# Patient Record
Sex: FEMALE | Race: WHITE | NOT HISPANIC OR LATINO | Employment: OTHER | URBAN - METROPOLITAN AREA
[De-identification: names, ages, dates, MRNs, and addresses within clinical notes are randomized per-mention and may not be internally consistent; named-entity substitution may affect disease eponyms.]

---

## 2017-01-10 ENCOUNTER — TRANSCRIBE ORDERS (OUTPATIENT)
Dept: ADMINISTRATIVE | Facility: HOSPITAL | Age: 77
End: 2017-01-10

## 2017-01-10 DIAGNOSIS — Z13.820 SCREENING FOR OSTEOPOROSIS: Primary | ICD-10-CM

## 2017-01-17 ENCOUNTER — HOSPITAL ENCOUNTER (OUTPATIENT)
Dept: BONE DENSITY | Facility: HOSPITAL | Age: 77
Discharge: HOME | End: 2017-01-17
Admitting: INTERNAL MEDICINE

## 2017-01-17 DIAGNOSIS — Z13.820 SCREENING FOR OSTEOPOROSIS: ICD-10-CM

## 2017-01-17 PROCEDURE — 77080 DXA BONE DENSITY AXIAL: CPT | Performed by: RADIOLOGY

## 2017-01-17 PROCEDURE — 77080 DXA BONE DENSITY AXIAL: CPT

## 2017-07-31 ENCOUNTER — OFFICE VISIT (OUTPATIENT)
Dept: SLEEP MEDICINE | Facility: HOSPITAL | Age: 77
End: 2017-07-31

## 2017-07-31 VITALS
WEIGHT: 166 LBS | BODY MASS INDEX: 29.41 KG/M2 | DIASTOLIC BLOOD PRESSURE: 66 MMHG | SYSTOLIC BLOOD PRESSURE: 144 MMHG | HEART RATE: 82 BPM | HEIGHT: 63 IN | OXYGEN SATURATION: 96 %

## 2017-07-31 DIAGNOSIS — G47.33 OBSTRUCTIVE SLEEP APNEA, ADULT: Primary | ICD-10-CM

## 2017-07-31 DIAGNOSIS — G47.33 MILD OBSTRUCTIVE SLEEP APNEA: ICD-10-CM

## 2017-07-31 PROCEDURE — 99213 OFFICE O/P EST LOW 20 MIN: CPT | Performed by: INTERNAL MEDICINE

## 2017-07-31 NOTE — PROGRESS NOTES
Subjective:     Chief Complaint:   Chief Complaint   Patient presents with   • Follow-up       HPI:    Kaylen Garrison is a 77 y.o. female here for follow-up of obstructive sleep apnea.    BERNARD well controlled on current auto CPAP and nasal mask pillow mask.    Further details:    Since starting PAP sleep problem has: remained the same  Currently using PAP: yes Hours of usage during the night: 7    Amount of sleep per night : 7 hours  Average length of time it takes to fall asleep : 10 minutes  Number of awakenings per night : 1     She feels fatigue (tiredness, exhaustion, lethargy) in the daytime even when not sleepy? No  She feels sleepy (or struggles to stay awake) in the daytime? No    [place] Scale scored as 1/24.    Type of mask: nasal pillow    She (since starting PAP or since last visit) has problems with the following:   Pressure from the mask 0 - No Problem  Skin irritation from the mask 0 - No Problem  Mask coming off face 0 - No Problem  Air leaks from the mask 1 - Mild Problems  Dry mouth or throat 1 - Mild Problems  Nasal congestion 0 - No Problem    I reviewed her PAP download:  Average pressure: 9  Average AHI:  3  Average minutes in large leak per night: 5      Current medications are:   Current Outpatient Prescriptions:   •  amLODIPine (NORVASC) 2.5 MG tablet, daily., Disp: , Rfl:   •  aspirin 81 MG tablet, Take 81 mg by mouth daily., Disp: , Rfl:   •  glipiZIDE (GLUCOTROL) 5 MG ER tablet, daily., Disp: , Rfl:   •  metFORMIN XR (GLUCOPHAGE-XR) 500 MG 24 hr tablet, 2 (two) times a day. 2 tabs 2x daily, Disp: , Rfl:   •  simvastatin (ZOCOR) 20 MG tablet, daily., Disp: , Rfl:   •  sitaGLIPtin (JANUVIA) 100 MG tablet, Take  by mouth daily., Disp: , Rfl:   •  triamterene-hydrochlorothiazide (DYAZIDE) 37.5-25 MG per capsule, daily., Disp: , Rfl: .      The patient's relevant past medical, surgical, family and social history were reviewed and updated in Epic as appropriate.     ROS:    Review of  Systems   Constitutional: Negative for fatigue.   HENT: Negative for congestion.    Respiratory: Negative for apnea.    Psychiatric/Behavioral: Negative for sleep disturbance.         Objective:    Physical Exam   Constitutional: She is oriented to person, place, and time. She appears well-developed and well-nourished.   HENT:   Head: Normocephalic and atraumatic.   Mouth/Throat: Oropharynx is clear and moist.   Class II airway   Neck: Neck supple. No thyromegaly present.   Cardiovascular: Normal rate and regular rhythm.  Exam reveals no gallop and no friction rub.    No murmur heard.  Pulmonary/Chest: Effort normal. No respiratory distress. She has no wheezes. She has no rales.   Musculoskeletal: She exhibits no edema.   Neurological: She is alert and oriented to person, place, and time.   Skin: Skin is warm and dry.   Psychiatric: She has a normal mood and affect. Her behavior is normal.   Vitals reviewed.      Data:    Patient's PAP download was personally reviewed including raw data and results.    Assessment:    Problem List Items Addressed This Visit        Pulmonary Problems    Mild obstructive sleep apnea    Overview     APAP 8-18           Other Visit Diagnoses     Obstructive sleep apnea, adult    -  Primary          BERNARD well controlled on current auto CPAP and nasal mask pillow mask.    Plan:     No change in PAP settings.  No change in her mask size or type.  Continued efforts at further weight loss.  I urged 7-8 hours of sleep per night on average.  I renewed supplies for the next year.  Follow-up scheduled.  If problems in the interim she knows how to contact us or her [employer] company.  Discussed the above in detail with the patient.        Signed by  Deuce Tyson MD

## 2017-09-19 ENCOUNTER — OFFICE VISIT (OUTPATIENT)
Dept: CARDIOLOGY | Facility: CLINIC | Age: 77
End: 2017-09-19

## 2017-09-19 VITALS
BODY MASS INDEX: 30.78 KG/M2 | DIASTOLIC BLOOD PRESSURE: 58 MMHG | SYSTOLIC BLOOD PRESSURE: 114 MMHG | HEIGHT: 61 IN | HEART RATE: 82 BPM | WEIGHT: 163 LBS

## 2017-09-19 DIAGNOSIS — R55 SYNCOPE, UNSPECIFIED SYNCOPE TYPE: Primary | ICD-10-CM

## 2017-09-19 DIAGNOSIS — E78.2 MIXED HYPERLIPIDEMIA: ICD-10-CM

## 2017-09-19 DIAGNOSIS — I10 ESSENTIAL HYPERTENSION: ICD-10-CM

## 2017-09-19 PROCEDURE — 99213 OFFICE O/P EST LOW 20 MIN: CPT | Performed by: INTERNAL MEDICINE

## 2017-09-19 NOTE — PROGRESS NOTES
Kaylen Garrison  1940  [phone]  [phone]    09/19/2017    Nika Shelton MD    Chief Complaint   Patient presents with   • Loss of Consciousness     Problem List:  1. Syncope, April 2012.    A. Echocardiogram, April 2012, revealing an LVEF of 55% to 60% with no valvular abnormalities appreciated.    B. Carotid studies performed, April 9, 2012, revealing less than 50% bilateral carotid artery stenosis.    C. No acute findings per CT scan.    D. MRI of the brain, April 9, 2012, revealing cortical atrophy, chronic ischemic changes are noted.    E. Abnormal myocardial perfusion study, May 29, 2012, revealing an LVEF of 79% with apical hypoperfusion suspicious for ischemia, Arnaud Augustine M.D.     F. MPS, 9/12/2016: EF > 70%. No evidence of inducible ischemia. Borderline inferior ST segment changes were seen.  2. Hypertension  3. Dyslipidemia   4. Diabetes mellitus, type 2.   5. Obesity.   6. Meniere's disease.   7. Surgical history.   A. Hysterectomy   B. Benign tumor removal from the left breast.       Allergies   Allergen Reactions   • Adhesive Tape    • Latex    • Penicillins Rash       Current Medications:      Current Outpatient Prescriptions:   •  amLODIPine (NORVASC) 2.5 MG tablet, daily., Disp: , Rfl:   •  aspirin 81 MG tablet, Take 81 mg by mouth daily., Disp: , Rfl:   •  glipiZIDE (GLUCOTROL) 5 MG ER tablet, daily., Disp: , Rfl:   •  metFORMIN XR (GLUCOPHAGE-XR) 500 MG 24 hr tablet, 2 (two) times a day. 2 tabs 2x daily, Disp: , Rfl:   •  simvastatin (ZOCOR) 20 MG tablet, daily., Disp: , Rfl:   •  sitaGLIPtin (JANUVIA) 100 MG tablet, Take  by mouth daily., Disp: , Rfl:   •  triamterene-hydrochlorothiazide (DYAZIDE) 37.5-25 MG per capsule, daily., Disp: , Rfl:     HPI    Kaylen Garrison presents today for 12 month follow up of syncope, hypertension, and hyperlipidemia. Since last visit, patient has been feeling well overall from a cardiovascular standpoint. Patient denies chest pain,  "palpitations, shortness of breath, edema, PND, orthopnea, dizziness, and syncope. She states she has been trying to remain as active as possible by exercising, but has not been able to do much over the past month. She is curious about what she should do about her varicose veins. She notes she does travel often, but tries to get up and walk around on her flights.    The following portions of the patient's history were reviewed and updated as appropriate: allergies, current medications and problem list.    Pertinent positives as listed in the HPI.  All other systems reviewed are negative.    Vitals:    09/19/17 1346   BP: 114/58   BP Location: Right arm   Patient Position: Sitting   Pulse: 82   Weight: 163 lb (73.9 kg)   Height: 61\" (154.9 cm)       Physical Exam:  General: Alert and oriented  Neck: Jugular venous pressure is within normal limits. Carotids have normal upstrokes without bruits.   Cardiovascular: Regular rate and rhythm with 1/6 systolic ejection murmur at right upper sternal border.  Lungs: Clear without rales or wheezes. Equal expansion is noted.   Extremities: Show no edema.  Skin: warm and dry.  Neurologic: nonfocal    Diagnostic Data:    Procedures    Assessment:      ICD-10-CM ICD-9-CM   1. Syncope, unspecified syncope type R55 780.2   2. Essential hypertension I10 401.9   3. Mixed hyperlipidemia E78.2 272.2       Plan:    1. Recommend Jobst light compression stockings for varicose veins, especially during travel.  2. Continue routine exercise regimen; 30 minutes per day, 4-5 days per week.  3. Continue current medications.  4. F/up in 12 months or sooner if needed.    Scribed for Matilde Alvarez MD by Jose Platt. 9/19/2017  2:00 PM    I Matilde Alvarez MD personally performed the services described in this documentation as scribed by the above individual in my presence, and it is both accurate and complete.    Matilde Alvarez MD, FACC    "

## 2018-08-09 ENCOUNTER — OFFICE VISIT (OUTPATIENT)
Dept: SLEEP MEDICINE | Facility: HOSPITAL | Age: 78
End: 2018-08-09

## 2018-08-09 VITALS
SYSTOLIC BLOOD PRESSURE: 127 MMHG | OXYGEN SATURATION: 97 % | HEART RATE: 77 BPM | BODY MASS INDEX: 27.68 KG/M2 | DIASTOLIC BLOOD PRESSURE: 62 MMHG | WEIGHT: 156.2 LBS | HEIGHT: 63 IN

## 2018-08-09 DIAGNOSIS — G47.33 OSA (OBSTRUCTIVE SLEEP APNEA): Primary | ICD-10-CM

## 2018-08-09 PROCEDURE — 99213 OFFICE O/P EST LOW 20 MIN: CPT | Performed by: NURSE PRACTITIONER

## 2018-08-09 NOTE — PROGRESS NOTES
Subjective: Follow-up        Chief Complaint:   Chief Complaint   Patient presents with   • Follow-up       HPI:    Kaylen Garrison is a 78 y.o. female here for follow-up of andrea.  Patient states she is doing very well with her CPAP machine.  She sleeping 7 hours approximately a night.  She feels rested upon awakening.  Her [place] score is 2/24.  She is doing well with her current settings.  She does not need any changes made today.        Current medications are:   Current Outpatient Prescriptions:   •  amLODIPine (NORVASC) 2.5 MG tablet, daily., Disp: , Rfl:   •  aspirin 81 MG tablet, Take 81 mg by mouth daily., Disp: , Rfl:   •  glipiZIDE (GLUCOTROL) 5 MG ER tablet, daily., Disp: , Rfl:   •  metFORMIN XR (GLUCOPHAGE-XR) 500 MG 24 hr tablet, 2 (two) times a day. 2 tabs 2x daily, Disp: , Rfl:   •  simvastatin (ZOCOR) 20 MG tablet, daily., Disp: , Rfl:   •  sitaGLIPtin (JANUVIA) 100 MG tablet, Take  by mouth daily., Disp: , Rfl:   •  triamterene-hydrochlorothiazide (DYAZIDE) 37.5-25 MG per capsule, daily., Disp: , Rfl: .      The patient's relevant past medical, surgical, family and social history were reviewed and updated in Epic as appropriate.       Review of Systems   Constitutional: Negative for chills, fatigue, fever and unexpected weight change.   HENT: Positive for postnasal drip and rhinorrhea.    Respiratory: Positive for apnea and shortness of breath.    Musculoskeletal: Positive for arthralgias and back pain.   Allergic/Immunologic: Positive for environmental allergies.   Neurological: Positive for dizziness.   Psychiatric/Behavioral: Positive for sleep disturbance.   All other systems reviewed and are negative.        Objective:    Physical Exam   Constitutional: She is oriented to person, place, and time. She appears well-developed and well-nourished.   HENT:   Head: Normocephalic and atraumatic.   Mouth/Throat: Oropharynx is clear and moist.   Mallampati 2 anatomy   Eyes: Conjunctivae are normal.    Neck: Neck supple. No thyromegaly present.   Cardiovascular: Normal rate and regular rhythm.    Pulmonary/Chest: Effort normal and breath sounds normal.   Lymphadenopathy:     She has no cervical adenopathy.   Neurological: She is alert and oriented to person, place, and time.   Skin: Skin is warm and dry.   Psychiatric: She has a normal mood and affect. Her behavior is normal. Judgment and thought content normal.   Nursing note and vitals reviewed.    Reviewed compliance download with patient usages at 92.8%.  AHI of 2.2.  90% pressure 9.8.    ASSESSMENT/PLAN    Kaylen was seen today for follow-up.    Diagnoses and all orders for this visit:    BERNARD (obstructive sleep apnea)  -     CPAP Therapy            1. Counseled patient regarding multimodal approach with healthy nutrition, healthy sleep, regular physical activity, social activities, counseling, and medications. Encouraged to practice lateral sleep position. Avoid alcohol and sedatives close to bedtime.  2. Refill supplies ×1 year.  Return to clinic one year or sooner as symptoms warrant.    I have reviewed the results of my evaluation and impression and discussed my recommendations in detail with the patient.      Signed by  VALERIANO Covarrubias    August 9, 2018      CC: Nika Shelton MD          No ref. provider found

## 2018-08-09 NOTE — PATIENT INSTRUCTIONS

## 2019-02-05 NOTE — PROGRESS NOTES
Kaylen Garrison  1940  78 y.o.  [phone]  [phone]      02/06/2019    Nika Shelton MD    Chief Complaint   Patient presents with   • Syncope, unspecified syncope type       Problem List:  1. Syncope, April 2012:  a. Echocardiogram, April 2012: EF 55-60% with no valvular abnormalities.   b. Carotid studies, 04/09/2012: 0-49% bilateral carotid artery stenosis.   c. No acute findings per CT scan.   d. MRI of the brain, 04/09/2012: Cortical atrophy, chronic ischemic changes noted.   e. Abnormal MPS, 05/29/2012: EF 79% with apical hypoperfusion suspicious for ischemia, Dr. Arnaud Augustine.  f. MPS, 09/12/2016: EF > 70%. No evidence of inducible ischemia. Borderline inferior ST segment changes.  2. Hypertension.  3. Dyslipidemia.  4. Diabetes mellitus, type 2.   5. Obesity.   6. Meniere's disease.  7. Surgical history:  a. Hysterectomy.  b. Benign tumor removal from the left breast.     Allergies   Allergen Reactions   • Adhesive Tape    • Latex    • Penicillins Rash       Current Medications:    Current Outpatient Medications:   •  amLODIPine (NORVASC) 2.5 MG tablet, Take 2.5 mg by mouth Daily., Disp: , Rfl:   •  aspirin 81 MG tablet, Take 81 mg by mouth daily., Disp: , Rfl:   •  glipiZIDE (GLUCOTROL) 5 MG ER tablet, Take 5 mg by mouth Daily., Disp: , Rfl:   •  metFORMIN XR (GLUCOPHAGE-XR) 500 MG 24 hr tablet, Take 1,000 mg by mouth 2 (Two) Times a Day. 2 tabs 2x daily , Disp: , Rfl:   •  simvastatin (ZOCOR) 20 MG tablet, Take 20 mg by mouth Daily., Disp: , Rfl:   •  sitaGLIPtin (JANUVIA) 100 MG tablet, Take  by mouth daily., Disp: , Rfl:   •  triamterene-hydrochlorothiazide (DYAZIDE) 37.5-25 MG per capsule, daily., Disp: , Rfl:     HPI    Kaylen Garrison is a 78 y.o. female who presents today for annual follow up of hypertension and hyperlipidemia. Since last visit, she has been doing well overall from a cardiovascular standpoint. Patient's PCP switched her from simvastatin 20 mg to rosuvastatin 20  "mg for better control of her hyperlipidemia. She has not made this change yet as she wanted to make sure that we would agree with this decision. Patient denies chest pain, palpitations, shortness of breath, edema, dizziness, and syncope. Her twin sister is a physical therapist and has been advising her on exercises to remain active and help with her balance.    The following portions of the patient's history were reviewed and updated as appropriate: allergies, current medications and problem list.    Pertinent positives as listed in the HPI.  All other systems reviewed are negative.    Vitals:    02/06/19 0940   BP: 138/56   BP Location: Right arm   Patient Position: Sitting   Pulse: 90   Weight: 71.2 kg (157 lb)   Height: 160 cm (63\")       Physical Exam:    General: Alert and oriented  Neck: Jugular venous pressure is within normal limits. Carotids have normal upstrokes without bruits.   Cardiovascular: Heart has a nondisplaced focal PMI. Regular rate and rhythm with 1/6 systolic ejection murmur at the right upper sternal border. No gallop or rub.  Lungs: Clear without rales or wheezes. Equal expansion is noted.   Extremities: Show no edema.  Skin: warm and dry.  Neurologic: nonfocal    Diagnostic Data:  No recent lab results available for review.     Procedures    Assessment:      ICD-10-CM ICD-9-CM   1. Essential hypertension I10 401.9   2. Mixed hyperlipidemia E78.2 272.2       Plan:    1. Continue aspirin 81 mg.  2. Continue amlodipine and triamterene-hydrochlorothiazide for hypertension.  3. DC simvastatin and start rosuvastatin 20 mg daily.  4. Continue all other current medications.  5. F/up in 12 months or sooner if needed.    Scribed for Matilde Alvarez MD by Genevieve Fernandez. 2/6/2019  10:07 AM     I Matilde Alvarez MD personally performed the services described in this documentation as scribed by the above individual in my presence, and it is both accurate and complete.    Matilde Alvarez, " MD, FACC

## 2019-02-06 ENCOUNTER — OFFICE VISIT (OUTPATIENT)
Dept: CARDIOLOGY | Facility: CLINIC | Age: 79
End: 2019-02-06

## 2019-02-06 VITALS
DIASTOLIC BLOOD PRESSURE: 56 MMHG | WEIGHT: 157 LBS | HEART RATE: 90 BPM | BODY MASS INDEX: 27.82 KG/M2 | SYSTOLIC BLOOD PRESSURE: 138 MMHG | HEIGHT: 63 IN

## 2019-02-06 DIAGNOSIS — E78.2 MIXED HYPERLIPIDEMIA: ICD-10-CM

## 2019-02-06 DIAGNOSIS — I10 ESSENTIAL HYPERTENSION: Primary | ICD-10-CM

## 2019-02-06 PROCEDURE — 99213 OFFICE O/P EST LOW 20 MIN: CPT | Performed by: INTERNAL MEDICINE

## 2019-08-02 ENCOUNTER — OFFICE VISIT (OUTPATIENT)
Dept: SLEEP MEDICINE | Facility: HOSPITAL | Age: 79
End: 2019-08-02

## 2019-08-02 VITALS
HEART RATE: 73 BPM | DIASTOLIC BLOOD PRESSURE: 57 MMHG | OXYGEN SATURATION: 99 % | WEIGHT: 153.2 LBS | HEIGHT: 63 IN | SYSTOLIC BLOOD PRESSURE: 117 MMHG | BODY MASS INDEX: 27.14 KG/M2

## 2019-08-02 DIAGNOSIS — G47.33 OSA (OBSTRUCTIVE SLEEP APNEA): Primary | ICD-10-CM

## 2019-08-02 PROCEDURE — 99212 OFFICE O/P EST SF 10 MIN: CPT | Performed by: NURSE PRACTITIONER

## 2019-08-02 NOTE — PATIENT INSTRUCTIONS

## 2019-08-02 NOTE — PROGRESS NOTES
"    Chief Complaint:   Chief Complaint   Patient presents with   • Follow-up       HPI:    Kaylen Garrison is a 79 y.o. female here for follow-up of sleep apnea.  Patient was last seen 8/9/2018.  Patient states she is doing well with CPAP therapy.  Patient is sleeping 7 hours nightly and feels refreshed upon awakening.  Patient has an [place] score of 1/24.  Patient has been experiencing a little dry mouth.  Patient has not increased the humidity settings on her machine and has not tried any over-the-counter medication.  Patient currently has allergies after cleaning out boxes in her garage, she has watery eyes and nasal congestion.  Other than this patient states she is doing \"wonderful.\"        Current medications are:   Current Outpatient Medications:   •  amLODIPine (NORVASC) 2.5 MG tablet, Take 2.5 mg by mouth Daily., Disp: , Rfl:   •  aspirin 81 MG tablet, Take 81 mg by mouth daily., Disp: , Rfl:   •  glipiZIDE (GLUCOTROL) 5 MG ER tablet, Take 5 mg by mouth Daily., Disp: , Rfl:   •  metFORMIN XR (GLUCOPHAGE-XR) 500 MG 24 hr tablet, Take 1,000 mg by mouth 2 (Two) Times a Day. 2 tabs 2x daily , Disp: , Rfl:   •  sitaGLIPtin (JANUVIA) 100 MG tablet, Take  by mouth daily., Disp: , Rfl:   •  triamterene-hydrochlorothiazide (DYAZIDE) 37.5-25 MG per capsule, daily., Disp: , Rfl: .      The patient's relevant past medical, surgical, family and social history were reviewed and updated in Epic as appropriate.       Review of Systems   HENT: Positive for congestion, postnasal drip and rhinorrhea.    Eyes: Positive for redness, itching and visual disturbance.   Respiratory: Positive for apnea and shortness of breath.    Musculoskeletal: Positive for arthralgias and back pain.   Allergic/Immunologic: Positive for environmental allergies.   Neurological: Positive for dizziness.   Psychiatric/Behavioral: Positive for sleep disturbance.   All other systems reviewed and are negative.        Objective:    Physical Exam "   Constitutional: She is oriented to person, place, and time. She appears well-developed and well-nourished.   HENT:   Head: Normocephalic and atraumatic.   Mouth/Throat: Oropharynx is clear and moist.   Class 2 airway   Eyes: Conjunctivae are normal.   Neck: Neck supple. No thyromegaly present.   Cardiovascular: Normal rate and regular rhythm.   Pulmonary/Chest: Effort normal and breath sounds normal.   Lymphadenopathy:     She has no cervical adenopathy.   Neurological: She is alert and oriented to person, place, and time.   Skin: Skin is warm and dry.   Psychiatric: She has a normal mood and affect. Her behavior is normal. Judgment and thought content normal.   Nursing note and vitals reviewed.  168/1 80 days of use.  Greater than 4-hour use 90%.  AHI 2.7.  Percent pressure 10.5.  Download reviewed with patient.      ASSESSMENT/PLAN    Kaylen was seen today for follow-up.    Diagnoses and all orders for this visit:    BERNARD (obstructive sleep apnea)  -     CPAP Therapy            1. Counseled patient regarding multimodal approach with healthy nutrition, healthy sleep, regular physical activity, social activities, counseling, and medications. Encouraged to practice lateral sleep position. Avoid alcohol and sedatives close to bedtime.  2.   Refill supplies x1 year.  Return to clinic 1 year or sooner if symptoms warrant.  Patient to increase humidity or try Biotene if her mucous membranes continue to be dry.  I have reviewed the results of my evaluation and impression and discussed my recommendations in detail with the patient.      Signed by  VALERIANO Covarrubias    August 2, 2019      CC: Nika Shelton MD          No ref. provider found

## 2020-02-12 ENCOUNTER — OFFICE VISIT (OUTPATIENT)
Dept: CARDIOLOGY | Facility: CLINIC | Age: 80
End: 2020-02-12

## 2020-02-12 VITALS
BODY MASS INDEX: 26.58 KG/M2 | WEIGHT: 150 LBS | HEIGHT: 63 IN | SYSTOLIC BLOOD PRESSURE: 126 MMHG | HEART RATE: 101 BPM | DIASTOLIC BLOOD PRESSURE: 38 MMHG

## 2020-02-12 DIAGNOSIS — E78.2 MIXED HYPERLIPIDEMIA: ICD-10-CM

## 2020-02-12 DIAGNOSIS — E66.3 OVERWEIGHT (BMI 25.0-29.9): ICD-10-CM

## 2020-02-12 DIAGNOSIS — I10 ESSENTIAL HYPERTENSION: Primary | ICD-10-CM

## 2020-02-12 PROCEDURE — 99213 OFFICE O/P EST LOW 20 MIN: CPT | Performed by: INTERNAL MEDICINE

## 2020-02-12 RX ORDER — AMPICILLIN TRIHYDRATE 250 MG
500 CAPSULE ORAL DAILY
COMMUNITY

## 2020-02-12 RX ORDER — ROSUVASTATIN CALCIUM 20 MG/1
20 TABLET, COATED ORAL DAILY
COMMUNITY
Start: 2019-12-09 | End: 2021-12-08 | Stop reason: ALTCHOICE

## 2020-02-12 NOTE — PROGRESS NOTES
[facility]    Patient ID: Kaylen Garrison is a  79 y.o.  female.  : 1940   Contact: [phone]    Encounter date: 2020    PCP: Nika Shelton MD      Chief complaint:   Chief Complaint   Patient presents with   • Essential hypertension       Problem List:  1. Syncope, 2012:  a. Echocardiogram, 2012: EF 55-60% with no valvular abnormalities.   b. Carotid studies, 2012: 0-49% bilateral carotid artery stenosis.   c. No acute findings per CT scan.   d. MRI of the brain, 2012: Cortical atrophy, chronic ischemic changes noted.   e. Abnormal MPS, 2012: EF 79% with apical hypoperfusion suspicious for ischemia, Dr. Arnaud Augustine.  f. MPS, 2016: EF > 70%. No evidence of inducible ischemia. Borderline inferior ST segment changes.  2. Hypertension.  3. Dyslipidemia.  4. Diabetes mellitus, type 2.   5. Obesity.   6. Meniere's disease.  7. Surgical history:  a. Hysterectomy.  b. Benign tumor removal from the left breast.     Allergies   Allergen Reactions   • Adhesive Tape Rash   • Latex Rash   • Penicillins Rash       Current Medications:      Current Outpatient Medications:   •  amLODIPine (NORVASC) 2.5 MG tablet, Take 2.5 mg by mouth Daily., Disp: , Rfl:   •  aspirin 81 MG tablet, Take 81 mg by mouth daily., Disp: , Rfl:   •  Cinnamon 500 MG capsule, Take 500 mg by mouth Daily., Disp: , Rfl:   •  glipiZIDE (GLUCOTROL) 5 MG ER tablet, Take 5 mg by mouth Daily., Disp: , Rfl:   •  metFORMIN (GLUCOPHAGE) 500 MG tablet, Take 1,000 mg by mouth 2 (Two) Times a Day With Meals., Disp: , Rfl:   •  Mirabegron ER (MYRBETRIQ) 25 MG tablet sustained-release 24 hour 24 hr tablet, Take 25 mg by mouth Daily., Disp: , Rfl:   •  Omega-3 300 MG capsule, Take 300 mg by mouth Daily., Disp: , Rfl:   •  rosuvastatin (CRESTOR) 20 MG tablet, Take 20 mg by mouth Daily., Disp: , Rfl:   •  sitaGLIPtin (JANUVIA) 100 MG tablet, Take  by mouth  "daily., Disp: , Rfl:   •  triamterene-hydrochlorothiazide (DYAZIDE) 37.5-25 MG per capsule, Take 1 capsule by mouth Daily., Disp: , Rfl:     HPI    Kaylen Garrison is a 79 y.o. female who presents today for annual follow up of hypertension and hyperlipidemia. Since last visit, she has been feeling well overall from a cardiovascular standpoint. She has been going to physical therapy for leg weakness, and has been pleased with the results. She monitors her blood pressure at home, and reports it has been well-controlled. She denies any orthostatic dizziness. Patient denies chest pain, palpitations, shortness of breath, edema, dizziness, and syncope. She stays busy with [facility]. Pt has questions about what lifestyle changes she should make for weight loss and better overall health.      The following portions of the patient's history were reviewed and updated as appropriate: allergies, current medications and problem list.    Pertinent positives as listed in the HPI.  All other systems reviewed are negative.         Vitals:    02/12/20 1308 02/12/20 1324   BP: 98/50 (!) 126/38   BP Location: Right arm Right arm   Patient Position: Sitting Sitting   Pulse: 101    Weight: 68 kg (150 lb)    Height: 160 cm (63\")        Physical Exam:  General: Alert and oriented.  Neck: Jugular venous pressure is within normal limits. Carotids have normal upstrokes without bruits.   Cardiovascular: Heart has a nondisplaced focal PMI. Regular rate and rhythm without gallop or rub. 1/6 ASHWIN RUSB.  Lungs: Clear without rales or wheezes. Equal expansion is noted.   Extremities: Show no edema.  Skin: Warm and dry.  Neurologic: Nonfocal.       Assessment:    ICD-10-CM ICD-9-CM   1. Essential hypertension I10 401.9   2. Mixed hyperlipidemia E78.2 272.2   3. Overweight (BMI 25.0-29.9) E66.3 278.02         Plan:  1. Continue PT and begin routine aerobic exercise, at least 30 minutes 5 days per week.   2. Carb cycling recommended for " weight loss, as well as exercise.  3. Continue amlodipine and Dyazide for hypertension and fluid rentention.  4. Continue rosuvastatin 20 mg for hyperlipidemia.  5. Continue all other current medications.  6. F/up in 12 months, sooner if needed.      Scribed for Matilde Alvarez MD by Genevieve Fernandez. 2/12/2020  1:41 PM     I Matilde Alvarez MD personally performed the services described in this documentation as scribed by the above individual in my presence, and it is both accurate and complete.    Matilde Alvarez MD, FACC

## 2020-08-03 ENCOUNTER — OFFICE VISIT (OUTPATIENT)
Dept: SLEEP MEDICINE | Facility: HOSPITAL | Age: 80
End: 2020-08-03

## 2020-08-03 VITALS
OXYGEN SATURATION: 98 % | BODY MASS INDEX: 25.94 KG/M2 | SYSTOLIC BLOOD PRESSURE: 152 MMHG | DIASTOLIC BLOOD PRESSURE: 67 MMHG | WEIGHT: 146.4 LBS | HEIGHT: 63 IN | HEART RATE: 86 BPM

## 2020-08-03 DIAGNOSIS — G47.33 OSA (OBSTRUCTIVE SLEEP APNEA): Primary | ICD-10-CM

## 2020-08-03 PROCEDURE — 99212 OFFICE O/P EST SF 10 MIN: CPT | Performed by: NURSE PRACTITIONER

## 2020-08-03 NOTE — PROGRESS NOTES
Chief Complaint:   Chief Complaint   Patient presents with   • Follow-up       HPI:    Kaylen Garrison is a 80 y.o. female here for follow-up of sleep apnea.  Patient was last seen 8/2/2019.  Patient has a history Hyperlipidemia, Hypertension, syncope, and diabetes.  Patient states she is doing very well with CPAP therapy.  Patient is sleeping 6 to 7 hours nightly and does feel refreshed upon awakening.  Patient will fall asleep within minutes and does not get up in the night.  Patient has an [place] score of 2/24.  Patient has no concerns or complaints related to CPAP therapy and wishes to continue.        Current medications are:   Current Outpatient Medications:   •  amLODIPine (NORVASC) 2.5 MG tablet, Take 2.5 mg by mouth Daily., Disp: , Rfl:   •  aspirin 81 MG tablet, Take 81 mg by mouth daily., Disp: , Rfl:   •  Cinnamon 500 MG capsule, Take 500 mg by mouth Daily., Disp: , Rfl:   •  glipiZIDE (GLUCOTROL) 5 MG ER tablet, Take 5 mg by mouth Daily., Disp: , Rfl:   •  metFORMIN (GLUCOPHAGE) 500 MG tablet, Take 1,000 mg by mouth 2 (Two) Times a Day With Meals., Disp: , Rfl:   •  Mirabegron ER (MYRBETRIQ) 25 MG tablet sustained-release 24 hour 24 hr tablet, Take 25 mg by mouth Daily., Disp: , Rfl:   •  Omega-3 300 MG capsule, Take 300 mg by mouth Daily., Disp: , Rfl:   •  rosuvastatin (CRESTOR) 20 MG tablet, Take 20 mg by mouth Daily., Disp: , Rfl:   •  sitaGLIPtin (JANUVIA) 100 MG tablet, Take  by mouth daily., Disp: , Rfl:   •  triamterene-hydrochlorothiazide (DYAZIDE) 37.5-25 MG per capsule, Take 1 capsule by mouth Daily., Disp: , Rfl: .      The patient's relevant past medical, surgical, family and social history were reviewed and updated in Epic as appropriate.       Review of Systems   Respiratory: Positive for apnea and shortness of breath.    Musculoskeletal: Positive for arthralgias and back pain.   Allergic/Immunologic: Positive for environmental allergies.   Neurological: Positive for dizziness.    Psychiatric/Behavioral: Positive for sleep disturbance.   All other systems reviewed and are negative.        Objective:    Physical Exam   Constitutional: She is oriented to person, place, and time. She appears well-developed and well-nourished.   HENT:   Head: Normocephalic and atraumatic.   Class 2 airway   Eyes: Conjunctivae are normal.   Neck: No tracheal deviation present.   Pulmonary/Chest: Effort normal.   Neurological: She is alert and oriented to person, place, and time.   Skin: No erythema. No pallor.   Psychiatric: She has a normal mood and affect. Her behavior is normal. Judgment and thought content normal.     90/90 days of use, > 4 hour use 97.8, 90 % pressure 10.2, ahi 2.5 , settings 8-18, download reviewed with pt     ASSESSMENT/PLAN    Kaylen was seen today for follow-up.    Diagnoses and all orders for this visit:    BERNARD (obstructive sleep apnea)  -     CPAP Therapy            1. Counseled patient regarding multimodal approach with healthy nutrition, healthy sleep, regular physical activity, social activities, counseling, and medications. Encouraged to practice lateral  sleep position. Avoid alcohol and sedatives close to bedtime.  2. Refill supplies x1 year.  Return to clinic 1 year or sooner if symptoms warrant.    I have reviewed the results of my evaluation and impression and discussed my recommendations in detail with the patient.      Signed by  VALERIANO Covarrubias    August 3, 2020      CC: Nika Shelton MD          No ref. provider found

## 2020-11-04 ENCOUNTER — TRANSCRIBE ORDERS (OUTPATIENT)
Dept: ADMINISTRATIVE | Facility: HOSPITAL | Age: 80
End: 2020-11-04

## 2020-11-04 ENCOUNTER — TELEPHONE (OUTPATIENT)
Dept: ENDOCRINOLOGY | Facility: CLINIC | Age: 80
End: 2020-11-04

## 2020-11-04 DIAGNOSIS — Z78.0 MENOPAUSE: Primary | ICD-10-CM

## 2020-11-04 NOTE — TELEPHONE ENCOUNTER
PT SAW HER PCP  YESTERDAY AND HER BLOOD PRESSURE /60  THEY TOLD HER TO CALL US    PLEASE CALL THE PT BACK [phone]

## 2020-11-04 NOTE — TELEPHONE ENCOUNTER
Called pt and left VM.  Since BP was low, she can stop the amlodipine 2.5mg qd and monitor BP.  I will try to give her a call back tomorrow as well.

## 2020-11-05 NOTE — TELEPHONE ENCOUNTER
Spoke with pt. She stopped amlodipine and dyazide. She will monitor BP and call if going above 140/85.

## 2020-12-08 ENCOUNTER — TELEPHONE (OUTPATIENT)
Dept: ENDOCRINOLOGY | Facility: CLINIC | Age: 80
End: 2020-12-08

## 2020-12-08 NOTE — TELEPHONE ENCOUNTER
Spoke with patient.  She is not going to fast for appointment.  It looks like in [place] patient will need CMP, A1c, vitamin d, PTH

## 2020-12-08 NOTE — TELEPHONE ENCOUNTER
PATIENT WOULD LIEK TO KNOW IF SHE NEEDS TO BE FASTING FOR HER UPCOMING APPT.    CALL BACK [phone]

## 2020-12-30 ENCOUNTER — OFFICE VISIT (OUTPATIENT)
Dept: ENDOCRINOLOGY | Facility: CLINIC | Age: 80
End: 2020-12-30

## 2020-12-30 ENCOUNTER — LAB (OUTPATIENT)
Dept: LAB | Facility: HOSPITAL | Age: 80
End: 2020-12-30

## 2020-12-30 ENCOUNTER — TELEPHONE (OUTPATIENT)
Dept: ENDOCRINOLOGY | Facility: CLINIC | Age: 80
End: 2020-12-30

## 2020-12-30 VITALS
DIASTOLIC BLOOD PRESSURE: 64 MMHG | OXYGEN SATURATION: 98 % | HEART RATE: 68 BPM | WEIGHT: 144 LBS | BODY MASS INDEX: 23.99 KG/M2 | SYSTOLIC BLOOD PRESSURE: 124 MMHG | HEIGHT: 65 IN | TEMPERATURE: 97.3 F

## 2020-12-30 DIAGNOSIS — E11.9 TYPE 2 DIABETES MELLITUS WITHOUT COMPLICATION, WITHOUT LONG-TERM CURRENT USE OF INSULIN (HCC): Primary | Chronic | ICD-10-CM

## 2020-12-30 DIAGNOSIS — E78.00 PURE HYPERCHOLESTEROLEMIA: ICD-10-CM

## 2020-12-30 DIAGNOSIS — I10 ESSENTIAL HYPERTENSION: ICD-10-CM

## 2020-12-30 DIAGNOSIS — E21.0 PRIMARY HYPERPARATHYROIDISM (HCC): ICD-10-CM

## 2020-12-30 LAB
25(OH)D3 SERPL-MCNC: 34.7 NG/ML (ref 30–100)
ALBUMIN SERPL-MCNC: 4.8 G/DL (ref 3.5–5.2)
ALBUMIN UR-MCNC: <1.2 MG/DL
ANION GAP SERPL CALCULATED.3IONS-SCNC: 17.2 MMOL/L (ref 5–15)
BUN SERPL-MCNC: 29 MG/DL (ref 8–23)
BUN/CREAT SERPL: 29.9 (ref 7–25)
CALCIUM SPEC-SCNC: 10.6 MG/DL (ref 8.6–10.5)
CHLORIDE SERPL-SCNC: 105 MMOL/L (ref 98–107)
CO2 SERPL-SCNC: 22.8 MMOL/L (ref 22–29)
CREAT SERPL-MCNC: 0.97 MG/DL (ref 0.57–1)
CREAT UR-MCNC: 66 MG/DL
GFR SERPL CREATININE-BSD FRML MDRD: 55 ML/MIN/1.73
GLUCOSE SERPL-MCNC: 98 MG/DL (ref 65–99)
MICROALBUMIN/CREAT UR: NORMAL MG/G{CREAT}
PHOSPHATE SERPL-MCNC: 3.7 MG/DL (ref 2.5–4.5)
POTASSIUM SERPL-SCNC: 4.3 MMOL/L (ref 3.5–5.2)
PTH-INTACT SERPL-MCNC: 67.2 PG/ML (ref 15–65)
SODIUM SERPL-SCNC: 145 MMOL/L (ref 136–145)

## 2020-12-30 PROCEDURE — 82306 VITAMIN D 25 HYDROXY: CPT | Performed by: PHYSICIAN ASSISTANT

## 2020-12-30 PROCEDURE — 83970 ASSAY OF PARATHORMONE: CPT | Performed by: PHYSICIAN ASSISTANT

## 2020-12-30 PROCEDURE — 99214 OFFICE O/P EST MOD 30 MIN: CPT | Performed by: PHYSICIAN ASSISTANT

## 2020-12-30 PROCEDURE — 82570 ASSAY OF URINE CREATININE: CPT | Performed by: PHYSICIAN ASSISTANT

## 2020-12-30 PROCEDURE — 82043 UR ALBUMIN QUANTITATIVE: CPT | Performed by: PHYSICIAN ASSISTANT

## 2020-12-30 PROCEDURE — 80069 RENAL FUNCTION PANEL: CPT | Performed by: PHYSICIAN ASSISTANT

## 2020-12-30 RX ORDER — METFORMIN HYDROCHLORIDE 500 MG/1
1000 TABLET, EXTENDED RELEASE ORAL 2 TIMES DAILY WITH MEALS
Start: 2020-12-30 | End: 2021-03-08 | Stop reason: SDUPTHER

## 2020-12-30 RX ORDER — BLOOD SUGAR DIAGNOSTIC
STRIP MISCELLANEOUS
COMMUNITY
Start: 2020-12-12 | End: 2021-07-16 | Stop reason: SDUPTHER

## 2020-12-30 RX ORDER — METFORMIN HYDROCHLORIDE 500 MG/1
1000 TABLET, EXTENDED RELEASE ORAL 2 TIMES DAILY WITH MEALS
COMMUNITY
Start: 2020-10-27 | End: 2020-12-30

## 2020-12-30 NOTE — TELEPHONE ENCOUNTER
Please call [facility] listed in her chart.  I would like to verify that she is getting the extended release version of metformin 500mg.  Thank you.

## 2020-12-30 NOTE — PROGRESS NOTES
"     Office Note      Date: 2020  Patient Name: Kaylen Garrison  MRN: [id number]  : 1940    Chief Complaint   Patient presents with   • Diabetes       History of Present Illness:   Kaylen Garrison is a 80 y.o. female who presents today for type 2 diabetes.  She remains on metformin, Januvia, and glipizide.  She is testing fasting FSBS daily.  Readings are typically <120.  She denies any hypoglycemia.  Lowest FSBS has been 88.  She reports that she has been working on her diet.  She stopped amlodipine and dyazide after having BP reading of 100/60.  She reports that she has started these back though.  She reports that most home BP readings have been around 125/65.  She denies any sores on her feet.  She reports wearing compression stockings due to varicose veins.  Eye exam up to date.  She remains on statin and aspirin.    Subjective      Review of Systems:   Review of Systems   Constitutional: Negative for appetite change, chills, fatigue, fever and unexpected weight change.   Respiratory: Negative for cough, shortness of breath and wheezing.    Cardiovascular: Negative for chest pain, palpitations and leg swelling.   Gastrointestinal: Negative for abdominal pain, constipation, diarrhea, nausea and vomiting.   Endocrine: Negative for cold intolerance, heat intolerance, polydipsia, polyphagia and polyuria.   Neurological: Negative for tremors, syncope, weakness, numbness and headaches.       The following portions of the patient's history were reviewed and updated as appropriate: allergies, current medications, past family history, past medical history, past social history, past surgical history and problem list.    Objective     Vitals:    20 0755   BP: 124/64   BP Location: Left arm   Patient Position: Sitting   Cuff Size: Adult   Pulse: 68   Temp: 97.3 °F (36.3 °C)   TempSrc: Infrared   SpO2: 98%   Weight: 65.3 kg (144 lb)   Height: 165.1 cm (65\")     Body mass index is 23.96 " kg/m².    Physical Exam  Vitals signs reviewed.   Constitutional:       General: She is not in acute distress.  Cardiovascular:      Rate and Rhythm: Normal rate and regular rhythm.      Pulses:           Dorsalis pedis pulses are 2+ on the right side and 2+ on the left side.        Posterior tibial pulses are 2+ on the right side and 2+ on the left side.      Heart sounds: Murmur (faint ASHWIN) present.   Pulmonary:      Effort: Pulmonary effort is normal.      Breath sounds: Normal breath sounds and air entry.   Musculoskeletal:      Right lower leg: No edema.      Left lower leg: No edema.      Right foot: Deformity (hammertoes, arthritic changes) present.      Left foot: Deformity (hammertoes, arthritic changes) present.   Feet:      Right foot:      Protective Sensation: 10 sites tested. 10 sites sensed.      Skin integrity: Dry skin present.      Toenail Condition: Right toenails are abnormally thick.      Left foot:      Protective Sensation: 10 sites tested. 10 sites sensed.      Skin integrity: Dry skin present.      Toenail Condition: Left toenails are abnormally thick.      Comments: Varicose veins  Neurological:      Mental Status: She is alert and oriented to person, place, and time.   Psychiatric:         Mood and Affect: Mood and affect normal.       Current Outpatient Medications   Medication Instructions   • Accu-Chek Guide test strip USE TO CHECK BLOOD GLUCOSE ONCE DAILY   • amLODIPine (NORVASC) 2.5 mg, Oral, Daily   • aspirin 81 mg, Oral, Daily   • Cinnamon 500 mg, Oral, Daily   • glipizide (GLUCOTROL XL) 5 mg, Oral, Daily   • metFORMIN ER (GLUCOPHAGE-XR) 1,000 mg, Oral, 2 Times Daily With Meals   • Mirabegron ER (MYRBETRIQ) 25 mg, Oral, Daily   • Omega-3 300 mg, Oral, Daily   • rosuvastatin (CRESTOR) 20 mg, Oral, Daily   • SITagliptin (JANUVIA) 100 mg, Oral, Daily   • triamterene-hydrochlorothiazide (DYAZIDE) 37.5-25 MG per capsule 1 capsule, Oral, Daily       Assessment / Plan      Assessment &  Plan:  1. Type 2 diabetes mellitus without complication, without long-term current use of insulin (CMS/[facility])  Recent A1c 7.0%.  Encouraged her to keep up the good work.  No known hypoglycemia.  Continue current treatment.  - Microalbumin / Creatinine Urine Ratio - Urine, Clean Catch  - Renal Function Panel    2. Essential hypertension  BP okay today.  Continue to monitor BP.  Consider stopping amlodipine.    3. Pure hypercholesterolemia  Recent lipids at goal.  Continue rosuvastatin.    4. Primary hyperparathyroidism (CMS/[facility])  Check intact PTH with calcium and 25-OH vitamin D.  - Vitamin D 25 Hydroxy  - PTH, Intact    Will notify her of pending lab results from today.    Return in about 3 months (around 3/30/2021) for Next scheduled follow up.     LISA Stone  12/30/2020

## 2021-01-13 ENCOUNTER — TELEPHONE (OUTPATIENT)
Dept: ENDOCRINOLOGY | Facility: CLINIC | Age: 81
End: 2021-01-13

## 2021-01-13 NOTE — TELEPHONE ENCOUNTER
Spoke with patient about lab results.  Kidney tests stable.  Urine protein level was normal.  Calcium mildly elevated along with intact PTH - stable mild primary hyperparathyroidism.  Vitamin D was okay.  We will continue to monitor.  Mailed her copy of results.  She will keep track of her blood pressure for 2 weeks and then send me readings.  Consider stopping amlodipine or dyazide.

## 2021-02-17 ENCOUNTER — APPOINTMENT (OUTPATIENT)
Dept: BONE DENSITY | Facility: HOSPITAL | Age: 81
End: 2021-02-17

## 2021-03-08 RX ORDER — METFORMIN HYDROCHLORIDE 500 MG/1
1000 TABLET, EXTENDED RELEASE ORAL 2 TIMES DAILY WITH MEALS
Qty: 120 TABLET | Refills: 0
Start: 2021-03-08 | End: 2021-03-31 | Stop reason: SDUPTHER

## 2021-03-11 ENCOUNTER — TELEPHONE (OUTPATIENT)
Dept: ENDOCRINOLOGY | Facility: CLINIC | Age: 81
End: 2021-03-11

## 2021-03-11 NOTE — TELEPHONE ENCOUNTER
This is not our patient.  Have tried to call number listed and there was no answer.  Left message to return call.

## 2021-03-11 NOTE — TELEPHONE ENCOUNTER
PATIENT CALLED TO REQUEST A REFILL ON METFORMIN  MG. PLEASE SEND TO [facility] [address]. SHE STATES THAT SHE HAS AN UPCOMING APPT THIS MONTH BUT WILL RUN OUT OF THIS MEDICATION PRIOR TO THAT APPT.

## 2021-03-30 ENCOUNTER — OFFICE VISIT (OUTPATIENT)
Dept: ENDOCRINOLOGY | Facility: CLINIC | Age: 81
End: 2021-03-30

## 2021-03-30 VITALS
HEIGHT: 65 IN | HEART RATE: 76 BPM | TEMPERATURE: 98 F | WEIGHT: 146 LBS | SYSTOLIC BLOOD PRESSURE: 146 MMHG | DIASTOLIC BLOOD PRESSURE: 74 MMHG | BODY MASS INDEX: 24.32 KG/M2

## 2021-03-30 DIAGNOSIS — E78.00 PURE HYPERCHOLESTEROLEMIA: Chronic | ICD-10-CM

## 2021-03-30 DIAGNOSIS — I10 ESSENTIAL HYPERTENSION: Chronic | ICD-10-CM

## 2021-03-30 DIAGNOSIS — E11.65 TYPE 2 DIABETES MELLITUS WITH HYPERGLYCEMIA, WITHOUT LONG-TERM CURRENT USE OF INSULIN (HCC): Primary | Chronic | ICD-10-CM

## 2021-03-30 LAB
EXPIRATION DATE: NORMAL
HBA1C MFR BLD: 8.1 %
Lab: NORMAL

## 2021-03-30 PROCEDURE — 83036 HEMOGLOBIN GLYCOSYLATED A1C: CPT | Performed by: PHYSICIAN ASSISTANT

## 2021-03-30 PROCEDURE — 99214 OFFICE O/P EST MOD 30 MIN: CPT | Performed by: PHYSICIAN ASSISTANT

## 2021-03-31 ENCOUNTER — TELEPHONE (OUTPATIENT)
Dept: ENDOCRINOLOGY | Facility: CLINIC | Age: 81
End: 2021-03-31

## 2021-03-31 RX ORDER — METFORMIN HYDROCHLORIDE 500 MG/1
1000 TABLET, EXTENDED RELEASE ORAL 2 TIMES DAILY WITH MEALS
Qty: 120 TABLET | Refills: 11 | Status: SHIPPED | OUTPATIENT
Start: 2021-03-31 | End: 2021-11-29 | Stop reason: SDUPTHER

## 2021-03-31 NOTE — TELEPHONE ENCOUNTER
Pt called needs a refill on Metformin  mg also needs a refill Januvia 100 mg. Pt will stop by and pickup prescriptions. Pt is going out of town and would like to leave by 2:00. Please notify pt.

## 2021-04-08 ENCOUNTER — OFFICE VISIT (OUTPATIENT)
Dept: CARDIOLOGY | Facility: CLINIC | Age: 81
End: 2021-04-08

## 2021-04-08 VITALS
HEIGHT: 63 IN | BODY MASS INDEX: 26.22 KG/M2 | SYSTOLIC BLOOD PRESSURE: 118 MMHG | OXYGEN SATURATION: 94 % | WEIGHT: 148 LBS | DIASTOLIC BLOOD PRESSURE: 84 MMHG | HEART RATE: 85 BPM

## 2021-04-08 DIAGNOSIS — E78.00 PURE HYPERCHOLESTEROLEMIA: ICD-10-CM

## 2021-04-08 DIAGNOSIS — E66.3 OVERWEIGHT (BMI 25.0-29.9): ICD-10-CM

## 2021-04-08 DIAGNOSIS — I10 ESSENTIAL HYPERTENSION: Primary | ICD-10-CM

## 2021-04-08 PROCEDURE — 99213 OFFICE O/P EST LOW 20 MIN: CPT | Performed by: INTERNAL MEDICINE

## 2021-04-08 NOTE — PROGRESS NOTES
[facility]    Patient ID: Kaylen Garrison is a 80 y.o. female.  : 1940   Contact: [phone]    Encounter date: 2021    PCP: Nika Shelton MD      Chief complaint:   Chief Complaint   Patient presents with   • Hypertension     f/u       Problem List:  1. Syncope, 2012:  a. Echocardiogram, 2012: EF 55-60% with no valvular abnormalities.   b. Carotid studies, 2012: 0-49% bilateral carotid artery stenosis.   c. No acute findings per CT scan.   d. MRI of the brain, 2012: Cortical atrophy, chronic ischemic changes noted.   e. Abnormal MPS, 2012: EF 79% with apical hypoperfusion suspicious for ischemia, Dr. Arnaud Augustine.  f. MPS, 2016: EF > 70%. No evidence of inducible ischemia. Borderline inferior ST segment changes.  2. Hypertension.  3. Dyslipidemia.  4. Diabetes mellitus, type 2.   5. Obesity.   6. Meniere's disease.  7. Surgical history:  a. Hysterectomy.  b. Benign tumor removal from the left breast.     Allergies   Allergen Reactions   • Adhesive Tape Rash   • Latex Rash   • Penicillins Rash       Current Medications:    Current Outpatient Medications:   •  Accu-Chek Guide test strip, USE TO CHECK BLOOD GLUCOSE ONCE DAILY, Disp: , Rfl:   •  amLODIPine (NORVASC) 2.5 MG tablet, Take 2.5 mg by mouth Daily., Disp: , Rfl:   •  aspirin 81 MG tablet, Take 81 mg by mouth daily., Disp: , Rfl:   •  Cinnamon 500 MG capsule, Take 500 mg by mouth Daily., Disp: , Rfl:   •  glipiZIDE (GLUCOTROL) 5 MG ER tablet, Take 5 mg by mouth Daily., Disp: , Rfl:   •  metFORMIN ER (GLUCOPHAGE-XR) 500 MG 24 hr tablet, Take 2 tablets by mouth 2 (Two) Times a Day With Meals., Disp: 120 tablet, Rfl: 11  •  Mirabegron ER (MYRBETRIQ) 25 MG tablet sustained-release 24 hour 24 hr tablet, Take 25 mg by mouth Daily., Disp: , Rfl:   •  Omega-3 300 MG capsule, Take 300 mg by mouth Daily., Disp: , Rfl:   •  rosuvastatin (CRESTOR) 20 MG tablet, Take 20 mg  "by mouth Daily., Disp: , Rfl:   •  SITagliptin (Januvia) 100 MG tablet, Take 1 tablet by mouth Daily., Disp: 30 tablet, Rfl: 11  •  triamterene-hydrochlorothiazide (DYAZIDE) 37.5-25 MG per capsule, Take 1 capsule by mouth Daily., Disp: , Rfl:     HPI    Kaylen Garrison is a 80 y.o. female who presents today for an annual follow up of cardiac risk factors. Since last visit, she has been doing well from a cardiovascular standpoint. She has been experiencing trouble with balance but is planning on beginning physical therapy soon. She has been wearing support stocking to help control POP. She has what seems to be a ganglion cyst on the palm of her hand. She has been trying to stay healthy and control her weight through diet and remaining active. Patient otherwise denies chest pain, shortness of breath, PND, edema, palpitations, syncope, or presyncope at this time.       The following portions of the patient's history were reviewed and updated as appropriate: allergies, current medications and problem list.    Pertinent positives as listed in the HPI.  All other systems reviewed are negative.         Vitals:    04/08/21 1041   BP: 118/84   BP Location: Left arm   Patient Position: Sitting   Pulse: 85   SpO2: 94%   Weight: 67.1 kg (148 lb)   Height: 160 cm (63\")       Physical Exam:  General: Alert and oriented.  Neck: Jugular venous pressure is within normal limits. Carotids have normal upstrokes without bruits.   Cardiovascular: Heart has a nondisplaced focal PMI. Regular rate and rhythm. No gallop or rub. 1/6 SE murmur  Lungs: Clear, no rales or wheezes. Equal expansion is noted.   Extremities: Show no edema.  Skin: Warm and dry.  Neurologic: Nonfocal.     Diagnostic Data (reviewed with patient):  Lab date: 11/03/2020  • FLP: , TG 99, HDL 61, LDL 58     Lab Results   Component Value Date    GLUCOSE 98 12/30/2020    BUN 29 (H) 12/30/2020    CREATININE 0.97 12/30/2020    EGFRIFNONA 55 (L) 12/30/2020    BCR 29.9 " (H) 12/30/2020     12/30/2020    K 4.3 12/30/2020     12/30/2020    CO2 22.8 12/30/2020    CALCIUM 10.6 (H) 12/30/2020    ALBUMIN 4.80 12/30/2020       Procedures    Advance Care Planning   ACP discussion was held with the patient during this visit. Patient does not have an advance directive, declines further assistance.      Assessment:    ICD-10-CM ICD-9-CM   1. Essential hypertension  I10 401.9   2. Pure hypercholesterolemia  E78.00 272.0   3. Overweight (BMI 25.0-29.9)  E66.3 278.02         Plan:  1. Continue amlodipine 2.5mg and Dyazide 37.5-25mg for hypertension.   2. Continue rosuvastatin 20mg for hyperlipidemia.   3. Continue all other current medications.  4. F/up in 12 months, sooner if needed.    Scribed for Matilde Alvarez MD by Jose Antonio Alegre. 4/8/2021  10:53 EDT     I Matilde Alvarez MD personally performed the services described in this documentation as scribed by the above individual in my presence, and it is both accurate and complete.    Matilde Alvarez MD, [facility]

## 2021-04-09 ENCOUNTER — HOSPITAL ENCOUNTER (OUTPATIENT)
Dept: BONE DENSITY | Facility: HOSPITAL | Age: 81
Discharge: HOME | End: 2021-04-09
Admitting: INTERNAL MEDICINE

## 2021-04-09 DIAGNOSIS — Z78.0 MENOPAUSE: ICD-10-CM

## 2021-04-09 PROCEDURE — 77080 DXA BONE DENSITY AXIAL: CPT

## 2021-05-05 ENCOUNTER — TELEPHONE (OUTPATIENT)
Dept: ENDOCRINOLOGY | Facility: CLINIC | Age: 81
End: 2021-05-05

## 2021-05-05 NOTE — TELEPHONE ENCOUNTER
Pt wanted to come in today for labs can we put an order in her chart and call her when this is done  [phone]

## 2021-05-05 NOTE — TELEPHONE ENCOUNTER
Spoke with patient.  She is unsure of what labs.  States her PCP told her this office needed to order labs due to her DEXA scan.  She is going to contact PCP and have them contact us if labs are needed.

## 2021-06-07 RX ORDER — TRIAMTERENE AND HYDROCHLOROTHIAZIDE 37.5; 25 MG/1; MG/1
1 CAPSULE ORAL DAILY
Qty: 90 CAPSULE | Refills: 1 | Status: SHIPPED | OUTPATIENT
Start: 2021-06-07 | End: 2021-11-17 | Stop reason: HOSPADM

## 2021-06-24 RX ORDER — AMLODIPINE BESYLATE 2.5 MG/1
2.5 TABLET ORAL DAILY
Qty: 90 TABLET | Refills: 1 | Status: SHIPPED | OUTPATIENT
Start: 2021-06-24 | End: 2021-11-17 | Stop reason: HOSPADM

## 2021-06-28 RX ORDER — GLIPIZIDE 5 MG/1
TABLET, FILM COATED, EXTENDED RELEASE ORAL
Qty: 90 TABLET | Refills: 1 | Status: SHIPPED | OUTPATIENT
Start: 2021-06-28 | End: 2021-11-17 | Stop reason: HOSPADM

## 2021-06-30 ENCOUNTER — OFFICE VISIT (OUTPATIENT)
Dept: ENDOCRINOLOGY | Facility: CLINIC | Age: 81
End: 2021-06-30

## 2021-06-30 VITALS — SYSTOLIC BLOOD PRESSURE: 128 MMHG | OXYGEN SATURATION: 95 % | HEART RATE: 76 BPM | DIASTOLIC BLOOD PRESSURE: 60 MMHG

## 2021-06-30 DIAGNOSIS — E21.0 PRIMARY HYPERPARATHYROIDISM (HCC): Chronic | ICD-10-CM

## 2021-06-30 DIAGNOSIS — I10 ESSENTIAL HYPERTENSION: Chronic | ICD-10-CM

## 2021-06-30 DIAGNOSIS — E11.65 TYPE 2 DIABETES MELLITUS WITH HYPERGLYCEMIA, WITHOUT LONG-TERM CURRENT USE OF INSULIN (HCC): Primary | Chronic | ICD-10-CM

## 2021-06-30 DIAGNOSIS — M85.852 OSTEOPENIA OF NECK OF LEFT FEMUR: ICD-10-CM

## 2021-06-30 LAB
EXPIRATION DATE: ABNORMAL
EXPIRATION DATE: NORMAL
GLUCOSE BLDC GLUCOMTR-MCNC: 178 MG/DL (ref 70–130)
HBA1C MFR BLD: 8.7 %
Lab: ABNORMAL
Lab: NORMAL

## 2021-06-30 PROCEDURE — 82947 ASSAY GLUCOSE BLOOD QUANT: CPT | Performed by: PHYSICIAN ASSISTANT

## 2021-06-30 PROCEDURE — 83036 HEMOGLOBIN GLYCOSYLATED A1C: CPT | Performed by: PHYSICIAN ASSISTANT

## 2021-06-30 PROCEDURE — 99214 OFFICE O/P EST MOD 30 MIN: CPT | Performed by: PHYSICIAN ASSISTANT

## 2021-07-13 PROBLEM — M85.80 OSTEOPENIA: Status: ACTIVE | Noted: 2021-07-13

## 2021-07-16 RX ORDER — BLOOD SUGAR DIAGNOSTIC
1 STRIP MISCELLANEOUS DAILY
Qty: 90 EACH | Refills: 0 | Status: SHIPPED | OUTPATIENT
Start: 2021-07-16 | End: 2021-10-14

## 2021-08-03 ENCOUNTER — OFFICE VISIT (OUTPATIENT)
Dept: SLEEP MEDICINE | Facility: HOSPITAL | Age: 81
End: 2021-08-03

## 2021-08-03 VITALS
BODY MASS INDEX: 26.58 KG/M2 | TEMPERATURE: 97.7 F | RESPIRATION RATE: 18 BRPM | HEIGHT: 63 IN | WEIGHT: 150 LBS | HEART RATE: 83 BPM | SYSTOLIC BLOOD PRESSURE: 119 MMHG | DIASTOLIC BLOOD PRESSURE: 49 MMHG | OXYGEN SATURATION: 96 %

## 2021-08-03 DIAGNOSIS — G47.33 OSA (OBSTRUCTIVE SLEEP APNEA): Primary | ICD-10-CM

## 2021-08-03 PROCEDURE — 99213 OFFICE O/P EST LOW 20 MIN: CPT | Performed by: NURSE PRACTITIONER

## 2021-08-03 NOTE — PROGRESS NOTES
Chief Complaint:   Chief Complaint   Patient presents with   • Follow-up       HPI:    Kaylen Garrison is a 81 y.o. female here for follow-up of sleep apnea.  Patient was last seen 8/3/2020.  Patient has a history of CANALES, hyperlipidemia, hypertension, Ménière's disease, type 2 diabetes, primary hyperparathyroidism, osteopenia, and sleep apnea.  Patient states she continues to do well with CPAP therapy.  Patient is sleeping 6 to 7 hours nightly and does feel refreshed upon awakening.  Patient goes to sleep quickly and does not get up during the night.  Patient has an [place] score of 2/24.  Patient states her machine is 5+ years old and does wish to have an order today for a new machine.  We will provide this for her and faxed to DME.        Current medications are:   Current Outpatient Medications:   •  Accu-Chek Guide test strip, 1 each by Other route Daily for 90 days. Use as instructed, Disp: 90 each, Rfl: 0  •  amLODIPine (NORVASC) 2.5 MG tablet, Take 1 tablet by mouth Daily., Disp: 90 tablet, Rfl: 1  •  aspirin 81 MG tablet, Take 81 mg by mouth daily., Disp: , Rfl:   •  Cinnamon 500 MG capsule, Take 500 mg by mouth Daily., Disp: , Rfl:   •  glipizide (GLUCOTROL XL) 5 MG ER tablet, Take 1 tablet daily, Disp: 90 tablet, Rfl: 1  •  metFORMIN ER (GLUCOPHAGE-XR) 500 MG 24 hr tablet, Take 2 tablets by mouth 2 (Two) Times a Day With Meals., Disp: 120 tablet, Rfl: 11  •  Mirabegron ER (MYRBETRIQ) 25 MG tablet sustained-release 24 hour 24 hr tablet, Take 25 mg by mouth Daily., Disp: , Rfl:   •  Omega-3 300 MG capsule, Take 300 mg by mouth Daily., Disp: , Rfl:   •  rosuvastatin (CRESTOR) 20 MG tablet, Take 20 mg by mouth Daily., Disp: , Rfl:   •  SITagliptin (Januvia) 100 MG tablet, Take 1 tablet by mouth Daily., Disp: 30 tablet, Rfl: 11  •  triamterene-hydrochlorothiazide (DYAZIDE) 37.5-25 MG per capsule, Take 1 capsule by mouth Daily., Disp: 90 capsule, Rfl: 1.      The patient's relevant past medical, surgical,  family and social history were reviewed and updated in Epic as appropriate.       Review of Systems   Eyes: Positive for visual disturbance.   Respiratory: Positive for apnea and shortness of breath.    Musculoskeletal: Positive for arthralgias and back pain.   Allergic/Immunologic: Positive for environmental allergies.   Neurological: Positive for dizziness.   Psychiatric/Behavioral: Positive for sleep disturbance.   All other systems reviewed and are negative.        Objective:    Physical Exam  Constitutional:       Appearance: Normal appearance.   HENT:      Head: Normocephalic and atraumatic.      Mouth/Throat:      Mouth: Mucous membranes are moist.      Pharynx: Oropharynx is clear.      Comments: Class 2 airway  Eyes:      Conjunctiva/sclera: Conjunctivae normal.   Cardiovascular:      Rate and Rhythm: Normal rate and regular rhythm.   Pulmonary:      Effort: Pulmonary effort is normal.      Breath sounds: Normal breath sounds.   Skin:     General: Skin is warm and dry.   Neurological:      Mental Status: She is alert and oriented to person, place, and time.   Psychiatric:         Mood and Affect: Mood normal.         Behavior: Behavior normal.         Thought Content: Thought content normal.         Judgment: Judgment normal.       81/90 days of use  Greater than 4-hour use 88.9  90% pressure 15.7  AHI of 3.2  Settings 8-18    ASSESSMENT/PLAN    Diagnoses and all orders for this visit:    1. BERNARD (obstructive sleep apnea) (Primary)  -     CPAP Therapy            1. Counseled patient regarding multimodal approach with healthy nutrition, healthy sleep, regular physical activity, social activities, counseling, and medications. Encouraged to practice lateral sleep position. Avoid alcohol and sedatives close to bedtime.  2. Refill supplies x1 year.  Order for new machine with no changes in settings and I will see patient back in 31 to 90 days.    I have reviewed the results of my evaluation and impression and  discussed my recommendations in detail with the patient.      Signed by  VALEIRANO Covarrubias    August 3, 2021      CC: Nika Shelton MD          No ref. provider found

## 2021-08-20 ENCOUNTER — TELEPHONE (OUTPATIENT)
Dept: ENDOCRINOLOGY | Facility: CLINIC | Age: 81
End: 2021-08-20

## 2021-08-20 NOTE — TELEPHONE ENCOUNTER
Please call pt she is having blood sugar problems its been running  150-160    pts number  [phone]

## 2021-08-20 NOTE — TELEPHONE ENCOUNTER
Spoke with patient.  Since blood sugars have improved, will not make any medication adjustments.  Call back if anything changes.

## 2021-08-20 NOTE — TELEPHONE ENCOUNTER
Spoke with patient.  States you had wanted her to call back with blood sugar readings after her last appointment.  States she has been out of town.  States they have been running 150-160.  States now that she is back home, it was 120.  She thinks she can keep it down now that she is home.  She just wanted to let you know.

## 2021-08-26 ENCOUNTER — TELEPHONE (OUTPATIENT)
Dept: ENDOCRINOLOGY | Facility: CLINIC | Age: 81
End: 2021-08-26

## 2021-08-26 RX ORDER — BLOOD-GLUCOSE METER
1 EACH MISCELLANEOUS SEE ADMIN INSTRUCTIONS
Qty: 1 KIT | Refills: 0 | Status: SHIPPED | OUTPATIENT
Start: 2021-08-26

## 2021-08-26 NOTE — TELEPHONE ENCOUNTER
PT NEEDS A NEW METER HERS STOPPED WORKING    ACCU CHECK GUIDE (DO WE HAVE A SAMPLE SHE CAN HAVE)    IF NOT SHE NEEDS ONE CALLED TO [facility]     PTS NUMBER CALL BEFORE 10:30 OR AFTER 3 [phone]

## 2021-09-10 RX ORDER — SITAGLIPTIN 100 MG/1
TABLET, FILM COATED ORAL
Qty: 90 TABLET | Refills: 0 | Status: SHIPPED | OUTPATIENT
Start: 2021-09-10

## 2021-09-21 ENCOUNTER — OFFICE VISIT (OUTPATIENT)
Dept: ENDOCRINOLOGY | Facility: CLINIC | Age: 81
End: 2021-09-21

## 2021-09-21 ENCOUNTER — MEDICATION THERAPY MANAGEMENT (OUTPATIENT)
Dept: ENDOCRINOLOGY | Facility: CLINIC | Age: 81
End: 2021-09-21

## 2021-09-21 VITALS
BODY MASS INDEX: 26.22 KG/M2 | WEIGHT: 148 LBS | OXYGEN SATURATION: 98 % | HEIGHT: 63 IN | HEART RATE: 85 BPM | DIASTOLIC BLOOD PRESSURE: 58 MMHG | SYSTOLIC BLOOD PRESSURE: 148 MMHG

## 2021-09-21 DIAGNOSIS — E21.0 PRIMARY HYPERPARATHYROIDISM (HCC): ICD-10-CM

## 2021-09-21 DIAGNOSIS — I10 ESSENTIAL HYPERTENSION: Chronic | ICD-10-CM

## 2021-09-21 DIAGNOSIS — E78.00 PURE HYPERCHOLESTEROLEMIA: Chronic | ICD-10-CM

## 2021-09-21 DIAGNOSIS — E11.9 TYPE 2 DIABETES MELLITUS WITHOUT COMPLICATION, WITHOUT LONG-TERM CURRENT USE OF INSULIN (HCC): Primary | Chronic | ICD-10-CM

## 2021-09-21 PROCEDURE — 99214 OFFICE O/P EST MOD 30 MIN: CPT | Performed by: INTERNAL MEDICINE

## 2021-09-21 NOTE — PROGRESS NOTES
"     Office Note      Date: 2021  Patient Name: Kaylen Garrison  MRN: [id number]  : 1940    Chief Complaint   Patient presents with   • Thyroid Problem     hyperparathyroidism       History of Present Illness:   Kaylen Garrison is a 81 y.o. female who presents for Diabetes type 2. Diagnosed in: . Treated in past with oral agents. Current treatments: metformin, januvia and glipizide. Number of insulin shots per day: none. Checks blood sugar 1 times a day. Has low blood sugar: no. Aspirin use: Yes. Statin use: Yes. ACE-I/ARB use: No -  . Changes in health since last visit: none. Last eye exam 2021.    She saw a podiatrist due to pulled ligament in her foot.  No diabetic complications.      She has h/o primary hyperparathyroidism which has been mild and asymptomatic.  Her last DEXA showed osteopenia.  We have discussed surgery but elected to observe due to her age.  She denies any kidney stones.  She denies any bone fractures.      She had labs done with her PCP recently.  We are calling for results to be sent.    Subjective      Diabetic Complications:  Eyes: No  Kidneys: No  Feet: No  Heart: No    Diet and Exercise:  Meals per day: 3  Minutes of exercise per week: 0 mins.    Review of Systems:   Review of Systems   Constitutional: Negative.    Cardiovascular: Negative.    Gastrointestinal: Negative.    Endocrine: Negative.        The following portions of the patient's history were reviewed and updated as appropriate: allergies, current medications, past family history, past medical history, past social history, past surgical history and problem list.    Objective       Visit Vitals  /58 (BP Location: Right arm, Patient Position: Sitting, Cuff Size: Adult)   Pulse 85   Ht 160 cm (63\")   Wt 67.1 kg (148 lb)   SpO2 98%   BMI 26.22 kg/m²       Physical Exam:  Physical Exam  Constitutional:       Appearance: Normal appearance.   Neurological:      Mental Status: She is alert. "         Labs:    HbA1c  Lab Results   Component Value Date    HGBA1C 8.7 06/30/2021       CMP  Lab Results   Component Value Date    GLUCOSE 98 12/30/2020    BUN 29 (H) 12/30/2020    CREATININE 0.97 12/30/2020    EGFRIFNONA 55 (L) 12/30/2020    BCR 29.9 (H) 12/30/2020    K 4.3 12/30/2020    CO2 22.8 12/30/2020    CALCIUM 10.6 (H) 12/30/2020        Lipid Panel        TSH  No results found for: TSH, FREET4     Hemoglobin A1C  Lab Results   Component Value Date    HGBA1C 8.7 06/30/2021        Microalbumin/Creatinine  Lab Results   Component Value Date    MALBCRERATIO  12/30/2020      Comment:      Unable to calculate    MICROALBUR <1.2 12/30/2020           Assessment / Plan      Assessment & Plan:  Diagnoses and all orders for this visit:    1. Type 2 diabetes mellitus without complication, without long-term current use of insulin (CMS/[facility]) (Primary)  Assessment & Plan:  Last A1c was elevated.  Too soon to recheck today.  Continue current meds.  Watch for hypoglycemia.  She has made some dietary changes and recent FSBS have been <150.      2. Essential hypertension  Assessment & Plan:  SBP a bit elevated but DBP a bit low.  Continue observation.      3. Pure hypercholesterolemia  Assessment & Plan:  Continue statin.      4. Primary hyperparathyroidism (CMS/[facility])  Assessment & Plan:  Trying to track down recent labs.         Return in about 3 months (around 12/21/2021) for Recheck with A1c, CMP, lipids, TSH, microalbumin, foot exam.    Harley Rose MD   09/21/2021

## 2021-09-21 NOTE — PROGRESS NOTES
[facility] Pharmacy Visit [facility] Endocrinology    Kaylen Garrison is a 81 y.o. female with T2DM seen by Endocrinology and assessed by the [facility] Pharmacist at [facility]. This was an Initial MTM visit for Kaylen Garrison.    Kaylen Garrison was introduced to the [facility] and allergies, PMH, and medications were reviewed.    Insurance Coverage/Eligibility:  • [employer] Medicare D  • Patient is Eligible for [facility]      Past Medical History:   Diagnosis Date   • Benign hypertension    • History of echocardiogram 04/2012   • Hypercalcemia    • Hypercholesterolemia    • Hypertension    • Meniere's disease    • Obesity    • Overweight (BMI 25.0-29.9)    • Primary hyperparathyroidism (CMS/[facility])    • Sleep apnea with use of continuous positive airway pressure (CPAP)    • Syncope 04/2012   • Type 2 diabetes mellitus (CMS/[facility])    • Vitamin D deficiency      Social History     Socioeconomic History   • Marital status:      Spouse name: Not on file   • Number of children: Not on file   • Years of education: Not on file   • Highest education level: Not on file   Tobacco Use   • Smoking status: Never Smoker   • Smokeless tobacco: Never Used   Vaping Use   • Vaping Use: Never used   Substance and Sexual Activity   • Alcohol use: No   • Drug use: No   • Sexual activity: Defer       Medication Allergies:  Adhesive tape, Latex, and Penicillins    Current Medication List:    Current Outpatient Medications:   •  Accu-Chek Guide test strip, 1 each by Other route Daily for 90 days. Use as instructed, Disp: 90 each, Rfl: 0  •  amLODIPine (NORVASC) 2.5 MG tablet, Take 1 tablet by mouth Daily., Disp: 90 tablet, Rfl: 1  •  aspirin 81 MG tablet, Take 81 mg by mouth daily., Disp: , Rfl:   •  Blood Glucose Monitoring Suppl (Accu-Chek Guide) w/Device kit, 1 Device See Admin Instructions. Use to check blood sugar once daily.  Dx E11.9,  Disp: 1 kit, Rfl: 0  •  Cinnamon 500 MG capsule, Take 500 mg by mouth Daily., Disp: , Rfl:   •  glipizide (GLUCOTROL XL) 5 MG ER tablet, Take 1 tablet daily, Disp: 90 tablet, Rfl: 1  •  Januvia 100 MG tablet, Take 1 tablet by mouth once daily, Disp: 90 tablet, Rfl: 0  •  metFORMIN ER (GLUCOPHAGE-XR) 500 MG 24 hr tablet, Take 2 tablets by mouth 2 (Two) Times a Day With Meals., Disp: 120 tablet, Rfl: 11  •  Mirabegron ER (MYRBETRIQ) 25 MG tablet sustained-release 24 hour 24 hr tablet, Take 25 mg by mouth Daily., Disp: , Rfl:   •  Omega-3 300 MG capsule, Take 300 mg by mouth Daily., Disp: , Rfl:   •  rosuvastatin (CRESTOR) 20 MG tablet, Take 20 mg by mouth Daily., Disp: , Rfl:   •  triamterene-hydrochlorothiazide (DYAZIDE) 37.5-25 MG per capsule, Take 1 capsule by mouth Daily., Disp: 90 capsule, Rfl: 1    Vitals/Labs:  BP: (148)/(58) 148/58   Heart Rate:  [85] 85      There were no vitals filed for this visit.  Lab Results   Component Value Date    HGBA1C 8.7 06/30/2021     Lab Results   Component Value Date    GLUCOSE 98 12/30/2020    CALCIUM 10.6 (H) 12/30/2020     12/30/2020    K 4.3 12/30/2020    CO2 22.8 12/30/2020     12/30/2020    BUN 29 (H) 12/30/2020    CREATININE 0.97 12/30/2020    EGFRIFNONA 55 (L) 12/30/2020    BCR 29.9 (H) 12/30/2020    ANIONGAP 17.2 (H) 12/30/2020     No results found for: CHOL, CHLPL, TRIG, HDL, LDL, LDLDIRECT  Microalbumin    Microalbumin 12/30/20   Microalbumin, Urine <1.2              Drug-Drug Interactions:   • Glipizide + Januvia + metformin: Concurrent use increases risk for hypoglycemia. Patient currently tolerating combination well, denies any s/sx of hypoglycemia. No further action warranted at this time.    Medication Assessment:   1. Aspirin - Currently Taking  2. Statin - Currently Taking  3. ACEi/ARB - Not Indicated    Medication Education on Specialty Medication:  The patient was provided medication education via verbal and written information on new and/or  current target medications. Patient expressed understanding and had no further questions at this time.    Januvia (sitagliptin)   • Take once daily with or without food   • Side effects may include headache, arthralgia,nasopharyngitis, or upper respiratory infection   • May also cause severe joint pain       Assessment & Plan:  1. Provider Changes This Visit: None  2. Therapy Modifications Recommended: None at This Time   3. Provided contact information for the pharmacy team and discussed [facility] services available to patient, including: monitoring of refills, prior authorizations, and copay coupon cards, as applicable, as well as curb-side pick-up or mail-order as needed.   4. Ms. Garrison currently fills her prescriptions at [facility], which is conveniently located near her home. Reviewed anticipated costs of medications through  Retail Pharmacy, copays appear similar. Denies any barriers in obtaining medications at this time. Ms. Garrison states she prefers to  her prescriptions vs. Receiving via mail and is not interested in converting at this time. However, states she will call writer if she changes her mind this winter.   5. PharmD Follow Up: No further f/u warranted at this time.        Linda Moreno, SakshiD  9/21/2021  16:22 EDT

## 2021-09-21 NOTE — ASSESSMENT & PLAN NOTE
Last A1c was elevated.  Too soon to recheck today.  Continue current meds.  Watch for hypoglycemia.  She has made some dietary changes and recent FSBS have been <150.

## 2021-09-30 ENCOUNTER — TELEPHONE (OUTPATIENT)
Dept: ENDOCRINOLOGY | Facility: CLINIC | Age: 81
End: 2021-09-30

## 2021-10-05 ENCOUNTER — OFFICE VISIT (OUTPATIENT)
Dept: SLEEP MEDICINE | Facility: HOSPITAL | Age: 81
End: 2021-10-05

## 2021-10-05 VITALS
HEIGHT: 63 IN | DIASTOLIC BLOOD PRESSURE: 54 MMHG | OXYGEN SATURATION: 96 % | WEIGHT: 150.4 LBS | HEART RATE: 87 BPM | BODY MASS INDEX: 26.65 KG/M2 | SYSTOLIC BLOOD PRESSURE: 122 MMHG

## 2021-10-05 DIAGNOSIS — G47.33 OSA (OBSTRUCTIVE SLEEP APNEA): Primary | ICD-10-CM

## 2021-10-05 PROCEDURE — 99213 OFFICE O/P EST LOW 20 MIN: CPT | Performed by: NURSE PRACTITIONER

## 2021-10-05 NOTE — PROGRESS NOTES
Chief Complaint:   Chief Complaint   Patient presents with   • Follow-up       HPI:    Kaylen Garrison is a 81 y.o. female here for follow-up of sleep apnea.  Patient has a history of CANALES, hyperlipidemia, hypertension, Ménière's disease, type 2 diabetes, hyperparathyroidism, osteopenia, syncope and sleep apnea.  Patient was last seen 8/3/2021.  Patient had a machine that was 5+ years old and needed an order for a new one.  Patient states she continues to do well and does like her new machine.  She is sleeping 6 to 7 hours nightly and feels refreshed upon awakening.  Patient will go to sleep within 5 minutes and does not get up during the night.  Patient has no concerns or complaints today and wishes to continue with CPAP.        Current medications are:   Current Outpatient Medications:   •  Accu-Chek Guide test strip, 1 each by Other route Daily for 90 days. Use as instructed, Disp: 90 each, Rfl: 0  •  amLODIPine (NORVASC) 2.5 MG tablet, Take 1 tablet by mouth Daily., Disp: 90 tablet, Rfl: 1  •  aspirin 81 MG tablet, Take 81 mg by mouth daily., Disp: , Rfl:   •  Blood Glucose Monitoring Suppl (Accu-Chek Guide) w/Device kit, 1 Device See Admin Instructions. Use to check blood sugar once daily.  Dx E11.9, Disp: 1 kit, Rfl: 0  •  Cinnamon 500 MG capsule, Take 500 mg by mouth Daily., Disp: , Rfl:   •  glipizide (GLUCOTROL XL) 5 MG ER tablet, Take 1 tablet daily, Disp: 90 tablet, Rfl: 1  •  Januvia 100 MG tablet, Take 1 tablet by mouth once daily, Disp: 90 tablet, Rfl: 0  •  metFORMIN ER (GLUCOPHAGE-XR) 500 MG 24 hr tablet, Take 2 tablets by mouth 2 (Two) Times a Day With Meals., Disp: 120 tablet, Rfl: 11  •  Mirabegron ER (MYRBETRIQ) 25 MG tablet sustained-release 24 hour 24 hr tablet, Take 25 mg by mouth Daily., Disp: , Rfl:   •  Omega-3 300 MG capsule, Take 300 mg by mouth Daily., Disp: , Rfl:   •  rosuvastatin (CRESTOR) 20 MG tablet, Take 20 mg by mouth Daily., Disp: , Rfl:   •  triamterene-hydrochlorothiazide  (DYAZIDE) 37.5-25 MG per capsule, Take 1 capsule by mouth Daily., Disp: 90 capsule, Rfl: 1.      The patient's relevant past medical, surgical, family and social history were reviewed and updated in Epic as appropriate.       Review of Systems   Eyes: Positive for visual disturbance.   Respiratory: Positive for apnea and shortness of breath.    Musculoskeletal: Positive for arthralgias and back pain.   Allergic/Immunologic: Positive for environmental allergies.   Neurological: Positive for dizziness.   Psychiatric/Behavioral: Positive for sleep disturbance.   All other systems reviewed and are negative.        Objective:    Physical Exam  Constitutional:       Appearance: Normal appearance.   HENT:      Head: Normocephalic and atraumatic.      Mouth/Throat:      Mouth: Mucous membranes are moist.      Pharynx: Oropharynx is clear.      Comments: Class 2 airway  Cardiovascular:      Rate and Rhythm: Normal rate and regular rhythm.   Pulmonary:      Effort: Pulmonary effort is normal.      Breath sounds: Normal breath sounds.   Skin:     General: Skin is warm and dry.   Neurological:      Mental Status: She is alert.   Psychiatric:         Mood and Affect: Mood normal.         Behavior: Behavior normal.         Thought Content: Thought content normal.         Judgment: Judgment normal.     48/60 days of use  Greater than 4-hour use 77%  95th percentile pressure 9.6  AHI of 0.7  Settings 8-18    ASSESSMENT/PLAN    Diagnoses and all orders for this visit:    1. BERNARD (obstructive sleep apnea) (Primary)  -     CPAP Therapy            1. Counseled patient regarding multimodal approach with healthy nutrition, healthy sleep, regular physical activity, social activities, counseling, and medications. Encouraged to practice lateral  sleep position. Avoid alcohol and sedatives close to bedtime.  2. Refill supplies x1 year.  Return to clinic 1 year or sooner symptoms warrant.    I have reviewed the results of my evaluation and  impression and discussed my recommendations in detail with the patient.      Signed by  Donita Cat, VALERIANO    October 5, 2021      CC: Nika Shelton MD          No ref. provider found

## 2021-11-08 ENCOUNTER — TELEPHONE (OUTPATIENT)
Dept: ENDOCRINOLOGY | Facility: CLINIC | Age: 81
End: 2021-11-08

## 2021-11-08 NOTE — TELEPHONE ENCOUNTER
PATIENT IS REQUESTING A RETURN CALL TO DISCUSS HER INCREASE IN GLUCOSE READINGS. SHE STATES THAT HER READINGS HAVE GONE HIGH AND WOULD LIKE TO BE ADVISED ON ANY POSSIBLE RX CHANGE. SHE WOULD LIKE TO COME TO SEE DR SCHMID TODAY, OF POSSIBLE.     CALL BACK [phone]

## 2021-11-08 NOTE — TELEPHONE ENCOUNTER
Spoke with patient.  No appointments available with Dr. Rose.  Patient did not want a virtual appointment.  Appointment scheduled with EDUARD Sousa for 11/10/2021.

## 2021-11-08 NOTE — TELEPHONE ENCOUNTER
Spoke with patient.  States her blood sugars have been running 200-240 fasting.  States she is currently taking Januvia, glipizide and Metformin.  Feels like this may need to change.

## 2021-11-10 ENCOUNTER — OFFICE VISIT (OUTPATIENT)
Dept: ENDOCRINOLOGY | Facility: CLINIC | Age: 81
End: 2021-11-10

## 2021-11-10 ENCOUNTER — HOSPITAL ENCOUNTER (INPATIENT)
Facility: HOSPITAL | Age: 81
LOS: 7 days | Discharge: NURSING, REHAB OR LONG-TERM CARE | End: 2021-11-17
Attending: EMERGENCY MEDICINE | Admitting: INTERNAL MEDICINE

## 2021-11-10 ENCOUNTER — APPOINTMENT (OUTPATIENT)
Dept: GENERAL RADIOLOGY | Facility: HOSPITAL | Age: 81
End: 2021-11-10

## 2021-11-10 VITALS
BODY MASS INDEX: 27.75 KG/M2 | HEIGHT: 61 IN | WEIGHT: 147 LBS | OXYGEN SATURATION: 93 % | SYSTOLIC BLOOD PRESSURE: 104 MMHG | DIASTOLIC BLOOD PRESSURE: 62 MMHG | HEART RATE: 154 BPM | TEMPERATURE: 97.7 F

## 2021-11-10 DIAGNOSIS — R73.9 HYPERGLYCEMIA: ICD-10-CM

## 2021-11-10 DIAGNOSIS — R06.89 DECREASED BREATH SOUNDS: ICD-10-CM

## 2021-11-10 DIAGNOSIS — I48.91 ATRIAL FIBRILLATION, UNSPECIFIED TYPE (HCC): ICD-10-CM

## 2021-11-10 DIAGNOSIS — R00.0 TACHYCARDIA: ICD-10-CM

## 2021-11-10 DIAGNOSIS — I48.92 ATRIAL FLUTTER WITH RAPID VENTRICULAR RESPONSE (HCC): Primary | ICD-10-CM

## 2021-11-10 DIAGNOSIS — I48.11 LONGSTANDING PERSISTENT ATRIAL FIBRILLATION (HCC): ICD-10-CM

## 2021-11-10 DIAGNOSIS — E11.65 TYPE 2 DIABETES MELLITUS WITH HYPERGLYCEMIA, WITHOUT LONG-TERM CURRENT USE OF INSULIN (HCC): Primary | Chronic | ICD-10-CM

## 2021-11-10 LAB
ALBUMIN SERPL-MCNC: 4 G/DL (ref 3.5–5.2)
ALBUMIN/GLOB SERPL: 1.3 G/DL
ALP SERPL-CCNC: 67 U/L (ref 39–117)
ALT SERPL W P-5'-P-CCNC: 11 U/L (ref 1–33)
ANION GAP SERPL CALCULATED.3IONS-SCNC: 16 MMOL/L (ref 5–15)
AST SERPL-CCNC: 13 U/L (ref 1–32)
B-OH-BUTYR SERPL-SCNC: 1.12 MMOL/L (ref 0.02–0.27)
BACTERIA UR QL AUTO: NORMAL /HPF
BASOPHILS # BLD AUTO: 0.06 10*3/MM3 (ref 0–0.2)
BASOPHILS NFR BLD AUTO: 0.5 % (ref 0–1.5)
BILIRUB SERPL-MCNC: 0.5 MG/DL (ref 0–1.2)
BILIRUB UR QL STRIP: NEGATIVE
BUN SERPL-MCNC: 21 MG/DL (ref 8–23)
BUN/CREAT SERPL: 21.9 (ref 7–25)
CALCIUM SPEC-SCNC: 9.6 MG/DL (ref 8.6–10.5)
CHLORIDE SERPL-SCNC: 94 MMOL/L (ref 98–107)
CLARITY UR: CLEAR
CO2 SERPL-SCNC: 23 MMOL/L (ref 22–29)
COLOR UR: YELLOW
CREAT SERPL-MCNC: 0.96 MG/DL (ref 0.57–1)
DEPRECATED RDW RBC AUTO: 46.4 FL (ref 37–54)
EOSINOPHIL # BLD AUTO: 0.06 10*3/MM3 (ref 0–0.4)
EOSINOPHIL NFR BLD AUTO: 0.5 % (ref 0.3–6.2)
ERYTHROCYTE [DISTWIDTH] IN BLOOD BY AUTOMATED COUNT: 14.6 % (ref 12.3–15.4)
EXPIRATION DATE: ABNORMAL
EXPIRATION DATE: NORMAL
FLUAV RNA RESP QL NAA+PROBE: NOT DETECTED
FLUBV RNA RESP QL NAA+PROBE: NOT DETECTED
GFR SERPL CREATININE-BSD FRML MDRD: 56 ML/MIN/1.73
GLOBULIN UR ELPH-MCNC: 3.2 GM/DL
GLUCOSE BLDC GLUCOMTR-MCNC: 195 MG/DL (ref 70–130)
GLUCOSE BLDC GLUCOMTR-MCNC: 265 MG/DL (ref 70–130)
GLUCOSE BLDC GLUCOMTR-MCNC: 342 MG/DL (ref 70–130)
GLUCOSE BLDC GLUCOMTR-MCNC: 346 MG/DL (ref 70–130)
GLUCOSE BLDC GLUCOMTR-MCNC: 351 MG/DL (ref 70–130)
GLUCOSE SERPL-MCNC: 366 MG/DL (ref 65–99)
GLUCOSE UR STRIP-MCNC: ABNORMAL MG/DL
HBA1C MFR BLD: 9.8 %
HCT VFR BLD AUTO: 40.2 % (ref 34–46.6)
HGB BLD-MCNC: 13.2 G/DL (ref 12–15.9)
HGB UR QL STRIP.AUTO: NEGATIVE
HOLD SPECIMEN: NORMAL
HYALINE CASTS UR QL AUTO: NORMAL /LPF
IMM GRANULOCYTES # BLD AUTO: 0.07 10*3/MM3 (ref 0–0.05)
IMM GRANULOCYTES NFR BLD AUTO: 0.5 % (ref 0–0.5)
KETONES UR QL STRIP: ABNORMAL
LEUKOCYTE ESTERASE UR QL STRIP.AUTO: ABNORMAL
LYMPHOCYTES # BLD AUTO: 2.01 10*3/MM3 (ref 0.7–3.1)
LYMPHOCYTES NFR BLD AUTO: 15.5 % (ref 19.6–45.3)
Lab: ABNORMAL
Lab: NORMAL
MAGNESIUM SERPL-MCNC: 1.9 MG/DL (ref 1.6–2.4)
MCH RBC QN AUTO: 28.4 PG (ref 26.6–33)
MCHC RBC AUTO-ENTMCNC: 32.8 G/DL (ref 31.5–35.7)
MCV RBC AUTO: 86.5 FL (ref 79–97)
MONOCYTES # BLD AUTO: 1.01 10*3/MM3 (ref 0.1–0.9)
MONOCYTES NFR BLD AUTO: 7.8 % (ref 5–12)
NEUTROPHILS NFR BLD AUTO: 75.2 % (ref 42.7–76)
NEUTROPHILS NFR BLD AUTO: 9.77 10*3/MM3 (ref 1.7–7)
NITRITE UR QL STRIP: NEGATIVE
NRBC BLD AUTO-RTO: 0 /100 WBC (ref 0–0.2)
NT-PROBNP SERPL-MCNC: 1072 PG/ML (ref 0–1800)
PH UR STRIP.AUTO: 7 [PH] (ref 5–8)
PLATELET # BLD AUTO: 395 10*3/MM3 (ref 140–450)
PMV BLD AUTO: 10.2 FL (ref 6–12)
POTASSIUM SERPL-SCNC: 3.9 MMOL/L (ref 3.5–5.2)
PROCALCITONIN SERPL-MCNC: 0.07 NG/ML (ref 0–0.25)
PROT SERPL-MCNC: 7.2 G/DL (ref 6–8.5)
PROT UR QL STRIP: NEGATIVE
RBC # BLD AUTO: 4.65 10*6/MM3 (ref 3.77–5.28)
RBC # UR: NORMAL /HPF
REF LAB TEST METHOD: NORMAL
SARS-COV-2 RNA RESP QL NAA+PROBE: NOT DETECTED
SODIUM SERPL-SCNC: 133 MMOL/L (ref 136–145)
SP GR UR STRIP: 1.01 (ref 1–1.03)
SQUAMOUS #/AREA URNS HPF: NORMAL /HPF
TROPONIN T SERPL-MCNC: <0.01 NG/ML (ref 0–0.03)
TSH SERPL DL<=0.05 MIU/L-ACNC: 2.45 UIU/ML (ref 0.27–4.2)
UROBILINOGEN UR QL STRIP: ABNORMAL
WBC # BLD AUTO: 12.98 10*3/MM3 (ref 3.4–10.8)
WBC UR QL AUTO: NORMAL /HPF
WHOLE BLOOD HOLD SPECIMEN: NORMAL
WHOLE BLOOD HOLD SPECIMEN: NORMAL

## 2021-11-10 PROCEDURE — 82947 ASSAY GLUCOSE BLOOD QUANT: CPT | Performed by: PHYSICIAN ASSISTANT

## 2021-11-10 PROCEDURE — 80053 COMPREHEN METABOLIC PANEL: CPT | Performed by: EMERGENCY MEDICINE

## 2021-11-10 PROCEDURE — 82010 KETONE BODYS QUAN: CPT | Performed by: EMERGENCY MEDICINE

## 2021-11-10 PROCEDURE — 83880 ASSAY OF NATRIURETIC PEPTIDE: CPT | Performed by: EMERGENCY MEDICINE

## 2021-11-10 PROCEDURE — 25010000002 MAGNESIUM SULFATE 2 GM/50ML SOLUTION: Performed by: NURSE PRACTITIONER

## 2021-11-10 PROCEDURE — 83036 HEMOGLOBIN GLYCOSYLATED A1C: CPT | Performed by: PHYSICIAN ASSISTANT

## 2021-11-10 PROCEDURE — 63710000001 INSULIN DETEMIR PER 5 UNITS: Performed by: HOSPITALIST

## 2021-11-10 PROCEDURE — 99284 EMERGENCY DEPT VISIT MOD MDM: CPT

## 2021-11-10 PROCEDURE — 99214 OFFICE O/P EST MOD 30 MIN: CPT | Performed by: PHYSICIAN ASSISTANT

## 2021-11-10 PROCEDURE — 63710000001 INSULIN LISPRO (HUMAN) PER 5 UNITS: Performed by: HOSPITALIST

## 2021-11-10 PROCEDURE — 85025 COMPLETE CBC W/AUTO DIFF WBC: CPT | Performed by: EMERGENCY MEDICINE

## 2021-11-10 PROCEDURE — 84145 PROCALCITONIN (PCT): CPT | Performed by: EMERGENCY MEDICINE

## 2021-11-10 PROCEDURE — 63710000001 INSULIN REGULAR HUMAN PER 5 UNITS: Performed by: EMERGENCY MEDICINE

## 2021-11-10 PROCEDURE — 3046F HEMOGLOBIN A1C LEVEL >9.0%: CPT | Performed by: PHYSICIAN ASSISTANT

## 2021-11-10 PROCEDURE — 25010000002 ENOXAPARIN PER 10 MG: Performed by: HOSPITALIST

## 2021-11-10 PROCEDURE — 93005 ELECTROCARDIOGRAM TRACING: CPT | Performed by: EMERGENCY MEDICINE

## 2021-11-10 PROCEDURE — 84443 ASSAY THYROID STIM HORMONE: CPT | Performed by: EMERGENCY MEDICINE

## 2021-11-10 PROCEDURE — 99223 1ST HOSP IP/OBS HIGH 75: CPT | Performed by: HOSPITALIST

## 2021-11-10 PROCEDURE — 84484 ASSAY OF TROPONIN QUANT: CPT | Performed by: EMERGENCY MEDICINE

## 2021-11-10 PROCEDURE — 81001 URINALYSIS AUTO W/SCOPE: CPT | Performed by: EMERGENCY MEDICINE

## 2021-11-10 PROCEDURE — 83735 ASSAY OF MAGNESIUM: CPT | Performed by: EMERGENCY MEDICINE

## 2021-11-10 PROCEDURE — 93010 ELECTROCARDIOGRAM REPORT: CPT | Performed by: INTERNAL MEDICINE

## 2021-11-10 PROCEDURE — 82962 GLUCOSE BLOOD TEST: CPT

## 2021-11-10 PROCEDURE — 99222 1ST HOSP IP/OBS MODERATE 55: CPT | Performed by: INTERNAL MEDICINE

## 2021-11-10 PROCEDURE — 87636 SARSCOV2 & INF A&B AMP PRB: CPT | Performed by: HOSPITALIST

## 2021-11-10 PROCEDURE — 93005 ELECTROCARDIOGRAM TRACING: CPT | Performed by: NURSE PRACTITIONER

## 2021-11-10 PROCEDURE — 71045 X-RAY EXAM CHEST 1 VIEW: CPT

## 2021-11-10 PROCEDURE — 25010000002 AMIODARONE IN DEXTROSE 5% 360-4.14 MG/200ML-% SOLUTION: Performed by: NURSE PRACTITIONER

## 2021-11-10 RX ORDER — OXYBUTYNIN CHLORIDE 5 MG/1
5 TABLET, EXTENDED RELEASE ORAL DAILY
Status: DISCONTINUED | OUTPATIENT
Start: 2021-11-11 | End: 2021-11-17 | Stop reason: HOSPADM

## 2021-11-10 RX ORDER — ASPIRIN 81 MG/1
81 TABLET ORAL DAILY
Status: DISCONTINUED | OUTPATIENT
Start: 2021-11-10 | End: 2021-11-17 | Stop reason: HOSPADM

## 2021-11-10 RX ORDER — SODIUM CHLORIDE 0.9 % (FLUSH) 0.9 %
10 SYRINGE (ML) INJECTION EVERY 12 HOURS SCHEDULED
Status: DISCONTINUED | OUTPATIENT
Start: 2021-11-10 | End: 2021-11-17 | Stop reason: HOSPADM

## 2021-11-10 RX ORDER — SODIUM CHLORIDE 0.9 % (FLUSH) 0.9 %
10 SYRINGE (ML) INJECTION AS NEEDED
Status: DISCONTINUED | OUTPATIENT
Start: 2021-11-10 | End: 2021-11-17 | Stop reason: HOSPADM

## 2021-11-10 RX ORDER — ACETAMINOPHEN 325 MG/1
650 TABLET ORAL EVERY 4 HOURS PRN
Status: DISCONTINUED | OUTPATIENT
Start: 2021-11-10 | End: 2021-11-17 | Stop reason: HOSPADM

## 2021-11-10 RX ORDER — ROSUVASTATIN CALCIUM 20 MG/1
20 TABLET, COATED ORAL NIGHTLY
Status: DISCONTINUED | OUTPATIENT
Start: 2021-11-10 | End: 2021-11-17 | Stop reason: HOSPADM

## 2021-11-10 RX ORDER — DILTIAZEM HCL IN NACL,ISO-OSM 125 MG/125
5-15 PLASTIC BAG, INJECTION (ML) INTRAVENOUS
Status: DISCONTINUED | OUTPATIENT
Start: 2021-11-10 | End: 2021-11-10

## 2021-11-10 RX ORDER — DILTIAZEM HYDROCHLORIDE 5 MG/ML
5 INJECTION INTRAVENOUS ONCE
Status: COMPLETED | OUTPATIENT
Start: 2021-11-10 | End: 2021-11-10

## 2021-11-10 RX ORDER — ONDANSETRON 2 MG/ML
4 INJECTION INTRAMUSCULAR; INTRAVENOUS EVERY 6 HOURS PRN
Status: DISCONTINUED | OUTPATIENT
Start: 2021-11-10 | End: 2021-11-17 | Stop reason: HOSPADM

## 2021-11-10 RX ORDER — DEXTROSE MONOHYDRATE 25 G/50ML
25 INJECTION, SOLUTION INTRAVENOUS
Status: DISCONTINUED | OUTPATIENT
Start: 2021-11-10 | End: 2021-11-17 | Stop reason: HOSPADM

## 2021-11-10 RX ORDER — ONDANSETRON 4 MG/1
4 TABLET, FILM COATED ORAL EVERY 6 HOURS PRN
Status: DISCONTINUED | OUTPATIENT
Start: 2021-11-10 | End: 2021-11-17 | Stop reason: HOSPADM

## 2021-11-10 RX ORDER — NICOTINE POLACRILEX 4 MG
15 LOZENGE BUCCAL
Status: DISCONTINUED | OUTPATIENT
Start: 2021-11-10 | End: 2021-11-13 | Stop reason: SDUPTHER

## 2021-11-10 RX ORDER — TRIAMTERENE AND HYDROCHLOROTHIAZIDE 37.5; 25 MG/1; MG/1
1 TABLET ORAL DAILY
Status: DISCONTINUED | OUTPATIENT
Start: 2021-11-11 | End: 2021-11-17

## 2021-11-10 RX ORDER — MAGNESIUM SULFATE HEPTAHYDRATE 40 MG/ML
2 INJECTION, SOLUTION INTRAVENOUS ONCE
Status: COMPLETED | OUTPATIENT
Start: 2021-11-10 | End: 2021-11-10

## 2021-11-10 RX ORDER — MAGNESIUM SULFATE HEPTAHYDRATE 40 MG/ML
2 INJECTION, SOLUTION INTRAVENOUS ONCE
Status: DISCONTINUED | OUTPATIENT
Start: 2021-11-10 | End: 2021-11-10

## 2021-11-10 RX ORDER — AMLODIPINE BESYLATE 2.5 MG/1
2.5 TABLET ORAL DAILY
Status: DISCONTINUED | OUTPATIENT
Start: 2021-11-10 | End: 2021-11-17

## 2021-11-10 RX ADMIN — MAGNESIUM SULFATE HEPTAHYDRATE 2 G: 2 INJECTION, SOLUTION INTRAVENOUS at 20:49

## 2021-11-10 RX ADMIN — Medication 5 MG/HR: at 18:42

## 2021-11-10 RX ADMIN — ROSUVASTATIN CALCIUM 20 MG: 20 TABLET, COATED ORAL at 20:49

## 2021-11-10 RX ADMIN — DILTIAZEM HYDROCHLORIDE 5 MG: 5 INJECTION INTRAVENOUS at 12:39

## 2021-11-10 RX ADMIN — SODIUM CHLORIDE 500 ML: 9 INJECTION, SOLUTION INTRAVENOUS at 20:50

## 2021-11-10 RX ADMIN — AMLODIPINE BESYLATE 2.5 MG: 2.5 TABLET ORAL at 16:18

## 2021-11-10 RX ADMIN — INSULIN DETEMIR 10 UNITS: 100 INJECTION, SOLUTION SUBCUTANEOUS at 20:54

## 2021-11-10 RX ADMIN — ASPIRIN 81 MG: 81 TABLET, COATED ORAL at 16:18

## 2021-11-10 RX ADMIN — SODIUM CHLORIDE 500 ML: 9 INJECTION, SOLUTION INTRAVENOUS at 12:05

## 2021-11-10 RX ADMIN — INSULIN HUMAN 6 UNITS: 100 INJECTION, SOLUTION PARENTERAL at 12:12

## 2021-11-10 RX ADMIN — SODIUM CHLORIDE, PRESERVATIVE FREE 10 ML: 5 INJECTION INTRAVENOUS at 20:50

## 2021-11-10 RX ADMIN — ENOXAPARIN SODIUM 70 MG: 80 INJECTION SUBCUTANEOUS at 15:31

## 2021-11-10 RX ADMIN — AMIODARONE HYDROCHLORIDE 1 MG/MIN: 1.8 INJECTION, SOLUTION INTRAVENOUS at 22:53

## 2021-11-10 RX ADMIN — INSULIN LISPRO 4 UNITS: 100 INJECTION, SOLUTION INTRAVENOUS; SUBCUTANEOUS at 16:38

## 2021-11-10 RX ADMIN — Medication 5 MG/HR: at 12:40

## 2021-11-10 NOTE — PROGRESS NOTES
"     Office Note      Date: 11/10/2021  Patient Name: Kaylen Garrison  MRN: [id number]  : 1940    Chief Complaint   Patient presents with   • Diabetes       History of Present Illness:   Kaylen Garrison is a 81 y.o. female who presents today for follow up on type 2 diabetes.  She remains on Januvia, metformin, glipizide.  She is testing FSBS 1-2 times per day.  She reports that blood sugars have been higher, particularly in the past week.  Readings have been consistently over 200, some over 300.  Average  for the past week, 244 for past 2 weeks, 223 for past month.  Feet: No sores.  Foot exam up to date.  Eye exam current (within one year): yes, 2021. No retinopathy.  Weight trend: stable  ACE inhibitor/ARB: No.  Statin: Yes, rosuvastatin.  She reports pain in left upper arm/shoulder.  She reports that she is being scheduled for imaging by .  She reports increased stress.  She says that this has improved since yesterday.  She reports that she feels well overall.  No denies shortness of breath, chest pain, chest tightness, or edema.  No cough or URI symptoms.  She denies dysuria.  She denies fever.      Subjective      Review of Systems:   Review of Systems   Constitutional: Negative.    HENT: Negative.    Cardiovascular: Negative.    Gastrointestinal: Negative.    Endocrine: Negative.    Genitourinary: Negative.    Musculoskeletal: Positive for arthralgias (left arm/shoulder).       The following portions of the patient's history were reviewed and updated as appropriate: allergies, current medications, past family history, past medical history, past social history, past surgical history and problem list.    Objective     Vitals:    11/10/21 0812 11/10/21 0902   BP: 104/62    Pulse: (!) 154 (!) 154   Temp:  97.7 °F (36.5 °C)   SpO2: 98% 93%   Weight: 66.7 kg (147 lb)    Height: 154.9 cm (61\")    PainSc: 0-No pain      Body mass index is 27.78 kg/m².    Physical Exam  Vitals reviewed. "   Constitutional:       General: She is not in acute distress.  Neck:      Thyroid: No thyroid mass, thyromegaly or thyroid tenderness.   Cardiovascular:      Rate and Rhythm: Regular rhythm. Tachycardia present.      Heart sounds: No murmur heard.      Pulmonary:      Effort: Pulmonary effort is normal.      Breath sounds: Examination of the right-middle field reveals decreased breath sounds. Examination of the right-lower field reveals decreased breath sounds. Decreased breath sounds present.   Musculoskeletal:      Right lower leg: No edema.      Left lower leg: No edema.   Lymphadenopathy:      Cervical: No cervical adenopathy.   Neurological:      Mental Status: She is alert and oriented to person, place, and time.   Psychiatric:         Mood and Affect: Mood and affect normal.         HEMOGLOBIN A1C  Lab Results   Component Value Date    HGBA1C 9.8 11/10/2021    HGBA1C 8.7 06/30/2021    HGBA1C 8.1 03/30/2021     GLUCOSE  Lab Results   Component Value Date    POCGLU 342 (A) 11/10/2021       Current Outpatient Medications   Medication Instructions   • amLODIPine (NORVASC) 2.5 mg, Oral, Daily   • aspirin 81 mg, Oral, Daily   • Blood Glucose Monitoring Suppl (Accu-Chek Guide) w/Device kit 1 Device, Does not apply, See Admin Instructions, Use to check blood sugar once daily.  Dx E11.9   • Cinnamon 500 mg, Oral, Daily   • glipizide (GLUCOTROL XL) 5 MG ER tablet Take 1 tablet daily   • Januvia 100 MG tablet Take 1 tablet by mouth once daily   • metFORMIN ER (GLUCOPHAGE-XR) 1,000 mg, Oral, 2 Times Daily With Meals   • Mirabegron ER (MYRBETRIQ) 25 mg, Oral, Daily   • Omega-3 300 mg, Oral, Daily   • rosuvastatin (CRESTOR) 20 mg, Oral, Daily   • triamterene-hydrochlorothiazide (DYAZIDE) 37.5-25 MG per capsule 1 capsule, Oral, Daily       Assessment / Plan      Assessment & Plan:  1. Type 2 diabetes mellitus with hyperglycemia, without long-term current use of insulin (HCC)  2. Tachycardia  3. Decreased breath sounds  A1c  significantly increased.  She is severely hyperglycemic today.  Blood sugars even higher in the past week.  Plan was to stop glipizide and add basal insulin today.  However, due to tachycardia of 154 and abnormal pulmonary exam, recommend that she go to ER.  Her twin sister accompanies her today and will drive her to [facility] ER.  I notified the ER that she is on her way.  Will address diabetes treatment once she returns home.  - POC Glycosylated Hemoglobin (Hb A1C)  - POC Glucose, Blood      No follow-ups on file.    LISA Stone  Endocrinology  11/10/2021

## 2021-11-11 ENCOUNTER — APPOINTMENT (OUTPATIENT)
Dept: CARDIOLOGY | Facility: HOSPITAL | Age: 81
End: 2021-11-11

## 2021-11-11 ENCOUNTER — APPOINTMENT (OUTPATIENT)
Dept: MRI IMAGING | Facility: HOSPITAL | Age: 81
End: 2021-11-11

## 2021-11-11 LAB
ANION GAP SERPL CALCULATED.3IONS-SCNC: 12 MMOL/L (ref 5–15)
BUN SERPL-MCNC: 19 MG/DL (ref 8–23)
BUN/CREAT SERPL: 22.1 (ref 7–25)
CALCIUM SPEC-SCNC: 9.2 MG/DL (ref 8.6–10.5)
CHLORIDE SERPL-SCNC: 99 MMOL/L (ref 98–107)
CHOLEST SERPL-MCNC: 97 MG/DL (ref 0–200)
CO2 SERPL-SCNC: 25 MMOL/L (ref 22–29)
CREAT SERPL-MCNC: 0.86 MG/DL (ref 0.57–1)
DEPRECATED RDW RBC AUTO: 47.2 FL (ref 37–54)
ERYTHROCYTE [DISTWIDTH] IN BLOOD BY AUTOMATED COUNT: 14.7 % (ref 12.3–15.4)
GFR SERPL CREATININE-BSD FRML MDRD: 63 ML/MIN/1.73
GLUCOSE BLDC GLUCOMTR-MCNC: 290 MG/DL (ref 70–130)
GLUCOSE BLDC GLUCOMTR-MCNC: 299 MG/DL (ref 70–130)
GLUCOSE BLDC GLUCOMTR-MCNC: 322 MG/DL (ref 70–130)
GLUCOSE BLDC GLUCOMTR-MCNC: 332 MG/DL (ref 70–130)
GLUCOSE BLDC GLUCOMTR-MCNC: 440 MG/DL (ref 70–130)
GLUCOSE BLDC GLUCOMTR-MCNC: 457 MG/DL (ref 70–130)
GLUCOSE SERPL-MCNC: 326 MG/DL (ref 65–99)
HCT VFR BLD AUTO: 35.5 % (ref 34–46.6)
HDLC SERPL-MCNC: 42 MG/DL (ref 40–60)
HGB BLD-MCNC: 11.8 G/DL (ref 12–15.9)
LDLC SERPL CALC-MCNC: 40 MG/DL (ref 0–100)
LDLC/HDLC SERPL: 1 {RATIO}
MAGNESIUM SERPL-MCNC: 2.6 MG/DL (ref 1.6–2.4)
MCH RBC QN AUTO: 28.9 PG (ref 26.6–33)
MCHC RBC AUTO-ENTMCNC: 33.2 G/DL (ref 31.5–35.7)
MCV RBC AUTO: 86.8 FL (ref 79–97)
PLATELET # BLD AUTO: 359 10*3/MM3 (ref 140–450)
PMV BLD AUTO: 10.2 FL (ref 6–12)
POTASSIUM SERPL-SCNC: 3.3 MMOL/L (ref 3.5–5.2)
QT INTERVAL: 256 MS
QT INTERVAL: 290 MS
QT INTERVAL: 328 MS
QTC INTERVAL: 411 MS
QTC INTERVAL: 442 MS
QTC INTERVAL: 528 MS
RBC # BLD AUTO: 4.09 10*6/MM3 (ref 3.77–5.28)
SODIUM SERPL-SCNC: 136 MMOL/L (ref 136–145)
TRIGL SERPL-MCNC: 66 MG/DL (ref 0–150)
VLDLC SERPL-MCNC: 15 MG/DL (ref 5–40)
WBC # BLD AUTO: 9.55 10*3/MM3 (ref 3.4–10.8)

## 2021-11-11 PROCEDURE — 93325 DOPPLER ECHO COLOR FLOW MAPG: CPT | Performed by: INTERNAL MEDICINE

## 2021-11-11 PROCEDURE — 99232 SBSQ HOSP IP/OBS MODERATE 35: CPT | Performed by: HOSPITALIST

## 2021-11-11 PROCEDURE — 92960 CARDIOVERSION ELECTRIC EXT: CPT

## 2021-11-11 PROCEDURE — 82962 GLUCOSE BLOOD TEST: CPT

## 2021-11-11 PROCEDURE — 94660 CPAP INITIATION&MGMT: CPT

## 2021-11-11 PROCEDURE — 5A2204Z RESTORATION OF CARDIAC RHYTHM, SINGLE: ICD-10-PCS | Performed by: INTERNAL MEDICINE

## 2021-11-11 PROCEDURE — 73221 MRI JOINT UPR EXTREM W/O DYE: CPT

## 2021-11-11 PROCEDURE — 94799 UNLISTED PULMONARY SVC/PX: CPT

## 2021-11-11 PROCEDURE — 93010 ELECTROCARDIOGRAM REPORT: CPT | Performed by: INTERNAL MEDICINE

## 2021-11-11 PROCEDURE — 93005 ELECTROCARDIOGRAM TRACING: CPT | Performed by: NURSE PRACTITIONER

## 2021-11-11 PROCEDURE — 85027 COMPLETE CBC AUTOMATED: CPT | Performed by: HOSPITALIST

## 2021-11-11 PROCEDURE — 83735 ASSAY OF MAGNESIUM: CPT | Performed by: HOSPITALIST

## 2021-11-11 PROCEDURE — 80061 LIPID PANEL: CPT | Performed by: HOSPITALIST

## 2021-11-11 PROCEDURE — 63710000001 INSULIN LISPRO (HUMAN) PER 5 UNITS: Performed by: PHYSICIAN ASSISTANT

## 2021-11-11 PROCEDURE — 93312 ECHO TRANSESOPHAGEAL: CPT

## 2021-11-11 PROCEDURE — 80048 BASIC METABOLIC PNL TOTAL CA: CPT | Performed by: HOSPITALIST

## 2021-11-11 PROCEDURE — 63710000001 INSULIN LISPRO (HUMAN) PER 5 UNITS: Performed by: HOSPITALIST

## 2021-11-11 PROCEDURE — 99232 SBSQ HOSP IP/OBS MODERATE 35: CPT | Performed by: INTERNAL MEDICINE

## 2021-11-11 PROCEDURE — 25010000002 ENOXAPARIN PER 10 MG

## 2021-11-11 PROCEDURE — 93321 DOPPLER ECHO F-UP/LMTD STD: CPT

## 2021-11-11 PROCEDURE — 92960 CARDIOVERSION ELECTRIC EXT: CPT | Performed by: INTERNAL MEDICINE

## 2021-11-11 PROCEDURE — 93321 DOPPLER ECHO F-UP/LMTD STD: CPT | Performed by: INTERNAL MEDICINE

## 2021-11-11 PROCEDURE — 25010000002 MIDAZOLAM PER 1 MG: Performed by: INTERNAL MEDICINE

## 2021-11-11 PROCEDURE — 63710000001 INSULIN DETEMIR PER 5 UNITS: Performed by: HOSPITALIST

## 2021-11-11 PROCEDURE — 25010000002 FUROSEMIDE PER 20 MG: Performed by: INTERNAL MEDICINE

## 2021-11-11 PROCEDURE — 25010000002 AMIODARONE IN DEXTROSE 5% 360-4.14 MG/200ML-% SOLUTION: Performed by: NURSE PRACTITIONER

## 2021-11-11 PROCEDURE — 93312 ECHO TRANSESOPHAGEAL: CPT | Performed by: INTERNAL MEDICINE

## 2021-11-11 PROCEDURE — 93325 DOPPLER ECHO COLOR FLOW MAPG: CPT

## 2021-11-11 RX ORDER — FENTANYL CITRATE 50 UG/ML
INJECTION, SOLUTION INTRAMUSCULAR; INTRAVENOUS
Status: DISCONTINUED
Start: 2021-11-11 | End: 2021-11-11 | Stop reason: WASHOUT

## 2021-11-11 RX ORDER — MAGNESIUM SULFATE HEPTAHYDRATE 40 MG/ML
4 INJECTION, SOLUTION INTRAVENOUS AS NEEDED
Status: DISCONTINUED | OUTPATIENT
Start: 2021-11-11 | End: 2021-11-17 | Stop reason: HOSPADM

## 2021-11-11 RX ORDER — FLUMAZENIL 0.1 MG/ML
INJECTION INTRAVENOUS
Status: DISCONTINUED
Start: 2021-11-11 | End: 2021-11-11 | Stop reason: WASHOUT

## 2021-11-11 RX ORDER — POTASSIUM CHLORIDE 1.5 G/1.77G
40 POWDER, FOR SOLUTION ORAL AS NEEDED
Status: DISCONTINUED | OUTPATIENT
Start: 2021-11-11 | End: 2021-11-17 | Stop reason: HOSPADM

## 2021-11-11 RX ORDER — FUROSEMIDE 10 MG/ML
20 INJECTION INTRAMUSCULAR; INTRAVENOUS ONCE
Status: COMPLETED | OUTPATIENT
Start: 2021-11-11 | End: 2021-11-11

## 2021-11-11 RX ORDER — MIDAZOLAM HYDROCHLORIDE 1 MG/ML
INJECTION INTRAMUSCULAR; INTRAVENOUS
Status: DISCONTINUED
Start: 2021-11-11 | End: 2021-11-17 | Stop reason: HOSPADM

## 2021-11-11 RX ORDER — MAGNESIUM SULFATE HEPTAHYDRATE 40 MG/ML
2 INJECTION, SOLUTION INTRAVENOUS AS NEEDED
Status: DISCONTINUED | OUTPATIENT
Start: 2021-11-11 | End: 2021-11-17 | Stop reason: HOSPADM

## 2021-11-11 RX ORDER — MIDAZOLAM HYDROCHLORIDE 1 MG/ML
INJECTION INTRAMUSCULAR; INTRAVENOUS
Status: DISCONTINUED | OUTPATIENT
Start: 2021-11-11 | End: 2021-11-17 | Stop reason: HOSPADM

## 2021-11-11 RX ORDER — NALOXONE HYDROCHLORIDE 0.4 MG/ML
INJECTION, SOLUTION INTRAMUSCULAR; INTRAVENOUS; SUBCUTANEOUS
Status: DISCONTINUED
Start: 2021-11-11 | End: 2021-11-11 | Stop reason: WASHOUT

## 2021-11-11 RX ORDER — POTASSIUM CHLORIDE 750 MG/1
40 CAPSULE, EXTENDED RELEASE ORAL AS NEEDED
Status: DISCONTINUED | OUTPATIENT
Start: 2021-11-11 | End: 2021-11-17 | Stop reason: HOSPADM

## 2021-11-11 RX ADMIN — OXYBUTYNIN CHLORIDE 5 MG: 5 TABLET, EXTENDED RELEASE ORAL at 08:42

## 2021-11-11 RX ADMIN — ROSUVASTATIN CALCIUM 20 MG: 20 TABLET, COATED ORAL at 20:00

## 2021-11-11 RX ADMIN — ASPIRIN 81 MG: 81 TABLET, COATED ORAL at 08:42

## 2021-11-11 RX ADMIN — AMLODIPINE BESYLATE 2.5 MG: 2.5 TABLET ORAL at 08:42

## 2021-11-11 RX ADMIN — ACETAMINOPHEN 650 MG: 325 TABLET, FILM COATED ORAL at 01:21

## 2021-11-11 RX ADMIN — POTASSIUM CHLORIDE 40 MEQ: 750 CAPSULE, EXTENDED RELEASE ORAL at 20:00

## 2021-11-11 RX ADMIN — INSULIN LISPRO 5 UNITS: 100 INJECTION, SOLUTION INTRAVENOUS; SUBCUTANEOUS at 17:58

## 2021-11-11 RX ADMIN — ACETAMINOPHEN 650 MG: 325 TABLET, FILM COATED ORAL at 23:33

## 2021-11-11 RX ADMIN — TRIAMTERENE AND HYDROCHLOROTHIAZIDE 1 TABLET: 37.5; 25 TABLET ORAL at 08:42

## 2021-11-11 RX ADMIN — POTASSIUM CHLORIDE 40 MEQ: 750 CAPSULE, EXTENDED RELEASE ORAL at 08:42

## 2021-11-11 RX ADMIN — SODIUM CHLORIDE, PRESERVATIVE FREE 10 ML: 5 INJECTION INTRAVENOUS at 08:43

## 2021-11-11 RX ADMIN — INSULIN LISPRO 7 UNITS: 100 INJECTION, SOLUTION INTRAVENOUS; SUBCUTANEOUS at 21:48

## 2021-11-11 RX ADMIN — MIDAZOLAM 2 MG: 1 INJECTION INTRAMUSCULAR; INTRAVENOUS at 16:10

## 2021-11-11 RX ADMIN — SODIUM CHLORIDE, PRESERVATIVE FREE 10 ML: 5 INJECTION INTRAVENOUS at 20:11

## 2021-11-11 RX ADMIN — ENOXAPARIN SODIUM 70 MG: 80 INJECTION SUBCUTANEOUS at 04:54

## 2021-11-11 RX ADMIN — FUROSEMIDE 20 MG: 10 INJECTION INTRAMUSCULAR; INTRAVENOUS at 08:42

## 2021-11-11 RX ADMIN — AMIODARONE HYDROCHLORIDE 0.5 MG/MIN: 1.8 INJECTION, SOLUTION INTRAVENOUS at 04:53

## 2021-11-11 RX ADMIN — INSULIN DETEMIR 14 UNITS: 100 INJECTION, SOLUTION SUBCUTANEOUS at 20:10

## 2021-11-11 RX ADMIN — RIVAROXABAN 15 MG: 15 TABLET, FILM COATED ORAL at 17:58

## 2021-11-12 LAB
ANION GAP SERPL CALCULATED.3IONS-SCNC: 10 MMOL/L (ref 5–15)
BUN SERPL-MCNC: 23 MG/DL (ref 8–23)
BUN/CREAT SERPL: 23.2 (ref 7–25)
CALCIUM SPEC-SCNC: 9.2 MG/DL (ref 8.6–10.5)
CHLORIDE SERPL-SCNC: 97 MMOL/L (ref 98–107)
CO2 SERPL-SCNC: 29 MMOL/L (ref 22–29)
CREAT SERPL-MCNC: 0.99 MG/DL (ref 0.57–1)
D-LACTATE SERPL-SCNC: 1.3 MMOL/L (ref 0.5–2)
DEPRECATED RDW RBC AUTO: 46.7 FL (ref 37–54)
ERYTHROCYTE [DISTWIDTH] IN BLOOD BY AUTOMATED COUNT: 14.5 % (ref 12.3–15.4)
GFR SERPL CREATININE-BSD FRML MDRD: 54 ML/MIN/1.73
GLUCOSE BLDC GLUCOMTR-MCNC: 204 MG/DL (ref 70–130)
GLUCOSE BLDC GLUCOMTR-MCNC: 270 MG/DL (ref 70–130)
GLUCOSE BLDC GLUCOMTR-MCNC: 338 MG/DL (ref 70–130)
GLUCOSE BLDC GLUCOMTR-MCNC: 424 MG/DL (ref 70–130)
GLUCOSE BLDC GLUCOMTR-MCNC: 461 MG/DL (ref 70–130)
GLUCOSE SERPL-MCNC: 209 MG/DL (ref 65–99)
HBA1C MFR BLD: 10.4 % (ref 4.8–5.6)
HCT VFR BLD AUTO: 38.7 % (ref 34–46.6)
HGB BLD-MCNC: 12.6 G/DL (ref 12–15.9)
MCH RBC QN AUTO: 28.6 PG (ref 26.6–33)
MCHC RBC AUTO-ENTMCNC: 32.6 G/DL (ref 31.5–35.7)
MCV RBC AUTO: 87.8 FL (ref 79–97)
PHOSPHATE SERPL-MCNC: 3.2 MG/DL (ref 2.5–4.5)
PLATELET # BLD AUTO: 379 10*3/MM3 (ref 140–450)
PMV BLD AUTO: 9.8 FL (ref 6–12)
POTASSIUM SERPL-SCNC: 4 MMOL/L (ref 3.5–5.2)
QT INTERVAL: 500 MS
QTC INTERVAL: 565 MS
RBC # BLD AUTO: 4.41 10*6/MM3 (ref 3.77–5.28)
SODIUM SERPL-SCNC: 136 MMOL/L (ref 136–145)
WBC # BLD AUTO: 9.93 10*3/MM3 (ref 3.4–10.8)

## 2021-11-12 PROCEDURE — 83605 ASSAY OF LACTIC ACID: CPT | Performed by: HOSPITALIST

## 2021-11-12 PROCEDURE — 63710000001 INSULIN DETEMIR PER 5 UNITS: Performed by: HOSPITALIST

## 2021-11-12 PROCEDURE — 63710000001 INSULIN LISPRO (HUMAN) PER 5 UNITS: Performed by: PHYSICIAN ASSISTANT

## 2021-11-12 PROCEDURE — 93005 ELECTROCARDIOGRAM TRACING: CPT | Performed by: NURSE PRACTITIONER

## 2021-11-12 PROCEDURE — 82962 GLUCOSE BLOOD TEST: CPT

## 2021-11-12 PROCEDURE — 93010 ELECTROCARDIOGRAM REPORT: CPT | Performed by: INTERNAL MEDICINE

## 2021-11-12 PROCEDURE — 25010000002 AMIODARONE IN DEXTROSE 5% 360-4.14 MG/200ML-% SOLUTION: Performed by: NURSE PRACTITIONER

## 2021-11-12 PROCEDURE — 99232 SBSQ HOSP IP/OBS MODERATE 35: CPT | Performed by: NURSE PRACTITIONER

## 2021-11-12 PROCEDURE — 63710000001 INSULIN DETEMIR PER 5 UNITS: Performed by: NURSE PRACTITIONER

## 2021-11-12 PROCEDURE — 80048 BASIC METABOLIC PNL TOTAL CA: CPT | Performed by: INTERNAL MEDICINE

## 2021-11-12 PROCEDURE — 85027 COMPLETE CBC AUTOMATED: CPT | Performed by: HOSPITALIST

## 2021-11-12 PROCEDURE — 93005 ELECTROCARDIOGRAM TRACING: CPT | Performed by: INTERNAL MEDICINE

## 2021-11-12 PROCEDURE — 83036 HEMOGLOBIN GLYCOSYLATED A1C: CPT | Performed by: NURSE PRACTITIONER

## 2021-11-12 PROCEDURE — 84100 ASSAY OF PHOSPHORUS: CPT | Performed by: HOSPITALIST

## 2021-11-12 PROCEDURE — 99232 SBSQ HOSP IP/OBS MODERATE 35: CPT | Performed by: INTERNAL MEDICINE

## 2021-11-12 RX ORDER — SOTALOL HYDROCHLORIDE 80 MG/1
80 TABLET ORAL EVERY 12 HOURS SCHEDULED
Status: DISCONTINUED | OUTPATIENT
Start: 2021-11-12 | End: 2021-11-13

## 2021-11-12 RX ADMIN — TRIAMTERENE AND HYDROCHLOROTHIAZIDE 1 TABLET: 37.5; 25 TABLET ORAL at 08:25

## 2021-11-12 RX ADMIN — SODIUM CHLORIDE, PRESERVATIVE FREE 10 ML: 5 INJECTION INTRAVENOUS at 21:14

## 2021-11-12 RX ADMIN — INSULIN LISPRO 3 UNITS: 100 INJECTION, SOLUTION INTRAVENOUS; SUBCUTANEOUS at 08:25

## 2021-11-12 RX ADMIN — INSULIN DETEMIR 10 UNITS: 100 INJECTION, SOLUTION SUBCUTANEOUS at 13:00

## 2021-11-12 RX ADMIN — ASPIRIN 81 MG: 81 TABLET, COATED ORAL at 08:25

## 2021-11-12 RX ADMIN — SODIUM CHLORIDE, PRESERVATIVE FREE 10 ML: 5 INJECTION INTRAVENOUS at 08:26

## 2021-11-12 RX ADMIN — INSULIN LISPRO 4 UNITS: 100 INJECTION, SOLUTION INTRAVENOUS; SUBCUTANEOUS at 21:13

## 2021-11-12 RX ADMIN — INSULIN DETEMIR 14 UNITS: 100 INJECTION, SOLUTION SUBCUTANEOUS at 21:14

## 2021-11-12 RX ADMIN — INSULIN LISPRO 5 UNITS: 100 INJECTION, SOLUTION INTRAVENOUS; SUBCUTANEOUS at 17:19

## 2021-11-12 RX ADMIN — AMIODARONE HYDROCHLORIDE 0.5 MG/MIN: 1.8 INJECTION, SOLUTION INTRAVENOUS at 04:29

## 2021-11-12 RX ADMIN — ROSUVASTATIN CALCIUM 20 MG: 20 TABLET, COATED ORAL at 21:13

## 2021-11-12 RX ADMIN — INSULIN LISPRO 7 UNITS: 100 INJECTION, SOLUTION INTRAVENOUS; SUBCUTANEOUS at 12:34

## 2021-11-12 RX ADMIN — AMLODIPINE BESYLATE 2.5 MG: 2.5 TABLET ORAL at 08:25

## 2021-11-12 RX ADMIN — SOTALOL HYDROCHLORIDE 80 MG: 80 TABLET ORAL at 21:13

## 2021-11-12 RX ADMIN — SOTALOL HYDROCHLORIDE 80 MG: 80 TABLET ORAL at 13:32

## 2021-11-12 RX ADMIN — OXYBUTYNIN CHLORIDE 5 MG: 5 TABLET, EXTENDED RELEASE ORAL at 08:25

## 2021-11-12 RX ADMIN — RIVAROXABAN 15 MG: 15 TABLET, FILM COATED ORAL at 17:19

## 2021-11-13 LAB
GLUCOSE BLDC GLUCOMTR-MCNC: 107 MG/DL (ref 70–130)
GLUCOSE BLDC GLUCOMTR-MCNC: 194 MG/DL (ref 70–130)
GLUCOSE BLDC GLUCOMTR-MCNC: 217 MG/DL (ref 70–130)
GLUCOSE BLDC GLUCOMTR-MCNC: 311 MG/DL (ref 70–130)

## 2021-11-13 PROCEDURE — 63710000001 INSULIN DETEMIR PER 5 UNITS: Performed by: NURSE PRACTITIONER

## 2021-11-13 PROCEDURE — 93005 ELECTROCARDIOGRAM TRACING: CPT | Performed by: INTERNAL MEDICINE

## 2021-11-13 PROCEDURE — 93010 ELECTROCARDIOGRAM REPORT: CPT | Performed by: INTERNAL MEDICINE

## 2021-11-13 PROCEDURE — 63710000001 INSULIN LISPRO (HUMAN) PER 5 UNITS: Performed by: NURSE PRACTITIONER

## 2021-11-13 PROCEDURE — 82962 GLUCOSE BLOOD TEST: CPT

## 2021-11-13 PROCEDURE — 99232 SBSQ HOSP IP/OBS MODERATE 35: CPT | Performed by: NURSE PRACTITIONER

## 2021-11-13 PROCEDURE — 63710000001 INSULIN DETEMIR PER 5 UNITS: Performed by: HOSPITALIST

## 2021-11-13 RX ORDER — NICOTINE POLACRILEX 4 MG
15 LOZENGE BUCCAL
Status: DISCONTINUED | OUTPATIENT
Start: 2021-11-13 | End: 2021-11-17 | Stop reason: HOSPADM

## 2021-11-13 RX ORDER — DEXTROSE MONOHYDRATE 25 G/50ML
25 INJECTION, SOLUTION INTRAVENOUS
Status: DISCONTINUED | OUTPATIENT
Start: 2021-11-13 | End: 2021-11-13 | Stop reason: SDUPTHER

## 2021-11-13 RX ORDER — SOTALOL HYDROCHLORIDE 80 MG/1
40 TABLET ORAL EVERY 12 HOURS SCHEDULED
Status: DISCONTINUED | OUTPATIENT
Start: 2021-11-13 | End: 2021-11-16

## 2021-11-13 RX ADMIN — SODIUM CHLORIDE, PRESERVATIVE FREE 10 ML: 5 INJECTION INTRAVENOUS at 22:09

## 2021-11-13 RX ADMIN — SOTALOL HYDROCHLORIDE 40 MG: 80 TABLET ORAL at 23:01

## 2021-11-13 RX ADMIN — INSULIN DETEMIR 10 UNITS: 100 INJECTION, SOLUTION SUBCUTANEOUS at 13:59

## 2021-11-13 RX ADMIN — ROSUVASTATIN CALCIUM 20 MG: 20 TABLET, COATED ORAL at 22:09

## 2021-11-13 RX ADMIN — RIVAROXABAN 15 MG: 15 TABLET, FILM COATED ORAL at 17:35

## 2021-11-13 RX ADMIN — INSULIN LISPRO 2 UNITS: 100 INJECTION, SOLUTION INTRAVENOUS; SUBCUTANEOUS at 12:38

## 2021-11-13 RX ADMIN — INSULIN LISPRO 4 UNITS: 100 INJECTION, SOLUTION INTRAVENOUS; SUBCUTANEOUS at 17:35

## 2021-11-13 RX ADMIN — ASPIRIN 81 MG: 81 TABLET, COATED ORAL at 08:52

## 2021-11-13 RX ADMIN — TRIAMTERENE AND HYDROCHLOROTHIAZIDE 1 TABLET: 37.5; 25 TABLET ORAL at 08:53

## 2021-11-13 RX ADMIN — AMLODIPINE BESYLATE 2.5 MG: 2.5 TABLET ORAL at 08:53

## 2021-11-13 RX ADMIN — LINAGLIPTIN 5 MG: 5 TABLET, FILM COATED ORAL at 16:17

## 2021-11-13 RX ADMIN — OXYBUTYNIN CHLORIDE 5 MG: 5 TABLET, EXTENDED RELEASE ORAL at 08:52

## 2021-11-13 RX ADMIN — INSULIN DETEMIR 14 UNITS: 100 INJECTION, SOLUTION SUBCUTANEOUS at 22:09

## 2021-11-14 LAB
GLUCOSE BLDC GLUCOMTR-MCNC: 154 MG/DL (ref 70–130)
GLUCOSE BLDC GLUCOMTR-MCNC: 256 MG/DL (ref 70–130)
GLUCOSE BLDC GLUCOMTR-MCNC: 277 MG/DL (ref 70–130)
GLUCOSE BLDC GLUCOMTR-MCNC: 330 MG/DL (ref 70–130)

## 2021-11-14 PROCEDURE — 93010 ELECTROCARDIOGRAM REPORT: CPT | Performed by: INTERNAL MEDICINE

## 2021-11-14 PROCEDURE — 93005 ELECTROCARDIOGRAM TRACING: CPT | Performed by: PHYSICIAN ASSISTANT

## 2021-11-14 PROCEDURE — 63710000001 INSULIN DETEMIR PER 5 UNITS: Performed by: HOSPITALIST

## 2021-11-14 PROCEDURE — 99232 SBSQ HOSP IP/OBS MODERATE 35: CPT | Performed by: NURSE PRACTITIONER

## 2021-11-14 PROCEDURE — 82962 GLUCOSE BLOOD TEST: CPT

## 2021-11-14 PROCEDURE — 63710000001 INSULIN LISPRO (HUMAN) PER 5 UNITS: Performed by: NURSE PRACTITIONER

## 2021-11-14 PROCEDURE — 63710000001 INSULIN DETEMIR PER 5 UNITS: Performed by: NURSE PRACTITIONER

## 2021-11-14 PROCEDURE — 93005 ELECTROCARDIOGRAM TRACING: CPT | Performed by: NURSE PRACTITIONER

## 2021-11-14 RX ADMIN — ASPIRIN 81 MG: 81 TABLET, COATED ORAL at 09:17

## 2021-11-14 RX ADMIN — INSULIN LISPRO 7 UNITS: 100 INJECTION, SOLUTION INTRAVENOUS; SUBCUTANEOUS at 12:18

## 2021-11-14 RX ADMIN — AMLODIPINE BESYLATE 2.5 MG: 2.5 TABLET ORAL at 09:16

## 2021-11-14 RX ADMIN — OXYBUTYNIN CHLORIDE 5 MG: 5 TABLET, EXTENDED RELEASE ORAL at 09:17

## 2021-11-14 RX ADMIN — INSULIN DETEMIR 10 UNITS: 100 INJECTION, SOLUTION SUBCUTANEOUS at 09:20

## 2021-11-14 RX ADMIN — RIVAROXABAN 15 MG: 15 TABLET, FILM COATED ORAL at 17:54

## 2021-11-14 RX ADMIN — SODIUM CHLORIDE, PRESERVATIVE FREE 10 ML: 5 INJECTION INTRAVENOUS at 21:30

## 2021-11-14 RX ADMIN — INSULIN LISPRO 6 UNITS: 100 INJECTION, SOLUTION INTRAVENOUS; SUBCUTANEOUS at 17:54

## 2021-11-14 RX ADMIN — INSULIN DETEMIR 14 UNITS: 100 INJECTION, SOLUTION SUBCUTANEOUS at 21:30

## 2021-11-14 RX ADMIN — SOTALOL HYDROCHLORIDE 40 MG: 80 TABLET ORAL at 22:53

## 2021-11-14 RX ADMIN — ROSUVASTATIN CALCIUM 20 MG: 20 TABLET, COATED ORAL at 21:30

## 2021-11-14 RX ADMIN — TRIAMTERENE AND HYDROCHLOROTHIAZIDE 1 TABLET: 37.5; 25 TABLET ORAL at 11:03

## 2021-11-14 RX ADMIN — SODIUM CHLORIDE, PRESERVATIVE FREE 10 ML: 5 INJECTION INTRAVENOUS at 09:17

## 2021-11-14 RX ADMIN — SOTALOL HYDROCHLORIDE 40 MG: 80 TABLET ORAL at 09:16

## 2021-11-14 RX ADMIN — LINAGLIPTIN 5 MG: 5 TABLET, FILM COATED ORAL at 09:19

## 2021-11-15 LAB
GLUCOSE BLDC GLUCOMTR-MCNC: 143 MG/DL (ref 70–130)
GLUCOSE BLDC GLUCOMTR-MCNC: 171 MG/DL (ref 70–130)
GLUCOSE BLDC GLUCOMTR-MCNC: 194 MG/DL (ref 70–130)
GLUCOSE BLDC GLUCOMTR-MCNC: 269 MG/DL (ref 70–130)
QT INTERVAL: 422 MS
QT INTERVAL: 428 MS
QT INTERVAL: 440 MS
QT INTERVAL: 484 MS
QT INTERVAL: 538 MS
QTC INTERVAL: 448 MS
QTC INTERVAL: 455 MS
QTC INTERVAL: 471 MS
QTC INTERVAL: 514 MS
QTC INTERVAL: 519 MS

## 2021-11-15 PROCEDURE — 97165 OT EVAL LOW COMPLEX 30 MIN: CPT

## 2021-11-15 PROCEDURE — 63710000001 INSULIN LISPRO (HUMAN) PER 5 UNITS: Performed by: NURSE PRACTITIONER

## 2021-11-15 PROCEDURE — 93010 ELECTROCARDIOGRAM REPORT: CPT | Performed by: INTERNAL MEDICINE

## 2021-11-15 PROCEDURE — 97530 THERAPEUTIC ACTIVITIES: CPT

## 2021-11-15 PROCEDURE — 99232 SBSQ HOSP IP/OBS MODERATE 35: CPT | Performed by: NURSE PRACTITIONER

## 2021-11-15 PROCEDURE — 97110 THERAPEUTIC EXERCISES: CPT

## 2021-11-15 PROCEDURE — 63710000001 INSULIN DETEMIR PER 5 UNITS: Performed by: HOSPITALIST

## 2021-11-15 PROCEDURE — 93005 ELECTROCARDIOGRAM TRACING: CPT | Performed by: INTERNAL MEDICINE

## 2021-11-15 PROCEDURE — 99232 SBSQ HOSP IP/OBS MODERATE 35: CPT | Performed by: INTERNAL MEDICINE

## 2021-11-15 PROCEDURE — 82962 GLUCOSE BLOOD TEST: CPT

## 2021-11-15 PROCEDURE — 97162 PT EVAL MOD COMPLEX 30 MIN: CPT

## 2021-11-15 PROCEDURE — 63710000001 INSULIN DETEMIR PER 5 UNITS: Performed by: NURSE PRACTITIONER

## 2021-11-15 RX ADMIN — LINAGLIPTIN 5 MG: 5 TABLET, FILM COATED ORAL at 09:03

## 2021-11-15 RX ADMIN — OXYBUTYNIN CHLORIDE 5 MG: 5 TABLET, EXTENDED RELEASE ORAL at 09:03

## 2021-11-15 RX ADMIN — INSULIN LISPRO 2 UNITS: 100 INJECTION, SOLUTION INTRAVENOUS; SUBCUTANEOUS at 17:27

## 2021-11-15 RX ADMIN — ROSUVASTATIN CALCIUM 20 MG: 20 TABLET, COATED ORAL at 21:43

## 2021-11-15 RX ADMIN — SOTALOL HYDROCHLORIDE 40 MG: 80 TABLET ORAL at 09:02

## 2021-11-15 RX ADMIN — INSULIN LISPRO 2 UNITS: 100 INJECTION, SOLUTION INTRAVENOUS; SUBCUTANEOUS at 12:03

## 2021-11-15 RX ADMIN — INSULIN DETEMIR 14 UNITS: 100 INJECTION, SOLUTION SUBCUTANEOUS at 21:42

## 2021-11-15 RX ADMIN — AMLODIPINE BESYLATE 2.5 MG: 2.5 TABLET ORAL at 09:02

## 2021-11-15 RX ADMIN — INSULIN DETEMIR 10 UNITS: 100 INJECTION, SOLUTION SUBCUTANEOUS at 09:04

## 2021-11-15 RX ADMIN — TRIAMTERENE AND HYDROCHLOROTHIAZIDE 1 TABLET: 37.5; 25 TABLET ORAL at 09:03

## 2021-11-15 RX ADMIN — SOTALOL HYDROCHLORIDE 40 MG: 80 TABLET ORAL at 21:43

## 2021-11-15 RX ADMIN — RIVAROXABAN 15 MG: 15 TABLET, FILM COATED ORAL at 17:27

## 2021-11-15 RX ADMIN — ACETAMINOPHEN 650 MG: 325 TABLET, FILM COATED ORAL at 21:55

## 2021-11-15 RX ADMIN — SODIUM CHLORIDE, PRESERVATIVE FREE 10 ML: 5 INJECTION INTRAVENOUS at 21:43

## 2021-11-15 RX ADMIN — ASPIRIN 81 MG: 81 TABLET, COATED ORAL at 09:03

## 2021-11-16 LAB
ANION GAP SERPL CALCULATED.3IONS-SCNC: 12 MMOL/L (ref 5–15)
BASOPHILS # BLD AUTO: 0.05 10*3/MM3 (ref 0–0.2)
BASOPHILS NFR BLD AUTO: 0.6 % (ref 0–1.5)
BUN SERPL-MCNC: 32 MG/DL (ref 8–23)
BUN/CREAT SERPL: 37.2 (ref 7–25)
CALCIUM SPEC-SCNC: 9.2 MG/DL (ref 8.6–10.5)
CHLORIDE SERPL-SCNC: 99 MMOL/L (ref 98–107)
CO2 SERPL-SCNC: 27 MMOL/L (ref 22–29)
CREAT SERPL-MCNC: 0.86 MG/DL (ref 0.57–1)
DEPRECATED RDW RBC AUTO: 45.3 FL (ref 37–54)
EOSINOPHIL # BLD AUTO: 0.24 10*3/MM3 (ref 0–0.4)
EOSINOPHIL NFR BLD AUTO: 3 % (ref 0.3–6.2)
ERYTHROCYTE [DISTWIDTH] IN BLOOD BY AUTOMATED COUNT: 14.7 % (ref 12.3–15.4)
GFR SERPL CREATININE-BSD FRML MDRD: 63 ML/MIN/1.73
GLUCOSE BLDC GLUCOMTR-MCNC: 181 MG/DL (ref 70–130)
GLUCOSE BLDC GLUCOMTR-MCNC: 207 MG/DL (ref 70–130)
GLUCOSE BLDC GLUCOMTR-MCNC: 268 MG/DL (ref 70–130)
GLUCOSE BLDC GLUCOMTR-MCNC: 328 MG/DL (ref 70–130)
GLUCOSE SERPL-MCNC: 201 MG/DL (ref 65–99)
HCT VFR BLD AUTO: 36.3 % (ref 34–46.6)
HGB BLD-MCNC: 12.1 G/DL (ref 12–15.9)
IMM GRANULOCYTES # BLD AUTO: 0.05 10*3/MM3 (ref 0–0.05)
IMM GRANULOCYTES NFR BLD AUTO: 0.6 % (ref 0–0.5)
LYMPHOCYTES # BLD AUTO: 2.73 10*3/MM3 (ref 0.7–3.1)
LYMPHOCYTES NFR BLD AUTO: 33.8 % (ref 19.6–45.3)
MCH RBC QN AUTO: 28.1 PG (ref 26.6–33)
MCHC RBC AUTO-ENTMCNC: 33.3 G/DL (ref 31.5–35.7)
MCV RBC AUTO: 84.4 FL (ref 79–97)
MONOCYTES # BLD AUTO: 0.92 10*3/MM3 (ref 0.1–0.9)
MONOCYTES NFR BLD AUTO: 11.4 % (ref 5–12)
NEUTROPHILS NFR BLD AUTO: 4.08 10*3/MM3 (ref 1.7–7)
NEUTROPHILS NFR BLD AUTO: 50.6 % (ref 42.7–76)
NRBC BLD AUTO-RTO: 0 /100 WBC (ref 0–0.2)
PLATELET # BLD AUTO: 411 10*3/MM3 (ref 140–450)
PMV BLD AUTO: 9.4 FL (ref 6–12)
POTASSIUM SERPL-SCNC: 3.4 MMOL/L (ref 3.5–5.2)
QT INTERVAL: 480 MS
QTC INTERVAL: 491 MS
RBC # BLD AUTO: 4.3 10*6/MM3 (ref 3.77–5.28)
SODIUM SERPL-SCNC: 138 MMOL/L (ref 136–145)
WBC # BLD AUTO: 8.07 10*3/MM3 (ref 3.4–10.8)

## 2021-11-16 PROCEDURE — 99233 SBSQ HOSP IP/OBS HIGH 50: CPT | Performed by: INTERNAL MEDICINE

## 2021-11-16 PROCEDURE — 63710000001 INSULIN LISPRO (HUMAN) PER 5 UNITS: Performed by: INTERNAL MEDICINE

## 2021-11-16 PROCEDURE — 85025 COMPLETE CBC W/AUTO DIFF WBC: CPT | Performed by: NURSE PRACTITIONER

## 2021-11-16 PROCEDURE — 63710000001 INSULIN DETEMIR PER 5 UNITS: Performed by: INTERNAL MEDICINE

## 2021-11-16 PROCEDURE — 93010 ELECTROCARDIOGRAM REPORT: CPT | Performed by: INTERNAL MEDICINE

## 2021-11-16 PROCEDURE — 80048 BASIC METABOLIC PNL TOTAL CA: CPT | Performed by: NURSE PRACTITIONER

## 2021-11-16 PROCEDURE — 82962 GLUCOSE BLOOD TEST: CPT

## 2021-11-16 PROCEDURE — 63710000001 INSULIN LISPRO (HUMAN) PER 5 UNITS: Performed by: NURSE PRACTITIONER

## 2021-11-16 PROCEDURE — 63710000001 INSULIN DETEMIR PER 5 UNITS: Performed by: NURSE PRACTITIONER

## 2021-11-16 PROCEDURE — 93005 ELECTROCARDIOGRAM TRACING: CPT | Performed by: INTERNAL MEDICINE

## 2021-11-16 RX ORDER — AMIODARONE HYDROCHLORIDE 200 MG/1
200 TABLET ORAL EVERY 12 HOURS SCHEDULED
Status: DISCONTINUED | OUTPATIENT
Start: 2021-11-16 | End: 2021-11-17 | Stop reason: HOSPADM

## 2021-11-16 RX ADMIN — SOTALOL HYDROCHLORIDE 40 MG: 80 TABLET ORAL at 09:25

## 2021-11-16 RX ADMIN — INSULIN LISPRO 3 UNITS: 100 INJECTION, SOLUTION INTRAVENOUS; SUBCUTANEOUS at 12:10

## 2021-11-16 RX ADMIN — INSULIN LISPRO 4 UNITS: 100 INJECTION, SOLUTION INTRAVENOUS; SUBCUTANEOUS at 17:41

## 2021-11-16 RX ADMIN — ASPIRIN 81 MG: 81 TABLET, COATED ORAL at 09:25

## 2021-11-16 RX ADMIN — INSULIN LISPRO 2 UNITS: 100 INJECTION, SOLUTION INTRAVENOUS; SUBCUTANEOUS at 09:28

## 2021-11-16 RX ADMIN — OXYBUTYNIN CHLORIDE 5 MG: 5 TABLET, EXTENDED RELEASE ORAL at 09:25

## 2021-11-16 RX ADMIN — INSULIN DETEMIR 10 UNITS: 100 INJECTION, SOLUTION SUBCUTANEOUS at 09:00

## 2021-11-16 RX ADMIN — INSULIN LISPRO 7 UNITS: 100 INJECTION, SOLUTION INTRAVENOUS; SUBCUTANEOUS at 12:10

## 2021-11-16 RX ADMIN — ROSUVASTATIN CALCIUM 20 MG: 20 TABLET, COATED ORAL at 20:12

## 2021-11-16 RX ADMIN — AMIODARONE HYDROCHLORIDE 200 MG: 200 TABLET ORAL at 12:11

## 2021-11-16 RX ADMIN — POTASSIUM CHLORIDE 40 MEQ: 750 CAPSULE, EXTENDED RELEASE ORAL at 20:12

## 2021-11-16 RX ADMIN — POTASSIUM CHLORIDE 40 MEQ: 750 CAPSULE, EXTENDED RELEASE ORAL at 15:53

## 2021-11-16 RX ADMIN — INSULIN DETEMIR 16 UNITS: 100 INJECTION, SOLUTION SUBCUTANEOUS at 20:12

## 2021-11-16 RX ADMIN — AMLODIPINE BESYLATE 2.5 MG: 2.5 TABLET ORAL at 09:25

## 2021-11-16 RX ADMIN — LINAGLIPTIN 5 MG: 5 TABLET, FILM COATED ORAL at 09:25

## 2021-11-16 RX ADMIN — RIVAROXABAN 15 MG: 15 TABLET, FILM COATED ORAL at 17:41

## 2021-11-16 RX ADMIN — AMIODARONE HYDROCHLORIDE 200 MG: 200 TABLET ORAL at 20:12

## 2021-11-16 RX ADMIN — INSULIN LISPRO 3 UNITS: 100 INJECTION, SOLUTION INTRAVENOUS; SUBCUTANEOUS at 17:41

## 2021-11-16 RX ADMIN — TRIAMTERENE AND HYDROCHLOROTHIAZIDE 1 TABLET: 37.5; 25 TABLET ORAL at 09:25

## 2021-11-16 RX ADMIN — SODIUM CHLORIDE, PRESERVATIVE FREE 10 ML: 5 INJECTION INTRAVENOUS at 20:12

## 2021-11-17 VITALS
RESPIRATION RATE: 18 BRPM | SYSTOLIC BLOOD PRESSURE: 134 MMHG | OXYGEN SATURATION: 96 % | HEIGHT: 61 IN | DIASTOLIC BLOOD PRESSURE: 76 MMHG | BODY MASS INDEX: 28.32 KG/M2 | WEIGHT: 150 LBS | HEART RATE: 125 BPM | TEMPERATURE: 98 F

## 2021-11-17 LAB
GLUCOSE BLDC GLUCOMTR-MCNC: 140 MG/DL (ref 70–130)
GLUCOSE BLDC GLUCOMTR-MCNC: 257 MG/DL (ref 70–130)
QT INTERVAL: 352 MS
QT INTERVAL: 374 MS
QTC INTERVAL: 474 MS
QTC INTERVAL: 505 MS

## 2021-11-17 PROCEDURE — 82962 GLUCOSE BLOOD TEST: CPT

## 2021-11-17 PROCEDURE — 63710000001 INSULIN DETEMIR PER 5 UNITS: Performed by: NURSE PRACTITIONER

## 2021-11-17 PROCEDURE — 63710000001 INSULIN LISPRO (HUMAN) PER 5 UNITS: Performed by: NURSE PRACTITIONER

## 2021-11-17 PROCEDURE — 99239 HOSP IP/OBS DSCHRG MGMT >30: CPT | Performed by: INTERNAL MEDICINE

## 2021-11-17 PROCEDURE — 63710000001 INSULIN LISPRO (HUMAN) PER 5 UNITS: Performed by: INTERNAL MEDICINE

## 2021-11-17 RX ORDER — AMIODARONE HYDROCHLORIDE 200 MG/1
200 TABLET ORAL EVERY 12 HOURS SCHEDULED
Start: 2021-11-17 | End: 2021-12-08

## 2021-11-17 RX ADMIN — LINAGLIPTIN 5 MG: 5 TABLET, FILM COATED ORAL at 08:43

## 2021-11-17 RX ADMIN — INSULIN LISPRO 3 UNITS: 100 INJECTION, SOLUTION INTRAVENOUS; SUBCUTANEOUS at 11:24

## 2021-11-17 RX ADMIN — OXYBUTYNIN CHLORIDE 5 MG: 5 TABLET, EXTENDED RELEASE ORAL at 08:44

## 2021-11-17 RX ADMIN — INSULIN DETEMIR 10 UNITS: 100 INJECTION, SOLUTION SUBCUTANEOUS at 09:00

## 2021-11-17 RX ADMIN — AMIODARONE HYDROCHLORIDE 200 MG: 200 TABLET ORAL at 08:44

## 2021-11-17 RX ADMIN — INSULIN LISPRO 3 UNITS: 100 INJECTION, SOLUTION INTRAVENOUS; SUBCUTANEOUS at 08:00

## 2021-11-17 RX ADMIN — INSULIN LISPRO 6 UNITS: 100 INJECTION, SOLUTION INTRAVENOUS; SUBCUTANEOUS at 11:24

## 2021-11-17 RX ADMIN — SODIUM CHLORIDE, PRESERVATIVE FREE 10 ML: 5 INJECTION INTRAVENOUS at 08:44

## 2021-11-17 RX ADMIN — ASPIRIN 81 MG: 81 TABLET, COATED ORAL at 08:43

## 2021-11-19 ENCOUNTER — TELEPHONE (OUTPATIENT)
Dept: SLEEP MEDICINE | Facility: HOSPITAL | Age: 81
End: 2021-11-19

## 2021-11-19 NOTE — TELEPHONE ENCOUNTER
SON CALLED AND WANTED TO DISCUSS POSSIBLE SLEEP STUDY. STATED THAT PATIENT WAS RECENTLY HOSPITALIZED AND O2 KEPT DROPPING WHILE ON PAP        PLEASE RETURN CALL ARNOLD SPARROW [phone]

## 2021-11-19 NOTE — TELEPHONE ENCOUNTER
I did call and speak with her son Dr. German Bladimir.  States that even while she was in the hospital with atrial flutter she was shown to have low oxygen while on CPAP therapy.  She was then transferred to [facility] where she was placed on oxygen per nasal cannula with her CPAP.  She has no low oxygen at this time but son was worried about central apneas.  I did review with him the AHI of 3.9 that we did on her download in October that showed no central apneas.  He did mention retesting with CPAP I have suggested he continue follow-up with cardiology before moving forward as we would add oxygen which he is already on.  Patient is going to move forward gathering information from other doctors does not feel she needs PSG at this time but will call back should he change his mind.

## 2021-11-29 RX ORDER — METFORMIN HYDROCHLORIDE 500 MG/1
1000 TABLET, EXTENDED RELEASE ORAL 2 TIMES DAILY WITH MEALS
Qty: 120 TABLET | Refills: 5 | Status: SHIPPED | OUTPATIENT
Start: 2021-11-29 | End: 2021-11-30 | Stop reason: SDUPTHER

## 2021-11-30 RX ORDER — METFORMIN HYDROCHLORIDE 500 MG/1
1000 TABLET, EXTENDED RELEASE ORAL 2 TIMES DAILY WITH MEALS
Qty: 120 TABLET | Refills: 5 | Status: SHIPPED | OUTPATIENT
Start: 2021-11-30 | End: 2022-03-15 | Stop reason: SDUPTHER

## 2021-12-07 NOTE — PROGRESS NOTES
[facility]  [facility] Valve [facility]    Chief Complaint  Atrial Fibrillation and Establish Care    Subjective    History of Present Illness {CC  Problem List  Visit  Diagnosis   Encounters  Notes  Medications  Labs  Result Review Imaging  Media :23}     Kaylen Garrison is a 81 y.o. female with Hypertension, hyperlipidemia, diabetes, BERNARD who presents today as a hospital referral for atrial flutter.  Patient was admitted 11/10 with new onset atrial flutter with RVR.  She underwent SATISH cardioversion on 11/11 that was initially successful and was placed on sotalol.  She is doing well up until day of discharge and she went back into A. fib.  Sotalol was changed to amiodarone.  [facility] but rates improved.  Due to borderline blood pressures her amlodipine and triamterene/hydrochlorothiazide was stopped.  She was discharged to [facility] for rehab.  She reports that she was discharged home on the 24th.  She reports that she has been doing very well.  According to her , when she was discharged from [facility] she was in A. fib and she went to go see her primary care provider, Dr. Multani, and was prescribed atenolol.  She converted shortly thereafter.  She is asymptomatic and denies any palpitations, dizziness, lightheadedness, chest pain or shortness of breath.  She brings in her pill bottles today and she does not have the amiodarone.  She does not think she is taking this, however, we have confirmed with her pharmacy that she picked it up on the 24th. according to records from [facility] her heart rates were stable on amiodarone 200 mg twice daily.  Reviewed blood pressures from home and most of her systolic blood pressures are running in the 140s            Objective     Vital Signs:   Vitals:    12/08/21 1416 12/08/21 1418 12/08/21 1419   BP: 172/74 156/72 153/70   BP Location: Right arm Left arm Left arm   Patient Position: Sitting Standing Sitting   Cuff  "Size: Adult Adult Adult   Pulse: 57 58 56   Resp:   15   Temp:   97.6 °F (36.4 °C)   TempSrc:   Temporal   SpO2: 98% 99% 98%   Weight:   68.7 kg (151 lb 8 oz)   Height:   154.9 cm (61\")     Body mass index is 28.63 kg/m².  Physical Exam  Vitals reviewed.   Constitutional:       Appearance: Normal appearance.   HENT:      Head: Normocephalic.   Eyes:      Extraocular Movements: Extraocular movements intact.      Pupils: Pupils are equal, round, and reactive to light.   Neck:      Vascular: No carotid bruit.   Cardiovascular:      Rate and Rhythm: Normal rate and regular rhythm.      Pulses: Normal pulses.      Heart sounds: Normal heart sounds, S1 normal and S2 normal. No murmur heard.      Pulmonary:      Effort: Pulmonary effort is normal. No respiratory distress.      Breath sounds: Normal breath sounds.   Chest:      Chest wall: No tenderness.   Abdominal:      General: Abdomen is flat. Bowel sounds are normal.      Palpations: Abdomen is soft.   Musculoskeletal:      Cervical back: Neck supple.      Right lower leg: No edema.      Left lower leg: No edema.   Skin:     General: Skin is warm and dry.   Neurological:      General: No focal deficit present.      Mental Status: She is alert and oriented to person, place, and time. Mental status is at baseline.   Psychiatric:         Mood and Affect: Mood normal.         Behavior: Behavior normal.         Thought Content: Thought content normal.              Result Review  Data Reviewed:{ Labs  Result Review  Imaging  Med Tab  Media :23}   POC Glucose Once (11/17/2021 11:09)  Basic Metabolic Panel (11/16/2021 04:48)  CBC & Differential (11/16/2021 04:48)  ECG 12 Lead (11/16/2021 20:45)  Cardioversion External in Cardiology Department (11/12/2021 09:38)  Adult Transesophageal Echo (SATISH) W/ Cont if Necessary Per Protocol (11/12/2021 09:38)    Consultant notes Cardiology and Recent hospitalization notes             Assessment and Plan {CC Problem List  Visit " Diagnosis  ROS  Review (Popup)  [facility] Maintenance  Quality  BestPractice  Medications  SmartSets  SnapShot Encounters  Media :23}   1. Paroxysmal atrial flutter  Maintaining normal sinus rhythm.  It is unclear whether or not she is taking amiodarone.  Atenolol recently added by her PCP.  Patient to go home and confirm if she has the amiodarone and taking it. She says she will call.  According to her pharmacy she picked it up on 11/24  Continue Xarelto.  Refills provided  Atrial fibrillation education provided today including: causes of afib, s/s, risks for stroke and use of anticoagulation for stroke prevention and treatment of atrial fibrillation.  Continue CPAP and O2 at night  - ECG 12 Lead; Future    2. Essential hypertension  Home log reviewed. BPs 130s-150s  Cardinal Vickers prescribed her HCTZ 3 times a week.  She does not feel that she needs this and does note some dry mouth.  I told her she could go ahead and stop this.  We will go ahead and increase her lisinopril from 2.5 mg to 5 mg daily to help keep her systolic blood pressures in the 130s to 140s.  Advised to continue to monitor.      Keep follow up with Dr. Alvarez    Follow Up {Instructions Charge Capture  Follow-up Communications :23}   Return if symptoms worsen or fail to improve.    Patient was given instructions and counseling regarding her condition or for health maintenance advice. Please see specific information pulled into the AVS if appropriate.  Advised to call the Heart and Valve Center with any questions, concerns, or worsening symptoms.    Dictated Utilizing Dragon Dictation

## 2021-12-08 ENCOUNTER — OFFICE VISIT (OUTPATIENT)
Dept: CARDIOLOGY | Facility: HOSPITAL | Age: 81
End: 2021-12-08

## 2021-12-08 ENCOUNTER — PATIENT ROUNDING (BHMG ONLY) (OUTPATIENT)
Dept: CARDIOLOGY | Facility: HOSPITAL | Age: 81
End: 2021-12-08

## 2021-12-08 ENCOUNTER — TELEPHONE (OUTPATIENT)
Dept: CARDIOLOGY | Facility: HOSPITAL | Age: 81
End: 2021-12-08

## 2021-12-08 ENCOUNTER — HOSPITAL ENCOUNTER (OUTPATIENT)
Dept: CARDIOLOGY | Facility: HOSPITAL | Age: 81
Discharge: HOME | End: 2021-12-08

## 2021-12-08 VITALS
SYSTOLIC BLOOD PRESSURE: 153 MMHG | HEIGHT: 61 IN | RESPIRATION RATE: 15 BRPM | WEIGHT: 151.5 LBS | DIASTOLIC BLOOD PRESSURE: 70 MMHG | TEMPERATURE: 97.6 F | BODY MASS INDEX: 28.6 KG/M2 | OXYGEN SATURATION: 98 % | HEART RATE: 56 BPM

## 2021-12-08 DIAGNOSIS — I48.0 PAF (PAROXYSMAL ATRIAL FIBRILLATION) (HCC): ICD-10-CM

## 2021-12-08 DIAGNOSIS — I10 ESSENTIAL HYPERTENSION: ICD-10-CM

## 2021-12-08 DIAGNOSIS — I48.92 ATRIAL FLUTTER, PAROXYSMAL (HCC): Primary | ICD-10-CM

## 2021-12-08 PROCEDURE — 93010 ELECTROCARDIOGRAM REPORT: CPT | Performed by: INTERNAL MEDICINE

## 2021-12-08 PROCEDURE — 93005 ELECTROCARDIOGRAM TRACING: CPT | Performed by: NURSE PRACTITIONER

## 2021-12-08 PROCEDURE — 99214 OFFICE O/P EST MOD 30 MIN: CPT | Performed by: NURSE PRACTITIONER

## 2021-12-08 RX ORDER — LISINOPRIL 5 MG/1
5 TABLET ORAL DAILY
Qty: 30 TABLET | Refills: 3 | Status: SHIPPED | OUTPATIENT
Start: 2021-12-08 | End: 2022-01-19 | Stop reason: SDDI

## 2021-12-08 RX ORDER — AMIODARONE HYDROCHLORIDE 200 MG/1
200 TABLET ORAL 2 TIMES DAILY
Qty: 60 TABLET | Refills: 1 | Status: SHIPPED | OUTPATIENT
Start: 2021-12-08 | End: 2022-01-19

## 2021-12-08 RX ORDER — ATENOLOL 25 MG/1
12.5 TABLET ORAL DAILY
COMMUNITY

## 2021-12-08 RX ORDER — HYDROCHLOROTHIAZIDE 25 MG/1
12.5 TABLET ORAL 3 TIMES WEEKLY
COMMUNITY
End: 2021-12-08

## 2021-12-08 RX ORDER — OXYBUTYNIN CHLORIDE 5 MG/1
5 TABLET, EXTENDED RELEASE ORAL DAILY
COMMUNITY

## 2021-12-08 RX ORDER — GLIPIZIDE 2.5 MG/1
5 TABLET, EXTENDED RELEASE ORAL DAILY
COMMUNITY

## 2021-12-08 RX ORDER — LISINOPRIL 2.5 MG/1
2.5 TABLET ORAL DAILY
COMMUNITY
End: 2021-12-08 | Stop reason: SDUPTHER

## 2021-12-08 RX ORDER — ATORVASTATIN CALCIUM 40 MG/1
40 TABLET, FILM COATED ORAL DAILY
COMMUNITY

## 2021-12-08 NOTE — PROGRESS NOTES
December 8, 2021    Hello, may I speak with Kaylen Garrison?    My name is Luba    I am  with [facility]ARDI[facility]  [address]  [facility] [address] [phone].    Before we get started may I verify your date of birth? 1940    I am calling to officially welcome you to our practice and ask about your recent visit. Is this a good time to talk?Yes    Tell me about your visit with us. What things went well?  the provider was very detailed       We're always looking for ways to make our patients' experiences even better. Do you have recommendations on ways we may improve?  no    Overall were you satisfied with your first visit to our practice? yes       I appreciate you taking the time to speak with me today. Is there anything else I can do for you? no      Thank you, and have a great day.

## 2021-12-09 LAB
QT INTERVAL: 526 MS
QTC INTERVAL: 503 MS

## 2022-01-05 LAB
BH CV ECHO MEAS - BSA(HAYCOCK): 1.7 M^2
BH CV ECHO MEAS - BSA: 1.6 M^2
BH CV ECHO MEAS - BZI_BMI: 27.4 KILOGRAMS/M^2
BH CV ECHO MEAS - BZI_METRIC_HEIGHT: 154.9 CM
BH CV ECHO MEAS - BZI_METRIC_WEIGHT: 65.8 KG
BH CV ECHO MEAS - MV MAX PG: 6.9 MMHG
BH CV ECHO MEAS - MV MEAN PG: 3.9 MMHG
BH CV ECHO MEAS - MV V2 MAX: 131.3 CM/SEC
BH CV ECHO MEAS - MV V2 MEAN: 90.8 CM/SEC
BH CV ECHO MEAS - MV V2 VTI: 11 CM
LV EF 2D ECHO EST: 50 %
MAXIMAL PREDICTED HEART RATE: 137 BPM
STRESS TARGET HR: 116 BPM

## 2022-01-14 ENCOUNTER — TRANSCRIBE ORDERS (OUTPATIENT)
Dept: CARDIAC REHAB | Facility: HOSPITAL | Age: 82
End: 2022-01-14

## 2022-01-14 ENCOUNTER — DOCUMENTATION (OUTPATIENT)
Dept: CARDIAC REHAB | Facility: HOSPITAL | Age: 82
End: 2022-01-14

## 2022-01-14 DIAGNOSIS — Z00.00 PREVENTATIVE HEALTH CARE: Primary | ICD-10-CM

## 2022-01-14 NOTE — PROGRESS NOTES
Pt. Referred for Phase III Cardiac Rehab. Staff discussed benefits of exercise, program protocol, and educational material provided. Teach back verified. Patient schedule for Phase III CR orientation on 1/27/2022.

## 2022-01-17 NOTE — PROGRESS NOTES
[facility] Cardiology    Patient ID: Kaylen Garrison is a 81 y.o. female.  : 1940   Contact: [phone]    Encounter date: 2022    PCP: Lizzette Multani MD      Chief complaint:   Chief Complaint   Patient presents with   • Hypertension   • Hyperlipidemia   • Atrial Flutter     Problem List:  1. Atrial flutter with RVR  a. New onset 11/10/2021 in setting of nocturnal hypoxia  b. [id number] = 5 on Xarelto   c. SATISH, 2021: EF 50%. Mild LAE. No evidence of intracardiac thrombus or mass, clear JENNA, Trace MR and TR.   d. Successful ECV, 2021  e. Started on Amiodarone 2021.   2. Syncope, 2012  a. Echocardiogram, 2012: EF 55 to 60% with no valvular abnormalities.  b. Limited studies, 2012: 0-49% bilateral carotid artery stenosis.  c. No acute findings per CT scan.  d. MRI of the brain, 2012: Cortical atrophy, chronic ischemic changes noted.  e. Abnormal MPS, 2012: EF 79% with apical hypoperfusion suspicious for ischemia, Dr. Arnaud Augustine.  f. MPS 2016: EF greater than 70%.  No evidence of inducible ischemia.  Borderline inferior ST segment changes.  3. Hypertension  4. Dyslipidemia  5. Diabetes mellitus type 2  6. Obesity  7. Ménière's disease  8. Surgical history  a. Hysterectomy  b. Benign tumor removal from left breast    Allergies   Allergen Reactions   • Adhesive Tape Rash   • Latex Rash   • Penicillins Rash       Current Medications:    Current Outpatient Medications:   •  aspirin 81 MG tablet, Take 81 mg by mouth daily., Disp: , Rfl:   •  atenolol (TENORMIN) 25 MG tablet, Take 25 mg by mouth Daily., Disp: , Rfl:   •  atorvastatin (LIPITOR) 40 MG tablet, Take 40 mg by mouth Daily., Disp: , Rfl:   •  Blood Glucose Monitoring Suppl (Accu-Chek Guide) w/Device kit, 1 Device See Admin Instructions. Use to check blood sugar once daily.  Dx E11.9, Disp: 1 kit, Rfl: 0  •  Cinnamon 500 MG capsule, Take 500 mg by mouth Daily., Disp: , Rfl:   •   "glipizide (GLUCOTROL XL) 2.5 MG 24 hr tablet, Take 5 mg by mouth Daily., Disp: , Rfl:   •  hydroCHLOROthiazide (HYDRODIURIL) 25 MG tablet, Take 12.5 mg by mouth Daily., Disp: , Rfl:   •  Januvia 100 MG tablet, Take 1 tablet by mouth once daily, Disp: 90 tablet, Rfl: 0  •  metFORMIN ER (GLUCOPHAGE-XR) 500 MG 24 hr tablet, Take 2 tablets by mouth 2 (Two) Times a Day With Meals., Disp: 120 tablet, Rfl: 5  •  omega-3 acid ethyl esters (LOVAZA) 1 g capsule, Take 1 g by mouth Daily., Disp: , Rfl:   •  oxybutynin XL (DITROPAN-XL) 5 MG 24 hr tablet, Take 5 mg by mouth Daily., Disp: , Rfl:   •  rivaroxaban (XARELTO) 15 MG tablet, Take 1 tablet by mouth Daily With Dinner. Indications: Atrial Fibrillation, Disp: 30 tablet, Rfl: 3  •  valsartan (DIOVAN) 40 MG tablet, Take 40 mg by mouth Daily., Disp: , Rfl:   •  amiodarone (PACERONE) 200 MG tablet, Take 1 tablet by mouth Daily., Disp: 30 tablet, Rfl: 0    HPI    Kaylen Garrison is a 81 y.o. female who presents today for a 2 month hospital follow up of atrial flutter with RVR s/p ECV (11/12/21), syncope, and cardiac risk factors. Since last visit, patient has done well. She denies palpitations, LH, dizziness, chest pain, SOA. Her O2 has been in the mid 90's by pulse oximetry. BP has been 140-150's systolic but she has been checking it before she takes her antihypertensives. No TIA/CVA symptoms. No major bleeding or bruising.     The following portions of the patient's history were reviewed and updated as appropriate: allergies, current medications and problem list.    Pertinent positives as listed in the HPI.  All other systems reviewed are negative.         Vitals:    01/19/22 0927   BP: 136/60   BP Location: Left arm   Patient Position: Sitting   Pulse: 54   SpO2: 98%   Weight: 66.7 kg (147 lb)   Height: 154.9 cm (60.98\")       Physical Exam:  General: Alert and oriented.  Neck: Jugular venous pressure is within normal limits. Carotids have normal upstrokes without bruits. "   Cardiovascular: Heart has a nondisplaced focal PMI. Regular rate and rhythm. No murmur, gallop or rub.  Lungs: Clear, no rales or wheezes. Equal expansion is noted.   Extremities: Show no edema.  Skin: Warm and dry.  Neurologic: Nonfocal.     Diagnostic Data (reviewed with patient):    Lab date: 1/6/2022  • CMP: Glu 288, BUN 27, Creat 1.23, eGFR 41, Na 141, K 4.5, Cl 104, CO2 23, Ca 9.0  • proBNP: 3361      Lab Results   Component Value Date    GLUCOSE 201 (H) 11/16/2021    BUN 32 (H) 11/16/2021    CREATININE 0.86 11/16/2021    EGFRIFNONA 63 11/16/2021    BCR 37.2 (H) 11/16/2021     11/16/2021    K 3.4 (L) 11/16/2021    CL 99 11/16/2021    CO2 27.0 11/16/2021    CALCIUM 9.2 11/16/2021    ALBUMIN 4.00 11/10/2021    ALKPHOS 67 11/10/2021    AST 13 11/10/2021    ALT 11 11/10/2021     Lab Results   Component Value Date    CHOL 97 11/11/2021    TRIG 66 11/11/2021    HDL 42 11/11/2021    LDL 40 11/11/2021      Lab Results   Component Value Date    WBC 8.07 11/16/2021    RBC 4.30 11/16/2021    HGB 12.1 11/16/2021    HCT 36.3 11/16/2021    MCV 84.4 11/16/2021     11/16/2021      Lab Results   Component Value Date    TSH 2.450 11/10/2021          ECG 12 Lead    Date/Time: 1/19/2022 10:08 AM  Performed by: Ada Spencer APRN  Authorized by: Ada Spencer APRN   Comparison: compared with previous ECG from 12/9/2021  Rhythm: sinus bradycardia  BPM: 54                Assessment:    ICD-10-CM ICD-9-CM   1. Atrial flutter with rapid ventricular response (HCC)  I48.92 427.32   2. Essential hypertension  I10 401.9   3. Mixed hyperlipidemia  E78.2 272.2         Plan:  1. Reduce Amiodarone to 200 mg once daily for Aflutter.   2. Check BP 2 hours after antihypertensives.   3. Continue on Xarelto 15 mg daily for stroke prophylaxis.   4. Continue on atenolol 25 mg daily for rate control.   5. Continue on Valsartan 40 mg daily for hypertension.   6. Continue on atorvastatin 40 mg daily for  hyperlipidemia.   7. Continue all other current medications.  8. F/up in 6 months, sooner if needed.      Electronically signed by VALERIANO Gonzales, 01/19/22, 10:07 AM EST.

## 2022-01-19 ENCOUNTER — OFFICE VISIT (OUTPATIENT)
Dept: CARDIOLOGY | Facility: CLINIC | Age: 82
End: 2022-01-19

## 2022-01-19 VITALS
WEIGHT: 147 LBS | HEIGHT: 61 IN | HEART RATE: 54 BPM | BODY MASS INDEX: 27.75 KG/M2 | OXYGEN SATURATION: 98 % | SYSTOLIC BLOOD PRESSURE: 136 MMHG | DIASTOLIC BLOOD PRESSURE: 60 MMHG

## 2022-01-19 DIAGNOSIS — I10 ESSENTIAL HYPERTENSION: ICD-10-CM

## 2022-01-19 DIAGNOSIS — I48.92 ATRIAL FLUTTER WITH RAPID VENTRICULAR RESPONSE: Primary | ICD-10-CM

## 2022-01-19 DIAGNOSIS — E78.2 MIXED HYPERLIPIDEMIA: ICD-10-CM

## 2022-01-19 PROCEDURE — 93000 ELECTROCARDIOGRAM COMPLETE: CPT | Performed by: INTERNAL MEDICINE

## 2022-01-19 PROCEDURE — 99213 OFFICE O/P EST LOW 20 MIN: CPT | Performed by: INTERNAL MEDICINE

## 2022-01-19 RX ORDER — HYDROCHLOROTHIAZIDE 25 MG/1
12.5 TABLET ORAL DAILY
COMMUNITY
Start: 2021-12-23

## 2022-01-19 RX ORDER — AMIODARONE HYDROCHLORIDE 200 MG/1
200 TABLET ORAL DAILY
Qty: 30 TABLET | Refills: 0 | Status: SHIPPED | OUTPATIENT
Start: 2022-01-19 | End: 2022-02-23

## 2022-01-19 RX ORDER — OMEGA-3-ACID ETHYL ESTERS 1 G/1
1 CAPSULE, LIQUID FILLED ORAL DAILY
COMMUNITY
Start: 2021-12-28

## 2022-01-19 RX ORDER — VALSARTAN 40 MG/1
80 TABLET ORAL DAILY
COMMUNITY
Start: 2022-01-18 | End: 2022-02-10 | Stop reason: DRUGHIGH

## 2022-01-19 NOTE — PATIENT INSTRUCTIONS
Reduce amiodarone to 200 mg once daily.   Continue Xarelto.   From a heart standpoint, she can exercise and can drive.   Take blood pressure about two hours after your medications.

## 2022-01-20 RX ORDER — BLOOD-GLUCOSE METER
EACH MISCELLANEOUS
Qty: 100 EACH | Refills: 3 | Status: SHIPPED | OUTPATIENT
Start: 2022-01-20 | End: 2022-05-04 | Stop reason: SDUPTHER

## 2022-01-20 NOTE — TELEPHONE ENCOUNTER
Rx pended.  Left message for patient to return call.  Romy will not be in the office until tomorrow.

## 2022-01-20 NOTE — TELEPHONE ENCOUNTER
Spoke with patient. States she does need strips today.  Rx has been pended.     Requesting a call back from Romy. States she is fine to wait until tomorrow and states it is okay if it is after clinic.

## 2022-01-20 NOTE — TELEPHONE ENCOUNTER
Pt called she uses a Relion meter premier classic meter pt needs the test strips ordered Relion premier classic. Pt last seen 11/10/21 pt next appt 02/10/22 pt want to talk to Romy please call her would not go into details.

## 2022-01-21 ENCOUNTER — TELEPHONE (OUTPATIENT)
Dept: ENDOCRINOLOGY | Facility: CLINIC | Age: 82
End: 2022-01-21

## 2022-02-10 ENCOUNTER — DOCUMENTATION (OUTPATIENT)
Dept: DIABETES SERVICES | Facility: HOSPITAL | Age: 82
End: 2022-02-10

## 2022-02-10 ENCOUNTER — LAB (OUTPATIENT)
Dept: LAB | Facility: HOSPITAL | Age: 82
End: 2022-02-10

## 2022-02-10 ENCOUNTER — OFFICE VISIT (OUTPATIENT)
Dept: ENDOCRINOLOGY | Facility: CLINIC | Age: 82
End: 2022-02-10

## 2022-02-10 VITALS
WEIGHT: 140 LBS | SYSTOLIC BLOOD PRESSURE: 120 MMHG | BODY MASS INDEX: 27.48 KG/M2 | OXYGEN SATURATION: 94 % | HEIGHT: 60 IN | DIASTOLIC BLOOD PRESSURE: 83 MMHG | HEART RATE: 61 BPM

## 2022-02-10 DIAGNOSIS — E11.65 TYPE 2 DIABETES MELLITUS WITH HYPERGLYCEMIA, WITHOUT LONG-TERM CURRENT USE OF INSULIN: Primary | ICD-10-CM

## 2022-02-10 DIAGNOSIS — I10 PRIMARY HYPERTENSION: Chronic | ICD-10-CM

## 2022-02-10 DIAGNOSIS — E11.65 TYPE 2 DIABETES MELLITUS WITH HYPERGLYCEMIA, WITHOUT LONG-TERM CURRENT USE OF INSULIN: ICD-10-CM

## 2022-02-10 DIAGNOSIS — E21.0 PRIMARY HYPERPARATHYROIDISM: ICD-10-CM

## 2022-02-10 DIAGNOSIS — E78.00 PURE HYPERCHOLESTEROLEMIA: Chronic | ICD-10-CM

## 2022-02-10 LAB
ALBUMIN SERPL-MCNC: 3.8 G/DL (ref 3.5–5.2)
ALBUMIN/GLOB SERPL: 1.2 G/DL
ALP SERPL-CCNC: 55 U/L (ref 39–117)
ALT SERPL W P-5'-P-CCNC: 21 U/L (ref 1–33)
ANION GAP SERPL CALCULATED.3IONS-SCNC: 10.8 MMOL/L (ref 5–15)
AST SERPL-CCNC: 17 U/L (ref 1–32)
BILIRUB SERPL-MCNC: 0.7 MG/DL (ref 0–1.2)
BUN SERPL-MCNC: 21 MG/DL (ref 8–23)
BUN/CREAT SERPL: 21.6 (ref 7–25)
CALCIUM SPEC-SCNC: 9.9 MG/DL (ref 8.6–10.5)
CHLORIDE SERPL-SCNC: 101 MMOL/L (ref 98–107)
CO2 SERPL-SCNC: 29.2 MMOL/L (ref 22–29)
CREAT SERPL-MCNC: 0.97 MG/DL (ref 0.57–1)
EXPIRATION DATE: ABNORMAL
EXPIRATION DATE: NORMAL
GFR SERPL CREATININE-BSD FRML MDRD: 55 ML/MIN/1.73
GLOBULIN UR ELPH-MCNC: 3.2 GM/DL
GLUCOSE BLDC GLUCOMTR-MCNC: 142 MG/DL (ref 70–130)
GLUCOSE SERPL-MCNC: 121 MG/DL (ref 65–99)
HBA1C MFR BLD: 6.7 %
Lab: ABNORMAL
Lab: NORMAL
POTASSIUM SERPL-SCNC: 3.5 MMOL/L (ref 3.5–5.2)
PROT SERPL-MCNC: 7 G/DL (ref 6–8.5)
SODIUM SERPL-SCNC: 141 MMOL/L (ref 136–145)

## 2022-02-10 PROCEDURE — 3044F HG A1C LEVEL LT 7.0%: CPT | Performed by: INTERNAL MEDICINE

## 2022-02-10 PROCEDURE — 82570 ASSAY OF URINE CREATININE: CPT

## 2022-02-10 PROCEDURE — 99214 OFFICE O/P EST MOD 30 MIN: CPT | Performed by: INTERNAL MEDICINE

## 2022-02-10 PROCEDURE — 83036 HEMOGLOBIN GLYCOSYLATED A1C: CPT | Performed by: INTERNAL MEDICINE

## 2022-02-10 PROCEDURE — 82043 UR ALBUMIN QUANTITATIVE: CPT

## 2022-02-10 PROCEDURE — 80053 COMPREHEN METABOLIC PANEL: CPT

## 2022-02-10 PROCEDURE — 82947 ASSAY GLUCOSE BLOOD QUANT: CPT | Performed by: INTERNAL MEDICINE

## 2022-02-10 RX ORDER — VALSARTAN 80 MG/1
80 TABLET ORAL DAILY
Start: 2022-02-10

## 2022-02-10 NOTE — PLAN OF CARE
Attempted to assist patient with Kuldip today. Patient does not have needed info for downloading apps at this time and declined offer to use  until apps can be downloaded as intent is to share data with family. Our phone number was given for patient to contact  Us when ready for Kuldip training. Thank you for this opportunity.

## 2022-02-10 NOTE — PROGRESS NOTES
"     Office Note      Date: 02/10/2022  Patient Name: Kaylen Garrison  MRN: [id number]  : 1940    Chief Complaint   Patient presents with   • Diabetes       History of Present Illness:   Kaylen Garrison is a 81 y.o. female who presents for Diabetes type 2. Diagnosed in: . Treated in past with oral agents. Current treatments: metformin, januvia and glipizide. Number of insulin shots per day: none. Checks blood sugar 1 times a day. Has low blood sugar: no. Aspirin use: Yes. Statin use: Yes. ACE-I/ARB use: No -  . Changes in health since last visit: admission for a.flutter. Last eye exam 2021.     She has h/o primary hyperparathyroidism which has been mild and asymptomatic.  Her last DEXA showed osteopenia.  We have discussed surgery but elected to observe due to her age.  She denies any kidney stones.  She denies any bone fractures.      Subjective      Diabetic Complications:  Eyes: No  Kidneys: No  Feet: No  Heart: No    Diet and Exercise:  Meals per day: 3  Minutes of exercise per week: 0 mins.    Review of Systems:   Review of Systems   Constitutional: Negative.    Cardiovascular: Negative.    Gastrointestinal: Negative.    Endocrine: Negative.        The following portions of the patient's history were reviewed and updated as appropriate: allergies, current medications, past family history, past medical history, past social history, past surgical history and problem list.    Objective       Visit Vitals  /83   Pulse 61   Ht 152.4 cm (60\")   Wt 63.5 kg (140 lb)   SpO2 94%   BMI 27.34 kg/m²       Physical Exam:  Physical Exam  Constitutional:       Appearance: Normal appearance.   Cardiovascular:      Pulses:           Dorsalis pedis pulses are 2+ on the right side and 2+ on the left side.        Posterior tibial pulses are 2+ on the right side and 2+ on the left side.   Feet:      Right foot:      Protective Sensation: 5 sites tested. 5 sites sensed.      Skin integrity: Skin integrity " normal.      Toenail Condition: Right toenails are normal.      Left foot:      Protective Sensation: 5 sites tested. 5 sites sensed.      Skin integrity: Skin integrity normal.      Toenail Condition: Left toenails are normal.   Neurological:      Mental Status: She is alert.         Labs:    HbA1c  Lab Results   Component Value Date    HGBA1C 6.7 02/10/2022       CMP  Lab Results   Component Value Date    GLUCOSE 201 (H) 11/16/2021    BUN 32 (H) 11/16/2021    CREATININE 0.86 11/16/2021    EGFRIFNONA 63 11/16/2021    BCR 37.2 (H) 11/16/2021    K 3.4 (L) 11/16/2021    CO2 27.0 11/16/2021    CALCIUM 9.2 11/16/2021    AST 13 11/10/2021    ALT 11 11/10/2021        Lipid Panel  Lab Results   Component Value Date    HDL 42 11/11/2021    LDL 40 11/11/2021    TRIG 66 11/11/2021        TSH  Lab Results   Component Value Date    TSH 2.450 11/10/2021        Hemoglobin A1C  Lab Results   Component Value Date    HGBA1C 6.7 02/10/2022        Microalbumin/Creatinine  Lab Results   Component Value Date    MALBCRERATIO  12/30/2020      Comment:      Unable to calculate    MICROALBUR <1.2 12/30/2020           Assessment / Plan      Assessment & Plan:  Diagnoses and all orders for this visit:    1. Type 2 diabetes mellitus with hyperglycemia, without long-term current use of insulin (HCC) (Primary)  Assessment & Plan:  Diabetes is improving with treatment.   Continue current treatment regimen.  Diabetes will be reassessed in 3 months.    Orders:  -     POC Glucose, Blood  -     POC Glycosylated Hemoglobin (Hb A1C)  -     Comprehensive Metabolic Panel; Future  -     Microalbumin / Creatinine Urine Ratio - Urine, Clean Catch; Future    2. Primary hypertension  Assessment & Plan:  Hypertension is improving with treatment.  Continue current treatment regimen.  Blood pressure will be reassessed at the next regular appointment.      3. Pure hypercholesterolemia  Assessment & Plan:  Recent lipids at goal.  Continue statin.      4. Primary  hyperparathyroidism (HCC)  Assessment & Plan:  Check calcium today.      Other orders  -     valsartan (DIOVAN) 80 MG tablet; Take 1 tablet by mouth Daily.  -     Continuous Blood Gluc  (FreeStyle Kuldip 2 [place]) device; 1 each Daily.  Dispense: 1 each; Refill: 0  -     Continuous Blood Gluc Sensor (FreeStyle Kuldip 2 Sensor) misc; 1 each Every 14 (Fourteen) Days.  Dispense: 2 each; Refill: 5      Return in about 3 months (around 5/10/2022) for Recheck with A1c.    Harley Rose MD   02/10/2022

## 2022-02-11 LAB
ALBUMIN UR-MCNC: 1.3 MG/DL
CREAT UR-MCNC: 47.9 MG/DL
MICROALBUMIN/CREAT UR: 27.1 MG/G

## 2022-02-23 RX ORDER — AMIODARONE HYDROCHLORIDE 200 MG/1
TABLET ORAL
Qty: 90 TABLET | Refills: 0 | Status: SHIPPED | OUTPATIENT
Start: 2022-02-23 | End: 2022-07-12

## 2022-03-15 RX ORDER — METFORMIN HYDROCHLORIDE 500 MG/1
1000 TABLET, EXTENDED RELEASE ORAL 2 TIMES DAILY WITH MEALS
Qty: 120 TABLET | Refills: 5 | Status: SHIPPED | OUTPATIENT
Start: 2022-03-15 | End: 2022-09-15 | Stop reason: SDUPTHER

## 2022-04-28 ENCOUNTER — APPOINTMENT (OUTPATIENT)
Dept: CARDIAC REHAB | Facility: HOSPITAL | Age: 82
End: 2022-04-28

## 2022-05-04 RX ORDER — BLOOD-GLUCOSE METER
EACH MISCELLANEOUS
Qty: 100 EACH | Refills: 3 | Status: SHIPPED | OUTPATIENT
Start: 2022-05-04

## 2022-05-05 ENCOUNTER — APPOINTMENT (OUTPATIENT)
Dept: CARDIAC REHAB | Facility: HOSPITAL | Age: 82
End: 2022-05-05

## 2022-05-12 ENCOUNTER — TELEPHONE (OUTPATIENT)
Dept: SLEEP MEDICINE | Facility: HOSPITAL | Age: 82
End: 2022-05-12

## 2022-05-12 DIAGNOSIS — G47.34 NOCTURNAL HYPOXIA: ICD-10-CM

## 2022-05-12 DIAGNOSIS — G47.33 OSA (OBSTRUCTIVE SLEEP APNEA): Primary | ICD-10-CM

## 2022-05-12 DIAGNOSIS — G47.34 NOCTURNAL HYPOXEMIA: ICD-10-CM

## 2022-05-12 NOTE — TELEPHONE ENCOUNTER
CALLED PATIENT TO ADVISE OF PULSE OX RESULTS. O2 NOT CORRECTED WITH PAP. TITRATION STUDY ORDERED. PATIENT VERBALIZED UNDERSTANDING AND WAS AGREEABLE TO FURTHER TESTING

## 2022-05-12 NOTE — TELEPHONE ENCOUNTER
----- Message from VALERIANO Moulton sent at 5/12/2022 10:21 AM EDT -----  Due to nocturnal hypoxemia with her BERNARD she will need to have a titration study which has been ordered

## 2022-06-12 ENCOUNTER — HOSPITAL ENCOUNTER (OUTPATIENT)
Dept: SLEEP MEDICINE | Facility: HOSPITAL | Age: 82
Discharge: HOME | End: 2022-06-12
Admitting: NURSE PRACTITIONER

## 2022-06-12 VITALS — HEART RATE: 87 BPM | BODY MASS INDEX: 26.64 KG/M2 | WEIGHT: 141.09 LBS | OXYGEN SATURATION: 96 % | HEIGHT: 61 IN

## 2022-06-12 DIAGNOSIS — G47.33 OSA (OBSTRUCTIVE SLEEP APNEA): ICD-10-CM

## 2022-06-12 DIAGNOSIS — G47.34 NOCTURNAL HYPOXEMIA: ICD-10-CM

## 2022-06-12 PROCEDURE — 95811 POLYSOM 6/>YRS CPAP 4/> PARM: CPT | Performed by: INTERNAL MEDICINE

## 2022-06-12 PROCEDURE — 95811 POLYSOM 6/>YRS CPAP 4/> PARM: CPT

## 2022-06-15 DIAGNOSIS — G47.33 MILD OBSTRUCTIVE SLEEP APNEA: Primary | ICD-10-CM

## 2022-06-15 DIAGNOSIS — I10 PRIMARY HYPERTENSION: Chronic | ICD-10-CM

## 2022-06-16 NOTE — PROGRESS NOTES
CALLED PATIENT AND ADVISED OF STUDY RESULTS. PATIENT VERBALIZED UNDERSTANDING AND WAS AGREEABLE TO BIPAP THERAPY. FAXED ORDER TO [facility] 06/16/22 [facility]

## 2022-07-08 ENCOUNTER — OFFICE VISIT (OUTPATIENT)
Dept: ENDOCRINOLOGY | Facility: CLINIC | Age: 82
End: 2022-07-08

## 2022-07-08 VITALS
DIASTOLIC BLOOD PRESSURE: 58 MMHG | HEIGHT: 60 IN | WEIGHT: 142 LBS | BODY MASS INDEX: 27.88 KG/M2 | OXYGEN SATURATION: 93 % | HEART RATE: 82 BPM | SYSTOLIC BLOOD PRESSURE: 130 MMHG

## 2022-07-08 DIAGNOSIS — E21.0 PRIMARY HYPERPARATHYROIDISM: ICD-10-CM

## 2022-07-08 DIAGNOSIS — E11.65 TYPE 2 DIABETES MELLITUS WITH HYPERGLYCEMIA, WITHOUT LONG-TERM CURRENT USE OF INSULIN: Primary | ICD-10-CM

## 2022-07-08 DIAGNOSIS — E78.00 PURE HYPERCHOLESTEROLEMIA: Chronic | ICD-10-CM

## 2022-07-08 DIAGNOSIS — I10 PRIMARY HYPERTENSION: Chronic | ICD-10-CM

## 2022-07-08 LAB
EXPIRATION DATE: NORMAL
EXPIRATION DATE: NORMAL
GLUCOSE BLDC GLUCOMTR-MCNC: 120 MG/DL (ref 70–130)
HBA1C MFR BLD: 6.4 %
Lab: NORMAL
Lab: NORMAL

## 2022-07-08 PROCEDURE — 3044F HG A1C LEVEL LT 7.0%: CPT | Performed by: INTERNAL MEDICINE

## 2022-07-08 PROCEDURE — 82947 ASSAY GLUCOSE BLOOD QUANT: CPT | Performed by: INTERNAL MEDICINE

## 2022-07-08 PROCEDURE — 83036 HEMOGLOBIN GLYCOSYLATED A1C: CPT | Performed by: INTERNAL MEDICINE

## 2022-07-08 PROCEDURE — 99214 OFFICE O/P EST MOD 30 MIN: CPT | Performed by: INTERNAL MEDICINE

## 2022-07-08 RX ORDER — AMLODIPINE BESYLATE 2.5 MG/1
2.5 TABLET ORAL DAILY
COMMUNITY
Start: 2022-06-20

## 2022-07-08 NOTE — ASSESSMENT & PLAN NOTE
Diabetes is unchanged.  A1c looks good at 6.4%.  Continue current treatment regimen.  Diabetes will be reassessed in 3 months.

## 2022-07-08 NOTE — PROGRESS NOTES
"     Office Note      Date: 2022  Patient Name: Kaylen Garrison  MRN: [id number]  : 1940    Chief Complaint   Patient presents with   • Diabetes       History of Present Illness:   Kaylen Garrison is a 81 y.o. female who presents for Diabetes type 2. Diagnosed in: . Treated in past with oral agents. Current treatments: metformin, januvia and glipizide. Number of insulin shots per day: none. Checks blood sugar 1 time a day. Has low blood sugar: no. Aspirin use: Yes. Statin use: Yes. ACE-I/ARB use: Yes. Changes in health since last visit: new CPAP - feeling better. Last eye exam fall .     She has h/o primary hyperparathyroidism which has been mild and asymptomatic.  Her last DEXA showed osteopenia.  We have discussed surgery but elected to observe due to her age.  She denies any kidney stones.  She denies any bone fractures.      Subjective      Diabetic Complications:  Eyes: No  Kidneys: No  Feet: No  Heart: No    Diet and Exercise:  Meals per day: 3  Minutes of exercise per week: 0 mins.    Review of Systems:   Review of Systems   Constitutional: Negative.    Cardiovascular: Negative.    Gastrointestinal: Negative.    Endocrine: Negative.        The following portions of the patient's history were reviewed and updated as appropriate: allergies, current medications, past family history, past medical history, past social history, past surgical history and problem list.    Objective       Visit Vitals  /58   Pulse 82   Ht 152.4 cm (60\")   Wt 64.4 kg (142 lb)   SpO2 93%   BMI 27.73 kg/m²       Physical Exam:  Physical Exam  Constitutional:       Appearance: Normal appearance.   Neurological:      Mental Status: She is alert.         Labs:    HbA1c  Lab Results   Component Value Date    HGBA1C 6.4 2022       CMP  Lab Results   Component Value Date    GLUCOSE 121 (H) 02/10/2022    BUN 21 02/10/2022    CREATININE 0.97 02/10/2022    EGFRIFNONA 55 (L) 02/10/2022    BCR 21.6 02/10/2022 "    K 3.5 02/10/2022    CO2 29.2 (H) 02/10/2022    CALCIUM 9.9 02/10/2022    AST 17 02/10/2022    ALT 21 02/10/2022        Lipid Panel  Lab Results   Component Value Date    HDL 42 11/11/2021    LDL 40 11/11/2021    TRIG 66 11/11/2021        TSH  Lab Results   Component Value Date    TSH 2.450 11/10/2021        Hemoglobin A1C  Lab Results   Component Value Date    HGBA1C 6.4 07/08/2022        Microalbumin/Creatinine  Lab Results   Component Value Date    MALBCRERATIO 27.1 02/10/2022    MICROALBUR 1.3 02/10/2022           Assessment / Plan      Assessment & Plan:  Diagnoses and all orders for this visit:    1. Type 2 diabetes mellitus with hyperglycemia, without long-term current use of insulin (HCC) (Primary)  Assessment & Plan:  Diabetes is unchanged.  A1c looks good at 6.4%.  Continue current treatment regimen.  Diabetes will be reassessed in 3 months.    Orders:  -     POC Glycosylated Hemoglobin (Hb A1C)  -     POC Glucose, Blood    2. Primary hypertension  Assessment & Plan:  Hypertension is unchanged.  Continue current treatment regimen.  Blood pressure will be reassessed at the next regular appointment.      3. Pure hypercholesterolemia  Assessment & Plan:  Continue statin.      4. Primary hyperparathyroidism (HCC)  Assessment & Plan:  Continue observation.  Check calcium next visit.        Return in about 3 months (around 10/8/2022) for Recheck with A1c, CMP.    Harley Rose MD   07/08/2022

## 2022-07-12 RX ORDER — AMIODARONE HYDROCHLORIDE 200 MG/1
TABLET ORAL
Qty: 90 TABLET | Refills: 0 | Status: SHIPPED | OUTPATIENT
Start: 2022-07-12

## 2022-07-18 RX ORDER — AMIODARONE HYDROCHLORIDE 200 MG/1
TABLET ORAL
Qty: 90 TABLET | Refills: 0 | OUTPATIENT
Start: 2022-07-18

## 2022-07-19 NOTE — PROGRESS NOTES
[facility] Cardiology    Patient ID: Kaylen Garrison is a 82 y.o. female.  : 1940   Contact: [phone]    Encounter date: 2022    PCP: Lizzette Multani MD      Chief complaint:   Chief Complaint   Patient presents with   • Atrial flutter with rapid ventricular response    • Essential hypertension       Problem List:  1. Atrial flutter with RVR  a. New onset 11/10/2021 in setting of nocturnal hypoxia  b. [id number] = 5 on Xarelto   c. SATISH, 2021: EF 50%. Mild LAE. No evidence of intracardiac thrombus or mass, clear JENNA, Trace MR and TR.   d. Successful ECV, 2021  e. Started on Amiodarone 2021.   2. Syncope, 2012  a. Echocardiogram, 2012: EF 55 to 60% with no valvular abnormalities.  b. Limited studies, 2012: 0-49% bilateral carotid artery stenosis.  c. No acute findings per CT scan.  d. MRI of the brain, 2012: Cortical atrophy, chronic ischemic changes noted.  e. Abnormal MPS, 2012: EF 79% with apical hypoperfusion suspicious for ischemia, Dr. Arnaud Augustine.  f. MPS 2016: EF greater than 70%.  No evidence of inducible ischemia.  Borderline inferior ST segment changes.  3. Hypertension  4. Dyslipidemia  5. Diabetes mellitus type 2  6. 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 Obesity  8. Ménière's disease  9. Surgical history  a. Hysterectomy  b. Benign tumor removal from left breast       Allergies   Allergen Reactions   • Adhesive Tape Rash   • Latex Rash   • Penicillins Rash       Current Medications:    Current Outpatient Medications:   •  amiodarone (PACERONE) 200 MG tablet, Take 1 tablet by mouth once daily, Disp: 90 tablet, Rfl: 0  •  amLODIPine (NORVASC) 2.5 MG tablet, Take 2.5 mg by mouth Daily., Disp: , Rfl:   •  atenolol (TENORMIN) 25 MG tablet, Take 12.5 mg by mouth Daily. A half tablet daily, Disp: , Rfl:   •  atorvastatin (LIPITOR) 40 MG tablet, Take 40 mg by mouth Daily., Disp: , Rfl:   •  Blood Glucose  Monitoring Suppl (Accu-Chek Guide) w/Device kit, 1 Device See Admin Instructions. Use to check blood sugar once daily.  Dx E11.9, Disp: 1 kit, Rfl: 0  •  Cinnamon 500 MG capsule, Take 500 mg by mouth Daily., Disp: , Rfl:   •  glipizide (GLUCOTROL XL) 2.5 MG 24 hr tablet, Take 5 mg by mouth Daily. 2 times daily, Disp: , Rfl:   •  glucose blood (ReliOn Premier Test) test strip, Use to test blood sugar daily.  Dx E11.9., Disp: 100 each, Rfl: 3  •  hydroCHLOROthiazide (HYDRODIURIL) 25 MG tablet, Take 12.5 mg by mouth Daily., Disp: , Rfl:   •  Januvia 100 MG tablet, Take 1 tablet by mouth once daily, Disp: 90 tablet, Rfl: 0  •  metFORMIN ER (GLUCOPHAGE-XR) 500 MG 24 hr tablet, Take 2 tablets by mouth 2 (Two) Times a Day With Meals., Disp: 120 tablet, Rfl: 5  •  omega-3 acid ethyl esters (LOVAZA) 1 g capsule, Take 1 g by mouth Daily., Disp: , Rfl:   •  oxybutynin XL (DITROPAN-XL) 5 MG 24 hr tablet, Take 5 mg by mouth Daily., Disp: , Rfl:   •  rivaroxaban (XARELTO) 15 MG tablet, Take 1 tablet by mouth Daily With Dinner. Indications: Atrial Fibrillation, Disp: 30 tablet, Rfl: 3  •  valsartan (DIOVAN) 80 MG tablet, Take 1 tablet by mouth Daily., Disp: , Rfl:     HPI    Kaylen Garrison is a 82 y.o. female who presents today for a 6 month follow up of atrial flutter with RVR s/p ECV (11/12/21), syncope, and cardiac risk factors. Since last visit, the patient has been doing well overall from a cardiovascular standpoint. She states she is going to physical therapy and is improving going up and down stairs. She is not short of breath anymore.     She asked if there was a test she could take to see if Xarelto was working. She has been having a bleeding issu, mostly bruising related. She was taken off aspirin and her bleeding has gotten better. Patient denies chest pain, shortness of breath, orthopnea, palpitations, edema, dizziness, and syncope.      The following portions of the patient's history were reviewed and updated as  "appropriate: allergies, current medications and problem list.    Pertinent positives as listed in the HPI.  All other systems reviewed are negative.         Vitals:    07/20/22 1342   BP: 134/58   BP Location: Left arm   Patient Position: Sitting   Pulse: 66   SpO2: 96%   Weight: 65.5 kg (144 lb 6.4 oz)   Height: 160 cm (63\")       Physical Exam:  General: Alert and oriented.  Neck: Jugular venous pressure is within normal limits. Carotids have normal upstrokes without bruits.   Cardiovascular: Heart has a nondisplaced focal PMI. Regular rate and rhythm. No murmur, gallop or rub.  Lungs: Clear, no rales or wheezes. Equal expansion is noted.   Extremities: Show no edema.  Skin: Warm and dry.  Neurologic: Nonfocal.     Diagnostic Data (reviewed with patient):    Lab Results   Component Value Date    CHOL 97 11/11/2021    TRIG 66 11/11/2021    HDL 42 11/11/2021    LDL 40 11/11/2021       Lab Results   Component Value Date    TSH 2.450 11/10/2021      Lab date: 04/12/2022  • CMP: Glu TNP, BUN 32, Creat 1.06, eGFR 52, Na 141, K TNP, Cl 102, CO2 19, Ca 9.4, Alk Phos 55, AST 21, ALT 20  • CBC: WBC 9.1, RBC 4.57, HGB 12.4, HCT 39.5, MCV 86, MCH 27.1,   • TSH 2.6       ECG 12 Lead    Date/Time: 7/20/2022 1:53 PM  Performed by: Matilde Alvarez MD  Authorized by: Matilde Alvarez MD   Comparison: compared with previous ECG from 1/19/2022  Comparison to previous ECG: New RBBB  Rhythm: sinus rhythm  BPM: 66  Conduction: right bundle branch block  QRS axis: left    Clinical impression: abnormal EKG              Assessment:    ICD-10-CM ICD-9-CM   1. Atrial flutter with rapid ventricular response (HCC)  I48.92 427.32   2. Essential hypertension  I10 401.9   3. Mixed hyperlipidemia  E78.2 272.2   4.  New right bundle branch block      Plan:  1. Stable cardiac status.   2. Continue on Xarelto 15 mg daily for stroke prophylaxis.    Continue on amiodarone 200 mg and atenolol 12.5 mg for rhythm control.  "   Continue on amlodipine 2.5 mg daily for hypertension.    Continue on atorvastatin 40 mg daily for hyperlipidemia.    Continue on hydrochlorothiazide 12.5 mg and valsartan 80 mg daily for hypertension.   3. Continue all other current medications.  4. F/up in 12 months, sooner if needed.      Scribed for Matilde Alvarez MD by Danni Castañeda. 7/20/2022 14:09 EDT    I Matilde Alvarez MD personally performed the services described in this documentation as scribed by the above individual in my presence, and it is both accurate and complete.    Matilde Alvarez MD, FACC

## 2022-07-20 ENCOUNTER — OFFICE VISIT (OUTPATIENT)
Dept: CARDIOLOGY | Facility: CLINIC | Age: 82
End: 2022-07-20

## 2022-07-20 VITALS
OXYGEN SATURATION: 96 % | HEART RATE: 66 BPM | HEIGHT: 63 IN | WEIGHT: 144.4 LBS | SYSTOLIC BLOOD PRESSURE: 134 MMHG | BODY MASS INDEX: 25.59 KG/M2 | DIASTOLIC BLOOD PRESSURE: 58 MMHG

## 2022-07-20 DIAGNOSIS — I48.92 ATRIAL FLUTTER WITH RAPID VENTRICULAR RESPONSE: Primary | ICD-10-CM

## 2022-07-20 DIAGNOSIS — I10 ESSENTIAL HYPERTENSION: ICD-10-CM

## 2022-07-20 DIAGNOSIS — E78.2 MIXED HYPERLIPIDEMIA: ICD-10-CM

## 2022-07-20 PROCEDURE — 99213 OFFICE O/P EST LOW 20 MIN: CPT | Performed by: INTERNAL MEDICINE

## 2022-07-20 PROCEDURE — 93000 ELECTROCARDIOGRAM COMPLETE: CPT | Performed by: INTERNAL MEDICINE

## 2022-09-06 ENCOUNTER — OFFICE VISIT (OUTPATIENT)
Dept: SLEEP MEDICINE | Facility: HOSPITAL | Age: 82
End: 2022-09-06

## 2022-09-06 VITALS
HEIGHT: 60 IN | SYSTOLIC BLOOD PRESSURE: 139 MMHG | OXYGEN SATURATION: 93 % | DIASTOLIC BLOOD PRESSURE: 77 MMHG | WEIGHT: 143 LBS | BODY MASS INDEX: 28.07 KG/M2 | HEART RATE: 65 BPM

## 2022-09-06 DIAGNOSIS — G47.33 OSA TREATED WITH BIPAP: Primary | ICD-10-CM

## 2022-09-06 DIAGNOSIS — R09.02 EXERCISE HYPOXEMIA: ICD-10-CM

## 2022-09-06 DIAGNOSIS — R06.09 DYSPNEA ON EXERTION: ICD-10-CM

## 2022-09-06 PROCEDURE — 99214 OFFICE O/P EST MOD 30 MIN: CPT | Performed by: INTERNAL MEDICINE

## 2022-09-06 NOTE — PROGRESS NOTES
Subjective:     Chief Complaint:   Chief Complaint   Patient presents with   • Follow-up       HPI:    Kaylen Garrison is a 82 y.o. female here for follow-up of obstructive sleep apnea.    Since her last visit here last fall she was hospitalized and was noted to have low oxygen saturations while on CPAP therapy.  She was proven to have oxygen desaturations by nocturnal pulse oximetry on CPAP therapy and therefore underwent a titration study on 6/12/2022.  This revealed that CPAP was inadequate and she was placed on BiPAP therapy with improved AHI and better saturations.    She has been ordered on auto BiPAP therapy and she thinks this is a big improvement.  On her download her AHI was 1.4.  Mask leak was elevated and she does notice a mask leak.  She continues to use 2 L of oxygen with her BiPAP at night.    She has noted that during the day with rehab and even when testing herself that her oxygen saturations are sometimes low.  This is not consistently the case but will occur fairly frequently.  She is wondering whether she needs oxygen during the day and if so would like a more portable device.  She also wonders whether she has significant lung disease as she does have significant dyspnea on exertion.    Further details are as follows:    [place] Scale scored as 1/24.    Type of mask: full face mask    Overall, she is benefiting from PAP therapy.    Current medications are:   Current Outpatient Medications:   •  amiodarone (PACERONE) 200 MG tablet, Take 1 tablet by mouth once daily, Disp: 90 tablet, Rfl: 0  •  amLODIPine (NORVASC) 2.5 MG tablet, Take 2.5 mg by mouth Daily., Disp: , Rfl:   •  atenolol (TENORMIN) 25 MG tablet, Take 12.5 mg by mouth Daily. A half tablet daily, Disp: , Rfl:   •  atorvastatin (LIPITOR) 40 MG tablet, Take 40 mg by mouth Daily., Disp: , Rfl:   •  Blood Glucose Monitoring Suppl (Accu-Chek Guide) w/Device kit, 1 Device See Admin Instructions. Use to check blood sugar once daily.  Dx  E11.9, Disp: 1 kit, Rfl: 0  •  Cinnamon 500 MG capsule, Take 500 mg by mouth Daily., Disp: , Rfl:   •  glipizide (GLUCOTROL XL) 2.5 MG 24 hr tablet, Take 5 mg by mouth Daily. 2 times daily, Disp: , Rfl:   •  glucose blood (ReliOn Premier Test) test strip, Use to test blood sugar daily.  Dx E11.9., Disp: 100 each, Rfl: 3  •  hydroCHLOROthiazide (HYDRODIURIL) 25 MG tablet, Take 12.5 mg by mouth Daily., Disp: , Rfl:   •  Januvia 100 MG tablet, Take 1 tablet by mouth once daily, Disp: 90 tablet, Rfl: 0  •  metFORMIN ER (GLUCOPHAGE-XR) 500 MG 24 hr tablet, Take 2 tablets by mouth 2 (Two) Times a Day With Meals., Disp: 120 tablet, Rfl: 5  •  omega-3 acid ethyl esters (LOVAZA) 1 g capsule, Take 1 g by mouth Daily., Disp: , Rfl:   •  oxybutynin XL (DITROPAN-XL) 5 MG 24 hr tablet, Take 5 mg by mouth Daily., Disp: , Rfl:   •  rivaroxaban (XARELTO) 15 MG tablet, Take 1 tablet by mouth Daily With Dinner. Indications: Atrial Fibrillation, Disp: 30 tablet, Rfl: 3  •  valsartan (DIOVAN) 80 MG tablet, Take 1 tablet by mouth Daily., Disp: , Rfl: .    The patient's relevant past medical, surgical, family and social history were reviewed and updated in Epic as appropriate.     ROS:    Review of Systems      Objective:    Physical Exam  Vitals reviewed.   Constitutional:       Appearance: She is well-developed.   HENT:      Head: Normocephalic and atraumatic.      Mouth/Throat:      Mouth: Mucous membranes are moist.      Pharynx: Oropharynx is clear.   Neck:      Thyroid: No thyromegaly.   Cardiovascular:      Rate and Rhythm: Normal rate and regular rhythm.      Heart sounds: No murmur heard.    No friction rub. No gallop.   Pulmonary:      Effort: Pulmonary effort is normal. No respiratory distress.      Breath sounds: No wheezing or rales.   Musculoskeletal:      Cervical back: Neck supple.   Skin:     General: Skin is warm and dry.   Neurological:      Mental Status: She is alert and oriented to person, place, and time.    Psychiatric:         Behavior: Behavior normal.         Thought Content: Thought content normal.         Data:    Patient's PAP download was personally interpreted by me and has not otherwise been independently reported.    PAP download findings:  Average pressure: 15/9  Average AHI:  1  Average minutes in large leak per night: High    Assessment:    Problem List Items Addressed This Visit        Pulmonary Problems    Dyspnea on exertion    Relevant Orders    Walking Oximetry    Full Pulmonary Function Test With Bronchodilator    CT Chest Hi Resolution Diagnostic    BERNARD treated with BiPAP - Primary    Overview     Auto BIPAP         Relevant Orders    PAP Therapy       Other    Exercise hypoxemia    Relevant Orders    Walking Oximetry    Full Pulmonary Function Test With Bronchodilator    CT Chest Hi Resolution Diagnostic          1. Obstructive sleep apnea: Underwent a titration study in June and is now on auto BiPAP therapy with a normal AHI and good compliance with improved sleep quality subjectively.  She does have a mask leak which is the only remaining issue.  She uses a full facemask.  2. Dyspnea on exertion: Unclear etiology.  Certainly some component of deconditioning.  3. Exertional hypoxemia: Will investigate further with formal exercise oximetry, high-resolution CT of the chest, and full PFTs    Plan:     1. Exercise oximetry  2. HRCT  3. Full PFTs  4. Additional attempts at mask fitting  5. No change in auto BiPAP settings  6. Close follow-up      Discussed in detail with the patient.  She will call prior to her follow up visit for any new problems.    Level of Risk Moderate due to: mild exacerbation of one chronic illness and undiagnosed new problem    Signed by  Deuce Tyson MD

## 2022-09-15 ENCOUNTER — HOSPITAL ENCOUNTER (OUTPATIENT)
Dept: CT IMAGING | Facility: HOSPITAL | Age: 82
Discharge: HOME | End: 2022-09-15
Admitting: INTERNAL MEDICINE

## 2022-09-15 DIAGNOSIS — R06.09 DYSPNEA ON EXERTION: ICD-10-CM

## 2022-09-15 DIAGNOSIS — R09.02 EXERCISE HYPOXEMIA: ICD-10-CM

## 2022-09-15 PROCEDURE — 71250 CT THORAX DX C-: CPT

## 2022-09-15 RX ORDER — METFORMIN HYDROCHLORIDE 500 MG/1
1000 TABLET, EXTENDED RELEASE ORAL 2 TIMES DAILY WITH MEALS
Qty: 120 TABLET | Refills: 12 | Status: SHIPPED | OUTPATIENT
Start: 2022-09-15

## 2022-09-16 ENCOUNTER — OFFICE VISIT (OUTPATIENT)
Dept: PULMONOLOGY | Facility: CLINIC | Age: 82
End: 2022-09-16

## 2022-09-16 DIAGNOSIS — R06.09 DYSPNEA ON EXERTION: Primary | ICD-10-CM

## 2022-09-16 PROCEDURE — 94729 DIFFUSING CAPACITY: CPT | Performed by: INTERNAL MEDICINE

## 2022-09-16 PROCEDURE — 94618 PULMONARY STRESS TESTING: CPT | Performed by: INTERNAL MEDICINE

## 2022-09-16 PROCEDURE — 94375 RESPIRATORY FLOW VOLUME LOOP: CPT | Performed by: INTERNAL MEDICINE

## 2022-09-16 PROCEDURE — 94726 PLETHYSMOGRAPHY LUNG VOLUMES: CPT | Performed by: INTERNAL MEDICINE

## 2022-10-11 ENCOUNTER — OFFICE VISIT (OUTPATIENT)
Dept: ENDOCRINOLOGY | Facility: CLINIC | Age: 82
End: 2022-10-11

## 2022-10-11 VITALS
BODY MASS INDEX: 28.74 KG/M2 | HEIGHT: 60 IN | OXYGEN SATURATION: 95 % | DIASTOLIC BLOOD PRESSURE: 68 MMHG | WEIGHT: 146.4 LBS | SYSTOLIC BLOOD PRESSURE: 122 MMHG | HEART RATE: 71 BPM

## 2022-10-11 DIAGNOSIS — E21.0 PRIMARY HYPERPARATHYROIDISM: ICD-10-CM

## 2022-10-11 DIAGNOSIS — E11.65 TYPE 2 DIABETES MELLITUS WITH HYPERGLYCEMIA, WITHOUT LONG-TERM CURRENT USE OF INSULIN: Primary | Chronic | ICD-10-CM

## 2022-10-11 PROCEDURE — 99213 OFFICE O/P EST LOW 20 MIN: CPT | Performed by: PHYSICIAN ASSISTANT

## 2022-10-20 LAB — HBA1C MFR BLD: 6.4 %

## 2023-02-07 ENCOUNTER — OFFICE VISIT (OUTPATIENT)
Dept: ENDOCRINOLOGY | Facility: CLINIC | Age: 83
End: 2023-02-07
Payer: MEDICARE

## 2023-02-07 VITALS
OXYGEN SATURATION: 98 % | DIASTOLIC BLOOD PRESSURE: 60 MMHG | HEIGHT: 60 IN | WEIGHT: 144 LBS | HEART RATE: 54 BPM | SYSTOLIC BLOOD PRESSURE: 154 MMHG | BODY MASS INDEX: 28.27 KG/M2

## 2023-02-07 DIAGNOSIS — E55.9 VITAMIN D DEFICIENCY: ICD-10-CM

## 2023-02-07 DIAGNOSIS — E78.00 PURE HYPERCHOLESTEROLEMIA: Chronic | ICD-10-CM

## 2023-02-07 DIAGNOSIS — I10 PRIMARY HYPERTENSION: Chronic | ICD-10-CM

## 2023-02-07 DIAGNOSIS — E11.65 TYPE 2 DIABETES MELLITUS WITH HYPERGLYCEMIA, WITHOUT LONG-TERM CURRENT USE OF INSULIN: Primary | Chronic | ICD-10-CM

## 2023-02-07 DIAGNOSIS — E21.0 PRIMARY HYPERPARATHYROIDISM: ICD-10-CM

## 2023-02-07 LAB
EXPIRATION DATE: ABNORMAL
EXPIRATION DATE: NORMAL
GLUCOSE BLDC GLUCOMTR-MCNC: 155 MG/DL (ref 70–130)
HBA1C MFR BLD: 7.2 %
Lab: ABNORMAL
Lab: NORMAL

## 2023-02-07 PROCEDURE — 99214 OFFICE O/P EST MOD 30 MIN: CPT | Performed by: PHYSICIAN ASSISTANT

## 2023-02-07 PROCEDURE — 3051F HG A1C>EQUAL 7.0%<8.0%: CPT | Performed by: PHYSICIAN ASSISTANT

## 2023-02-07 PROCEDURE — 83036 HEMOGLOBIN GLYCOSYLATED A1C: CPT | Performed by: PHYSICIAN ASSISTANT

## 2023-02-07 PROCEDURE — 82947 ASSAY GLUCOSE BLOOD QUANT: CPT | Performed by: PHYSICIAN ASSISTANT

## 2023-02-07 NOTE — PROGRESS NOTES
Office Note      Date: 2023  Patient Name: Kaylen Garrison  MRN: [id number]  : 1940    Chief Complaint   Patient presents with   • Diabetes     History of Present Illness:   Kaylen Garrison is a 82 y.o. female who presents today for follow up on type 2 diabetes.  Diabetes was diagnosed in .  Known diabetic complications: none.  Current diabetic medications: Metformin, Januvia, and glipizide.  She is testing FSBG 1 time per day.  Fasting readings typically <150.  She denies any hypoglycemia.  Lowest has been 98 recently.  The 30-day average is 134.  She reports increased stress.  She says that BGs have been higher due to this.  She reports that she has been eating more carbs, but is getting back on track.  She continues physical therapy at [facility].    Feet: No sores or pain.  Foot exam today.  Last eye exam: .  ACE inhibitor/ARB: Valsartan.  Statin: Atorvastatin.  History of atrial fib/flutter with RVR.  She has been taking amiodarone since 2021.  Followed by [facility] [place] cardiology.  She has history of mild primary hyperparathyroidism.  Last DEXA scan showed osteopenia in 2021.  She had labs with PCP on 2022.  Urine MA/C ratio 21.  CMP okay, creatinine 0.92, calcium 9.9.  TSH 1.87.  25-OH vitamin D 18.6.  She reports that she will see her PCP again soon and have updated labs.    Subjective      Review of Systems   Constitutional: Negative.    Cardiovascular: Negative.    Gastrointestinal: Negative.    Endocrine: Negative.      Past Medical History:   Diagnosis Date   • Atrial fibrillation (HCC)    • Benign hypertension    • History of echocardiogram 2012   • Hypercalcemia    • Hypercholesterolemia    • Hypertension    • Meniere's disease    • Obesity    • Overweight (BMI 25.0-29.9)    • Primary hyperparathyroidism (HCC)    • Sleep apnea with use of continuous positive airway pressure (CPAP)    • Syncope 2012   • Type 2 diabetes mellitus (HCC)    • Vitamin D deficiency      "  Past Surgical History:   Procedure Laterality Date   • BREAST SURGERY Left     benign tumor removal   • CATARACT EXTRACTION Bilateral    • HYSTERECTOMY       The following portions of the patient's history were reviewed and updated as appropriate: allergies, current medications, past family history, past medical history, past social history, past surgical history and problem list.    Objective     Vitals:    02/07/23 0809   BP: 154/60   BP Location: Left arm   Patient Position: Sitting   Cuff Size: Adult   Pulse: 54   SpO2: 98%   Weight: 65.3 kg (144 lb)   Height: 152.4 cm (60\")   PainSc: 0-No pain   Body mass index is 28.12 kg/m².    Physical Exam  Vitals reviewed.   Constitutional:       General: She is not in acute distress.  Cardiovascular:      Rate and Rhythm: Normal rate and regular rhythm.      Pulses:           Dorsalis pedis pulses are 2+ on the right side and 2+ on the left side.        Posterior tibial pulses are 2+ on the right side and 2+ on the left side.      Comments: Varicose veins  Musculoskeletal:      Right lower leg: No edema.      Left lower leg: No edema.      Right foot: No deformity.      Left foot: No deformity.   Feet:      Right foot:      Protective Sensation: 8 sites tested. 8 sites sensed.      Skin integrity: No ulcer or skin breakdown.      Left foot:      Protective Sensation: 8 sites tested. 8 sites sensed.      Skin integrity: No ulcer or skin breakdown.      Comments: Diabetic Foot Exam Performed and Monofilament Test Performed  Neurological:      Mental Status: She is alert and oriented to person, place, and time.   Psychiatric:         Mood and Affect: Affect normal.       HEMOGLOBIN A1C  Lab Results   Component Value Date    HGBA1C 7.2 02/07/2023    HGBA1C 6.4 09/12/2022    HGBA1C 6.4 07/08/2022     GLUCOSE  Lab Results   Component Value Date    POCGLU 155 (A) 02/07/2023     Current Outpatient Medications   Medication Instructions   • amiodarone (PACERONE) 200 MG tablet " Take 1 tablet by mouth once daily   • amLODIPine (NORVASC) 2.5 mg, Oral, Daily   • atenolol (TENORMIN) 12.5 mg, Oral, Daily, A half tablet daily   • atorvastatin (LIPITOR) 40 mg, Oral, Daily   • Blood Glucose Monitoring Suppl (Accu-Chek Guide) w/Device kit 1 Device, Does not apply, See Admin Instructions, Use to check blood sugar once daily.  Dx E11.9   • Cholecalciferol (VITAMIN D3 PO) Oral   • Cinnamon 500 mg, Oral, Daily   • glipizide (GLUCOTROL XL) 5 mg, Oral, Daily, 2 times daily   • glucose blood (ReliOn Premier Test) test strip Use to test blood sugar daily.  Dx E11.9.   • hydroCHLOROthiazide (HYDRODIURIL) 12.5 mg, Oral, Daily   • Januvia 100 MG tablet Take 1 tablet by mouth once daily   • metFORMIN ER (GLUCOPHAGE-XR) 1,000 mg, Oral, 2 Times Daily With Meals   • omega-3 acid ethyl esters (LOVAZA) 1 g, Oral, Daily   • oxybutynin XL (DITROPAN-XL) 5 mg, Oral, Daily   • rivaroxaban (XARELTO) 15 mg, Oral, Daily With Dinner   • valsartan (DIOVAN) 80 mg, Oral, Daily       Assessment / Plan      Assessment & Plan:  1. Type 2 diabetes mellitus with hyperglycemia, without long-term current use of insulin (HCC)  A1c increased to 7.2% - remains at goal for her.  No known hypoglycemia.  She will continue to work on nutritious dietary choices, moderation of carbohydrates, regular physical activity.  Continue Januvia, metformin ER, and glipizide.  - POC Glucose, Blood  - POC Glycosylated Hemoglobin (Hb A1C)    2. Primary hyperparathyroidism (HCC)  This has appeared mild and stable.  Continue to observe.    3.  Vitamin D deficiency  She will call with her current dose of vitamin D3.  Plan for upcoming labs with PCP.    4. Primary hypertension  BP higher than usual today per her report.  She will continue current medications and continue to monitor.    5. Pure hypercholesterolemia  Continue atorvastatin.  Labs up to date with PCP.      Return in about 3 months (around 5/7/2023) for next scheduled follow up. She was advised to  contact the office with any interval questions or concerns.    LISA Stone  Endocrinology  02/07/2023

## 2023-03-13 RX ORDER — GLUCOSAMINE HCL/CHONDROITIN SU 500-400 MG
CAPSULE ORAL
Qty: 100 EACH | Refills: 3 | Status: SHIPPED | OUTPATIENT
Start: 2023-03-13 | End: 2023-03-14 | Stop reason: SDUPTHER

## 2023-03-13 RX ORDER — LANCETS 33 GAUGE
1 EACH MISCELLANEOUS DAILY
Qty: 100 EACH | Refills: 3 | Status: ON HOLD | OUTPATIENT
Start: 2023-03-13

## 2023-03-14 RX ORDER — GLUCOSAMINE HCL/CHONDROITIN SU 500-400 MG
CAPSULE ORAL
Qty: 100 EACH | Refills: 3 | Status: ON HOLD | OUTPATIENT
Start: 2023-03-14

## 2023-04-07 ENCOUNTER — HOSPITAL ENCOUNTER (INPATIENT)
Facility: HOSPITAL | Age: 83
LOS: 4 days | Discharge: HOME | DRG: 417 | End: 2023-04-14
Attending: EMERGENCY MEDICINE | Admitting: INTERNAL MEDICINE
Payer: MEDICARE

## 2023-04-07 ENCOUNTER — APPOINTMENT (OUTPATIENT)
Dept: CT IMAGING | Facility: HOSPITAL | Age: 83
DRG: 417 | End: 2023-04-07
Payer: MEDICARE

## 2023-04-07 ENCOUNTER — APPOINTMENT (OUTPATIENT)
Dept: GENERAL RADIOLOGY | Facility: HOSPITAL | Age: 83
DRG: 417 | End: 2023-04-07
Payer: MEDICARE

## 2023-04-07 DIAGNOSIS — R10.9 ACUTE ABDOMINAL PAIN: ICD-10-CM

## 2023-04-07 DIAGNOSIS — E66.3 OVERWEIGHT (BMI 25.0-29.9): ICD-10-CM

## 2023-04-07 DIAGNOSIS — M85.852 OSTEOPENIA OF NECK OF LEFT FEMUR: ICD-10-CM

## 2023-04-07 DIAGNOSIS — I48.92 ATRIAL FLUTTER WITH RAPID VENTRICULAR RESPONSE: ICD-10-CM

## 2023-04-07 DIAGNOSIS — I10 PRIMARY HYPERTENSION: Chronic | ICD-10-CM

## 2023-04-07 DIAGNOSIS — E78.00 PURE HYPERCHOLESTEROLEMIA: Chronic | ICD-10-CM

## 2023-04-07 DIAGNOSIS — K80.50 CHOLEDOCHOLITHIASIS: ICD-10-CM

## 2023-04-07 DIAGNOSIS — G47.33 OSA TREATED WITH BIPAP: ICD-10-CM

## 2023-04-07 DIAGNOSIS — R06.09 DYSPNEA ON EXERTION: ICD-10-CM

## 2023-04-07 DIAGNOSIS — Z46.89 ENCOUNTER FOR REMOVAL OF PANCREATIC STENT: ICD-10-CM

## 2023-04-07 DIAGNOSIS — I10 UNCONTROLLED HYPERTENSION: ICD-10-CM

## 2023-04-07 DIAGNOSIS — R55 SYNCOPE, UNSPECIFIED SYNCOPE TYPE: ICD-10-CM

## 2023-04-07 DIAGNOSIS — E21.0 PRIMARY HYPERPARATHYROIDISM: ICD-10-CM

## 2023-04-07 DIAGNOSIS — K81.0 ACUTE CHOLECYSTITIS: Primary | ICD-10-CM

## 2023-04-07 DIAGNOSIS — E11.65 TYPE 2 DIABETES MELLITUS WITH HYPERGLYCEMIA, WITHOUT LONG-TERM CURRENT USE OF INSULIN: Chronic | ICD-10-CM

## 2023-04-07 DIAGNOSIS — R09.02 EXERCISE HYPOXEMIA: ICD-10-CM

## 2023-04-07 DIAGNOSIS — I48.0 PAROXYSMAL ATRIAL FIBRILLATION: ICD-10-CM

## 2023-04-07 LAB
ALBUMIN SERPL-MCNC: 4 G/DL (ref 3.5–5.2)
ALBUMIN/GLOB SERPL: 1.3 G/DL
ALP SERPL-CCNC: 154 U/L (ref 39–117)
ALT SERPL W P-5'-P-CCNC: 297 U/L (ref 1–33)
ANION GAP SERPL CALCULATED.3IONS-SCNC: 14 MMOL/L (ref 5–15)
APTT PPP: 39.1 SECONDS (ref 22–39)
AST SERPL-CCNC: 597 U/L (ref 1–32)
BASOPHILS # BLD AUTO: 0.04 10*3/MM3 (ref 0–0.2)
BASOPHILS NFR BLD AUTO: 0.4 % (ref 0–1.5)
BILIRUB SERPL-MCNC: 1.7 MG/DL (ref 0–1.2)
BUN BLDA-MCNC: 25 MG/DL (ref 8–26)
BUN SERPL-MCNC: 22 MG/DL (ref 8–23)
BUN/CREAT SERPL: 26.8 (ref 7–25)
CA-I BLDA-SCNC: 1.17 MMOL/L (ref 1.2–1.32)
CALCIUM SPEC-SCNC: 9.1 MG/DL (ref 8.6–10.5)
CHLORIDE BLDA-SCNC: 98 MMOL/L (ref 98–109)
CHLORIDE SERPL-SCNC: 99 MMOL/L (ref 98–107)
CO2 BLDA-SCNC: 28 MMOL/L (ref 24–29)
CO2 SERPL-SCNC: 25 MMOL/L (ref 22–29)
CREAT BLDA-MCNC: 0.9 MG/DL (ref 0.6–1.3)
CREAT SERPL-MCNC: 0.82 MG/DL (ref 0.57–1)
DEPRECATED RDW RBC AUTO: 56.1 FL (ref 37–54)
EGFRCR SERPLBLD CKD-EPI 2021: 63.2 ML/MIN/1.73
EGFRCR SERPLBLD CKD-EPI 2021: 70.6 ML/MIN/1.73
EOSINOPHIL # BLD AUTO: 0.06 10*3/MM3 (ref 0–0.4)
EOSINOPHIL NFR BLD AUTO: 0.6 % (ref 0.3–6.2)
ERYTHROCYTE [DISTWIDTH] IN BLOOD BY AUTOMATED COUNT: 17.3 % (ref 12.3–15.4)
GLOBULIN UR ELPH-MCNC: 3.1 GM/DL
GLUCOSE BLDC GLUCOMTR-MCNC: 277 MG/DL (ref 70–130)
GLUCOSE SERPL-MCNC: 280 MG/DL (ref 65–99)
HCT VFR BLD AUTO: 42.9 % (ref 34–46.6)
HCT VFR BLDA CALC: 44 % (ref 38–51)
HGB BLD-MCNC: 13.3 G/DL (ref 12–15.9)
HGB BLDA-MCNC: 15 G/DL (ref 12–17)
IMM GRANULOCYTES # BLD AUTO: 0.05 10*3/MM3 (ref 0–0.05)
IMM GRANULOCYTES NFR BLD AUTO: 0.5 % (ref 0–0.5)
INR PPP: 1.8 (ref 0.8–1.2)
INR PPP: 2 (ref 0.9–1.1)
LIPASE SERPL-CCNC: 47 U/L (ref 13–60)
LYMPHOCYTES # BLD AUTO: 0.98 10*3/MM3 (ref 0.7–3.1)
LYMPHOCYTES NFR BLD AUTO: 9 % (ref 19.6–45.3)
MCH RBC QN AUTO: 27.6 PG (ref 26.6–33)
MCHC RBC AUTO-ENTMCNC: 31 G/DL (ref 31.5–35.7)
MCV RBC AUTO: 89 FL (ref 79–97)
MONOCYTES # BLD AUTO: 0.76 10*3/MM3 (ref 0.1–0.9)
MONOCYTES NFR BLD AUTO: 7 % (ref 5–12)
NEUTROPHILS NFR BLD AUTO: 8.94 10*3/MM3 (ref 1.7–7)
NEUTROPHILS NFR BLD AUTO: 82.5 % (ref 42.7–76)
NRBC BLD AUTO-RTO: 0 /100 WBC (ref 0–0.2)
PLATELET # BLD AUTO: 333 10*3/MM3 (ref 140–450)
PMV BLD AUTO: 10.1 FL (ref 6–12)
POTASSIUM BLDA-SCNC: 3.5 MMOL/L (ref 3.5–4.9)
POTASSIUM SERPL-SCNC: 3.5 MMOL/L (ref 3.5–5.2)
PROT SERPL-MCNC: 7.1 G/DL (ref 6–8.5)
PROTHROMBIN TIME: 20.5 SECONDS
PROTHROMBIN TIME: 20.5 SECONDS (ref 12.8–15.2)
RBC # BLD AUTO: 4.82 10*6/MM3 (ref 3.77–5.28)
SODIUM BLD-SCNC: 138 MMOL/L (ref 138–146)
SODIUM SERPL-SCNC: 138 MMOL/L (ref 136–145)
TROPONIN T SERPL HS-MCNC: 14 NG/L
TROPONIN T SERPL HS-MCNC: 14 NG/L
WBC NRBC COR # BLD: 10.83 10*3/MM3 (ref 3.4–10.8)

## 2023-04-07 PROCEDURE — 85610 PROTHROMBIN TIME: CPT

## 2023-04-07 PROCEDURE — 85730 THROMBOPLASTIN TIME PARTIAL: CPT | Performed by: EMERGENCY MEDICINE

## 2023-04-07 PROCEDURE — 80053 COMPREHEN METABOLIC PANEL: CPT | Performed by: EMERGENCY MEDICINE

## 2023-04-07 PROCEDURE — 84484 ASSAY OF TROPONIN QUANT: CPT | Performed by: EMERGENCY MEDICINE

## 2023-04-07 PROCEDURE — 99285 EMERGENCY DEPT VISIT HI MDM: CPT

## 2023-04-07 PROCEDURE — 93005 ELECTROCARDIOGRAM TRACING: CPT | Performed by: EMERGENCY MEDICINE

## 2023-04-07 PROCEDURE — 74176 CT ABD & PELVIS W/O CONTRAST: CPT

## 2023-04-07 PROCEDURE — 85025 COMPLETE CBC W/AUTO DIFF WBC: CPT | Performed by: EMERGENCY MEDICINE

## 2023-04-07 PROCEDURE — 70450 CT HEAD/BRAIN W/O DYE: CPT

## 2023-04-07 PROCEDURE — 85014 HEMATOCRIT: CPT

## 2023-04-07 PROCEDURE — 36415 COLL VENOUS BLD VENIPUNCTURE: CPT

## 2023-04-07 PROCEDURE — 80047 BASIC METABLC PNL IONIZED CA: CPT

## 2023-04-07 PROCEDURE — 83605 ASSAY OF LACTIC ACID: CPT | Performed by: EMERGENCY MEDICINE

## 2023-04-07 PROCEDURE — 83690 ASSAY OF LIPASE: CPT | Performed by: EMERGENCY MEDICINE

## 2023-04-07 PROCEDURE — 71045 X-RAY EXAM CHEST 1 VIEW: CPT

## 2023-04-07 RX ORDER — PANTOPRAZOLE SODIUM 40 MG/10ML
40 INJECTION, POWDER, LYOPHILIZED, FOR SOLUTION INTRAVENOUS ONCE
Status: COMPLETED | OUTPATIENT
Start: 2023-04-07 | End: 2023-04-08

## 2023-04-07 RX ORDER — ONDANSETRON 2 MG/ML
4 INJECTION INTRAMUSCULAR; INTRAVENOUS ONCE
Status: COMPLETED | OUTPATIENT
Start: 2023-04-07 | End: 2023-04-08

## 2023-04-07 RX ORDER — ACETAMINOPHEN 325 MG/1
650 TABLET ORAL ONCE
Status: COMPLETED | OUTPATIENT
Start: 2023-04-07 | End: 2023-04-08

## 2023-04-07 RX ORDER — SODIUM CHLORIDE 0.9 % (FLUSH) 0.9 %
10 SYRINGE (ML) INJECTION AS NEEDED
Status: DISCONTINUED | OUTPATIENT
Start: 2023-04-07 | End: 2023-04-14 | Stop reason: HOSPADM

## 2023-04-07 RX ORDER — LABETALOL HYDROCHLORIDE 5 MG/ML
20 INJECTION, SOLUTION INTRAVENOUS ONCE
Status: COMPLETED | OUTPATIENT
Start: 2023-04-07 | End: 2023-04-08

## 2023-04-07 NOTE — Clinical Note
Level of Care: Telemetry [5]   Diagnosis: Acute cholecystitis [575.0.ICD-9-CM]   Admitting Physician: ROSIBEL LARA [[id number]]   Attending Physician: ROSIBEL LARA [[id number]]

## 2023-04-08 ENCOUNTER — APPOINTMENT (OUTPATIENT)
Dept: CARDIOLOGY | Facility: HOSPITAL | Age: 83
DRG: 417 | End: 2023-04-08
Payer: MEDICARE

## 2023-04-08 ENCOUNTER — APPOINTMENT (OUTPATIENT)
Dept: MRI IMAGING | Facility: HOSPITAL | Age: 83
DRG: 417 | End: 2023-04-08
Payer: MEDICARE

## 2023-04-08 PROBLEM — K81.0 ACUTE CHOLECYSTITIS: Status: ACTIVE | Noted: 2023-04-08

## 2023-04-08 PROBLEM — I48.0 PAROXYSMAL ATRIAL FIBRILLATION: Status: ACTIVE | Noted: 2023-04-08

## 2023-04-08 LAB
ALBUMIN SERPL-MCNC: 3.4 G/DL (ref 3.5–5.2)
ALBUMIN/GLOB SERPL: 1.8 G/DL
ALP SERPL-CCNC: 161 U/L (ref 39–117)
ALT SERPL W P-5'-P-CCNC: 408 U/L (ref 1–33)
ANION GAP SERPL CALCULATED.3IONS-SCNC: 13 MMOL/L (ref 5–15)
AST SERPL-CCNC: 590 U/L (ref 1–32)
BASOPHILS # BLD AUTO: 0.04 10*3/MM3 (ref 0–0.2)
BASOPHILS NFR BLD AUTO: 0.4 % (ref 0–1.5)
BH CV ECHO MEAS - AO MAX PG: 42.5 MMHG
BH CV ECHO MEAS - AO MEAN PG: 22.9 MMHG
BH CV ECHO MEAS - AO ROOT DIAM: 2.09 CM
BH CV ECHO MEAS - AO V2 MAX: 325.8 CM/SEC
BH CV ECHO MEAS - AO V2 VTI: 72.1 CM
BH CV ECHO MEAS - AVA(I,D): 1.07 CM2
BH CV ECHO MEAS - EDV(CUBED): 74.8 ML
BH CV ECHO MEAS - EDV(MOD-SP2): 48 ML
BH CV ECHO MEAS - EDV(MOD-SP4): 42 ML
BH CV ECHO MEAS - EF(MOD-BP): 60 %
BH CV ECHO MEAS - EF(MOD-SP2): 58.3 %
BH CV ECHO MEAS - EF(MOD-SP4): 61.9 %
BH CV ECHO MEAS - ESV(CUBED): 15.5 ML
BH CV ECHO MEAS - ESV(MOD-SP2): 20 ML
BH CV ECHO MEAS - ESV(MOD-SP4): 16 ML
BH CV ECHO MEAS - FS: 40.8 %
BH CV ECHO MEAS - IVS/LVPW: 0.97 CM
BH CV ECHO MEAS - IVSD: 1.16 CM
BH CV ECHO MEAS - LA DIMENSION: 4.2 CM
BH CV ECHO MEAS - LAT PEAK E' VEL: 6.4 CM/SEC
BH CV ECHO MEAS - LV MASS(C)D: 174.1 GRAMS
BH CV ECHO MEAS - LV MAX PG: 6.6 MMHG
BH CV ECHO MEAS - LV MEAN PG: 3.6 MMHG
BH CV ECHO MEAS - LV V1 MAX: 128 CM/SEC
BH CV ECHO MEAS - LV V1 VTI: 30.7 CM
BH CV ECHO MEAS - LVIDD: 4.2 CM
BH CV ECHO MEAS - LVIDS: 2.49 CM
BH CV ECHO MEAS - LVOT AREA: 2.5 CM2
BH CV ECHO MEAS - LVOT DIAM: 1.78 CM
BH CV ECHO MEAS - LVPWD: 1.19 CM
BH CV ECHO MEAS - MED PEAK E' VEL: 3.6 CM/SEC
BH CV ECHO MEAS - MV A MAX VEL: 109.7 CM/SEC
BH CV ECHO MEAS - MV DEC SLOPE: 521.5 CM/SEC2
BH CV ECHO MEAS - MV DEC TIME: 0.21 MSEC
BH CV ECHO MEAS - MV E MAX VEL: 163.2 CM/SEC
BH CV ECHO MEAS - MV E/A: 1.49
BH CV ECHO MEAS - MV MAX PG: 16 MMHG
BH CV ECHO MEAS - MV MEAN PG: 6 MMHG
BH CV ECHO MEAS - MV P1/2T: 106 MSEC
BH CV ECHO MEAS - MV V2 VTI: 57.6 CM
BH CV ECHO MEAS - MVA(P1/2T): 2.08 CM2
BH CV ECHO MEAS - MVA(VTI): 1.33 CM2
BH CV ECHO MEAS - PA ACC SLOPE: 693.1 CM/SEC2
BH CV ECHO MEAS - PA ACC TIME: 0.13 SEC
BH CV ECHO MEAS - PA PR(ACCEL): 22 MMHG
BH CV ECHO MEAS - RAP SYSTOLE: 10 MMHG
BH CV ECHO MEAS - RVSP: 53.9 MMHG
BH CV ECHO MEAS - SV(LVOT): 76.8 ML
BH CV ECHO MEAS - SV(MOD-SP2): 28 ML
BH CV ECHO MEAS - SV(MOD-SP4): 26 ML
BH CV ECHO MEAS - TAPSE (>1.6): 2.8 CM
BH CV ECHO MEAS - TR MAX PG: 43.9 MMHG
BH CV ECHO MEAS - TR MAX VEL: 328.8 CM/SEC
BH CV ECHO MEASUREMENTS AVERAGE E/E' RATIO: 32.64
BH CV VAS BP RIGHT ARM: NORMAL MMHG
BH CV XLRA - RV BASE: 2.9 CM
BH CV XLRA - RV MID: 2.5 CM
BH CV XLRA - TDI S': 13.6 CM/SEC
BILIRUB SERPL-MCNC: 2.3 MG/DL (ref 0–1.2)
BILIRUB UR QL STRIP: NEGATIVE
BUN SERPL-MCNC: 22 MG/DL (ref 8–23)
BUN/CREAT SERPL: 25.9 (ref 7–25)
CALCIUM SPEC-SCNC: 8.7 MG/DL (ref 8.6–10.5)
CHLORIDE SERPL-SCNC: 103 MMOL/L (ref 98–107)
CLARITY UR: CLEAR
CO2 SERPL-SCNC: 24 MMOL/L (ref 22–29)
COLOR UR: YELLOW
CREAT SERPL-MCNC: 0.85 MG/DL (ref 0.57–1)
D-LACTATE SERPL-SCNC: 1.1 MMOL/L (ref 0.5–2)
DEPRECATED RDW RBC AUTO: 54.9 FL (ref 37–54)
EGFRCR SERPLBLD CKD-EPI 2021: 67.7 ML/MIN/1.73
EOSINOPHIL # BLD AUTO: 0.03 10*3/MM3 (ref 0–0.4)
EOSINOPHIL NFR BLD AUTO: 0.3 % (ref 0.3–6.2)
ERYTHROCYTE [DISTWIDTH] IN BLOOD BY AUTOMATED COUNT: 16.9 % (ref 12.3–15.4)
GEN 5 2HR TROPONIN T REFLEX: 13 NG/L
GLOBULIN UR ELPH-MCNC: 1.9 GM/DL
GLUCOSE BLDC GLUCOMTR-MCNC: 174 MG/DL (ref 70–130)
GLUCOSE BLDC GLUCOMTR-MCNC: 185 MG/DL (ref 70–130)
GLUCOSE BLDC GLUCOMTR-MCNC: 194 MG/DL (ref 70–130)
GLUCOSE BLDC GLUCOMTR-MCNC: 278 MG/DL (ref 70–130)
GLUCOSE SERPL-MCNC: 205 MG/DL (ref 65–99)
GLUCOSE UR STRIP-MCNC: ABNORMAL MG/DL
HCT VFR BLD AUTO: 35.4 % (ref 34–46.6)
HGB BLD-MCNC: 11.3 G/DL (ref 12–15.9)
HGB UR QL STRIP.AUTO: NEGATIVE
HOLD SPECIMEN: NORMAL
IMM GRANULOCYTES # BLD AUTO: 0.04 10*3/MM3 (ref 0–0.05)
IMM GRANULOCYTES NFR BLD AUTO: 0.4 % (ref 0–0.5)
INR PPP: 2.66 (ref 0.84–1.13)
KETONES UR QL STRIP: ABNORMAL
LEFT ATRIUM VOLUME INDEX: 35.1 ML/M2
LEUKOCYTE ESTERASE UR QL STRIP.AUTO: NEGATIVE
LYMPHOCYTES # BLD AUTO: 0.98 10*3/MM3 (ref 0.7–3.1)
LYMPHOCYTES NFR BLD AUTO: 10.6 % (ref 19.6–45.3)
MAGNESIUM SERPL-MCNC: 1.8 MG/DL (ref 1.6–2.4)
MAXIMAL PREDICTED HEART RATE: 136 BPM
MCH RBC QN AUTO: 28.3 PG (ref 26.6–33)
MCHC RBC AUTO-ENTMCNC: 31.9 G/DL (ref 31.5–35.7)
MCV RBC AUTO: 88.5 FL (ref 79–97)
MONOCYTES # BLD AUTO: 0.87 10*3/MM3 (ref 0.1–0.9)
MONOCYTES NFR BLD AUTO: 9.4 % (ref 5–12)
NEUTROPHILS NFR BLD AUTO: 7.3 10*3/MM3 (ref 1.7–7)
NEUTROPHILS NFR BLD AUTO: 78.9 % (ref 42.7–76)
NITRITE UR QL STRIP: NEGATIVE
NRBC BLD AUTO-RTO: 0 /100 WBC (ref 0–0.2)
PH UR STRIP.AUTO: 6.5 [PH] (ref 5–8)
PLATELET # BLD AUTO: 265 10*3/MM3 (ref 140–450)
PMV BLD AUTO: 9.8 FL (ref 6–12)
POTASSIUM SERPL-SCNC: 3.4 MMOL/L (ref 3.5–5.2)
PROT SERPL-MCNC: 5.3 G/DL (ref 6–8.5)
PROT UR QL STRIP: ABNORMAL
PROTHROMBIN TIME: 28.5 SECONDS (ref 11.4–14.4)
QT INTERVAL: 556 MS
QTC INTERVAL: 559 MS
RBC # BLD AUTO: 4 10*6/MM3 (ref 3.77–5.28)
SODIUM SERPL-SCNC: 140 MMOL/L (ref 136–145)
SP GR UR STRIP: 1.02 (ref 1–1.03)
STRESS TARGET HR: 116 BPM
TROPONIN T DELTA: -1 NG/L
UROBILINOGEN UR QL STRIP: ABNORMAL
WBC NRBC COR # BLD: 9.26 10*3/MM3 (ref 3.4–10.8)
WHOLE BLOOD HOLD COAG: NORMAL
WHOLE BLOOD HOLD SPECIMEN: NORMAL

## 2023-04-08 PROCEDURE — 84484 ASSAY OF TROPONIN QUANT: CPT | Performed by: EMERGENCY MEDICINE

## 2023-04-08 PROCEDURE — 25010000002 ONDANSETRON PER 1 MG: Performed by: EMERGENCY MEDICINE

## 2023-04-08 PROCEDURE — 85610 PROTHROMBIN TIME: CPT | Performed by: NURSE PRACTITIONER

## 2023-04-08 PROCEDURE — G0378 HOSPITAL OBSERVATION PER HR: HCPCS

## 2023-04-08 PROCEDURE — 82962 GLUCOSE BLOOD TEST: CPT

## 2023-04-08 PROCEDURE — 74181 MRI ABDOMEN W/O CONTRAST: CPT

## 2023-04-08 PROCEDURE — 83735 ASSAY OF MAGNESIUM: CPT | Performed by: NURSE PRACTITIONER

## 2023-04-08 PROCEDURE — 25010000002 CEFTRIAXONE PER 250 MG: Performed by: INTERNAL MEDICINE

## 2023-04-08 PROCEDURE — 25010000002 MORPHINE PER 10 MG: Performed by: EMERGENCY MEDICINE

## 2023-04-08 PROCEDURE — 93306 TTE W/DOPPLER COMPLETE: CPT | Performed by: INTERNAL MEDICINE

## 2023-04-08 PROCEDURE — 93306 TTE W/DOPPLER COMPLETE: CPT

## 2023-04-08 PROCEDURE — 63710000001 INSULIN LISPRO (HUMAN) PER 5 UNITS: Performed by: NURSE PRACTITIONER

## 2023-04-08 PROCEDURE — 85025 COMPLETE CBC W/AUTO DIFF WBC: CPT | Performed by: NURSE PRACTITIONER

## 2023-04-08 PROCEDURE — 99223 1ST HOSP IP/OBS HIGH 75: CPT | Performed by: NURSE PRACTITIONER

## 2023-04-08 PROCEDURE — 25010000002 MEROPENEM PER 100 MG: Performed by: EMERGENCY MEDICINE

## 2023-04-08 PROCEDURE — 93005 ELECTROCARDIOGRAM TRACING: CPT | Performed by: NURSE PRACTITIONER

## 2023-04-08 PROCEDURE — 80053 COMPREHEN METABOLIC PANEL: CPT | Performed by: NURSE PRACTITIONER

## 2023-04-08 PROCEDURE — 81003 URINALYSIS AUTO W/O SCOPE: CPT | Performed by: EMERGENCY MEDICINE

## 2023-04-08 PROCEDURE — 94660 CPAP INITIATION&MGMT: CPT

## 2023-04-08 PROCEDURE — 99222 1ST HOSP IP/OBS MODERATE 55: CPT | Performed by: INTERNAL MEDICINE

## 2023-04-08 PROCEDURE — 87040 BLOOD CULTURE FOR BACTERIA: CPT | Performed by: EMERGENCY MEDICINE

## 2023-04-08 RX ORDER — MORPHINE SULFATE 2 MG/ML
2 INJECTION, SOLUTION INTRAMUSCULAR; INTRAVENOUS ONCE
Status: COMPLETED | OUTPATIENT
Start: 2023-04-08 | End: 2023-04-08

## 2023-04-08 RX ORDER — ATENOLOL 25 MG/1
12.5 TABLET ORAL DAILY
Status: DISCONTINUED | OUTPATIENT
Start: 2023-04-08 | End: 2023-04-08

## 2023-04-08 RX ORDER — AMIODARONE HYDROCHLORIDE 200 MG/1
200 TABLET ORAL DAILY
Status: DISCONTINUED | OUTPATIENT
Start: 2023-04-08 | End: 2023-04-09

## 2023-04-08 RX ORDER — VALSARTAN 80 MG/1
80 TABLET ORAL DAILY
Status: DISCONTINUED | OUTPATIENT
Start: 2023-04-08 | End: 2023-04-08

## 2023-04-08 RX ORDER — METRONIDAZOLE 500 MG/100ML
500 INJECTION, SOLUTION INTRAVENOUS EVERY 8 HOURS
Status: COMPLETED | OUTPATIENT
Start: 2023-04-08 | End: 2023-04-13

## 2023-04-08 RX ORDER — NICOTINE POLACRILEX 4 MG
15 LOZENGE BUCCAL
Status: DISCONTINUED | OUTPATIENT
Start: 2023-04-08 | End: 2023-04-14 | Stop reason: HOSPADM

## 2023-04-08 RX ORDER — ONDANSETRON 2 MG/ML
4 INJECTION INTRAMUSCULAR; INTRAVENOUS ONCE
Status: DISCONTINUED | OUTPATIENT
Start: 2023-04-08 | End: 2023-04-08

## 2023-04-08 RX ORDER — OXYBUTYNIN CHLORIDE 5 MG/1
5 TABLET, EXTENDED RELEASE ORAL DAILY
Status: DISCONTINUED | OUTPATIENT
Start: 2023-04-08 | End: 2023-04-14 | Stop reason: HOSPADM

## 2023-04-08 RX ORDER — SODIUM CHLORIDE, SODIUM LACTATE, POTASSIUM CHLORIDE, CALCIUM CHLORIDE 600; 310; 30; 20 MG/100ML; MG/100ML; MG/100ML; MG/100ML
75 INJECTION, SOLUTION INTRAVENOUS CONTINUOUS
Status: DISCONTINUED | OUTPATIENT
Start: 2023-04-08 | End: 2023-04-10

## 2023-04-08 RX ORDER — VALSARTAN 80 MG/1
40 TABLET ORAL DAILY
Status: DISCONTINUED | OUTPATIENT
Start: 2023-04-08 | End: 2023-04-09

## 2023-04-08 RX ORDER — AMLODIPINE BESYLATE 2.5 MG/1
2.5 TABLET ORAL DAILY
Status: DISCONTINUED | OUTPATIENT
Start: 2023-04-08 | End: 2023-04-08

## 2023-04-08 RX ORDER — INSULIN LISPRO 100 [IU]/ML
0-7 INJECTION, SOLUTION INTRAVENOUS; SUBCUTANEOUS EVERY 6 HOURS SCHEDULED
Status: DISCONTINUED | OUTPATIENT
Start: 2023-04-08 | End: 2023-04-09 | Stop reason: SDUPTHER

## 2023-04-08 RX ORDER — SODIUM CHLORIDE 0.9 % (FLUSH) 0.9 %
10 SYRINGE (ML) INJECTION AS NEEDED
Status: DISCONTINUED | OUTPATIENT
Start: 2023-04-08 | End: 2023-04-10 | Stop reason: SDUPTHER

## 2023-04-08 RX ORDER — IBUPROFEN 600 MG/1
1 TABLET ORAL
Status: DISCONTINUED | OUTPATIENT
Start: 2023-04-08 | End: 2023-04-14 | Stop reason: HOSPADM

## 2023-04-08 RX ORDER — SODIUM CHLORIDE 0.9 % (FLUSH) 0.9 %
10 SYRINGE (ML) INJECTION EVERY 12 HOURS SCHEDULED
Status: DISCONTINUED | OUTPATIENT
Start: 2023-04-08 | End: 2023-04-14 | Stop reason: HOSPADM

## 2023-04-08 RX ORDER — TRIAMCINOLONE ACETONIDE 1 MG/G
1 CREAM TOPICAL EVERY 12 HOURS SCHEDULED
Status: DISCONTINUED | OUTPATIENT
Start: 2023-04-08 | End: 2023-04-14 | Stop reason: HOSPADM

## 2023-04-08 RX ORDER — SODIUM CHLORIDE 9 MG/ML
40 INJECTION, SOLUTION INTRAVENOUS AS NEEDED
Status: DISCONTINUED | OUTPATIENT
Start: 2023-04-08 | End: 2023-04-14 | Stop reason: HOSPADM

## 2023-04-08 RX ORDER — SODIUM CHLORIDE, SODIUM LACTATE, POTASSIUM CHLORIDE, CALCIUM CHLORIDE 600; 310; 30; 20 MG/100ML; MG/100ML; MG/100ML; MG/100ML
30 INJECTION, SOLUTION INTRAVENOUS CONTINUOUS PRN
Status: CANCELLED | OUTPATIENT
Start: 2023-04-08

## 2023-04-08 RX ORDER — DEXTROSE MONOHYDRATE 25 G/50ML
25 INJECTION, SOLUTION INTRAVENOUS
Status: DISCONTINUED | OUTPATIENT
Start: 2023-04-08 | End: 2023-04-14 | Stop reason: HOSPADM

## 2023-04-08 RX ADMIN — METRONIDAZOLE 500 MG: 500 INJECTION, SOLUTION INTRAVENOUS at 23:59

## 2023-04-08 RX ADMIN — MORPHINE SULFATE 2 MG: 2 INJECTION, SOLUTION INTRAMUSCULAR; INTRAVENOUS at 00:20

## 2023-04-08 RX ADMIN — TRIAMCINOLONE ACETONIDE 1 APPLICATION: 1 CREAM TOPICAL at 21:36

## 2023-04-08 RX ADMIN — INSULIN LISPRO 2 UNITS: 100 INJECTION, SOLUTION INTRAVENOUS; SUBCUTANEOUS at 23:58

## 2023-04-08 RX ADMIN — METRONIDAZOLE 500 MG: 500 INJECTION, SOLUTION INTRAVENOUS at 16:22

## 2023-04-08 RX ADMIN — MEROPENEM 1 G: 1 INJECTION, POWDER, FOR SOLUTION INTRAVENOUS at 01:23

## 2023-04-08 RX ADMIN — ACETAMINOPHEN 325MG 650 MG: 325 TABLET ORAL at 00:16

## 2023-04-08 RX ADMIN — SODIUM CHLORIDE, POTASSIUM CHLORIDE, SODIUM LACTATE AND CALCIUM CHLORIDE 75 ML/HR: 600; 310; 30; 20 INJECTION, SOLUTION INTRAVENOUS at 03:53

## 2023-04-08 RX ADMIN — OXYBUTYNIN CHLORIDE 5 MG: 5 TABLET, EXTENDED RELEASE ORAL at 09:06

## 2023-04-08 RX ADMIN — AMIODARONE HYDROCHLORIDE 200 MG: 200 TABLET ORAL at 09:06

## 2023-04-08 RX ADMIN — SODIUM CHLORIDE 1000 ML: 9 INJECTION, SOLUTION INTRAVENOUS at 00:14

## 2023-04-08 RX ADMIN — Medication 10 ML: at 21:36

## 2023-04-08 RX ADMIN — METRONIDAZOLE 500 MG: 500 INJECTION, SOLUTION INTRAVENOUS at 09:06

## 2023-04-08 RX ADMIN — VALSARTAN 40 MG: 80 TABLET, FILM COATED ORAL at 10:32

## 2023-04-08 RX ADMIN — ONDANSETRON 4 MG: 2 INJECTION INTRAMUSCULAR; INTRAVENOUS at 00:22

## 2023-04-08 RX ADMIN — TRIAMCINOLONE ACETONIDE 1 APPLICATION: 1 CREAM TOPICAL at 13:04

## 2023-04-08 RX ADMIN — CEFTRIAXONE 2 G: 2 INJECTION, POWDER, FOR SOLUTION INTRAMUSCULAR; INTRAVENOUS at 09:06

## 2023-04-08 RX ADMIN — LABETALOL HYDROCHLORIDE 20 MG: 5 INJECTION, SOLUTION INTRAVENOUS at 00:19

## 2023-04-08 RX ADMIN — PANTOPRAZOLE SODIUM 40 MG: 40 INJECTION, POWDER, LYOPHILIZED, FOR SOLUTION INTRAVENOUS at 00:17

## 2023-04-08 NOTE — CONSULTS
[facility] [place] [facility] Consult    Referring Provider: Provider, No Known    PCP: Lizzette Multani MD    Reason for Consultation: Choledocholithiasis    Chief complaint: Abdominal pain    History of present illness:    Kaylen Garrison is a 82 y.o. female who is admitted with worsening abdominal pain.  GI consult received for concerns of choledocholithiasis noted on MRCP imaging.  Patient presents to hospital with a several day onset of worsening epigastric and upper quadrant abdominal pain with associated nausea without vomit.  Reports that her symptoms have been ongoing for the past 6 weeks with periods of waxing and waning.  Reports that she even self-induced an episode of emesis with no relief.  Does not recall any prior history of the symptoms prior to 6 weeks ago.  At time of exam, denies any N/V/D, SOB, CP, or F/C.  At time of admission, MRCP was obtained which shows at least 3 filling defects in the distal common bile duct with innumerable gallstones within the gallbladder.  LFTs are elevated in a cholestatic fashion. Past medical, surgical, social, and family histories are reviewed for accuracy.  No documented alleviating or exacerbating factors.  Does not endorse pain at time of exam.    Allergies:  Adhesive tape, Latex, and Penicillins    Scheduled Meds:  amiodarone, 200 mg, Oral, Daily  cefTRIAXone, 2 g, Intravenous, Q24H  insulin lispro, 0-7 Units, Subcutaneous, Q6H  metroNIDAZOLE, 500 mg, Intravenous, Q8H  oxybutynin XL, 5 mg, Oral, Daily  sodium chloride, 10 mL, Intravenous, Q12H  triamcinolone, 1 application, Topical, Q12H  valsartan, 40 mg, Oral, Daily         Infusions:  lactated ringers, 75 mL/hr, Last Rate: 75 mL/hr (04/08/23 0353)        PRN Meds:  •  dextrose  •  dextrose  •  glucagon (human recombinant)  •  sodium chloride  •  sodium chloride  •  sodium chloride    Home Meds:  Medications Prior to Admission   Medication Sig Dispense Refill Last Dose   • amiodarone (PACERONE) 200 MG tablet Take  1 tablet by mouth once daily 90 tablet 0 4/7/2023   • amLODIPine (NORVASC) 2.5 MG tablet Take 1 tablet by mouth Daily.   4/7/2023   • atenolol (TENORMIN) 25 MG tablet Take 12.5 mg by mouth Daily. A half tablet daily   4/7/2023   • atorvastatin (LIPITOR) 40 MG tablet Take 1 tablet by mouth Daily.   4/7/2023   • Blood Glucose Monitoring Suppl (Accu-Chek Guide) w/Device kit 1 Device See Admin Instructions. Use to check blood sugar once daily.  Dx E11.9 1 kit 0 4/7/2023   • Blood Glucose Monitoring Suppl device Use as directed to check blood sugar please dispense insurance preferred 1 each 0 4/7/2023   • Cholecalciferol (VITAMIN D3 PO) Take  by mouth.   4/7/2023   • Cinnamon 500 MG capsule Take 1 capsule by mouth Daily.   4/7/2023   • glipizide (GLUCOTROL XL) 2.5 MG 24 hr tablet Take 2 tablets by mouth Daily. 2 times daily   4/7/2023   • Glucose Blood (Blood Glucose Test) strip 1 strip daily dispense strips to go with meter dx e11.65 100 each 3 4/7/2023   • glucose blood (ReliOn Premier Test) test strip Use to test blood sugar daily.  Dx E11.9. 100 each 3 4/7/2023   • hydroCHLOROthiazide (HYDRODIURIL) 25 MG tablet Take 12.5 mg by mouth Daily.   4/7/2023   • Januvia 100 MG tablet Take 1 tablet by mouth once daily 90 tablet 0 4/7/2023   • Lancets 33G misc 1 each Daily. 100 each 3 4/7/2023   • metFORMIN ER (GLUCOPHAGE-XR) 500 MG 24 hr tablet Take 2 tablets by mouth 2 (Two) Times a Day With Meals. 120 tablet 12 4/7/2023   • omega-3 acid ethyl esters (LOVAZA) 1 g capsule Take 1 capsule by mouth Daily.   4/7/2023   • oxybutynin XL (DITROPAN-XL) 5 MG 24 hr tablet Take 1 tablet by mouth Daily.   4/7/2023   • rivaroxaban (XARELTO) 15 MG tablet Take 1 tablet by mouth Daily With Dinner. Indications: Atrial Fibrillation 30 tablet 3 4/7/2023   • valsartan (DIOVAN) 80 MG tablet Take 1 tablet by mouth Daily.   4/7/2023       ROS: Review of Systems   Constitutional: Positive for activity change, appetite change, chills and fatigue.  Negative for diaphoresis, fever and unexpected weight change.   HENT: Negative for sore throat, trouble swallowing and voice change.    Eyes: Negative.    Respiratory: Negative for apnea, cough, choking, chest tightness, shortness of breath, wheezing and stridor.    Cardiovascular: Negative for chest pain, palpitations and leg swelling.   Gastrointestinal: Positive for abdominal pain. Negative for abdominal distention, anal bleeding, blood in stool, constipation, diarrhea, nausea, rectal pain and vomiting.   Endocrine: Negative.    Genitourinary: Negative.    Musculoskeletal: Negative.    Skin: Negative.    Allergic/Immunologic: Negative.    Neurological: Negative.    Hematological: Negative.    Psychiatric/Behavioral: Negative.    All other systems reviewed and are negative.      PAST MED HX:  Past Medical History:   Diagnosis Date   • Atrial fibrillation    • Benign hypertension    • History of echocardiogram 04/2012   • Hypercalcemia    • Hypercholesterolemia    • Hypertension    • Meniere's disease    • Obesity    • Overweight (BMI 25.0-29.9)    • Primary hyperparathyroidism    • Sleep apnea with use of continuous positive airway pressure (CPAP)    • Syncope 04/2012   • Type 2 diabetes mellitus    • Vitamin D deficiency        PAST SURG HX:  Past Surgical History:   Procedure Laterality Date   • BREAST SURGERY Left     benign tumor removal   • CATARACT EXTRACTION Bilateral    • HYSTERECTOMY         FAM HX:  Family History   Problem Relation Age of Onset   • Heart attack Mother    • No Known Problems Father    • No Known Problems Sister    • Lactose intolerance Brother    • No Known Problems Sister        SOC HX:  Social History     Socioeconomic History   • Marital status:    Tobacco Use   • Smoking status: Never   • Smokeless tobacco: Never   Vaping Use   • Vaping Use: Never used   Substance and Sexual Activity   • Alcohol use: No   • Drug use: No   • Sexual activity: Defer       PHYSICAL EXAM  /59  "(BP Location: Right arm, Patient Position: Lying)   Pulse 64   Temp 97.6 °F (36.4 °C) (Oral)   Resp 18   Ht 160 cm (62.99\")   Wt 67 kg (147 lb 11.3 oz)   SpO2 96%   BMI 26.17 kg/m²   Wt Readings from Last 3 Encounters:   04/08/23 67 kg (147 lb 11.3 oz)   02/07/23 65.3 kg (144 lb)   10/11/22 66.4 kg (146 lb 6.4 oz)   ,body mass index is 26.17 kg/m².  Physical Exam  Vitals and nursing note reviewed.   Constitutional:       General: She is not in acute distress.     Appearance: Normal appearance. She is normal weight. She is not ill-appearing or toxic-appearing.   HENT:      Head: Normocephalic and atraumatic.   Eyes:      General: No scleral icterus.     Extraocular Movements: Extraocular movements intact.      Conjunctiva/sclera: Conjunctivae normal.      Pupils: Pupils are equal, round, and reactive to light.   Cardiovascular:      Rate and Rhythm: Normal rate and regular rhythm.      Pulses: Normal pulses.      Heart sounds: Normal heart sounds.   Pulmonary:      Effort: Pulmonary effort is normal. No respiratory distress.      Breath sounds: Normal breath sounds.   Abdominal:      General: Abdomen is flat. Bowel sounds are normal. There is no distension.      Palpations: Abdomen is soft. There is no mass.      Tenderness: There is no abdominal tenderness. There is no guarding or rebound.      Hernia: No hernia is present.   Musculoskeletal:      Cervical back: Normal range of motion and neck supple. No rigidity or tenderness.   Lymphadenopathy:      Cervical: No cervical adenopathy.   Skin:     General: Skin is warm and dry.      Capillary Refill: Capillary refill takes less than 2 seconds.   Neurological:      General: No focal deficit present.      Mental Status: She is alert and oriented to person, place, and time.   Psychiatric:         Mood and Affect: Mood normal.         Behavior: Behavior normal.         Thought Content: Thought content normal.         Judgment: Judgment normal.         Results " Review:   I reviewed the patient's new clinical results.  I reviewed the patient's new imaging results and agree with the interpretation.  I personally viewed and interpreted the patient's EKG/Telemetry data    Lab Results   Component Value Date    WBC 9.26 04/08/2023    HGB 11.3 (L) 04/08/2023    HGB 13.3 04/07/2023    HGB 15.0 04/07/2023    HCT 35.4 04/08/2023    MCV 88.5 04/08/2023     04/08/2023       Lab Results   Component Value Date    INR 2.66 (H) 04/08/2023    INR 2 (A) 04/07/2023    INR 1.8 (H) 04/07/2023       Lab Results   Component Value Date    GLUCOSE 205 (H) 04/08/2023    BUN 22 04/08/2023    CREATININE 0.85 04/08/2023    EGFRIFNONA 55 (L) 02/10/2022    BCR 25.9 (H) 04/08/2023     04/08/2023    K 3.4 (L) 04/08/2023    CO2 24.0 04/08/2023    CALCIUM 8.7 04/08/2023    ALBUMIN 3.4 (L) 04/08/2023    ALKPHOS 161 (H) 04/08/2023    BILITOT 2.3 (H) 04/08/2023     (H) 04/08/2023     (H) 04/08/2023       CT Abdomen Pelvis Without Contrast    Result Date: 4/7/2023  EXAM:  CT SCAN OF THE ABDOMEN AND PELVIS WITHOUT INTRAVENOUS CONTRAST DATE: 4/7/2023 11:06 PM HISTORY: Abdominal pain TECHNIQUE: CT examination of the abdomen and pelvis with sagittal and coronal reformations was performed without intravenous contrast.  CT dose lowering techniques were used, to include: automated exposure control, adjustment for patient size, and/or use  of iterative reconstruction. Note: The exam is limited because some types of pathology may not be adequately demonstrated due to lack of contrast enhancement. COMPARISON: None.   FINDINGS: ABDOMEN/PELVIS: LOWER CHEST: Mildly coarse basilar septal thickening LIVER: Normal.  GALLBLADDER/BILIARY: Distended gallbladder, with stones/sludge, suggestion of slight wall thickening. Small calcification near the aston hepatis PANCREAS: Normal. SPLEEN: Normal.  ADRENAL GLANDS: Normal. KIDNEYS/URETERS/BLADDER: Normal kidneys. Normal bladder GI TRACT: Normal bowel.  Normal appendix (axial image 77) MESENTERY/PERITONEUM: Normal mesentery REPRODUCTIVE: Hysterectomy VASCULATURE: Normal.   LYMPH NODES: Normal ABDOMINAL WALL: Normal. MUSCULOSKELETAL: Normal.     1.  Distended gallbladder, cholelithiasis/sludge, correlate for clinical evidence of cholecystitis. 2.  Small calcification at the aston hepatis, with consideration of choledocholithiasis, cystic duct stone. 3.  Basilar septal thickening/edema. Electronically signed by:  Lorraine Bridges M.D.  4/7/2023 9:32 PM Mountain Time    XR Chest 1 View    Result Date: 4/7/2023  Exam:  Single view chest Date: April 7, 2023 History: Neurologic symptoms Comparison: November 10, 2021 Technique: Single frontal radiograph of the chest was obtained Findings: The heart size is stable. There are atherosclerotic changes in the aortic arch. There are markedly coarsened interstitial lung markings in both lungs. There is patchy groundglass appearing airspace disease in the right lower lobe. There is no pneumothorax or sizable pleural fluid collection.     1. Redemonstration of markedly coarsened interstitial lung markings in both lungs. Findings could be related to etiologies of chronic interstitial lung disease. Alternatively, consider atypical infection or pulmonary edema. 2. There is patchy groundglass appearing airspace disease in the right lower lobe. The appearance is concerning for right lower lobe pneumonia. Slot 63 Electronically signed by:  Jarvis Pina M.D.  4/7/2023 9:56 PM Mountain Time    MRI abdomen wo contrast mrcp    Result Date: 4/8/2023  MRI ABDOMEN WO CONTRAST MRCP Date of Exam: 4/8/2023 3:01 AM EDT Indication: acute jenny, eval for choledocholithiasis.  Comparison: CT abdomen and pelvis dated 4/7/2023 Technique:  Routine multiplanar/multisequence images of the abdomen were obtained with MRCP sequences without contrast administration. Findings: The liver length is at the upper limit of normal. There is no evidence of hepatic  steatosis or iron deposition. There is no focal liver lesion by noncontrast technique. The gallbladder is present with innumerable stones. There is gallbladder wall thickening and pericholecystic fluid compatible with acute cholecystitis. There is dilation of the common hepatic duct measures up to 11 mm and dilation of the common bile duct  measuring up to 8 mm. There are at least 2 small stones within the common bile duct and likely 1 at the ampulla causing a blunted appearance of the common bile duct. Findings are compatible with choledocholithiasis. The spleen is normal in size. There is motion limited evaluation of the adrenal glands. There is mild prominence of the pancreatic duct within the pancreatic head measuring up to 6 mm. No visible obstructing mass. The kidneys are symmetric in size. There is a small simple cyst within the posterior aspect of the right kidney. There is no hydronephrosis. There are no ossific pulmonary changes with trace bilateral pleural effusions.     Impression: 1. Choledocholithiasis with at least 3 filling defects, one of which is near the ampulla and causes a blunted appearance of the distal common bile duct. 2. Innumerable filling defects within the gallbladder with associated gallbladder wall thickening and pericholecystic edema likely representing acute cholecystitis. 3. Prominence of the pancreatic duct within the pancreatic head measuring up to 5 mm. No visible obstructing pancreatic duct stone or mass. The remainder of the duct within the pancreatic body and tail appear normal in caliber. Electronically Signed: Vin Bojorquez  4/8/2023 8:41 AM EDT  Workstation ID: [id number]    CT Head Without Contrast Stroke Protocol    Result Date: 4/7/2023  EXAMINATION: CT HEAD WITHOUT CONTRAST DATE: 4/7/2023 11:00 PM  INDICATION: Neuro deficit, acute, stroke suspected Slurred speechRelease to patient->Routine Release  COMPARISON: None.  TECHNIQUE:  Routine axial images through the head  without contrast. Coronal reformations. Low-dose CT acquisition technique included one or more of the following options: 1. Automated exposure control, 2. Adjustment of mA and/or KV according to patient's size and/or 3. Use of iterative reconstruction. FINDINGS: Limited by motion. Intracranial contents: Ventricular and cisternal spaces are prominent from volume loss. Mild to moderate periventricular and subcortical white matter hypodensities. Thickened falx, partly could be motion artifact. No dominant mass, midline shift, hydrocephalus, extra axial fluid collection or acute hemorrhage. No asymmetric hyperdensity of the proximal major cerebral arteries. Craniocervical junction is patent. Bones and extracranial soft tissues: Mild mucosal thickening ethmoid air cells. Otherwise, Visualized paranasal sinuses and mastoid air cells are clear.  Skull is intact. Cataract surgery. Degenerative changes temporomandibular joints. Calcifications distal internal carotid arteries and distal vertebral arteries.     1. No acute intracranial process. MRI is more sensitive for the detection of acute nonhemorrhagic infarct. 2. Mild to moderate changes small vessel ischemic disease of indeterminate age, presumably mostly chronic. Volume loss. Atherosclerosis. Report called to stroke navigator Shannan Cordova at 4/7/2023 11:12 PM Electronically signed by:  Brenden Soto M.D.  4/7/2023 9:15 PM Mountain Time      COVID19   Date Value Ref Range Status   11/10/2021 Not Detected Not Detected - Ref. Range Final     ASSESSMENTS/PLANS  1.  Acute cholecystitis with choledocholithiasis and biliary obstruction  2.  Choledocholithiasis with biliary obstruction  3.  Shaking chills with concerns for ascending cholangitis  4.  Elevated LFTs, cholestatic fashion, related to above  5.  Type 2 diabetes mellitus.  6.  PAF, on Xarelto  7.  Concerns for mitral valve disease    Kaylen Garrison is a 82 y.o. female presented to hospital worsening  abdominal pain.  MRCP reviewed and shows evidence of choledocholithiasis with biliary obstruction; LFTs elevated in pattern consistent with biliary obstruction.  Patient's shaking chills at time of admission is concerning for possible underlying cholangitis with associated leukocytosis.  Recommend ERCP for alleviation of choledocholithiasis; will need cardiac echo and cardiac clearance prior to proceeding with ERCP; anticipate in a.m.    >>> N.p.o. at midnight  >>> Obtain informed consent for endoscopic retrograde cholangiopancreatography  >>> Risk and benefits explained including incomplete cannulation, 10%; Perforation, 1/ 500; Bleeding, 1/500:  Infection; Pancreatitis, 5 to 10% mild to moderate; and 5% severe with 1/1000 risk of death.  >>> Continue IV antibiotics given concerns of cholangitis  >>> Order placed for preprocedural indomethacin suppository  >>> Order placed for intraprocedural fluoroscopy  >>> Continue antiemetics and pain control measures    I discussed the patient's findings and my recommendations with patient, family and nursing staff    VALERIANO May  04/08/23  14:10 EDT

## 2023-04-08 NOTE — PLAN OF CARE
Goal Outcome Evaluation:  Plan of Care Reviewed With: patient, family        Progress: improving  Outcome Evaluation: VSS. AOx4. 2L n/c intermittently while resting. BP meds reduced or held. BG under control without insulin. Ambulated in the gaspar twice with walker/gait belt. Ambulating to bathroom. LBM today. NPO w/ sips. Cardiology clearence pending ECHO results. ERCP possible tomorrow with GI. No further needs identified at this time.

## 2023-04-08 NOTE — H&P
[facility] Medicine Services  HISTORY AND PHYSICAL    Patient Name: Kaylen Garrison  : 1940  MRN: [id number]  Primary Care Physician: Lizzette Multani MD  Date of admission: 2023    Subjective   Subjective     Chief Complaint:  Abdominal pain    HPI:  Kaylen Garrison is a 82 y.o. female with PMH significant for A-fib on Xarelto, HTN, HLD, BERNARD on BiPAP, DM2, who presents to the ED with complaint of abdominal pain.  She states that over the past 6 weeks she has had intermittent abdominal pain.  She states that over the past 2 weeks it has significantly increased in frequency and intensity.  She has had intermittent episodes of vomiting associated with the abdominal pain and notes that she has forced herself to vomit on occasion secondary to trying to find relief from the abdominal pain.  Tonight, she states that she had right-sided abdominal pain that did not resolve.  She states that she forced herself to vomit and the pain continued, therefore she called EMS for transport to the ED.  Of note, EMS noted slurred speech which she attributed to dry mouth.  Her son who is a physician also spoke with her and states this is baseline for her especially in the evenings.  CT head was negative she refused further work-up including imaging with contrast studies.  No focal deficits were noted.  Upon arrival to the ED, she is noted to have elevated liver enzymes.  She has mild leukocytosis.  CT abdomen/pelvis note distended gallbladder with cholelithiasis concerning for cholecystitis and concern for choledocholithiasis with cystic duct stone.  CXR notes interstitial lung disease and notes patchy groundglass airspace disease in the right lower lobe concerning for question of pneumonia.  CT head is negative for acute findings.  She was also noted to be hypertensive.  Merrem Flagyl and Rocephin were ordered while in the ED.  She will be admitted to hospital medicine for further  evaluation.      Review of Systems   Constitutional: Positive for appetite change. Negative for activity change, chills, diaphoresis, fatigue, fever and unexpected weight change.   HENT: Negative.  Negative for congestion and sore throat.         Chronic dry mouth   Eyes: Negative.  Negative for visual disturbance.   Respiratory: Negative.  Negative for cough, chest tightness, shortness of breath and wheezing.    Cardiovascular: Negative.  Negative for chest pain, palpitations and leg swelling.   Gastrointestinal: Positive for abdominal pain, nausea and vomiting. Negative for abdominal distention, constipation and diarrhea.   Endocrine: Negative.  Negative for polydipsia and polyuria.   Genitourinary: Negative.  Negative for difficulty urinating, dysuria, frequency and urgency.   Musculoskeletal: Negative.  Negative for arthralgias, back pain, gait problem, joint swelling, myalgias and neck pain.   Skin: Negative.  Negative for color change, pallor, rash and wound.   Allergic/Immunologic: Negative.  Negative for immunocompromised state.   Neurological: Negative for dizziness, syncope, facial asymmetry, speech difficulty, weakness, light-headedness, numbness and headaches.   Hematological: Bruises/bleeds easily.   Psychiatric/Behavioral: Negative.  Negative for confusion. The patient is not nervous/anxious.             Personal History     Past Medical History:   Diagnosis Date   • Atrial fibrillation    • Benign hypertension    • History of echocardiogram 04/2012   • Hypercalcemia    • Hypercholesterolemia    • Hypertension    • Meniere's disease    • Obesity    • Overweight (BMI 25.0-29.9)    • Primary hyperparathyroidism    • Sleep apnea with use of continuous positive airway pressure (CPAP)    • Syncope 04/2012   • Type 2 diabetes mellitus    • Vitamin D deficiency        Past Surgical History:   Procedure Laterality Date   • BREAST SURGERY Left     benign tumor removal   • CATARACT EXTRACTION Bilateral    •  HYSTERECTOMY         Family History:  family history includes Heart attack in her mother; Lactose intolerance in her brother; No Known Problems in her father, sister, and sister.     Social History:  reports that she has never smoked. She has never used smokeless tobacco. She reports that she does not drink alcohol and does not use drugs.  Social History     Social History Narrative    Caffeine 2 ice teas       Medications:  Accu-Chek Guide, Blood Glucose Monitoring Suppl, Blood Glucose Test, Cholecalciferol, Cinnamon, Lancets 33G, SITagliptin, amLODIPine, amiodarone, atenolol, atorvastatin, glipizide, glucose blood, hydroCHLOROthiazide, metFORMIN ER, omega-3 acid ethyl esters, oxybutynin XL, rivaroxaban, and valsartan    Allergies   Allergen Reactions   • Adhesive Tape Rash   • Latex Rash   • Penicillins Rash       Objective   Objective     Vital Signs:   Temp:  [97.1 °F (36.2 °C)] 97.1 °F (36.2 °C)  Heart Rate:  [74] 74  Resp:  [20] 20  BP: (192)/(94) 192/94  Flow (L/min):  [4] 4    Physical Exam   Constitutional: Awake, alert  Eyes: PERRLA, sclerae anicteric, no conjunctival injection  HENT: NCAT, mucous membranes dry  Neck: Supple, no thyromegaly, no lymphadenopathy, trachea midline  Respiratory: Clear to auscultation bilaterally, nonlabored respirations   Cardiovascular: RRR, no murmurs, rubs, or gallops, palpable pedal pulses bilaterally  Gastrointestinal: Positive bowel sounds, soft, RUQ tenderness, nondistended  Musculoskeletal: No bilateral ankle edema, no clubbing or cyanosis to extremities  Psychiatric: Appropriate affect, cooperative  Neurologic: Oriented x 3, strength symmetric in all extremities, Cranial Nerves grossly intact to confrontation, speech mildly slowed (baseline)  Skin: No rashes      Result Review:  I have personally reviewed the results from the time of this admission to 4/8/2023 01:17 EDT and agree with these findings:  [x]  Laboratory list / accordion  []  Microbiology  [x]   Radiology  []  EKG/Telemetry   []  Cardiology/Vascular   []  Pathology  [x]  Old records  []  Other:  Most notable findings include:     LAB RESULTS:      Lab 04/07/23  2334 04/07/23 2326 04/07/23 2321   WBC  --   --  10.83*   HEMOGLOBIN  --   --  13.3   HEMOGLOBIN, POC  --   --  15.0   HEMATOCRIT  --   --  42.9   HEMATOCRIT POC  --   --  44   PLATELETS  --   --  333   NEUTROS ABS  --   --  8.94*   IMMATURE GRANS (ABS)  --   --  0.05   LYMPHS ABS  --   --  0.98   MONOS ABS  --   --  0.76   EOS ABS  --   --  0.06   MCV  --   --  89.0   LACTATE  --   --  1.1   PROTIME 20.5 20.5*  --    APTT  --   --  39.1*         Lab 04/07/23 2321   SODIUM 138   POTASSIUM 3.5   CHLORIDE 99   CO2 25.0   ANION GAP 14.0   BUN 22   CREATININE 0.90  0.82   EGFR 70.6  63.2   GLUCOSE 280*   CALCIUM 9.1         Lab 04/07/23 2321   TOTAL PROTEIN 7.1   ALBUMIN 4.0   GLOBULIN 3.1   ALT (SGPT) 297*   AST (SGOT) 597*   BILIRUBIN 1.7*   ALK PHOS 154*   LIPASE 47         Lab 04/07/23 2334 04/07/23 2326 04/07/23 2321   HSTROP T  --   --  14*  14*   PROTIME 20.5 20.5*  --    INR 2* 1.8*  --                  Brief Urine Lab Results     None        Microbiology Results (last 10 days)     ** No results found for the last 240 hours. **          CT Abdomen Pelvis Without Contrast    Result Date: 4/7/2023  EXAM:  CT SCAN OF THE ABDOMEN AND PELVIS WITHOUT INTRAVENOUS CONTRAST DATE: 4/7/2023 11:06 PM HISTORY: Abdominal pain TECHNIQUE: CT examination of the abdomen and pelvis with sagittal and coronal reformations was performed without intravenous contrast.  CT dose lowering techniques were used, to include: automated exposure control, adjustment for patient size, and/or use  of iterative reconstruction. Note: The exam is limited because some types of pathology may not be adequately demonstrated due to lack of contrast enhancement. COMPARISON: None.   FINDINGS: ABDOMEN/PELVIS: LOWER CHEST: Mildly coarse basilar septal thickening LIVER: Normal.   GALLBLADDER/BILIARY: Distended gallbladder, with stones/sludge, suggestion of slight wall thickening. Small calcification near the aston hepatis PANCREAS: Normal. SPLEEN: Normal.  ADRENAL GLANDS: Normal. KIDNEYS/URETERS/BLADDER: Normal kidneys. Normal bladder GI TRACT: Normal bowel. Normal appendix (axial image 77) MESENTERY/PERITONEUM: Normal mesentery REPRODUCTIVE: Hysterectomy VASCULATURE: Normal.   LYMPH NODES: Normal ABDOMINAL WALL: Normal. MUSCULOSKELETAL: Normal.     Impression: 1.  Distended gallbladder, cholelithiasis/sludge, correlate for clinical evidence of cholecystitis. 2.  Small calcification at the aston hepatis, with consideration of choledocholithiasis, cystic duct stone. 3.  Basilar septal thickening/edema. Electronically signed by:  Lorraine Bridges M.D.  4/7/2023 9:32 PM Mountain Time    XR Chest 1 View    Result Date: 4/7/2023  Exam:  Single view chest Date: April 7, 2023 History: Neurologic symptoms Comparison: November 10, 2021 Technique: Single frontal radiograph of the chest was obtained Findings: The heart size is stable. There are atherosclerotic changes in the aortic arch. There are markedly coarsened interstitial lung markings in both lungs. There is patchy groundglass appearing airspace disease in the right lower lobe. There is no pneumothorax or sizable pleural fluid collection.     Impression: 1. Redemonstration of markedly coarsened interstitial lung markings in both lungs. Findings could be related to etiologies of chronic interstitial lung disease. Alternatively, consider atypical infection or pulmonary edema. 2. There is patchy groundglass appearing airspace disease in the right lower lobe. The appearance is concerning for right lower lobe pneumonia. Slot 63 Electronically signed by:  Jarvis Pina M.D.  4/7/2023 9:56 PM Mountain Time    CT Head Without Contrast Stroke Protocol    Result Date: 4/7/2023  EXAMINATION: CT HEAD WITHOUT CONTRAST DATE: 4/7/2023 11:00 PM  INDICATION:  Neuro deficit, acute, stroke suspected Slurred speechRelease to patient->Routine Release  COMPARISON: None.  TECHNIQUE:  Routine axial images through the head without contrast. Coronal reformations. Low-dose CT acquisition technique included one or more of the following options: 1. Automated exposure control, 2. Adjustment of mA and/or KV according to patient's size and/or 3. Use of iterative reconstruction. FINDINGS: Limited by motion. Intracranial contents: Ventricular and cisternal spaces are prominent from volume loss. Mild to moderate periventricular and subcortical white matter hypodensities. Thickened falx, partly could be motion artifact. No dominant mass, midline shift, hydrocephalus, extra axial fluid collection or acute hemorrhage. No asymmetric hyperdensity of the proximal major cerebral arteries. Craniocervical junction is patent. Bones and extracranial soft tissues: Mild mucosal thickening ethmoid air cells. Otherwise, Visualized paranasal sinuses and mastoid air cells are clear.  Skull is intact. Cataract surgery. Degenerative changes temporomandibular joints. Calcifications distal internal carotid arteries and distal vertebral arteries.     Impression: 1. No acute intracranial process. MRI is more sensitive for the detection of acute nonhemorrhagic infarct. 2. Mild to moderate changes small vessel ischemic disease of indeterminate age, presumably mostly chronic. Volume loss. Atherosclerosis. Report called to stroke navigator Shannan Cordova at 4/7/2023 11:12 PM Electronically signed by:  Brenden Soto M.D.  4/7/2023 9:15 PM Mountain Time      Results for orders placed during the hospital encounter of 11/10/21    Adult Transesophageal Echo (SATISH) W/ Cont if Necessary Per Protocol    Interpretation Summary  · Mild left atrial enlargement.  · Preserved left ventricular systolic function, estimated EF 50%.  · No evidence of intracardiac thrombus or mass, clear left atrial appendage.  · Trace mitral  regurgitation, trace tricuspid regurgitation.      Assessment & Plan   Assessment & Plan       Acute cholecystitis    Hypertension    Hyperlipidemia    BERNARD treated with BiPAP    Type 2 diabetes mellitus with hyperglycemia, without long-term current use of insulin    Paroxysmal atrial fibrillation    82 y.o. female with PMH significant for A-fib on Xarelto, HTN, HLD, BERNARD on BiPAP, DM2, who presents to the ED with complaint of abdominal pain who was found to have concern for acute cholecystitis.    Abdominal pain  Acute cholecystitis  Transaminitis  - Rocephin, Flagyl  - MRCP pending  - Ultrasound gallbladder pending  - N.p.o.  - With CBC, CMP, PT/INR in the a.m.  - EKG  - Low-dose as needed IV pain control, patient's son notes very intolerant to minimal pain medicine, causes confusion    Pneumonia  - Denies known aspiration event but has had vomiting  - SLP consult in the a.m.  - N.p.o. pending surgery evaluation    Elevated troponin  - Mildly elevated, suspect demand  - Repeat pending  - EKG pending  - No chest pain    PAF  - Rate controlled  - On Xarelto, last dose evening of 4/7/2023  - Continue amiodarone    HTN  - Continue amlodipine, atenolol, valsartan  - Hold HCTZ for now    Hyperlipidemia  - Hold statin secondary to elevated liver enzymes  - CMP in the a.m.    DM2  - Hold glipizide, Januvia, metformin  - FS BG every 6 while n.p.o.  - SS insulin  - Hemoglobin A1c in the a.m.    BERNARD  - Uses BiPAP at night, hold for now secondary to intermittent vomiting      DVT prophylaxis: SCUDs    CODE STATUS:    Code Status (Patient has no pulse and is not breathing): CPR (Attempt to Resuscitate)  Medical Interventions (Patient has pulse or is breathing): Full Support      Expected Discharge  Expected Discharge Date and Time     Expected Discharge Date Expected Discharge Time    Apr 11, 2023             This note has been completed as part of a split-shared workflow.     Signature: Electronically signed by Liberty Farias  VALERIANO, 04/08/23, 1:18 AM EDT.  Total APC time spent 57 minutes       Attending   Admission Attestation       I have performed an independent face-to-face diagnostic evaluation including performing an independent physical examination as documented here.  The documented plan of care above was reviewed and developed with the advanced practice clinician (APC).      Brief Summary Statement:   Kaylen Garrison is a 82 y.o. female who presents to the ER with complaints of abdominal pain, decreased appetite, intermittent episodes of nausea and vomiting.    Patient reports over the past 6 weeks she has had intermittent abdominal pain.  She reports significantly worsening over the past 2 weeks with intermittent nausea with vomiting.  She states that usually her pain will resolve, however tonight started having right-sided abdominal pain that was continuous.  She attempted to force herself to vomit at home, however this did not relieve her pain.  Patient noted to have slightly slurred speech at admission, however her mouth was noted to be significantly dry.  Her son reports that she has slight slurred speech at baseline, significantly worsens during late night.  Work-up in the ER concerning for elevated liver enzymes, bilirubin, and alk phos.  CT of the abdomen pelvis with cholelithiasis and concern for acute cholecystitis.  Need to rule out choledocholithiasis.    Patient with probable interstitial lung disease and was noted to have new infiltrate in right lower lobe concerning for pneumonia.  Patient started on Rocephin and Flagyl.  General surgery has been consulted for the a.m.    Remainder of detailed HPI is as noted by APC and has been reviewed and/or edited by me for completeness.    Attending Physical Exam:  Temp:  [97.1 °F (36.2 °C)] 97.1 °F (36.2 °C)  Heart Rate:  [74] 74  Resp:  [20] 20  BP: (192)/(94) 192/94  Flow (L/min):  [4] 4    Constitutional: Awake, alert, appears acutely ill  Eyes: PERRLA, sclerae  anicteric, no conjunctival injection  HENT: NCAT, mucous membranes dry  Neck: Supple, no thyromegaly, no lymphadenopathy, trachea midline  Respiratory: dec bs in rll, otherwise cta bilaterally, nonlabored respirations   Cardiovascular: RRR, no murmurs, rubs, or gallops, palpable pedal pulses bilaterally  Gastrointestinal: Positive bowel sounds, soft, RUQ tenderness, nondistended  Musculoskeletal: No bilateral ankle edema, no clubbing or cyanosis to extremities  Psychiatric: Appropriate affect, cooperative  Neurologic: Oriented x 3, strength symmetric in all extremities, Cranial Nerves grossly intact to confrontation, speech mildly slowed and slurred. (baseline according to son)  Skin: No rashes      Brief Assessment/Plan :  See detailed assessment and plan developed with APC which I have reviewed and/or edited for completeness.            Mayo Dominguez DO  04/08/23       Total time spent: 25  Time spent includes time reviewing chart, face-to-face time, counseling patient/family/caregiver, ordering medications/tests/procedures, communicating with other health care professionals, documenting clinical information in the electronic health record, and coordination of care.

## 2023-04-08 NOTE — PROGRESS NOTES
[facility] Medicine Services  ADMISSION FOLLOW-UP NOTE          Patient admitted after midnight, H&P by my partner performed earlier on today's date reviewed.  Interim findings, labs, and charting also reviewed.        The [facility] Hospital Problem List has been managed and updated to include any new diagnoses:  Active Hospital Problems    Diagnosis  POA   • **Acute cholecystitis [K81.0]  Yes   • Paroxysmal atrial fibrillation [I48.0]  Yes   • Type 2 diabetes mellitus with hyperglycemia, without long-term current use of insulin [E11.65]  Yes   • BERNARD treated with BiPAP [G47.33]  Yes   • Hyperlipidemia [E78.5]  Yes   • Hypertension [I10]  Yes      Resolved Hospital Problems   No resolved problems to display.         ADDITIONAL PLAN:  - detailed assessment and plan from admission reviewed  - 82 y.o. female with PMH significant for A-fib on Xarelto, HTN, HLD, BERNARD on BiPAP, DM2, who presents to the ED with complaint of abdominal pain who was found to have concern for acute cholecystitis.     Abdominal pain  Acute cholecystitis  Transaminitis  - continue Rocephin, Flagyl  - MRCP with cbd dilation, per surgery will need ERCP prior to jenny, GI consulted    Pneumonia  - Denies known aspiration event but has had vomiting  - xray with RLL opacity  - N.p.o. pending surgery evaluation     Elevated troponin  - Mildly elevated, suspect demand  - No chest pain     PAF  - Rate controlled  - On Xarelto, last dose evening of 4/7/2023  - Continue amiodarone     HTN  - Continue valsartan, hold other meds for now - bp on the low side  - Hold HCTZ for now     Hyperlipidemia  - Hold statin secondary to elevated liver enzymes  - CMP in the a.m.     DM2  - Hold glipizide, Januvia, metformin  - SSI  --a1c 7.2     BERNARD     Expected Discharge   Expected Discharge Date and Time     Expected Discharge Date Expected Discharge Time    Apr 11, 2023            Stella Cook DO  04/08/23

## 2023-04-08 NOTE — SIGNIFICANT NOTE
Stroke Navigator CODE STROKE Note    TIME NOTIFIED OF PATIENTS ARRIVAL 2250, TIME OF PATIENT EVALUATION 2300 on arrival    HPI    Ms. Garrison is an 82 year old  female with known medical diagnosis of A. Fib (on Xarelto), HTN, HLD, Meniere's disease, hyperparathyroidism, BERNARD, and T2DM who presents to [facility] ED via EMS with concerns of abdominal pain. Patient states that she was at home when she began having abdominal pain around 6:30 PM, the pain worsened and she called her  who told her to call 911. On EMS arrival she was noted to have slurred speech but no other focal deficits, she is also noted to be hypertensive with blood pressure 200/84. Patient was brought to [facility] for further evaluation.     On arrival patient was taken to CT for advanced imaging. CT head was negative for any acute intracranial abnormality. Patient refused contrast, therefore CTAs and perfusion were deferred.     On exam patient is alert and oriented, she is able to move all extremities and follow commands without difficulty. Her speech is garbled and difficult to understand at times, patient states she feels that this is due to her mouth being dry because of her medications. Face is symmetric. Sensation intact bilaterally. No focal deficits noted. Pupils equal, round, reactive to light, EOMs and visual fields intact.  NIH 1 for dysarthria.      Code Stroke location: ED    · Last known well: 1830    · GCS: 15  · Baseline level of function known: yes. Modified Frederic: 0   · Patient is not a candidate for thrombolytic therapy due to Current anticoagulation use Symptom onset >4.5 hours  · Patient is not a candidate for Neuro Intervention due to patient refused contrast, CTA/perfusion deferred, NIH 1 with minimal deficits.    Stroke risk factors: atrial fibrillation, diabetes mellitus, hyperlipidemia, hypertension, physical inactivity and/or obesity and sleep apnea.     Prior stroke history: no  Location: NA  Antiplatelet therapy:  none  Anticoagulation: Xarelto     · Imaging performed: CT head    CT Abdomen Pelvis Without Contrast    Result Date: 4/7/2023  1.  Distended gallbladder, cholelithiasis/sludge, correlate for clinical evidence of cholecystitis. 2.  Small calcification at the aston hepatis, with consideration of choledocholithiasis, cystic duct stone. 3.  Basilar septal thickening/edema. Electronically signed by:  Lorraine Bridges M.D.  4/7/2023 9:32 PM Mountain Time    XR Chest 1 View    Result Date: 4/7/2023  1. Redemonstration of markedly coarsened interstitial lung markings in both lungs. Findings could be related to etiologies of chronic interstitial lung disease. Alternatively, consider atypical infection or pulmonary edema. 2. There is patchy groundglass appearing airspace disease in the right lower lobe. The appearance is concerning for right lower lobe pneumonia. Slot 63 Electronically signed by:  Jarvis Pina M.D.  4/7/2023 9:56 PM Mountain Time    CT Head Without Contrast Stroke Protocol    Result Date: 4/7/2023  1. No acute intracranial process. MRI is more sensitive for the detection of acute nonhemorrhagic infarct. 2. Mild to moderate changes small vessel ischemic disease of indeterminate age, presumably mostly chronic. Volume loss. Atherosclerosis. Report called to stroke navigator Shannan Cordova at 4/7/2023 11:12 PM Electronically signed by:  Brenden Soto M.D.  4/7/2023 9:15 PM Mountain Time      NIHSS    Interval: baseline  1a. Level of Consciousness: 0-->Alert, keenly responsive  1b. LOC Questions: 0-->Answers both questions correctly  1c. LOC Commands: 0-->Performs both tasks correctly  2. Best Gaze: 0-->Normal  3. Visual: 0-->No visual loss  4. Facial Palsy: 0-->Normal symmetrical movements  5a. Motor Arm, Left: 0-->No drift, limb holds 90 (or 45) degrees for full 10 secs  5b. Motor Arm, Right: 0-->No drift, limb holds 90 (or 45) degrees for full 10 secs  6a. Motor Leg, Left: 0-->No drift, leg holds 30  degree position for full 5 secs  6b. Motor Leg, Right: 0-->No drift, leg holds 30 degree position for full 5 secs  7. Limb Ataxia: 0-->Absent  8. Sensory: 0-->Normal, no sensory loss  9. Best Language: 0-->No aphasia, normal  10. Dysarthria: 1-->Mild-to-moderate dysarthria, patient slurs at least some words and, at worst, can be understood with some difficulty  11. Extinction and Inattention (formerly Neglect): 0-->No abnormality    Total (NIH Stroke Scale): 1       PLAN  Dr. Mendenhall spoke with patient's son who is a physician in [place]. Per Dr. Mendenhall, patient's son states that patient's speech always sounds like this at night and it is not abnormal. No further workup from a stroke strandpoint. Disposition per ED physician. Please call with any further questions or concerns. Thank you for allowing us to participate in the care of this patient.       VALERIANO Thomas EXTENDER/STROKE NAVIGATOR

## 2023-04-08 NOTE — ED PROVIDER NOTES
Subjective   History of Present Illness  Mrs. Shelton called an ambulance for abdominal pain.  EMS reports that she walked out to greet the ambulance and was nonfocal.  When she began talking they noticed that her speech was slurred.  She tells me that her medications make her mouth dry and that is what causes her to have slurred speech.  EMS noted that she was hypertensive in route.  Her  is out of town.  She reports several hours of upper abdominal pain and tells me that it is now almost completely gone.         Review of Systems    Past Medical History:   Diagnosis Date   • Atrial fibrillation    • Benign hypertension    • History of echocardiogram 04/2012   • Hypercalcemia    • Hypercholesterolemia    • Hypertension    • Meniere's disease    • Obesity    • Overweight (BMI 25.0-29.9)    • Primary hyperparathyroidism    • Sleep apnea with use of continuous positive airway pressure (CPAP)    • Syncope 04/2012   • Type 2 diabetes mellitus    • Vitamin D deficiency        Allergies   Allergen Reactions   • Adhesive Tape Rash   • Latex Rash   • Penicillins Rash       Past Surgical History:   Procedure Laterality Date   • BREAST SURGERY Left     benign tumor removal   • CATARACT EXTRACTION Bilateral    • HYSTERECTOMY         Family History   Problem Relation Age of Onset   • Heart attack Mother    • No Known Problems Father    • No Known Problems Sister    • Lactose intolerance Brother    • No Known Problems Sister        Social History     Socioeconomic History   • Marital status:    Tobacco Use   • Smoking status: Never   • Smokeless tobacco: Never   Vaping Use   • Vaping Use: Never used   Substance and Sexual Activity   • Alcohol use: No   • Drug use: No   • Sexual activity: Defer           Objective   Physical Exam  Vitals and nursing note reviewed.   Constitutional:       General: She is not in acute distress.     Appearance: Normal appearance.   HENT:      Head: Normocephalic and atraumatic.       Nose: Nose normal. No congestion or rhinorrhea.   Eyes:      General: No scleral icterus.     Conjunctiva/sclera: Conjunctivae normal.   Neck:      Comments: No JVD   Cardiovascular:      Rate and Rhythm: Normal rate and regular rhythm.      Heart sounds: No murmur heard.    No friction rub.   Pulmonary:      Effort: Pulmonary effort is normal.      Breath sounds: Normal breath sounds. No wheezing or rales.   Abdominal:      General: Bowel sounds are normal.      Palpations: Abdomen is soft.      Tenderness: There is no abdominal tenderness. There is no guarding or rebound.   Musculoskeletal:         General: No tenderness.      Cervical back: Normal range of motion and neck supple.      Right lower leg: No edema.      Left lower leg: No edema.   Skin:     General: Skin is warm and dry.      Coloration: Skin is not pale.      Findings: No erythema.   Neurological:      General: No focal deficit present.      Mental Status: She is alert.      Motor: No weakness.      Coordination: Coordination normal.      Comments: Speech is slurred but is otherwise fluent.  She is fully awake and able to cooperate with neurological exam.  Neurological exam is normal otherwise.   Psychiatric:         Mood and Affect: Mood normal.         Behavior: Behavior normal.         Thought Content: Thought content normal.         Procedures           ED Course  ED Course as of 04/08/23 0018   Fri Apr 07, 2023   0264 I saw Mrs. Garrison emergently in the CAT scan as the result of a code stroke page.  I interviewed paramedics.  I evaluated her.  I reviewed her CT scan at bedside as it was being done.  I do not see bleed or other acute problem.  She does have slurred speech although she thinks it is from dry mouth.  Exam otherwise is nonfocal.  The stroke navigator was present and is seeing her as well.  She is not a candidate for consideration of thrombolysis as she is on Xarelto and symptoms are minimal.  We had planned on proceeding with CT  angiograms and perfusion study however she refused the contrast.  She tells us that her doctor told her never to allow anyone to give her IV contrast.  She cannot remember why.  She is not sure if it is an allergy or not.  I have reviewed her records and her kidney function is good.  At this point she clearly does not have LVO so we will respect her wishes and not proceed with IV contrast.  Will obtain CT of her abdomen and pelvis [DT]   2344 I spoke with her son on the phone.  He tells me that she sounds at baseline to him.  He said this is how she usually sounds at night and feels confident she is not having a stroke.  He is a physician in [place].  He tells me she has been having this abdominal pain for several weeks.  Her nurse had asked if we could give her pain medicine.  After discussion with her son Tylenol is the only option.  He tells me she gets confused with narcotics.  On repeat abdominal exam she continues to have no tenderness. [DT]   2348 Have reviewed her medications.  Differential diagnosis at this point would include peptic ulcer disease, reflux, biliary colic, myocardial infarction, and others.  We will give Protonix.  We will give labetalol for her hypertension. [DT]   Sat Apr 08, 2023   0001 Labs and CT scan consistent with acute cholecystitis.  We will give antibiotics and admit her to the hospital. [DT]   0003 I spoke with Mrs. Garrison and her son about findings and need for admission.  Have ordered meropenem IV due to penicillin allergy.  She is again asking for pain medication.  We will give a small amount of morphine. [DT]      ED Course User Index  [DT] Subhash Mendenhall MD                                           Medical Decision Making  Please refer to emergency department course notes.  I saw her emergently on CT scan.  Ultimately ruled out acute stroke and diagnosed acute cholecystitis.  Ordered IV antibiotics and spoke with her family in [place].  Arranged admission here    Acute  abdominal pain: acute illness or injury that poses a threat to life or bodily functions  Acute cholecystitis: acute illness or injury that poses a threat to life or bodily functions  Uncontrolled hypertension: complicated acute illness or injury  Amount and/or Complexity of Data Reviewed  Labs: ordered. Decision-making details documented in ED Course.  Radiology: ordered. Decision-making details documented in ED Course.  ECG/medicine tests: ordered. Decision-making details documented in ED Course.      Risk  OTC drugs.  Prescription drug management.  Decision regarding hospitalization.          Final diagnoses:   Acute abdominal pain   Uncontrolled hypertension   Acute cholecystitis       ED Disposition  ED Disposition     ED Disposition   Decision to Admit    Condition   --    Comment   Level of Care: Telemetry [5]   Diagnosis: Acute cholecystitis [575.0.ICD-9-CM]   Admitting Physician: ROSIBEL LARA [[id number]]   Attending Physician: ROSIBEL LARA [[id number]]               No follow-up provider specified.       Medication List      No changes were made to your prescriptions during this visit.          Subhash Mendenhall MD  04/08/23 0018

## 2023-04-08 NOTE — CONSULTS
General Surgery Consultation Note    Date of Service: 4/8/2023  Kaylen Garrison  [id number]  1940      Referring Provider: Stella Cook,*    Location of Consult: 3[facility]     Reason for Consultation: Choledocholithiasis       History of Present Illness:  I am seeing, Kaylen Garrison, in consultation for Stella Cook,* regarding choledocholithiasis.  Patient is a pleasant 82-year-old lady with a past medical history significant for atrial fibrillation on Xarelto, hypertension, hyperlipidemia, obstructive sleep apnea on BiPAP, diabetes mellitus type 2 and frailty.  She presented emergency room complaining of right upper quadrant pain.  She has been having intermittent abdominal pain for the last 6 weeks.  She induced herself to vomit with no relief.  Work-up in the emergency room was remarkable for elevated liver function test with bilirubin of 1.7, HS troponin of 14 and CT scan of the abdomen and pelvis that was remarkable for distended gallbladder with cholelithiasis and sludge with concern about choledocholithiasis.  MRCP showed at least 3 filling defects in the distal common bile duct with innumerable filling defects within the gallbladder with gallbladder wall thickening and pericholecystic edema concerning for acute cholecystitis.  Patient has been clinically stable.  She denies abdominal pain or nausea.  She took Xarelto last night.  She has been followed by Dr. Alvarez.  Abdominal surgeries include a hysterectomy.    Problems Addressed this Visit        Gastrointestinal Abdominal     * (Principal) Acute cholecystitis - Primary   Other Visit Diagnoses     Acute abdominal pain        Uncontrolled hypertension          Diagnoses       Codes Comments    Acute cholecystitis    -  Primary ICD-10-CM: K81.0  ICD-9-CM: 575.0     Acute abdominal pain     ICD-10-CM: R10.9  ICD-9-CM: 789.00, 338.19     Uncontrolled hypertension     ICD-10-CM: I10  ICD-9-CM: 401.9           Past Medical  History:   Diagnosis Date   • Atrial fibrillation    • Benign hypertension    • History of echocardiogram 04/2012   • Hypercalcemia    • Hypercholesterolemia    • Hypertension    • Meniere's disease    • Obesity    • Overweight (BMI 25.0-29.9)    • Primary hyperparathyroidism    • Sleep apnea with use of continuous positive airway pressure (CPAP)    • Syncope 04/2012   • Type 2 diabetes mellitus    • Vitamin D deficiency        Past Surgical History:   • BREAST SURGERY    benign tumor removal   • CATARACT EXTRACTION   • HYSTERECTOMY       Allergies   Allergen Reactions   • Adhesive Tape Rash   • Latex Rash   • Penicillins Rash       No current facility-administered medications on file prior to encounter.     Current Outpatient Medications on File Prior to Encounter   Medication Sig Dispense Refill   • amiodarone (PACERONE) 200 MG tablet Take 1 tablet by mouth once daily 90 tablet 0   • amLODIPine (NORVASC) 2.5 MG tablet Take 1 tablet by mouth Daily.     • atenolol (TENORMIN) 25 MG tablet Take 12.5 mg by mouth Daily. A half tablet daily     • atorvastatin (LIPITOR) 40 MG tablet Take 1 tablet by mouth Daily.     • Blood Glucose Monitoring Suppl (Accu-Chek Guide) w/Device kit 1 Device See Admin Instructions. Use to check blood sugar once daily.  Dx E11.9 1 kit 0   • Blood Glucose Monitoring Suppl device Use as directed to check blood sugar please dispense insurance preferred 1 each 0   • Cholecalciferol (VITAMIN D3 PO) Take  by mouth.     • Cinnamon 500 MG capsule Take 1 capsule by mouth Daily.     • glipizide (GLUCOTROL XL) 2.5 MG 24 hr tablet Take 2 tablets by mouth Daily. 2 times daily     • Glucose Blood (Blood Glucose Test) strip 1 strip daily dispense strips to go with meter dx e11.65 100 each 3   • glucose blood (ReliOn Premier Test) test strip Use to test blood sugar daily.  Dx E11.9. 100 each 3   • hydroCHLOROthiazide (HYDRODIURIL) 25 MG tablet Take 12.5 mg by mouth Daily.     • Januvia 100 MG tablet Take 1  tablet by mouth once daily 90 tablet 0   • Lancets 33G misc 1 each Daily. 100 each 3   • metFORMIN ER (GLUCOPHAGE-XR) 500 MG 24 hr tablet Take 2 tablets by mouth 2 (Two) Times a Day With Meals. 120 tablet 12   • omega-3 acid ethyl esters (LOVAZA) 1 g capsule Take 1 capsule by mouth Daily.     • oxybutynin XL (DITROPAN-XL) 5 MG 24 hr tablet Take 1 tablet by mouth Daily.     • rivaroxaban (XARELTO) 15 MG tablet Take 1 tablet by mouth Daily With Dinner. Indications: Atrial Fibrillation 30 tablet 3   • valsartan (DIOVAN) 80 MG tablet Take 1 tablet by mouth Daily.           Current Facility-Administered Medications:   •  amiodarone (PACERONE) tablet 200 mg, 200 mg, Oral, Daily, Liberty Farias APRN, 200 mg at 04/08/23 0906  •  amLODIPine (NORVASC) tablet 2.5 mg, 2.5 mg, Oral, Daily, Liberty Farias APRN  •  atenolol (TENORMIN) tablet 12.5 mg, 12.5 mg, Oral, Daily, Liberty Farias APRN  •  cefTRIAXone (ROCEPHIN) 2 g/100 mL 0.9% NS IVPB (MBP), 2 g, Intravenous, Q24H, Mayo Dominguez, DO, 2 g at 04/08/23 0906  •  dextrose (D50W) (25 g/50 mL) IV injection 25 g, 25 g, Intravenous, Q15 Min PRN, Liberty Farias APRN  •  dextrose (GLUTOSE) oral gel 15 g, 15 g, Oral, Q15 Min PRN, Liberty Farias APRN  •  glucagon (GLUCAGEN) injection 1 mg, 1 mg, Intramuscular, Q15 Min PRN, Liberty Farias APRN  •  Insulin Lispro (humaLOG) injection 0-7 Units, 0-7 Units, Subcutaneous, Q6H, Liberty Farias APRN  •  lactated ringers infusion, 75 mL/hr, Intravenous, Continuous, Liberty Farias APRN, Last Rate: 75 mL/hr at 04/08/23 0353, 75 mL/hr at 04/08/23 0353  •  metroNIDAZOLE (FLAGYL) IVPB 500 mg, 500 mg, Intravenous, Q8H, Mayo Dominguez, , 500 mg at 04/08/23 0906  •  oxybutynin XL (DITROPAN-XL) 24 hr tablet 5 mg, 5 mg, Oral, Daily, Liberty Farias, APRN, 5 mg at 04/08/23 0906  •  sodium chloride 0.9 % flush 10 mL, 10 mL, Intravenous, PRN, Subhash Mendenhall MD  •  sodium chloride 0.9 % flush 10 mL, 10 mL, Intravenous, Q12H,  "Liberty Farias, VALERIANO  •  sodium chloride 0.9 % flush 10 mL, 10 mL, Intravenous, PRN, Liberty Farias, APRN  •  sodium chloride 0.9 % infusion 40 mL, 40 mL, Intravenous, PRN, Liberty Farias, APRN  •  valsartan (DIOVAN) tablet 80 mg, 80 mg, Oral, Daily, Liberty Farias APRN    Family History   Problem Relation Age of Onset   • Heart attack Mother    • No Known Problems Father    • No Known Problems Sister    • Lactose intolerance Brother    • No Known Problems Sister      Social History     Socioeconomic History   • Marital status:    Tobacco Use   • Smoking status: Never   • Smokeless tobacco: Never   Vaping Use   • Vaping Use: Never used   Substance and Sexual Activity   • Alcohol use: No   • Drug use: No   • Sexual activity: Defer       Review of Systems:  Review of Systems   As above  Otherwise the 12 point review of systems is negative.    /64 (BP Location: Right arm, Patient Position: Lying)   Pulse 61   Temp 97.5 °F (36.4 °C) (Oral)   Resp 18   Ht 160 cm (63\")   Wt 67.6 kg (149 lb)   SpO2 93%   BMI 26.39 kg/m²   Body mass index is 26.39 kg/m².    General: Alert and oriented x3 in no acute distress  HEENT: PER, positive icterus, normal sclerae  Cardiac: regular rhythm,  no audible rubs  Pulmonary: bilateral breath sounds, nonlabored  Abdominal: Nondistended and soft  Infraumbilical vertical midline incision  No tenderness or guarding  Neurologic: awake, alert, no obvious focal deficits  Extremities: warm, no edema  Skin: no obvious rashes nor worrisome lesions seen    CBC  Results from last 7 days   Lab Units 04/08/23  0641   WBC 10*3/mm3 9.26   HEMOGLOBIN g/dL 11.3*   HEMATOCRIT % 35.4   PLATELETS 10*3/mm3 265       CMP  Results from last 7 days   Lab Units 04/08/23  0641   SODIUM mmol/L 140   POTASSIUM mmol/L 3.4*   CHLORIDE mmol/L 103   CO2 mmol/L 24.0   BUN mg/dL 22   CREATININE mg/dL 0.85   CALCIUM mg/dL 8.7   BILIRUBIN mg/dL 2.3*   ALK PHOS U/L 161*   ALT (SGPT) U/L 408*   AST " (SGOT) U/L 590*   GLUCOSE mg/dL 205*       Radiology  Imaging Results (Last 72 Hours)     Procedure Component Value Units Date/Time    MRI abdomen wo contrast mrcp [[id number]] Collected: 04/08/23 0831     Updated: 04/08/23 0844    Narrative:      MRI ABDOMEN WO CONTRAST MRCP    Date of Exam: 4/8/2023 3:01 AM EDT    Indication: acute jenny, eval for choledocholithiasis.      Comparison: CT abdomen and pelvis dated 4/7/2023    Technique:  Routine multiplanar/multisequence images of the abdomen were obtained with MRCP sequences without contrast administration.    Findings:   The liver length is at the upper limit of normal. There is no evidence of hepatic steatosis or iron deposition. There is no focal liver lesion by noncontrast technique.    The gallbladder is present with innumerable stones. There is gallbladder wall thickening and pericholecystic fluid compatible with acute cholecystitis. There is dilation of the common hepatic duct measures up to 11 mm and dilation of the common bile duct   measuring up to 8 mm. There are at least 2 small stones within the common bile duct and likely 1 at the ampulla causing a blunted appearance of the common bile duct. Findings are compatible with choledocholithiasis. The spleen is normal in size. There   is motion limited evaluation of the adrenal glands. There is mild prominence of the pancreatic duct within the pancreatic head measuring up to 6 mm. No visible obstructing mass.    The kidneys are symmetric in size. There is a small simple cyst within the posterior aspect of the right kidney. There is no hydronephrosis. There are no ossific pulmonary changes with trace bilateral pleural effusions.      Impression:      Impression:   1. Choledocholithiasis with at least 3 filling defects, one of which is near the ampulla and causes a blunted appearance of the distal common bile duct.  2. Innumerable filling defects within the gallbladder with associated gallbladder wall  thickening and pericholecystic edema likely representing acute cholecystitis.  3. Prominence of the pancreatic duct within the pancreatic head measuring up to 5 mm. No visible obstructing pancreatic duct stone or mass. The remainder of the duct within the pancreatic body and tail appear normal in caliber.    Electronically Signed: Vin Bojorquez    4/8/2023 8:41 AM EDT    Workstation ID: [id number]    XR Chest 1 View [[id number]] Collected: 04/07/23 2154     Updated: 04/07/23 2357    Narrative:      Exam:  Single view chest    Date: April 7, 2023    History: Neurologic symptoms    Comparison: November 10, 2021    Technique: Single frontal radiograph of the chest was obtained    Findings:    The heart size is stable. There are atherosclerotic changes in the aortic arch. There are markedly coarsened interstitial lung markings in both lungs. There is patchy groundglass appearing airspace disease in the right lower lobe. There is no   pneumothorax or sizable pleural fluid collection.      Impression:      1. Redemonstration of markedly coarsened interstitial lung markings in both lungs. Findings could be related to etiologies of chronic interstitial lung disease. Alternatively, consider atypical infection or pulmonary edema.  2. There is patchy groundglass appearing airspace disease in the right lower lobe. The appearance is concerning for right lower lobe pneumonia.      Slot 63      Electronically signed by:  Jarvis Pina M.D.    4/7/2023 9:56 PM Mountain Time    CT Abdomen Pelvis Without Contrast [[id number]] Collected: 04/07/23 2129     Updated: 04/07/23 2333    Narrative:      EXAM:  CT SCAN OF THE ABDOMEN AND PELVIS WITHOUT INTRAVENOUS CONTRAST  DATE: 4/7/2023 11:06 PM    HISTORY: Abdominal pain    TECHNIQUE: CT examination of the abdomen and pelvis with sagittal and coronal reformations was performed without intravenous contrast.  CT dose lowering techniques were used, to include: automated exposure control,  adjustment for patient size, and/or use   of iterative reconstruction. Note: The exam is limited because some types of pathology may not be adequately demonstrated due to lack of contrast enhancement.    COMPARISON: None.         FINDINGS:    ABDOMEN/PELVIS:    LOWER CHEST: Mildly coarse basilar septal thickening    LIVER: Normal.      GALLBLADDER/BILIARY: Distended gallbladder, with stones/sludge, suggestion of slight wall thickening. Small calcification near the aston hepatis    PANCREAS: Normal.     SPLEEN: Normal.      ADRENAL GLANDS: Normal.     KIDNEYS/URETERS/BLADDER: Normal kidneys. Normal bladder    GI TRACT: Normal bowel. Normal appendix (axial image 77)    MESENTERY/PERITONEUM: Normal mesentery    REPRODUCTIVE: Hysterectomy    VASCULATURE: Normal.       LYMPH NODES: Normal    ABDOMINAL WALL: Normal.     MUSCULOSKELETAL: Normal.        Impression:        1.  Distended gallbladder, cholelithiasis/sludge, correlate for clinical evidence of cholecystitis.  2.  Small calcification at the aston hepatis, with consideration of choledocholithiasis, cystic duct stone.  3.  Basilar septal thickening/edema.    Electronically signed by:  Lorraine Bridges M.D.    4/7/2023 9:32 PM Mountain Time    CT Head Without Contrast Stroke Protocol [[id number]] Collected: 04/07/23 2112     Updated: 04/07/23 2316    Narrative:      EXAMINATION: CT HEAD WITHOUT CONTRAST    DATE: 4/7/2023 11:00 PM     INDICATION: Neuro deficit, acute, stroke suspected Slurred speechRelease to patient->Routine Release     COMPARISON: None.     TECHNIQUE:  Routine axial images through the head without contrast. Coronal reformations.    Low-dose CT acquisition technique included one or more of the following options: 1. Automated exposure control, 2. Adjustment of mA and/or KV according to patient's size and/or 3. Use of iterative reconstruction.    FINDINGS:    Limited by motion.    Intracranial contents:    Ventricular and cisternal spaces are prominent  from volume loss. Mild to moderate periventricular and subcortical white matter hypodensities. Thickened falx, partly could be motion artifact. No dominant mass, midline shift, hydrocephalus, extra axial   fluid collection or acute hemorrhage.    No asymmetric hyperdensity of the proximal major cerebral arteries.    Craniocervical junction is patent.    Bones and extracranial soft tissues:    Mild mucosal thickening ethmoid air cells. Otherwise, Visualized paranasal sinuses and mastoid air cells are clear.  Skull is intact. Cataract surgery. Degenerative changes temporomandibular joints. Calcifications distal internal carotid arteries and   distal vertebral arteries.      Impression:      1. No acute intracranial process. MRI is more sensitive for the detection of acute nonhemorrhagic infarct.  2. Mild to moderate changes small vessel ischemic disease of indeterminate age, presumably mostly chronic. Volume loss. Atherosclerosis.      Report called to stroke navigator Shannan Cordova at 4/7/2023 11:12 PM     Electronically signed by:  Brenden Soto M.D.    4/7/2023 9:15 PM Mountain Time            Assessment:  Mrs. Garrison is a pleasant 82-year-old lady with history of atrial fibrillation on Xarelto, diabetes mellitus type 2, hypertension and hyperlipidemia.  She was admitted with worsening right upper quadrant pain and work-up remarkable for acute calculus cholecystitis with choledocholithiasis.  She has been clinically stable.  She took Xarelto last night.    Plan:  I reviewed her studies and discussed the findings with the patient and her son Dr. Garrison at bedside.  Plan to consult Dr. Brunner for an ERCP as well as cardiology for cardiac clearance.  Proceed with laparoscopic cholecystectomy hopefully during this hospitalization.  The patient understands and is in agreement.    Continue with IV antibiotics.  Continue to hold off Xarelto.  Continue to monitor closely.    Thank you for this  consultation.        Abraham Holbrook MD  04/08/23  09:20 EDT

## 2023-04-08 NOTE — CONSULTS
[facility] Cardiology   [address], [address]  [place], [place], [address]    [phone]  [email or web address] Center[facility]Going[facility]           INPATIENT CONSULTATION NOTE    Patient Care Team:  Patient Care Team:  Lizzette Multani MD as PCP - General (Internal Medicine)  Irish Sousa PA as Physician Assistant (Endocrinology)  Harley Rose MD as Consulting Physician (Endocrinology)  Matilde Alvarez MD as Consulting Physician (Cardiology)    Requesting Provider and Reason for consultation: The patient is being seen today at the request of Dr. Cook for preoperative cardiac risk assessment.     Chief complaint:   Chief Complaint   Patient presents with    Stroke            Subjective:     Cardiac focused problem list:  Atrial flutter with RVR  New onset 11/10/2021 in setting of nocturnal hypoxia  [id number] = 5 on Xarelto   SATISH, 11/12/2021: EF 50%. Mild LAE. No evidence of intracardiac thrombus or mass, clear JENNA, Trace MR and TR.   Successful ECV, 11/12/2021  Started on Amiodarone 11/2021.   Syncope, April 2012  Echocardiogram, April 2012: EF 55 to 60% with no valvular abnormalities.  Limited studies, 4/2012: 0-49% bilateral carotid artery stenosis.  No acute findings per CT scan.  MRI of the brain, 4/2012: Cortical atrophy, chronic ischemic changes noted.  Abnormal MPS, 5/2012: EF 79% with apical hypoperfusion suspicious for ischemia, Dr. Arnaud Augustine.  MPS 9/2016: EF greater than 70%.  No evidence of inducible ischemia.  Borderline inferior ST segment changes.  Hypertension  Dyslipidemia  Diabetes mellitus type 2  New right bundle branch block noted 7/20/2020   Obesity  Ménière's disease  Surgical history  Hysterectomy  Benign tumor removal from left breast    HPI:      Kaylen Garrison is a 82 y.o. female.  Patient presented to [facility] ED with complaints of right upper quadrant pain.  Reports she has had abdominal pain for the last 6 weeks but particularly worse in the last  several weeks.  Also with associated  nausea and vomiting.  Work-up in ED remarkable for elevated liver enzymes,  minimally elevated at bedtime troponin with distended gallbladder  and CT of abdomen and pelvis, cholelithiasis and sludge with concern for choledocholithiasis.  MRCP with at least 3 filling defects in the distal common bile duct with innumerable filling defects within the gallbladder, gallbladder wall thickening and pericolonic cystic edema concerning for acute cholecystitis.  Dr. GR consulted for above-noted findings.      Patient follows with Dr. Alvarez for atrial fibrillation.  Currently on Xarelto, last dose 4/07/2023. Patient reports she had been doing well from a cardiac standpoint recently.  Denies chest pain, exertional dyspnea, palpitations, lower extremity edema or syncope.  She is active doing her own grocery shopping and housework. Her son is at bedside and reports the patient has had some mild, exercise intolerance for the last year.  She has been participating in PT cardiopulmonary symptoms.    Most recent stress test 2016 without evidence of ischemia.  She underwent SATISH/cardioversion 11/2021.  Maintaining sinus rhythm.    Review of Systems:  As noted in the HPI    PFSH:  Patient Active Problem List   Diagnosis    Syncope    Hypertension    Hyperlipidemia    Overweight (BMI 25.0-29.9)    Meniere's disease    Dyspnea on exertion    BERNARD treated with BiPAP    Primary hyperparathyroidism    Osteopenia    Atrial flutter with rapid ventricular response    Type 2 diabetes mellitus with hyperglycemia, without long-term current use of insulin    Exercise hypoxemia    Acute cholecystitis    Paroxysmal atrial fibrillation       No current facility-administered medications on file prior to encounter.     Current Outpatient Medications on File Prior to Encounter   Medication Sig Dispense Refill    amiodarone (PACERONE) 200 MG tablet Take 1 tablet by mouth once daily 90 tablet 0    amLODIPine  (NORVASC) 2.5 MG tablet Take 1 tablet by mouth Daily.      atenolol (TENORMIN) 25 MG tablet Take 12.5 mg by mouth Daily. A half tablet daily      atorvastatin (LIPITOR) 40 MG tablet Take 1 tablet by mouth Daily.      Blood Glucose Monitoring Suppl (Accu-Chek Guide) w/Device kit 1 Device See Admin Instructions. Use to check blood sugar once daily.  Dx E11.9 1 kit 0    Blood Glucose Monitoring Suppl device Use as directed to check blood sugar please dispense insurance preferred 1 each 0    Cholecalciferol (VITAMIN D3 PO) Take  by mouth.      Cinnamon 500 MG capsule Take 1 capsule by mouth Daily.      glipizide (GLUCOTROL XL) 2.5 MG 24 hr tablet Take 2 tablets by mouth Daily. 2 times daily      Glucose Blood (Blood Glucose Test) strip 1 strip daily dispense strips to go with meter dx e11.65 100 each 3    glucose blood (ReliOn Premier Test) test strip Use to test blood sugar daily.  Dx E11.9. 100 each 3    hydroCHLOROthiazide (HYDRODIURIL) 25 MG tablet Take 12.5 mg by mouth Daily.      Januvia 100 MG tablet Take 1 tablet by mouth once daily 90 tablet 0    Lancets 33G misc 1 each Daily. 100 each 3    metFORMIN ER (GLUCOPHAGE-XR) 500 MG 24 hr tablet Take 2 tablets by mouth 2 (Two) Times a Day With Meals. 120 tablet 12    omega-3 acid ethyl esters (LOVAZA) 1 g capsule Take 1 capsule by mouth Daily.      oxybutynin XL (DITROPAN-XL) 5 MG 24 hr tablet Take 1 tablet by mouth Daily.      rivaroxaban (XARELTO) 15 MG tablet Take 1 tablet by mouth Daily With Dinner. Indications: Atrial Fibrillation 30 tablet 3    valsartan (DIOVAN) 80 MG tablet Take 1 tablet by mouth Daily.         Social History     Socioeconomic History    Marital status:    Tobacco Use    Smoking status: Never    Smokeless tobacco: Never   Vaping Use    Vaping Use: Never used   Substance and Sexual Activity    Alcohol use: No    Drug use: No    Sexual activity: Defer            Objective:     Vital Sign Min/Max for last 24 hours  Temp  Min: 97.1 °F (36.2  °C)  Max: 98.4 °F (36.9 °C)   BP  Min: 127/64  Max: 192/94   Pulse  Min: 61  Max: 77   Resp  Min: 18  Max: 20   SpO2  Min: 91 %  Max: 93 %   Flow (L/min)  Min: 2  Max: 4      Intake/Output Summary (Last 24 hours) at 4/8/2023 1122  Last data filed at 4/8/2023 0600  Gross per 24 hour   Intake 158.75 ml   Output --   Net 158.75 ml           Vitals:    04/08/23 0710   BP: 127/64   Pulse: 61   Resp: 18   Temp: 97.5 °F (36.4 °C)   SpO2: 93%     CONSTITUTIONAL: No acute distress  RESPIRATORY: Normal effort. Clear to auscultation bilaterally without wheezing or rales  CARDIOVASCULAR: Regular rate and rhythm with normal S1 and S2.  ASHWIN at RUSB.  PERIPHERAL VASCULAR: No carotid bruit bilaterally.  Normal radial pulse. There is no lower extremity edema bilaterally.    Labs and radiologic results:  Today's results were reviewed by myself.    Cardiac Data:    EKG 4/08/2021: NSR with bifascicular block    Results for orders placed during the hospital encounter of 11/10/21    Adult Transesophageal Echo (SATISH) W/ Cont if Necessary Per Protocol    Interpretation Summary  · Mild left atrial enlargement.  · Preserved left ventricular systolic function, estimated EF 50%.  · No evidence of intracardiac thrombus or mass, clear left atrial appendage.  · Trace mitral regurgitation, trace tricuspid regurgitation.      Tele: NSR         Assessment and Plan:     Problem list:    Acute cholecystitis    Hypertension    Hyperlipidemia    BERNARD treated with BiPAP    Type 2 diabetes mellitus with hyperglycemia, without long-term current use of insulin    Paroxysmal atrial fibrillation      ASSESSMENT:  Abdominal pain with acute cholecystitis and transaminitis  CT abdomen and pelvis concerning for cholecystitis and choledocholithiasis  MRCP with filling defects in the distal common bile duct and within the gallbladder, gallbladder wall thickening and pericholecystic edema  Elevated troponin  Minimally elevated in the setting of acute  cholecystitis  Pneumonia  PAF  Prior SATISH/cardioversion 2021.  Rhythm control on amiodarone  Anticoagulated with Xarelto at home  Hypertension  Hyperlipidemia    PLAN:  Patient currently without anginal symptoms.  Most recent stress test in 2016 was without evidence of ischemia.  Echocardiogram today to rule out LV and valvular dysfunction.   If echo unremarkable, ok to proceed with surgery from a cardiac standpoint.    Continue amiodarone for Afib.  Holding off Xarelto for now due to possible surgical intervention for cholecystitis.     Electronically signed by VALERIANO Allen, 04/08/23, 2:21 PM EDT.

## 2023-04-09 ENCOUNTER — ANESTHESIA (OUTPATIENT)
Dept: GASTROENTEROLOGY | Facility: HOSPITAL | Age: 83
DRG: 417 | End: 2023-04-09
Payer: MEDICARE

## 2023-04-09 ENCOUNTER — ANESTHESIA EVENT (OUTPATIENT)
Dept: GASTROENTEROLOGY | Facility: HOSPITAL | Age: 83
DRG: 417 | End: 2023-04-09
Payer: MEDICARE

## 2023-04-09 ENCOUNTER — APPOINTMENT (OUTPATIENT)
Dept: GENERAL RADIOLOGY | Facility: HOSPITAL | Age: 83
DRG: 417 | End: 2023-04-09
Payer: MEDICARE

## 2023-04-09 LAB
ALBUMIN SERPL-MCNC: 3.8 G/DL (ref 3.5–5.2)
ALBUMIN/GLOB SERPL: 1.7 G/DL
ALP SERPL-CCNC: 185 U/L (ref 39–117)
ALT SERPL W P-5'-P-CCNC: 328 U/L (ref 1–33)
ANION GAP SERPL CALCULATED.3IONS-SCNC: 13 MMOL/L (ref 5–15)
AST SERPL-CCNC: 219 U/L (ref 1–32)
BASOPHILS # BLD AUTO: 0.03 10*3/MM3 (ref 0–0.2)
BASOPHILS NFR BLD AUTO: 0.3 % (ref 0–1.5)
BILIRUB SERPL-MCNC: 4 MG/DL (ref 0–1.2)
BUN SERPL-MCNC: 13 MG/DL (ref 8–23)
BUN/CREAT SERPL: 18.8 (ref 7–25)
CALCIUM SPEC-SCNC: 8.9 MG/DL (ref 8.6–10.5)
CHLORIDE SERPL-SCNC: 103 MMOL/L (ref 98–107)
CO2 SERPL-SCNC: 25 MMOL/L (ref 22–29)
CREAT SERPL-MCNC: 0.69 MG/DL (ref 0.57–1)
DEPRECATED RDW RBC AUTO: 60 FL (ref 37–54)
EGFRCR SERPLBLD CKD-EPI 2021: 85.7 ML/MIN/1.73
EOSINOPHIL # BLD AUTO: 0.07 10*3/MM3 (ref 0–0.4)
EOSINOPHIL NFR BLD AUTO: 0.6 % (ref 0.3–6.2)
ERYTHROCYTE [DISTWIDTH] IN BLOOD BY AUTOMATED COUNT: 17.4 % (ref 12.3–15.4)
GLOBULIN UR ELPH-MCNC: 2.2 GM/DL
GLUCOSE BLDC GLUCOMTR-MCNC: 184 MG/DL (ref 70–130)
GLUCOSE BLDC GLUCOMTR-MCNC: 191 MG/DL (ref 70–130)
GLUCOSE BLDC GLUCOMTR-MCNC: 269 MG/DL (ref 70–130)
GLUCOSE SERPL-MCNC: 197 MG/DL (ref 65–99)
HCT VFR BLD AUTO: 41.9 % (ref 34–46.6)
HGB BLD-MCNC: 12.8 G/DL (ref 12–15.9)
IMM GRANULOCYTES # BLD AUTO: 0.06 10*3/MM3 (ref 0–0.05)
IMM GRANULOCYTES NFR BLD AUTO: 0.5 % (ref 0–0.5)
LYMPHOCYTES # BLD AUTO: 0.61 10*3/MM3 (ref 0.7–3.1)
LYMPHOCYTES NFR BLD AUTO: 5.1 % (ref 19.6–45.3)
MCH RBC QN AUTO: 28.4 PG (ref 26.6–33)
MCHC RBC AUTO-ENTMCNC: 30.5 G/DL (ref 31.5–35.7)
MCV RBC AUTO: 93.1 FL (ref 79–97)
MONOCYTES # BLD AUTO: 0.86 10*3/MM3 (ref 0.1–0.9)
MONOCYTES NFR BLD AUTO: 7.2 % (ref 5–12)
NEUTROPHILS NFR BLD AUTO: 10.25 10*3/MM3 (ref 1.7–7)
NEUTROPHILS NFR BLD AUTO: 86.3 % (ref 42.7–76)
NRBC BLD AUTO-RTO: 0 /100 WBC (ref 0–0.2)
PLATELET # BLD AUTO: 253 10*3/MM3 (ref 140–450)
PMV BLD AUTO: 9.5 FL (ref 6–12)
POTASSIUM SERPL-SCNC: 3.4 MMOL/L (ref 3.5–5.2)
PROT SERPL-MCNC: 6 G/DL (ref 6–8.5)
RBC # BLD AUTO: 4.5 10*6/MM3 (ref 3.77–5.28)
SODIUM SERPL-SCNC: 141 MMOL/L (ref 136–145)
WBC NRBC COR # BLD: 11.88 10*3/MM3 (ref 3.4–10.8)

## 2023-04-09 PROCEDURE — G0378 HOSPITAL OBSERVATION PER HR: HCPCS

## 2023-04-09 PROCEDURE — 80053 COMPREHEN METABOLIC PANEL: CPT | Performed by: INTERNAL MEDICINE

## 2023-04-09 PROCEDURE — 94660 CPAP INITIATION&MGMT: CPT

## 2023-04-09 PROCEDURE — 74330 X-RAY BILE/PANC ENDOSCOPY: CPT

## 2023-04-09 PROCEDURE — 82962 GLUCOSE BLOOD TEST: CPT

## 2023-04-09 PROCEDURE — 63710000001 INSULIN LISPRO (HUMAN) PER 5 UNITS: Performed by: NURSE PRACTITIONER

## 2023-04-09 PROCEDURE — 43264 ERCP REMOVE DUCT CALCULI: CPT | Performed by: INTERNAL MEDICINE

## 2023-04-09 PROCEDURE — C1769 GUIDE WIRE: HCPCS | Performed by: INTERNAL MEDICINE

## 2023-04-09 PROCEDURE — 25010000002 CEFTRIAXONE PER 250 MG: Performed by: INTERNAL MEDICINE

## 2023-04-09 PROCEDURE — 43274 ERCP DUCT STENT PLACEMENT: CPT | Performed by: INTERNAL MEDICINE

## 2023-04-09 PROCEDURE — 25010000002 PROPOFOL 10 MG/ML EMULSION: Performed by: NURSE ANESTHETIST, CERTIFIED REGISTERED

## 2023-04-09 PROCEDURE — 99232 SBSQ HOSP IP/OBS MODERATE 35: CPT | Performed by: INTERNAL MEDICINE

## 2023-04-09 PROCEDURE — 25010000002 DEXAMETHASONE PER 1 MG: Performed by: NURSE ANESTHETIST, CERTIFIED REGISTERED

## 2023-04-09 PROCEDURE — 0FC98ZZ EXTIRPATION OF MATTER FROM COMMON BILE DUCT, VIA NATURAL OR ARTIFICIAL OPENING ENDOSCOPIC: ICD-10-PCS | Performed by: INTERNAL MEDICINE

## 2023-04-09 PROCEDURE — 0F9D80Z DRAINAGE OF PANCREATIC DUCT WITH DRAINAGE DEVICE, VIA NATURAL OR ARTIFICIAL OPENING ENDOSCOPIC: ICD-10-PCS | Performed by: INTERNAL MEDICINE

## 2023-04-09 PROCEDURE — 94799 UNLISTED PULMONARY SVC/PX: CPT

## 2023-04-09 PROCEDURE — 85025 COMPLETE CBC W/AUTO DIFF WBC: CPT | Performed by: INTERNAL MEDICINE

## 2023-04-09 PROCEDURE — 25010000002 ONDANSETRON PER 1 MG: Performed by: NURSE ANESTHETIST, CERTIFIED REGISTERED

## 2023-04-09 PROCEDURE — BF101ZZ FLUOROSCOPY OF BILE DUCTS USING LOW OSMOLAR CONTRAST: ICD-10-PCS | Performed by: INTERNAL MEDICINE

## 2023-04-09 PROCEDURE — C2625 STENT, NON-COR, TEM W/DEL SY: HCPCS | Performed by: INTERNAL MEDICINE

## 2023-04-09 PROCEDURE — 63710000001 INSULIN LISPRO (HUMAN) PER 5 UNITS: Performed by: INTERNAL MEDICINE

## 2023-04-09 DEVICE — PANCREATIC STENT KIT
Type: IMPLANTABLE DEVICE | Site: PANCREAS | Status: FUNCTIONAL
Brand: ADVANIX™ PANCREATIC STENT KIT

## 2023-04-09 RX ORDER — ALBUTEROL SULFATE 2.5 MG/3ML
2.5 SOLUTION RESPIRATORY (INHALATION) ONCE AS NEEDED
Status: DISCONTINUED | OUTPATIENT
Start: 2023-04-09 | End: 2023-04-09 | Stop reason: HOSPADM

## 2023-04-09 RX ORDER — LIDOCAINE HYDROCHLORIDE 10 MG/ML
INJECTION, SOLUTION EPIDURAL; INFILTRATION; INTRACAUDAL; PERINEURAL AS NEEDED
Status: DISCONTINUED | OUTPATIENT
Start: 2023-04-09 | End: 2023-04-09 | Stop reason: SURG

## 2023-04-09 RX ORDER — PROPOFOL 10 MG/ML
VIAL (ML) INTRAVENOUS AS NEEDED
Status: DISCONTINUED | OUTPATIENT
Start: 2023-04-09 | End: 2023-04-09 | Stop reason: SURG

## 2023-04-09 RX ORDER — LIDOCAINE HYDROCHLORIDE 10 MG/ML
0.5 INJECTION, SOLUTION EPIDURAL; INFILTRATION; INTRACAUDAL; PERINEURAL ONCE AS NEEDED
Status: DISCONTINUED | OUTPATIENT
Start: 2023-04-09 | End: 2023-04-09 | Stop reason: HOSPADM

## 2023-04-09 RX ORDER — ACETAMINOPHEN 325 MG/1
650 TABLET ORAL EVERY 6 HOURS PRN
Status: DISCONTINUED | OUTPATIENT
Start: 2023-04-09 | End: 2023-04-14 | Stop reason: HOSPADM

## 2023-04-09 RX ORDER — NICOTINE POLACRILEX 4 MG
15 LOZENGE BUCCAL
Status: DISCONTINUED | OUTPATIENT
Start: 2023-04-09 | End: 2023-04-10 | Stop reason: SDUPTHER

## 2023-04-09 RX ORDER — INSULIN LISPRO 100 [IU]/ML
0-7 INJECTION, SOLUTION INTRAVENOUS; SUBCUTANEOUS
Status: DISCONTINUED | OUTPATIENT
Start: 2023-04-10 | End: 2023-04-14 | Stop reason: HOSPADM

## 2023-04-09 RX ORDER — SODIUM CHLORIDE 0.9 % (FLUSH) 0.9 %
10 SYRINGE (ML) INJECTION AS NEEDED
Status: DISCONTINUED | OUTPATIENT
Start: 2023-04-09 | End: 2023-04-09 | Stop reason: HOSPADM

## 2023-04-09 RX ORDER — SODIUM CHLORIDE 0.9 % (FLUSH) 0.9 %
10 SYRINGE (ML) INJECTION EVERY 12 HOURS SCHEDULED
Status: CANCELLED | OUTPATIENT
Start: 2023-04-09

## 2023-04-09 RX ORDER — FAMOTIDINE 20 MG/1
20 TABLET, FILM COATED ORAL ONCE
Status: CANCELLED | OUTPATIENT
Start: 2023-04-09 | End: 2023-04-09

## 2023-04-09 RX ORDER — ONDANSETRON 2 MG/ML
INJECTION INTRAMUSCULAR; INTRAVENOUS AS NEEDED
Status: DISCONTINUED | OUTPATIENT
Start: 2023-04-09 | End: 2023-04-09 | Stop reason: SURG

## 2023-04-09 RX ORDER — FAMOTIDINE 10 MG/ML
INJECTION, SOLUTION INTRAVENOUS
Status: COMPLETED
Start: 2023-04-09 | End: 2023-04-09

## 2023-04-09 RX ORDER — VALSARTAN 80 MG/1
40 TABLET ORAL EVERY 12 HOURS SCHEDULED
Status: DISCONTINUED | OUTPATIENT
Start: 2023-04-09 | End: 2023-04-09

## 2023-04-09 RX ORDER — DEXTROSE MONOHYDRATE 25 G/50ML
25 INJECTION, SOLUTION INTRAVENOUS
Status: DISCONTINUED | OUTPATIENT
Start: 2023-04-09 | End: 2023-04-10 | Stop reason: SDUPTHER

## 2023-04-09 RX ORDER — SODIUM CHLORIDE, SODIUM LACTATE, POTASSIUM CHLORIDE, CALCIUM CHLORIDE 600; 310; 30; 20 MG/100ML; MG/100ML; MG/100ML; MG/100ML
9 INJECTION, SOLUTION INTRAVENOUS CONTINUOUS
Status: DISCONTINUED | OUTPATIENT
Start: 2023-04-09 | End: 2023-04-14 | Stop reason: HOSPADM

## 2023-04-09 RX ORDER — FENTANYL CITRATE 50 UG/ML
50 INJECTION, SOLUTION INTRAMUSCULAR; INTRAVENOUS
Status: DISCONTINUED | OUTPATIENT
Start: 2023-04-09 | End: 2023-04-09 | Stop reason: HOSPADM

## 2023-04-09 RX ORDER — POTASSIUM CHLORIDE 750 MG/1
40 CAPSULE, EXTENDED RELEASE ORAL EVERY 4 HOURS
Status: COMPLETED | OUTPATIENT
Start: 2023-04-09 | End: 2023-04-10

## 2023-04-09 RX ORDER — HYDROMORPHONE HYDROCHLORIDE 1 MG/ML
0.5 INJECTION, SOLUTION INTRAMUSCULAR; INTRAVENOUS; SUBCUTANEOUS
Status: DISCONTINUED | OUTPATIENT
Start: 2023-04-09 | End: 2023-04-09 | Stop reason: HOSPADM

## 2023-04-09 RX ORDER — IBUPROFEN 600 MG/1
1 TABLET ORAL
Status: DISCONTINUED | OUTPATIENT
Start: 2023-04-09 | End: 2023-04-10 | Stop reason: SDUPTHER

## 2023-04-09 RX ORDER — PHENYLEPHRINE HCL IN 0.9% NACL 1 MG/10 ML
SYRINGE (ML) INTRAVENOUS AS NEEDED
Status: DISCONTINUED | OUTPATIENT
Start: 2023-04-09 | End: 2023-04-09 | Stop reason: SURG

## 2023-04-09 RX ORDER — FAMOTIDINE 10 MG/ML
20 INJECTION, SOLUTION INTRAVENOUS ONCE
Status: COMPLETED | OUTPATIENT
Start: 2023-04-09 | End: 2023-04-09

## 2023-04-09 RX ORDER — ONDANSETRON 2 MG/ML
4 INJECTION INTRAMUSCULAR; INTRAVENOUS ONCE AS NEEDED
Status: DISCONTINUED | OUTPATIENT
Start: 2023-04-09 | End: 2023-04-09 | Stop reason: HOSPADM

## 2023-04-09 RX ORDER — SODIUM CHLORIDE 9 MG/ML
40 INJECTION, SOLUTION INTRAVENOUS AS NEEDED
Status: DISCONTINUED | OUTPATIENT
Start: 2023-04-09 | End: 2023-04-09 | Stop reason: HOSPADM

## 2023-04-09 RX ORDER — VALSARTAN 80 MG/1
40 TABLET ORAL
Status: DISCONTINUED | OUTPATIENT
Start: 2023-04-09 | End: 2023-04-10

## 2023-04-09 RX ORDER — DEXAMETHASONE SODIUM PHOSPHATE 4 MG/ML
INJECTION, SOLUTION INTRA-ARTICULAR; INTRALESIONAL; INTRAMUSCULAR; INTRAVENOUS; SOFT TISSUE AS NEEDED
Status: DISCONTINUED | OUTPATIENT
Start: 2023-04-09 | End: 2023-04-09 | Stop reason: SURG

## 2023-04-09 RX ORDER — MIDAZOLAM HYDROCHLORIDE 1 MG/ML
0.5 INJECTION INTRAMUSCULAR; INTRAVENOUS
Status: DISCONTINUED | OUTPATIENT
Start: 2023-04-09 | End: 2023-04-09 | Stop reason: HOSPADM

## 2023-04-09 RX ORDER — ROCURONIUM BROMIDE 10 MG/ML
INJECTION, SOLUTION INTRAVENOUS AS NEEDED
Status: DISCONTINUED | OUTPATIENT
Start: 2023-04-09 | End: 2023-04-09 | Stop reason: SURG

## 2023-04-09 RX ORDER — INSULIN LISPRO 100 [IU]/ML
0-7 INJECTION, SOLUTION INTRAVENOUS; SUBCUTANEOUS
Status: DISCONTINUED | OUTPATIENT
Start: 2023-04-10 | End: 2023-04-09

## 2023-04-09 RX ORDER — INSULIN LISPRO 100 [IU]/ML
0-7 INJECTION, SOLUTION INTRAVENOUS; SUBCUTANEOUS ONCE
Status: DISCONTINUED | OUTPATIENT
Start: 2023-04-09 | End: 2023-04-09 | Stop reason: HOSPADM

## 2023-04-09 RX ORDER — INSULIN LISPRO 100 [IU]/ML
0-7 INJECTION, SOLUTION INTRAVENOUS; SUBCUTANEOUS
Status: DISCONTINUED | OUTPATIENT
Start: 2023-04-09 | End: 2023-04-09

## 2023-04-09 RX ADMIN — VALSARTAN 40 MG: 80 TABLET, FILM COATED ORAL at 14:49

## 2023-04-09 RX ADMIN — FAMOTIDINE 20 MG: 10 INJECTION INTRAVENOUS at 09:10

## 2023-04-09 RX ADMIN — SUGAMMADEX 200 MG: 100 INJECTION, SOLUTION INTRAVENOUS at 11:10

## 2023-04-09 RX ADMIN — TRIAMCINOLONE ACETONIDE 1 APPLICATION: 1 CREAM TOPICAL at 20:04

## 2023-04-09 RX ADMIN — OXYBUTYNIN CHLORIDE 5 MG: 5 TABLET, EXTENDED RELEASE ORAL at 14:51

## 2023-04-09 RX ADMIN — CEFTRIAXONE 2 G: 2 INJECTION, POWDER, FOR SOLUTION INTRAMUSCULAR; INTRAVENOUS at 13:34

## 2023-04-09 RX ADMIN — FAMOTIDINE 20 MG: 10 INJECTION, SOLUTION INTRAVENOUS at 09:10

## 2023-04-09 RX ADMIN — ROCURONIUM BROMIDE 50 MG: 10 INJECTION INTRAVENOUS at 10:27

## 2023-04-09 RX ADMIN — DEXAMETHASONE SODIUM PHOSPHATE 4 MG: 4 INJECTION, SOLUTION INTRAMUSCULAR; INTRAVENOUS at 10:36

## 2023-04-09 RX ADMIN — AMIODARONE HYDROCHLORIDE 200 MG: 200 TABLET ORAL at 14:51

## 2023-04-09 RX ADMIN — Medication 10 ML: at 12:38

## 2023-04-09 RX ADMIN — SODIUM CHLORIDE, POTASSIUM CHLORIDE, SODIUM LACTATE AND CALCIUM CHLORIDE 75 ML/HR: 600; 310; 30; 20 INJECTION, SOLUTION INTRAVENOUS at 12:34

## 2023-04-09 RX ADMIN — Medication 10 ML: at 20:04

## 2023-04-09 RX ADMIN — METRONIDAZOLE 500 MG: 500 INJECTION, SOLUTION INTRAVENOUS at 19:55

## 2023-04-09 RX ADMIN — TRIAMCINOLONE ACETONIDE 1 APPLICATION: 1 CREAM TOPICAL at 15:02

## 2023-04-09 RX ADMIN — POTASSIUM CHLORIDE 40 MEQ: 10 CAPSULE, COATED, EXTENDED RELEASE ORAL at 20:04

## 2023-04-09 RX ADMIN — PROPOFOL 70 MG: 10 INJECTION, EMULSION INTRAVENOUS at 10:27

## 2023-04-09 RX ADMIN — METRONIDAZOLE 500 MG: 500 INJECTION, SOLUTION INTRAVENOUS at 12:16

## 2023-04-09 RX ADMIN — ACETAMINOPHEN 325MG 650 MG: 325 TABLET ORAL at 15:33

## 2023-04-09 RX ADMIN — LIDOCAINE HYDROCHLORIDE 50 MG: 10 INJECTION, SOLUTION EPIDURAL; INFILTRATION; INTRACAUDAL; PERINEURAL at 10:27

## 2023-04-09 RX ADMIN — INSULIN LISPRO 2 UNITS: 100 INJECTION, SOLUTION INTRAVENOUS; SUBCUTANEOUS at 06:08

## 2023-04-09 RX ADMIN — Medication 100 MCG: at 10:49

## 2023-04-09 RX ADMIN — INSULIN LISPRO 4 UNITS: 100 INJECTION, SOLUTION INTRAVENOUS; SUBCUTANEOUS at 17:31

## 2023-04-09 RX ADMIN — SODIUM CHLORIDE, POTASSIUM CHLORIDE, SODIUM LACTATE AND CALCIUM CHLORIDE 9 ML/HR: 600; 310; 30; 20 INJECTION, SOLUTION INTRAVENOUS at 09:09

## 2023-04-09 RX ADMIN — ONDANSETRON 4 MG: 2 INJECTION INTRAMUSCULAR; INTRAVENOUS at 11:10

## 2023-04-09 NOTE — PROGRESS NOTES
[facility] Cardiology    Inpatient Progress Note      Chief Complaint/Reason for service:    · Preop cardiac risk assessment         Subjective:       Still little bit sleepy from her ERCP.  Denies chest pain, shortness of air at rest, palpitations    Past medical, surgical, social and family history reviewed in the patient's electronic medical record.    Problem List  Active Hospital Problems    Diagnosis  POA   • **Acute cholecystitis [K81.0]  Yes   • Paroxysmal atrial fibrillation [I48.0]  Yes   • Type 2 diabetes mellitus with hyperglycemia, without long-term current use of insulin [E11.65]  Yes   • BERNARD treated with BiPAP [G47.33]  Yes   • Hyperlipidemia [E78.5]  Yes   • Hypertension [I10]  Yes      Resolved Hospital Problems   No resolved problems to display.            Objective:      Infusions:  lactated ringers, 75 mL/hr, Last Rate: 75 mL/hr (04/09/23 1456)  lactated ringers, 9 mL/hr, Last Rate: 9 mL/hr (04/09/23 1534)         Medications:    Current Facility-Administered Medications:   •  acetaminophen (TYLENOL) tablet 650 mg, 650 mg, Oral, Q6H PRN, Stella Cook DO, 650 mg at 04/09/23 1533  •  Calcium Replacement - Follow Nurse / BPA Driven Protocol, , Does not apply, PRN, Stella Cook DO  •  cefTRIAXone (ROCEPHIN) 2 g/100 mL 0.9% NS IVPB (MBP), 2 g, Intravenous, Q24H, Brunner, Mark I, MD, Stopped at 04/09/23 1410  •  dextrose (D50W) (25 g/50 mL) IV injection 25 g, 25 g, Intravenous, Q15 Min PRN, Brunner, Mark I, MD  •  dextrose (D50W) (25 g/50 mL) IV injection 25 g, 25 g, Intravenous, Q15 Min PRN, Stella Cook DO  •  dextrose (GLUTOSE) oral gel 15 g, 15 g, Oral, Q15 Min PRN, Brunner, Mark I, MD  •  dextrose (GLUTOSE) oral gel 15 g, 15 g, Oral, Q15 Min PRN, Stella Cook DO  •  glucagon (GLUCAGEN) injection 1 mg, 1 mg, Intramuscular, Q15 Min PRN, Brunner, Mark I, MD  •  glucagon (GLUCAGEN) injection 1 mg, 1 mg, Intramuscular, Q15 Min PRN,  Stella Cook DO  •  Insulin Lispro (humaLOG) injection 0-7 Units, 0-7 Units, Subcutaneous, TID AC, Stella Cook DO  •  lactated ringers infusion, 75 mL/hr, Intravenous, Continuous, Brunner, Mark I, MD, Last Rate: 75 mL/hr at 04/09/23 1456, 75 mL/hr at 04/09/23 1456  •  lactated ringers infusion, 9 mL/hr, Intravenous, Continuous, Brunner, Mark I, MD, Last Rate: 9 mL/hr at 04/09/23 1534, 9 mL/hr at 04/09/23 1534  •  Magnesium Standard Dose Replacement - Follow Nurse / BPA Driven Protocol, , Does not apply, PRN, Stella Cook DO  •  metroNIDAZOLE (FLAGYL) IVPB 500 mg, 500 mg, Intravenous, Q8H, Brunner, Mark I, MD, Stopped at 04/09/23 1250  •  oxybutynin XL (DITROPAN-XL) 24 hr tablet 5 mg, 5 mg, Oral, Daily, Brunner, Mark I, MD, 5 mg at 04/09/23 1451  •  Phosphorus Replacement - Follow Nurse / BPA Driven Protocol, , Does not apply, PRN, Stella Cook DO  •  Potassium Replacement - Follow Nurse / BPA Driven Protocol, , Does not apply, Joey TORRES Meghan Carroll, DO  •  sodium chloride 0.9 % flush 10 mL, 10 mL, Intravenous, PRN, Brunner, Mark I, MD  •  sodium chloride 0.9 % flush 10 mL, 10 mL, Intravenous, Q12H, Brunner, Mark I, MD, 10 mL at 04/09/23 1238  •  sodium chloride 0.9 % flush 10 mL, 10 mL, Intravenous, PRN, Brunner, Mark I, MD  •  sodium chloride 0.9 % infusion 40 mL, 40 mL, Intravenous, PRN, Brunner, Mark I, MD  •  triamcinolone (KENALOG) 0.1 % cream 1 application, 1 application, Topical, Q12H, Brunner, Mark I, MD, 1 application at 04/09/23 1502  •  valsartan (DIOVAN) tablet 40 mg, 40 mg, Oral, Q24H, Stella Cook DO, 40 mg at 04/09/23 1449    Vital Sign Min/Max for last 24 hours  Temp  Min: 97.1 °F (36.2 °C)  Max: 98.3 °F (36.8 °C)   BP  Min: 127/85  Max: 173/62   Pulse  Min: 65  Max: 80   Resp  Min: 16  Max: 18   SpO2  Min: 91 %  Max: 96 %   No data recorded      Intake/Output Summary (Last 24 hours) at 4/9/2023 1635  Last data filed at 4/9/2023  1110  Gross per 24 hour   Intake 1850 ml   Output 600 ml   Net 1250 ml           CONSTITUTIONAL: No acute distress  CARDIOVASCULAR: Regular rate and rhythm with normal S1 and S2.  Murmur present. There is no lower extremity edema bilaterally. Normal radial pulse.     Labs/studies:  Available lab and imaging results were reviewed by myself today    Results for orders placed during the hospital encounter of 04/07/23    Adult Transthoracic Echo Complete W/ Cont if Necessary Per Protocol    Interpretation Summary  •  Left ventricular systolic function is normal. Left ventricular ejection fraction appears to be 56 - 60%.  •  Left ventricular wall thickness is consistent with mild concentric hypertrophy.  •  Left ventricular diastolic function is consistent with (grade II w/high LAP) pseudonormalization.  •  Left atrial volume is mildly increased.  •  There is moderate calcification of the aortic valve.  •  Moderate aortic valve stenosis is present.  •  There is moderate calcification of the mitral valve  •  Mild to moderate mitral valve stenosis is present.  •  Mild tricuspid valve regurgitation is present.  •  Estimated right ventricular systolic pressure from tricuspid regurgitation is moderately elevated (45-55 mmHg).      Tele: Sinus rhythm         Assessment/Plan:       ASSESSMENT:  1. Acute cholecystitis, transaminitis  2. Paroxysmal atrial fibrillation  a. Diagnosed 2021 status post SATISH/ECV  b. Remains rhythm controlled on amiodarone  3. Moderate aortic stenosis  4. Mild to moderate mitral stenosis  5. Minimally elevated troponin secondary to demand ischemia    PLAN:  1. Repeating EKG in the morning to reassess borderline QTc.  Tentatively holding amiodarone.  Due to long half-life of amiodarone, patient remains loaded with amiodarone going into her operation Tuesday, should we decide to hold it for couple days.  Avoid QT prolonging medicines  2. Xarelto held for surgery  3. Okay to proceed with surgery from a  cardiac standpoint  4. Patient's primary cardiologist, Dr. Alvarez, to resume cardiac care tomorrow    -Plan discussed with patient's son and medical POA, Dr. Maxi Garrison, over the phone    Rayo Coles MD, MSc, FACC, [facility]  Interventional Cardiology  [facility]

## 2023-04-09 NOTE — PROGRESS NOTES
[facility] Medicine Services  PROGRESS NOTE    Patient Name: Kaylen Garrison  : 1940  MRN: [id number]    Date of Admission: 2023  Primary Care Physician: Lizzette Multani MD    Subjective   Subjective     CC:  Elevated LFTs    HPI:  Sleeping on CPAP after ERCP.  Discussed with son earlier this morning while she was in Endo.  He is currently gone on follow-up interview    ROS:  Sleeping comfortably, opens eyes to stimuli and falls back asleep    Objective   Objective     Vital Signs:   Temp:  [97.1 °F (36.2 °C)-98.3 °F (36.8 °C)] 97.1 °F (36.2 °C)  Heart Rate:  [65-80] 80  Resp:  [16-18] 16  BP: (127-173)/(61-85) 165/72  Flow (L/min):  [4] 4     Physical Exam:  Constitutional: No acute distress, sleeping comfortably  HENT: NCAT, mucous membranes moist  Respiratory: Respiratory effort normal   Cardiovascular: RRR, no murmurs, rubs, or gallops  Gastrointestinal: Soft, nontender, nondistended  Musculoskeletal: No LE edema  Psychiatric: Appropriate affect, cooperative  Neurologic: Oriented x 3, confrontation, speech clear  Skin: No rashes      Results Reviewed:  LAB RESULTS:      Lab 23  0541 23  0641 23  2334 23  2326 23  2321   WBC 11.88* 9.26  --   --  10.83*   HEMOGLOBIN 12.8 11.3*  --   --  13.3   HEMOGLOBIN, POC  --   --   --   --  15.0   HEMATOCRIT 41.9 35.4  --   --  42.9   HEMATOCRIT POC  --   --   --   --  44   PLATELETS 253 265  --   --  333   NEUTROS ABS 10.25* 7.30*  --   --  8.94*   IMMATURE GRANS (ABS) 0.06* 0.04  --   --  0.05   LYMPHS ABS 0.61* 0.98  --   --  0.98   MONOS ABS 0.86 0.87  --   --  0.76   EOS ABS 0.07 0.03  --   --  0.06   MCV 93.1 88.5  --   --  89.0   LACTATE  --   --   --   --  1.1   PROTIME  --  28.5* 20.5 20.5*  --    APTT  --   --   --   --  39.1*         Lab 23  0541 23  0641 23  5851   SODIUM 141 140 138   POTASSIUM 3.4* 3.4* 3.5   CHLORIDE 103 103 99   CO2 25.0 24.0 25.0   ANION GAP 13.0 13.0  14.0   BUN 13 22 22   CREATININE 0.69 0.85 0.90  0.82   EGFR 85.7 67.7 70.6  63.2   GLUCOSE 197* 205* 280*   CALCIUM 8.9 8.7 9.1   MAGNESIUM  --  1.8  --          Lab 04/09/23  0541 04/08/23  0641 04/07/23  2321   TOTAL PROTEIN 6.0 5.3* 7.1   ALBUMIN 3.8 3.4* 4.0   GLOBULIN 2.2 1.9 3.1   ALT (SGPT) 328* 408* 297*   AST (SGOT) 219* 590* 597*   BILIRUBIN 4.0* 2.3* 1.7*   ALK PHOS 185* 161* 154*   LIPASE  --   --  47         Lab 04/08/23  0641 04/08/23  0150 04/07/23  2334 04/07/23  2326 04/07/23  2321   HSTROP T  --  13*  --   --  14*  14*   PROTIME 28.5*  --  20.5 20.5*  --    INR 2.66*  --  2* 1.8*  --                  Brief Urine Lab Results  (Last result in the past 365 days)      Color   Clarity   Blood   Leuk Est   Nitrite   Protein   CREAT   Urine HCG        04/08/23 0113 Yellow   Clear   Negative   Negative   Negative   Trace                 Microbiology Results Abnormal     Procedure Component Value - Date/Time    Blood Culture - Blood, Arm, Right [[id number]]  (Normal) Collected: 04/08/23 0103    Lab Status: Preliminary result Specimen: Blood from Arm, Right Updated: 04/09/23 0730     Blood Culture No growth at 24 hours    Blood Culture - Blood, Arm, Right [[id number]]  (Normal) Collected: 04/08/23 0103    Lab Status: Preliminary result Specimen: Blood from Arm, Right Updated: 04/09/23 0730     Blood Culture No growth at 24 hours          Adult Transthoracic Echo Complete W/ Cont if Necessary Per Protocol    Result Date: 4/8/2023  •  Left ventricular systolic function is normal. Left ventricular ejection fraction appears to be 56 - 60%. •  Left ventricular wall thickness is consistent with mild concentric hypertrophy. •  Left ventricular diastolic function is consistent with (grade II w/high LAP) pseudonormalization. •  Left atrial volume is mildly increased. •  There is moderate calcification of the aortic valve. •  Moderate aortic valve stenosis is present. •  There is moderate calcification of the mitral  valve •  Mild to moderate mitral valve stenosis is present. •  Mild tricuspid valve regurgitation is present. •  Estimated right ventricular systolic pressure from tricuspid regurgitation is moderately elevated (45-55 mmHg).     CT Abdomen Pelvis Without Contrast    Result Date: 4/7/2023  EXAM:  CT SCAN OF THE ABDOMEN AND PELVIS WITHOUT INTRAVENOUS CONTRAST DATE: 4/7/2023 11:06 PM HISTORY: Abdominal pain TECHNIQUE: CT examination of the abdomen and pelvis with sagittal and coronal reformations was performed without intravenous contrast.  CT dose lowering techniques were used, to include: automated exposure control, adjustment for patient size, and/or use  of iterative reconstruction. Note: The exam is limited because some types of pathology may not be adequately demonstrated due to lack of contrast enhancement. COMPARISON: None.   FINDINGS: ABDOMEN/PELVIS: LOWER CHEST: Mildly coarse basilar septal thickening LIVER: Normal.  GALLBLADDER/BILIARY: Distended gallbladder, with stones/sludge, suggestion of slight wall thickening. Small calcification near the aston hepatis PANCREAS: Normal. SPLEEN: Normal.  ADRENAL GLANDS: Normal. KIDNEYS/URETERS/BLADDER: Normal kidneys. Normal bladder GI TRACT: Normal bowel. Normal appendix (axial image 77) MESENTERY/PERITONEUM: Normal mesentery REPRODUCTIVE: Hysterectomy VASCULATURE: Normal.   LYMPH NODES: Normal ABDOMINAL WALL: Normal. MUSCULOSKELETAL: Normal.     Impression: 1.  Distended gallbladder, cholelithiasis/sludge, correlate for clinical evidence of cholecystitis. 2.  Small calcification at the aston hepatis, with consideration of choledocholithiasis, cystic duct stone. 3.  Basilar septal thickening/edema. Electronically signed by:  Lorraine Bridges M.D.  4/7/2023 9:32 PM Mountain Time    FL ERCP pancreatic and biliary ducts    Result Date: 4/9/2023  FL ERCP PANCREATIC AND BILIARY DUCTS Date of Exam: 4/9/2023 11:20 AM EDT Indication: ercp. Comparison: None available. Technique:   A series of radiographic digital spot films were obtained in conjunction with a endoscopic catheterization of the biliary and pancreatic ductal system, performed by the gastroenterologist. Fluoroscopic Time: 86.1 seconds Number of Images: 3 Findings: Intraoperative imaging was utilized during ERCP.     Impression: Impression: Intraoperative imaging during ERCP. Please see the endoscopists operative note. Electronically Signed: Maxi Kim  4/9/2023 11:53 AM EDT  Workstation ID: [id number]    XR Chest 1 View    Result Date: 4/7/2023  Exam:  Single view chest Date: April 7, 2023 History: Neurologic symptoms Comparison: November 10, 2021 Technique: Single frontal radiograph of the chest was obtained Findings: The heart size is stable. There are atherosclerotic changes in the aortic arch. There are markedly coarsened interstitial lung markings in both lungs. There is patchy groundglass appearing airspace disease in the right lower lobe. There is no pneumothorax or sizable pleural fluid collection.     Impression: 1. Redemonstration of markedly coarsened interstitial lung markings in both lungs. Findings could be related to etiologies of chronic interstitial lung disease. Alternatively, consider atypical infection or pulmonary edema. 2. There is patchy groundglass appearing airspace disease in the right lower lobe. The appearance is concerning for right lower lobe pneumonia. Slot 63 Electronically signed by:  Jarvis Pina M.D.  4/7/2023 9:56 PM Mountain Time    MRI abdomen wo contrast mrcp    Result Date: 4/8/2023  MRI ABDOMEN WO CONTRAST MRCP Date of Exam: 4/8/2023 3:01 AM EDT Indication: acute jenny, eval for choledocholithiasis.  Comparison: CT abdomen and pelvis dated 4/7/2023 Technique:  Routine multiplanar/multisequence images of the abdomen were obtained with MRCP sequences without contrast administration. Findings: The liver length is at the upper limit of normal. There is no evidence of hepatic steatosis or  iron deposition. There is no focal liver lesion by noncontrast technique. The gallbladder is present with innumerable stones. There is gallbladder wall thickening and pericholecystic fluid compatible with acute cholecystitis. There is dilation of the common hepatic duct measures up to 11 mm and dilation of the common bile duct  measuring up to 8 mm. There are at least 2 small stones within the common bile duct and likely 1 at the ampulla causing a blunted appearance of the common bile duct. Findings are compatible with choledocholithiasis. The spleen is normal in size. There is motion limited evaluation of the adrenal glands. There is mild prominence of the pancreatic duct within the pancreatic head measuring up to 6 mm. No visible obstructing mass. The kidneys are symmetric in size. There is a small simple cyst within the posterior aspect of the right kidney. There is no hydronephrosis. There are no ossific pulmonary changes with trace bilateral pleural effusions.     Impression: Impression: 1. Choledocholithiasis with at least 3 filling defects, one of which is near the ampulla and causes a blunted appearance of the distal common bile duct. 2. Innumerable filling defects within the gallbladder with associated gallbladder wall thickening and pericholecystic edema likely representing acute cholecystitis. 3. Prominence of the pancreatic duct within the pancreatic head measuring up to 5 mm. No visible obstructing pancreatic duct stone or mass. The remainder of the duct within the pancreatic body and tail appear normal in caliber. Electronically Signed: Vin Bojorquez  4/8/2023 8:41 AM EDT  Workstation ID: [id number]    CT Head Without Contrast Stroke Protocol    Result Date: 4/7/2023  EXAMINATION: CT HEAD WITHOUT CONTRAST DATE: 4/7/2023 11:00 PM  INDICATION: Neuro deficit, acute, stroke suspected Slurred speechRelease to patient->Routine Release  COMPARISON: None.  TECHNIQUE:  Routine axial images through the head  without contrast. Coronal reformations. Low-dose CT acquisition technique included one or more of the following options: 1. Automated exposure control, 2. Adjustment of mA and/or KV according to patient's size and/or 3. Use of iterative reconstruction. FINDINGS: Limited by motion. Intracranial contents: Ventricular and cisternal spaces are prominent from volume loss. Mild to moderate periventricular and subcortical white matter hypodensities. Thickened falx, partly could be motion artifact. No dominant mass, midline shift, hydrocephalus, extra axial fluid collection or acute hemorrhage. No asymmetric hyperdensity of the proximal major cerebral arteries. Craniocervical junction is patent. Bones and extracranial soft tissues: Mild mucosal thickening ethmoid air cells. Otherwise, Visualized paranasal sinuses and mastoid air cells are clear.  Skull is intact. Cataract surgery. Degenerative changes temporomandibular joints. Calcifications distal internal carotid arteries and distal vertebral arteries.     Impression: 1. No acute intracranial process. MRI is more sensitive for the detection of acute nonhemorrhagic infarct. 2. Mild to moderate changes small vessel ischemic disease of indeterminate age, presumably mostly chronic. Volume loss. Atherosclerosis. Report called to stroke navigator Shannan Cordova at 4/7/2023 11:12 PM Electronically signed by:  Brendne Soto M.D.  4/7/2023 9:15 PM Mountain Time      Results for orders placed during the hospital encounter of 04/07/23    Adult Transthoracic Echo Complete W/ Cont if Necessary Per Protocol    Interpretation Summary  •  Left ventricular systolic function is normal. Left ventricular ejection fraction appears to be 56 - 60%.  •  Left ventricular wall thickness is consistent with mild concentric hypertrophy.  •  Left ventricular diastolic function is consistent with (grade II w/high LAP) pseudonormalization.  •  Left atrial volume is mildly increased.  •  There is  moderate calcification of the aortic valve.  •  Moderate aortic valve stenosis is present.  •  There is moderate calcification of the mitral valve  •  Mild to moderate mitral valve stenosis is present.  •  Mild tricuspid valve regurgitation is present.  •  Estimated right ventricular systolic pressure from tricuspid regurgitation is moderately elevated (45-55 mmHg).      Current medications:  Scheduled Meds:amiodarone, 200 mg, Oral, Daily  cefTRIAXone, 2 g, Intravenous, Q24H  insulin lispro, 0-7 Units, Subcutaneous, TID AC  metroNIDAZOLE, 500 mg, Intravenous, Q8H  oxybutynin XL, 5 mg, Oral, Daily  sodium chloride, 10 mL, Intravenous, Q12H  triamcinolone, 1 application, Topical, Q12H  valsartan, 40 mg, Oral, Q24H      Continuous Infusions:lactated ringers, 75 mL/hr, Last Rate: 75 mL/hr (04/09/23 1456)  lactated ringers, 9 mL/hr, Last Rate: 9 mL/hr (04/09/23 1323)      PRN Meds:.•  Calcium Replacement - Follow Nurse / BPA Driven Protocol  •  dextrose  •  dextrose  •  dextrose  •  dextrose  •  glucagon (human recombinant)  •  glucagon (human recombinant)  •  Magnesium Standard Dose Replacement - Follow Nurse / BPA Driven Protocol  •  Phosphorus Replacement - Follow Nurse / BPA Driven Protocol  •  Potassium Replacement - Follow Nurse / BPA Driven Protocol  •  sodium chloride  •  sodium chloride  •  sodium chloride    Assessment & Plan   Assessment & Plan     Active Hospital Problems    Diagnosis  POA   • **Acute cholecystitis [K81.0]  Yes   • Paroxysmal atrial fibrillation [I48.0]  Yes   • Type 2 diabetes mellitus with hyperglycemia, without long-term current use of insulin [E11.65]  Yes   • BERNARD treated with BiPAP [G47.33]  Yes   • Hyperlipidemia [E78.5]  Yes   • Hypertension [I10]  Yes      Resolved Hospital Problems   No resolved problems to display.        Brief Hospital Course to date:  Kaylen Garrison is a 82 y.o. female with PMH significant for A-fib on Xarelto, HTN, HLD, BERNARD on BiPAP, DM2, who presents to the  ED with complaint of abdominal pain who was found to have concern for acute cholecystitis.     Abdominal pain  Acute cholecystitis  Transaminitis  - continue Rocephin, Flagyl  - MRCP with cbd dilation, GI consulted, plan for ERCP  - Evaluated by cardio for clearance, echo reviewed  -Plan for cholecystectomy likely on Tuesday with Dr. GR    Pneumonia  - Denies known aspiration event but has had vomiting  - xray with RLL opacity  -  Speech evaluated     Elevated troponin  - Mildly elevated, suspect demand  - No chest pain     PAF  - Rate controlled  - On Xarelto, last dose evening of 4/7/2023  -INR elevated on 4/8, recheck INR again in a.m.  Had discussed case with GI regarding VTE prophylaxis but given elevated INR will hold dose of Lovenox for VTE prophylaxis.  Planned for OR on Tuesday.  She has been ambulating in the hallway with her son a few times a day.  Continue mobilization  - Continue amiodarone     HTN  - Continue valsartan, hold other meds for now - bp on the low side  - Hold HCTZ, amlodipine    Hyperlipidemia  - Hold statin secondary to elevated liver enzymes  -  Restart at DC     DM2  - Hold glipizide, Januvia, metformin  - SSI  --a1c 7.2      Expected Discharge Location and Transportation: Home  Expected Discharge  Expected Discharge Date and Time     Expected Discharge Date Expected Discharge Time    Apr 13, 2023            DVT prophylaxis:  Mechanical DVT prophylaxis orders are present.     AM-PAC 6 Clicks Score (PT): 20 (04/09/23 0842)    CODE STATUS:   Code Status and Medical Interventions:   Ordered at: 04/08/23 0117     Code Status (Patient has no pulse and is not breathing):    CPR (Attempt to Resuscitate)     Medical Interventions (Patient has pulse or is breathing):    Full Support       Stella Cook,   04/09/23

## 2023-04-09 NOTE — ANESTHESIA PROCEDURE NOTES
Airway  Urgency: elective    Date/Time: 4/9/2023 10:29 AM  Airway not difficult    General Information and Staff    Patient location during procedure: OR  CRNA/CAA: Kevin Iglesias CRNA    Indications and Patient Condition  Indications for airway management: airway protection    Preoxygenated: yes  MILS not maintained throughout  Mask difficulty assessment: 1 - vent by mask    Final Airway Details  Final airway type: endotracheal airway      Successful airway: ETT  Cuffed: yes   Successful intubation technique: direct laryngoscopy and video laryngoscopy  Facilitating devices/methods: intubating stylet  Endotracheal tube insertion site: oral  Blade: Landers  Blade size: 3  ETT size (mm): 7.0  Cormack-Lehane Classification: grade I - full view of glottis  Placement verified by: chest auscultation and capnometry   Cuff volume (mL): 5  Measured from: lips  ETT/EBT  to lips (cm): 21  Number of attempts at approach: 1  Assessment: lips, teeth, and gum same as pre-op and atraumatic intubation    Additional Comments  Negative epigastric sounds, Breath sound equal bilaterally with symmetric chest rise and fall

## 2023-04-09 NOTE — ANESTHESIA PREPROCEDURE EVALUATION
Anesthesia Evaluation     Patient summary reviewed and Nursing notes reviewed   NPO Solid Status: > 8 hours  NPO Liquid Status: > 8 hours           Airway   Mallampati: II  TM distance: >3 FB  Neck ROM: limited  Possible difficult intubation  Dental - normal exam     Pulmonary     breath sounds clear to auscultation  Cardiovascular   Exercise tolerance: poor (<4 METS)    Rhythm: regular  Rate: normal    (+) murmur,       Neuro/Psych  GI/Hepatic/Renal/Endo      Musculoskeletal     Abdominal    Substance History      OB/GYN          Other                        Anesthesia Plan    ASA 3     general     intravenous induction     Anesthetic plan, risks, benefits, and alternatives have been provided, discussed and informed consent has been obtained with: patient.        CODE STATUS:    Code Status (Patient has no pulse and is not breathing): CPR (Attempt to Resuscitate)  Medical Interventions (Patient has pulse or is breathing): Full Support

## 2023-04-09 NOTE — PROGRESS NOTES
Patient feels well after ERCP.  Denies nausea or abdominal pain.    >> Will advance diet to low-fat.    Discussed with patient's son in room.

## 2023-04-09 NOTE — ANESTHESIA POSTPROCEDURE EVALUATION
Patient: Kaylen Garrison    Procedure Summary     Date: 04/09/23 Room / Location: [facility] 3 /  [facility]    Anesthesia Start: 1019 Anesthesia Stop: 1123    Procedure: ENDOSCOPIC RETROGRADE CHOLANGIOPANCREATOGRAPHY Diagnosis:     Surgeons: Brunner, Mark I, MD Provider: Khurram Marroquin MD    Anesthesia Type: general ASA Status: 3          Anesthesia Type: general    Vitals  Vitals Value Taken Time   BP     Temp     Pulse 73 04/09/23 1121   Resp     SpO2 85 % 04/09/23 1121   Vitals shown include unvalidated device data.        Post Anesthesia Care and Evaluation    Patient location during evaluation: PACU  Patient participation: complete - patient participated  Level of consciousness: awake  Pain score: 0  Pain management: adequate    Airway patency: patent  Anesthetic complications: No anesthetic complications  PONV Status: none  Cardiovascular status: acceptable and stable  Respiratory status: nasal cannula, unassisted, acceptable and spontaneous ventilation  Hydration status: acceptable

## 2023-04-09 NOTE — BRIEF OP NOTE
"ENDOSCOPIC RETROGRADE CHOLANGIOPANCREATOGRAPHY  Progress Note    Kaylen Garrison  4/9/2023    First wire pass entered the pancreas duct.  The common bile duct was then cannulated using \"double wire technique\".  Cholangiogram revealed filling defect in the distal common bile duct compatible with stone.  Sphincterotomy performed without bleeding.  Balloon sweeps yielded 3 black-speckled multifaceted stones.  No stones remained.  A prophylactic 4 German by 3 cm three-quarter pigtail stent without internal flap was inserted into the pancreas duct.    >> Proceed with cholecystectomy.  >> KUB in 1 ~1 week to assess for spontaneous passage of pancreas duct stent.  If stent remains, will need removal by EGD.    Mark I. Brunner, MD     Date: 4/9/2023  Time: 11:11 EDT      "

## 2023-04-10 ENCOUNTER — APPOINTMENT (OUTPATIENT)
Dept: CT IMAGING | Facility: HOSPITAL | Age: 83
DRG: 417 | End: 2023-04-10
Payer: MEDICARE

## 2023-04-10 LAB
ALBUMIN SERPL-MCNC: 3.1 G/DL (ref 3.5–5.2)
ALBUMIN/GLOB SERPL: 1.1 G/DL
ALP SERPL-CCNC: 154 U/L (ref 39–117)
ALT SERPL W P-5'-P-CCNC: 198 U/L (ref 1–33)
ANION GAP SERPL CALCULATED.3IONS-SCNC: 10 MMOL/L (ref 5–15)
AST SERPL-CCNC: 67 U/L (ref 1–32)
B PARAPERT DNA SPEC QL NAA+PROBE: NOT DETECTED
B PERT DNA SPEC QL NAA+PROBE: NOT DETECTED
BASOPHILS # BLD AUTO: 0.03 10*3/MM3 (ref 0–0.2)
BASOPHILS NFR BLD AUTO: 0.3 % (ref 0–1.5)
BILIRUB SERPL-MCNC: 1.2 MG/DL (ref 0–1.2)
BUN SERPL-MCNC: 21 MG/DL (ref 8–23)
BUN/CREAT SERPL: 22.8 (ref 7–25)
C PNEUM DNA NPH QL NAA+NON-PROBE: NOT DETECTED
CALCIUM SPEC-SCNC: 8.7 MG/DL (ref 8.6–10.5)
CHLORIDE SERPL-SCNC: 103 MMOL/L (ref 98–107)
CO2 SERPL-SCNC: 24 MMOL/L (ref 22–29)
CREAT SERPL-MCNC: 0.92 MG/DL (ref 0.57–1)
DEPRECATED RDW RBC AUTO: 56.3 FL (ref 37–54)
EGFRCR SERPLBLD CKD-EPI 2021: 61.5 ML/MIN/1.73
EOSINOPHIL # BLD AUTO: 0.04 10*3/MM3 (ref 0–0.4)
EOSINOPHIL NFR BLD AUTO: 0.3 % (ref 0.3–6.2)
ERYTHROCYTE [DISTWIDTH] IN BLOOD BY AUTOMATED COUNT: 17.2 % (ref 12.3–15.4)
FLUAV SUBTYP SPEC NAA+PROBE: NOT DETECTED
FLUBV RNA ISLT QL NAA+PROBE: NOT DETECTED
GLOBULIN UR ELPH-MCNC: 2.9 GM/DL
GLUCOSE BLDC GLUCOMTR-MCNC: 293 MG/DL (ref 70–130)
GLUCOSE BLDC GLUCOMTR-MCNC: 347 MG/DL (ref 70–130)
GLUCOSE BLDC GLUCOMTR-MCNC: 347 MG/DL (ref 70–130)
GLUCOSE BLDC GLUCOMTR-MCNC: 354 MG/DL (ref 70–130)
GLUCOSE BLDC GLUCOMTR-MCNC: 420 MG/DL (ref 70–130)
GLUCOSE BLDC GLUCOMTR-MCNC: 432 MG/DL (ref 70–130)
GLUCOSE SERPL-MCNC: 374 MG/DL (ref 65–99)
HADV DNA SPEC NAA+PROBE: NOT DETECTED
HCOV 229E RNA SPEC QL NAA+PROBE: NOT DETECTED
HCOV HKU1 RNA SPEC QL NAA+PROBE: NOT DETECTED
HCOV NL63 RNA SPEC QL NAA+PROBE: NOT DETECTED
HCOV OC43 RNA SPEC QL NAA+PROBE: NOT DETECTED
HCT VFR BLD AUTO: 39.6 % (ref 34–46.6)
HGB BLD-MCNC: 12.4 G/DL (ref 12–15.9)
HMPV RNA NPH QL NAA+NON-PROBE: NOT DETECTED
HPIV1 RNA ISLT QL NAA+PROBE: NOT DETECTED
HPIV2 RNA SPEC QL NAA+PROBE: NOT DETECTED
HPIV3 RNA NPH QL NAA+PROBE: NOT DETECTED
HPIV4 P GENE NPH QL NAA+PROBE: NOT DETECTED
IMM GRANULOCYTES # BLD AUTO: 0.05 10*3/MM3 (ref 0–0.05)
IMM GRANULOCYTES NFR BLD AUTO: 0.4 % (ref 0–0.5)
INR PPP: 1.38 (ref 0.84–1.13)
LIPASE SERPL-CCNC: 44 U/L (ref 13–60)
LYMPHOCYTES # BLD AUTO: 0.7 10*3/MM3 (ref 0.7–3.1)
LYMPHOCYTES NFR BLD AUTO: 6.1 % (ref 19.6–45.3)
M PNEUMO IGG SER IA-ACNC: NOT DETECTED
MCH RBC QN AUTO: 27.9 PG (ref 26.6–33)
MCHC RBC AUTO-ENTMCNC: 31.3 G/DL (ref 31.5–35.7)
MCV RBC AUTO: 89.2 FL (ref 79–97)
MONOCYTES # BLD AUTO: 0.82 10*3/MM3 (ref 0.1–0.9)
MONOCYTES NFR BLD AUTO: 7.1 % (ref 5–12)
MRSA DNA SPEC QL NAA+PROBE: NEGATIVE
NEUTROPHILS NFR BLD AUTO: 85.8 % (ref 42.7–76)
NEUTROPHILS NFR BLD AUTO: 9.88 10*3/MM3 (ref 1.7–7)
NRBC BLD AUTO-RTO: 0 /100 WBC (ref 0–0.2)
PLATELET # BLD AUTO: 270 10*3/MM3 (ref 140–450)
PMV BLD AUTO: 10.4 FL (ref 6–12)
POTASSIUM SERPL-SCNC: 4.7 MMOL/L (ref 3.5–5.2)
PROCALCITONIN SERPL-MCNC: 0.19 NG/ML (ref 0–0.25)
PROT SERPL-MCNC: 6 G/DL (ref 6–8.5)
PROTHROMBIN TIME: 16.9 SECONDS (ref 11.4–14.4)
QT INTERVAL: 478 MS
QTC INTERVAL: 481 MS
RBC # BLD AUTO: 4.44 10*6/MM3 (ref 3.77–5.28)
RHINOVIRUS RNA SPEC NAA+PROBE: NOT DETECTED
RSV RNA NPH QL NAA+NON-PROBE: NOT DETECTED
SARS-COV-2 RNA NPH QL NAA+NON-PROBE: NOT DETECTED
SODIUM SERPL-SCNC: 137 MMOL/L (ref 136–145)
WBC NRBC COR # BLD: 11.52 10*3/MM3 (ref 3.4–10.8)

## 2023-04-10 PROCEDURE — 25010000002 ENOXAPARIN PER 10 MG: Performed by: INTERNAL MEDICINE

## 2023-04-10 PROCEDURE — 25510000001 IOPAMIDOL PER 1 ML: Performed by: INTERNAL MEDICINE

## 2023-04-10 PROCEDURE — 85025 COMPLETE CBC W/AUTO DIFF WBC: CPT | Performed by: INTERNAL MEDICINE

## 2023-04-10 PROCEDURE — 94799 UNLISTED PULMONARY SVC/PX: CPT

## 2023-04-10 PROCEDURE — 85610 PROTHROMBIN TIME: CPT | Performed by: INTERNAL MEDICINE

## 2023-04-10 PROCEDURE — 84145 PROCALCITONIN (PCT): CPT | Performed by: INTERNAL MEDICINE

## 2023-04-10 PROCEDURE — 94640 AIRWAY INHALATION TREATMENT: CPT

## 2023-04-10 PROCEDURE — 94660 CPAP INITIATION&MGMT: CPT

## 2023-04-10 PROCEDURE — 71275 CT ANGIOGRAPHY CHEST: CPT

## 2023-04-10 PROCEDURE — 63710000001 INSULIN LISPRO (HUMAN) PER 5 UNITS: Performed by: PHYSICIAN ASSISTANT

## 2023-04-10 PROCEDURE — 25010000002 FUROSEMIDE PER 20 MG: Performed by: INTERNAL MEDICINE

## 2023-04-10 PROCEDURE — 0202U NFCT DS 22 TRGT SARS-COV-2: CPT | Performed by: INTERNAL MEDICINE

## 2023-04-10 PROCEDURE — 93010 ELECTROCARDIOGRAM REPORT: CPT | Performed by: INTERNAL MEDICINE

## 2023-04-10 PROCEDURE — 82962 GLUCOSE BLOOD TEST: CPT

## 2023-04-10 PROCEDURE — 83690 ASSAY OF LIPASE: CPT | Performed by: INTERNAL MEDICINE

## 2023-04-10 PROCEDURE — 99232 SBSQ HOSP IP/OBS MODERATE 35: CPT | Performed by: INTERNAL MEDICINE

## 2023-04-10 PROCEDURE — 99233 SBSQ HOSP IP/OBS HIGH 50: CPT | Performed by: INTERNAL MEDICINE

## 2023-04-10 PROCEDURE — 99233 SBSQ HOSP IP/OBS HIGH 50: CPT | Performed by: PHYSICIAN ASSISTANT

## 2023-04-10 PROCEDURE — 80053 COMPREHEN METABOLIC PANEL: CPT | Performed by: INTERNAL MEDICINE

## 2023-04-10 PROCEDURE — 87641 MR-STAPH DNA AMP PROBE: CPT | Performed by: INTERNAL MEDICINE

## 2023-04-10 PROCEDURE — 93005 ELECTROCARDIOGRAM TRACING: CPT | Performed by: INTERNAL MEDICINE

## 2023-04-10 PROCEDURE — 25010000002 CEFTRIAXONE PER 250 MG: Performed by: INTERNAL MEDICINE

## 2023-04-10 RX ORDER — VALSARTAN 80 MG/1
80 TABLET ORAL
Status: DISCONTINUED | OUTPATIENT
Start: 2023-04-11 | End: 2023-04-14 | Stop reason: HOSPADM

## 2023-04-10 RX ORDER — ENOXAPARIN SODIUM 100 MG/ML
40 INJECTION SUBCUTANEOUS NIGHTLY
Status: DISCONTINUED | OUTPATIENT
Start: 2023-04-10 | End: 2023-04-11

## 2023-04-10 RX ORDER — FUROSEMIDE 10 MG/ML
40 INJECTION INTRAMUSCULAR; INTRAVENOUS ONCE
Status: COMPLETED | OUTPATIENT
Start: 2023-04-10 | End: 2023-04-10

## 2023-04-10 RX ORDER — ECHINACEA PURPUREA EXTRACT 125 MG
1 TABLET ORAL AS NEEDED
Status: DISCONTINUED | OUTPATIENT
Start: 2023-04-10 | End: 2023-04-14 | Stop reason: HOSPADM

## 2023-04-10 RX ORDER — IPRATROPIUM BROMIDE AND ALBUTEROL SULFATE 2.5; .5 MG/3ML; MG/3ML
3 SOLUTION RESPIRATORY (INHALATION)
Status: DISCONTINUED | OUTPATIENT
Start: 2023-04-10 | End: 2023-04-12

## 2023-04-10 RX ORDER — GUAIFENESIN 600 MG/1
1200 TABLET, EXTENDED RELEASE ORAL EVERY 12 HOURS SCHEDULED
Status: DISCONTINUED | OUTPATIENT
Start: 2023-04-10 | End: 2023-04-14 | Stop reason: HOSPADM

## 2023-04-10 RX ADMIN — METRONIDAZOLE 500 MG: 500 INJECTION, SOLUTION INTRAVENOUS at 20:59

## 2023-04-10 RX ADMIN — INSULIN LISPRO 5 UNITS: 100 INJECTION, SOLUTION INTRAVENOUS; SUBCUTANEOUS at 21:00

## 2023-04-10 RX ADMIN — CEFTRIAXONE 2 G: 2 INJECTION, POWDER, FOR SOLUTION INTRAMUSCULAR; INTRAVENOUS at 09:19

## 2023-04-10 RX ADMIN — GUAIFENESIN 1200 MG: 600 TABLET, EXTENDED RELEASE ORAL at 15:37

## 2023-04-10 RX ADMIN — METRONIDAZOLE 500 MG: 500 INJECTION, SOLUTION INTRAVENOUS at 03:00

## 2023-04-10 RX ADMIN — POTASSIUM CHLORIDE 40 MEQ: 10 CAPSULE, COATED, EXTENDED RELEASE ORAL at 00:21

## 2023-04-10 RX ADMIN — OXYBUTYNIN CHLORIDE 5 MG: 5 TABLET, EXTENDED RELEASE ORAL at 09:20

## 2023-04-10 RX ADMIN — TRIAMCINOLONE ACETONIDE 1 APPLICATION: 1 CREAM TOPICAL at 21:01

## 2023-04-10 RX ADMIN — SODIUM CHLORIDE, POTASSIUM CHLORIDE, SODIUM LACTATE AND CALCIUM CHLORIDE 75 ML/HR: 600; 310; 30; 20 INJECTION, SOLUTION INTRAVENOUS at 02:53

## 2023-04-10 RX ADMIN — METRONIDAZOLE 500 MG: 500 INJECTION, SOLUTION INTRAVENOUS at 15:38

## 2023-04-10 RX ADMIN — INSULIN LISPRO 6 UNITS: 100 INJECTION, SOLUTION INTRAVENOUS; SUBCUTANEOUS at 13:22

## 2023-04-10 RX ADMIN — Medication 10 ML: at 21:01

## 2023-04-10 RX ADMIN — IPRATROPIUM BROMIDE AND ALBUTEROL SULFATE 3 ML: 2.5; .5 SOLUTION RESPIRATORY (INHALATION) at 20:55

## 2023-04-10 RX ADMIN — ENOXAPARIN SODIUM 40 MG: 40 INJECTION SUBCUTANEOUS at 21:00

## 2023-04-10 RX ADMIN — IOPAMIDOL 80 ML: 755 INJECTION, SOLUTION INTRAVENOUS at 14:09

## 2023-04-10 RX ADMIN — INSULIN LISPRO 5 UNITS: 100 INJECTION, SOLUTION INTRAVENOUS; SUBCUTANEOUS at 09:21

## 2023-04-10 RX ADMIN — GUAIFENESIN 1200 MG: 600 TABLET, EXTENDED RELEASE ORAL at 21:00

## 2023-04-10 RX ADMIN — INSULIN LISPRO 4 UNITS: 100 INJECTION, SOLUTION INTRAVENOUS; SUBCUTANEOUS at 18:08

## 2023-04-10 RX ADMIN — TRIAMCINOLONE ACETONIDE 1 APPLICATION: 1 CREAM TOPICAL at 09:21

## 2023-04-10 RX ADMIN — VALSARTAN 40 MG: 80 TABLET, FILM COATED ORAL at 09:20

## 2023-04-10 RX ADMIN — SALINE NASAL SPRAY 1 SPRAY: 1.5 SOLUTION NASAL at 22:18

## 2023-04-10 RX ADMIN — FUROSEMIDE 40 MG: 10 INJECTION, SOLUTION INTRAMUSCULAR; INTRAVENOUS at 16:21

## 2023-04-10 RX ADMIN — DOXYCYCLINE 100 MG: 100 INJECTION, POWDER, LYOPHILIZED, FOR SOLUTION INTRAVENOUS at 15:37

## 2023-04-10 RX ADMIN — IPRATROPIUM BROMIDE AND ALBUTEROL SULFATE 3 ML: 2.5; .5 SOLUTION RESPIRATORY (INHALATION) at 15:50

## 2023-04-10 RX ADMIN — Medication 10 ML: at 09:21

## 2023-04-10 RX ADMIN — INSULIN LISPRO 7 UNITS: 100 INJECTION, SOLUTION INTRAVENOUS; SUBCUTANEOUS at 00:21

## 2023-04-10 NOTE — PROGRESS NOTES
[facility] Medicine Services  PROGRESS NOTE    Patient Name: Kaylen Garrison  : 1940  MRN: [id number]    Date of Admission: 2023  Primary Care Physician: Lizzette Multani MD    Subjective   Subjective     CC:  Follow-up for cholelithiasis    HPI:  I have seen and evaluated the patient this morning.  Comfortable in the recliner at bedside.  More hypoxic with increased oxygen requirement at 3.5 L, currently satting 91%.  Walked with her son in the corridor door and her oxygen saturation was around 80% at the end of the walk with no significant dyspnea.  Occasional dry cough.  She denies any chest pain or abdominal pain.  Her son is at bedside and questions addressed    ROS:  General : no fevers, chills  CVS: No chest pain, palpitations  Respiratory:++ Cough, dyspnea  GI: No N/V/D, abd pain  10 point review of systems is negative except for what is mentioned in HPI    Objective   Objective     Vital Signs:   Temp:  [97.1 °F (36.2 °C)-98.3 °F (36.8 °C)] 98.1 °F (36.7 °C)  Heart Rate:  [63-85] 67  Resp:  [16-20] 18  BP: (113-172)/(51-85) 145/73  Flow (L/min):  [4] 4     Physical Exam:  General: Comfortable, not in distress, conversant and cooperative  Head: Atraumatic and normocephalic  Eyes: No Icterus. No pallor  Ears:  Ears appear intact with no abnormalities noted  Throat: No oral lesions, no thrush  Neck: Supple, trachea midline  Lungs: Clear to auscultation bilaterally, equal air entry, right coarse basal crackles  Heart:  Normal S1 and S2, no murmur, no gallop, No JVD, no lower extremity swelling  Abdomen:  Soft, no tenderness, no organomegaly, normal bowel sounds, no organomegaly  Extremities: pulses equal bilaterally  Skin: No bleeding, bruising or rash, normal skin turgor and elasticity  Neurologic: Cranial nerves appear intact with no evidence of facial asymmetry, normal motor and sensory functions in all 4 extremities  Psych: Alert and oriented x 3, normal  mood      Results Reviewed:  LAB RESULTS:      Lab 04/10/23  0601 04/10/23  0600 04/09/23  0541 04/08/23  0641 04/07/23  2334 04/07/23  2326 04/07/23 2321   WBC  --  11.52* 11.88* 9.26  --   --  10.83*   HEMOGLOBIN  --  12.4 12.8 11.3*  --   --  13.3   HEMOGLOBIN, POC  --   --   --   --   --   --  15.0   HEMATOCRIT  --  39.6 41.9 35.4  --   --  42.9   HEMATOCRIT POC  --   --   --   --   --   --  44   PLATELETS  --  270 253 265  --   --  333   NEUTROS ABS  --  9.88* 10.25* 7.30*  --   --  8.94*   IMMATURE GRANS (ABS)  --  0.05 0.06* 0.04  --   --  0.05   LYMPHS ABS  --  0.70 0.61* 0.98  --   --  0.98   MONOS ABS  --  0.82 0.86 0.87  --   --  0.76   EOS ABS  --  0.04 0.07 0.03  --   --  0.06   MCV  --  89.2 93.1 88.5  --   --  89.0   LACTATE  --   --   --   --   --   --  1.1   PROTIME 16.9*  --   --  28.5* 20.5 20.5*  --    APTT  --   --   --   --   --   --  39.1*         Lab 04/10/23  0600 04/09/23  0541 04/08/23  0641 04/07/23  2321   SODIUM 137 141 140 138   POTASSIUM 4.7 3.4* 3.4* 3.5   CHLORIDE 103 103 103 99   CO2 24.0 25.0 24.0 25.0   ANION GAP 10.0 13.0 13.0 14.0   BUN 21 13 22 22   CREATININE 0.92 0.69 0.85 0.90  0.82   EGFR 61.5 85.7 67.7 70.6  63.2   GLUCOSE 374* 197* 205* 280*   CALCIUM 8.7 8.9 8.7 9.1   MAGNESIUM  --   --  1.8  --          Lab 04/10/23  0600 04/09/23  0541 04/08/23  0641 04/07/23  2321   TOTAL PROTEIN 6.0 6.0 5.3* 7.1   ALBUMIN 3.1* 3.8 3.4* 4.0   GLOBULIN 2.9 2.2 1.9 3.1   ALT (SGPT) 198* 328* 408* 297*   AST (SGOT) 67* 219* 590* 597*   BILIRUBIN 1.2 4.0* 2.3* 1.7*   ALK PHOS 154* 185* 161* 154*   LIPASE 44  --   --  47         Lab 04/10/23  0601 04/08/23  0641 04/08/23  0150 04/07/23  2334 04/07/23  2326 04/07/23  2321   HSTROP T  --   --  13*  --   --  14*  14*   PROTIME 16.9* 28.5*  --  20.5 20.5*  --    INR 1.38* 2.66*  --  2* 1.8*  --                  Brief Urine Lab Results  (Last result in the past 365 days)      Color   Clarity   Blood   Leuk Est   Nitrite   Protein   CREAT    Urine HCG        04/08/23 0113 Yellow   Clear   Negative   Negative   Negative   Trace                 Microbiology Results Abnormal     Procedure Component Value - Date/Time    Blood Culture - Blood, Arm, Right [[id number]]  (Normal) Collected: 04/08/23 0103    Lab Status: Preliminary result Specimen: Blood from Arm, Right Updated: 04/10/23 0730     Blood Culture No growth at 2 days    Blood Culture - Blood, Arm, Right [[id number]]  (Normal) Collected: 04/08/23 0103    Lab Status: Preliminary result Specimen: Blood from Arm, Right Updated: 04/10/23 0730     Blood Culture No growth at 2 days          Adult Transthoracic Echo Complete W/ Cont if Necessary Per Protocol    Result Date: 4/8/2023  •  Left ventricular systolic function is normal. Left ventricular ejection fraction appears to be 56 - 60%. •  Left ventricular wall thickness is consistent with mild concentric hypertrophy. •  Left ventricular diastolic function is consistent with (grade II w/high LAP) pseudonormalization. •  Left atrial volume is mildly increased. •  There is moderate calcification of the aortic valve. •  Moderate aortic valve stenosis is present. •  There is moderate calcification of the mitral valve •  Mild to moderate mitral valve stenosis is present. •  Mild tricuspid valve regurgitation is present. •  Estimated right ventricular systolic pressure from tricuspid regurgitation is moderately elevated (45-55 mmHg).     FL ERCP pancreatic and biliary ducts    Result Date: 4/9/2023  FL ERCP PANCREATIC AND BILIARY DUCTS Date of Exam: 4/9/2023 11:20 AM EDT Indication: ercp. Comparison: None available. Technique:  A series of radiographic digital spot films were obtained in conjunction with a endoscopic catheterization of the biliary and pancreatic ductal system, performed by the gastroenterologist. Fluoroscopic Time: 86.1 seconds Number of Images: 3 Findings: Intraoperative imaging was utilized during ERCP.     Impression: Impression:  Intraoperative imaging during ERCP. Please see the endoscopists operative note. Electronically Signed: Maxi Kim  4/9/2023 11:53 AM EDT  Workstation ID: [id number]      Results for orders placed during the hospital encounter of 04/07/23    Adult Transthoracic Echo Complete W/ Cont if Necessary Per Protocol    Interpretation Summary  •  Left ventricular systolic function is normal. Left ventricular ejection fraction appears to be 56 - 60%.  •  Left ventricular wall thickness is consistent with mild concentric hypertrophy.  •  Left ventricular diastolic function is consistent with (grade II w/high LAP) pseudonormalization.  •  Left atrial volume is mildly increased.  •  There is moderate calcification of the aortic valve.  •  Moderate aortic valve stenosis is present.  •  There is moderate calcification of the mitral valve  •  Mild to moderate mitral valve stenosis is present.  •  Mild tricuspid valve regurgitation is present.  •  Estimated right ventricular systolic pressure from tricuspid regurgitation is moderately elevated (45-55 mmHg).      Current medications:  Scheduled Meds:cefTRIAXone, 2 g, Intravenous, Q24H  insulin lispro, 0-7 Units, Subcutaneous, 4x Daily With Meals & Nightly  metroNIDAZOLE, 500 mg, Intravenous, Q8H  oxybutynin XL, 5 mg, Oral, Daily  sodium chloride, 10 mL, Intravenous, Q12H  triamcinolone, 1 application, Topical, Q12H  valsartan, 40 mg, Oral, Q24H      Continuous Infusions:lactated ringers, 75 mL/hr, Last Rate: 75 mL/hr (04/10/23 0644)  lactated ringers, 9 mL/hr, Last Rate: 9 mL/hr (04/09/23 1019)      PRN Meds:.•  acetaminophen  •  Calcium Replacement - Follow Nurse / BPA Driven Protocol  •  dextrose  •  dextrose  •  dextrose  •  dextrose  •  glucagon (human recombinant)  •  glucagon (human recombinant)  •  Magnesium Standard Dose Replacement - Follow Nurse / BPA Driven Protocol  •  Phosphorus Replacement - Follow Nurse / BPA Driven Protocol  •  Potassium Replacement - Follow Nurse /  BPA Driven Protocol  •  sodium chloride  •  sodium chloride  •  sodium chloride    Assessment & Plan   Assessment & Plan     Active Hospital Problems    Diagnosis  POA   • **Acute cholecystitis [K81.0]  Yes   • Paroxysmal atrial fibrillation [I48.0]  Yes   • Type 2 diabetes mellitus with hyperglycemia, without long-term current use of insulin [E11.65]  Yes   • BERNARD treated with BiPAP [G47.33]  Yes   • Hyperlipidemia [E78.5]  Yes   • Hypertension [I10]  Yes      Resolved Hospital Problems   No resolved problems to display.        Brief Hospital Course to date:  Kaylen Garrison is a 82 y.o. female with PMH significant for A-fib on Xarelto, HTN, HLD, BERNARD on BiPAP, DM2, who presents to the ED with complaint of abdominal pain who was found to have concern for acute cholecystitis.    Assessment and plan:  Acute cholecystitis  Transaminitis  · Status post ERCP by Dr. Brunner on 4/9/2023 --> sphincterotomy, balloon sweeps using 3 black multifaceted stones, prophylactic bacterial stent to pancreatic duct  · Continue IV Rocephin and Flagyl  · Plan for lap jenny tomorrow by Dr. GR  · Continue supportive care    Acute hypoxic respiratory  Pulmonary edema with bilateral pleural effusion  · More hypoxic today with increased oxygen requirement to 3.5 L.  On that dose, she satting 91 to 92%.  Not on home O2  · CTA chest done today 04/10/2023 --> no PE but showed bilateral groundglass opacities concerning for pulmonary edema rather than atypical pneumonia.  Also showing bilateral pleural effusion  · We will give her 1 dose of IV Lasix 40 mg today and monitor urine output  · Discontinue IV fluids  · We will add doxycycline for atypical pneumonia coverage     Elevated troponin  · Mildly elevated, suspect demand  · No chest pain     Paroxysmal atrial fibrillation  Elevated INR  · Currently rate controlled with amiodarone  · INR was elevated which is likely secondary to Xarelto.  Last dose of Xarelto was given 04/07/2023   · Start  Lovenox for DVT prophylaxis.  Restart Xarelto after laparoscopic cholecystectomy     Essential hypertension  · Crease valsartan to 80 mg daily  · Continue to hold hydrochlorothiazide and amlodipine    Hyperlipidemia  · Continue to hold statin secondary to elevated liver enzymes  · Can restart at DC     DM2  · A1c 7.2%  · Continue to hold glipizide, Januvia, metformin  · Continue SSI      Expected Discharge Location and Transportation: Home  Expected Discharge  Expected Discharge Date and Time     Expected Discharge Date Expected Discharge Time    Apr 13, 2023            DVT prophylaxis:  Mechanical DVT prophylaxis orders are present.     AM-PAC 6 Clicks Score (PT): 20 (04/09/23 0842)    CODE STATUS:   Code Status and Medical Interventions:   Ordered at: 04/08/23 0117     Code Status (Patient has no pulse and is not breathing):    CPR (Attempt to Resuscitate)     Medical Interventions (Patient has pulse or is breathing):    Full Support       Jonathan Scott MD  04/10/23

## 2023-04-10 NOTE — PROGRESS NOTES
"Kaylen Garrison  1940  [id number]    Surgery Progress Note    Date of visit: 4/10/2023    Subjective: No complaints of abdominal pain  Sitting up in a chair  Has been ambulating  Tolerating diet    Objective:    /73 (BP Location: Left arm, Patient Position: Lying)   Pulse 67   Temp 98.1 °F (36.7 °C) (Axillary)   Resp 18   Ht 160 cm (62.99\")   Wt 67 kg (147 lb 11.3 oz)   SpO2 93%   BMI 26.17 kg/m²     Intake/Output Summary (Last 24 hours) at 4/10/2023 0807  Last data filed at 4/10/2023 0300  Gross per 24 hour   Intake 1591.25 ml   Output 250 ml   Net 1341.25 ml       CV: Irregular irregular rhythm  L: normal air entry  Abd: Soft and nontender      LABS:    Results from last 7 days   Lab Units 04/10/23  0600   WBC 10*3/mm3 11.52*   HEMOGLOBIN g/dL 12.4   HEMATOCRIT % 39.6   PLATELETS 10*3/mm3 270     Results from last 7 days   Lab Units 04/10/23  0600   SODIUM mmol/L 137   POTASSIUM mmol/L 4.7   CHLORIDE mmol/L 103   CO2 mmol/L 24.0   BUN mg/dL 21   CREATININE mg/dL 0.92   CALCIUM mg/dL 8.7   BILIRUBIN mg/dL 1.2   ALK PHOS U/L 154*   ALT (SGPT) U/L 198*   AST (SGOT) U/L 67*   GLUCOSE mg/dL 374*     Results from last 7 days   Lab Units 04/10/23  0600   SODIUM mmol/L 137   POTASSIUM mmol/L 4.7   CHLORIDE mmol/L 103   CO2 mmol/L 24.0   BUN mg/dL 21   CREATININE mg/dL 0.92   GLUCOSE mg/dL 374*   CALCIUM mg/dL 8.7     Lab Results   Lab Value Date/Time    LIPASE 44 04/10/2023 0600    LIPASE 47 04/07/2023 2321         Assessment/ Plan: Patient admitted with acute calculus cholecystitis and choledocholithiasis.  She is status post ERCP with extraction of 3 stones.  She has been clinically stable off Xarelto.  Cardiac clearance obtained.  Plan to proceed with laparoscopic cholecystectomy, possible open tomorrow.  I have discussed the procedure with her son already.    Problem List Items Addressed This Visit        Gastrointestinal Abdominal     * (Principal) Acute cholecystitis - Primary    Relevant Orders "    Case Request (Completed)   Other Visit Diagnoses     Acute abdominal pain        Relevant Orders    Case Request (Completed)    Uncontrolled hypertension        Choledocholithiasis        Relevant Orders    Case Request (Completed)            Abraham Holbrook MD  4/10/2023  08:07 EDT

## 2023-04-10 NOTE — PROGRESS NOTES
"GI Daily Progress Note  Subjective:    Pt sitting up in chair, recently returned from CT. Son at bedside. Pt c/o exertional dyspnea this date with ambulation. She denies abdominal pain, nausea, vomiting, diarrhea, fever or chills. Anticipating CCY tomorrow.     ERCP yesterday with biliary sphincterotomy, balloon sweep and stone removal. PD stent placed. Total bilirubin improved to 1.2 today, AST 67, , alk phos 154.     Objective:    /73 (BP Location: Left arm, Patient Position: Lying)   Pulse 67   Temp 98.1 °F (36.7 °C) (Axillary)   Resp 18   Ht 160 cm (62.99\")   Wt 67 kg (147 lb 11.3 oz)   SpO2 93%   BMI 26.17 kg/m²     Physical Exam  Vitals and nursing note reviewed.   Constitutional:       General: She is not in acute distress.     Appearance: Normal appearance. She is normal weight. She is not ill-appearing or diaphoretic.      Interventions: Nasal cannula in place.   HENT:      Head: Normocephalic and atraumatic.      Right Ear: External ear normal.      Left Ear: External ear normal.      Nose: Nose normal.      Mouth/Throat:      Mouth: Mucous membranes are moist.      Pharynx: Oropharynx is clear. No posterior oropharyngeal erythema.   Eyes:      Conjunctiva/sclera: Conjunctivae normal.      Pupils: Pupils are equal, round, and reactive to light.   Neck:      Thyroid: No thyromegaly.   Cardiovascular:      Rate and Rhythm: Normal rate and regular rhythm.      Pulses: Normal pulses.      Heart sounds: Normal heart sounds.   Pulmonary:      Effort: Pulmonary effort is normal.      Breath sounds: Normal breath sounds. No wheezing.      Comments: Conversationally dyspneic  Abdominal:      General: Abdomen is flat. Bowel sounds are normal. There is no distension.      Tenderness: There is no abdominal tenderness.   Musculoskeletal:         General: Normal range of motion.      Cervical back: Normal range of motion and neck supple.   Skin:     General: Skin is warm and dry.   Neurological:      " General: No focal deficit present.      Mental Status: She is oriented to person, place, and time.   Psychiatric:         Mood and Affect: Mood normal.         Lab  Lab Results   Component Value Date    WBC 11.52 (H) 04/10/2023    HGB 12.4 04/10/2023    HGB 12.8 04/09/2023    HGB 11.3 (L) 04/08/2023    MCV 89.2 04/10/2023     04/10/2023    INR 1.38 (H) 04/10/2023    INR 2.66 (H) 04/08/2023    INR 2 (A) 04/07/2023    INR 1.8 (H) 04/07/2023       Lab Results   Component Value Date    GLUCOSE 374 (H) 04/10/2023    BUN 21 04/10/2023    CREATININE 0.92 04/10/2023    EGFRIFNONA 55 (L) 02/10/2022    BCR 22.8 04/10/2023     04/10/2023    K 4.7 04/10/2023    CO2 24.0 04/10/2023    CALCIUM 8.7 04/10/2023    ALBUMIN 3.1 (L) 04/10/2023    ALKPHOS 154 (H) 04/10/2023    BILITOT 1.2 04/10/2023     (H) 04/10/2023    AST 67 (H) 04/10/2023       Assessment and Plan:    Acute cholecystitis  Choledocholithiasis with biliary obstruction s/p ERCP and PD stent placement 4/9  Elevated LFTs 2/2 above, improved  PAF on Xarelto  T2DM   - continue trending LFTs    - plan for CCY tomorrow   - KUB 4/12 AM to evaluate stent placement     India Farias PA-C  04/10/23  15:05 EDT

## 2023-04-10 NOTE — PROGRESS NOTES
"[facility] Cardiology Daily Note       LOS: 0 days   Patient Care Team:  Lizzette Multani MD as PCP - General (Internal Medicine)  Irish Sousa PA as Physician Assistant (Endocrinology)  Harley Rose MD as Consulting Physician (Endocrinology)  Matilde Alvarez MD as Consulting Physician (Cardiology)    Chief Complaint: Elevated troponin/prolonged QT    Subjective     Subjective: 99% on 4 L.  Sitting up eating breakfast.  No complaints currently.  Abdomen feels good.  Blood pressures have been good overall through the night.  Heart rates in the 60s and 70s.    Review of Systems:   As above.    Medications:  cefTRIAXone, 2 g, Intravenous, Q24H  insulin lispro, 0-7 Units, Subcutaneous, 4x Daily With Meals & Nightly  metroNIDAZOLE, 500 mg, Intravenous, Q8H  oxybutynin XL, 5 mg, Oral, Daily  sodium chloride, 10 mL, Intravenous, Q12H  triamcinolone, 1 application, Topical, Q12H  valsartan, 40 mg, Oral, Q24H        Objective     Vital Sign Min/Max for last 24 hours  Temp  Min: 97.1 °F (36.2 °C)  Max: 98.3 °F (36.8 °C)   BP  Min: 113/51  Max: 172/77   Pulse  Min: 63  Max: 85   Resp  Min: 16  Max: 20   SpO2  Min: 91 %  Max: 98 %   Flow (L/min)  Min: 4  Max: 4   Weight  Min: 67 kg (147 lb 11.3 oz)  Max: 67 kg (147 lb 11.3 oz)      Intake/Output Summary (Last 24 hours) at 4/10/2023 0747  Last data filed at 4/10/2023 0300  Gross per 24 hour   Intake 1591.25 ml   Output 250 ml   Net 1341.25 ml        Flowsheet Rows    Flowsheet Row First Filed Value   Admission Height 160 cm (63\") Documented at 04/07/2023 2326   Admission Weight 72.6 kg (160 lb) Documented at 04/07/2023 2326          Physical Exam:    General: Alert and oriented.   Cardiovascular: Heart has a nondisplaced focal PMI. Regular rate and rhythm with 2/6 SE murmur, no gallop or rub.  Lungs: Clear without rales or wheezes. Equal expansion is noted.   Abdomen: Soft, nontender.  Extremities: Show no edema.   Skin: warm and " dry.     Results Review:    I reviewed the patient's new clinical results.  EKG:  Tele: Sinus rhythm at 60 bpm    Labs:    Results from last 7 days   Lab Units 04/10/23  0600 04/09/23  0541 04/08/23  0641   SODIUM mmol/L 137 141 140   POTASSIUM mmol/L 4.7 3.4* 3.4*   CHLORIDE mmol/L 103 103 103   CO2 mmol/L 24.0 25.0 24.0   BUN mg/dL 21 13 22   CREATININE mg/dL 0.92 0.69 0.85   CALCIUM mg/dL 8.7 8.9 8.7   BILIRUBIN mg/dL 1.2 4.0* 2.3*   ALK PHOS U/L 154* 185* 161*   ALT (SGPT) U/L 198* 328* 408*   AST (SGOT) U/L 67* 219* 590*   GLUCOSE mg/dL 374* 197* 205*     Results from last 7 days   Lab Units 04/10/23  0600 04/09/23  0541 04/08/23  0641   WBC 10*3/mm3 11.52* 11.88* 9.26   HEMOGLOBIN g/dL 12.4 12.8 11.3*   HEMATOCRIT % 39.6 41.9 35.4   PLATELETS 10*3/mm3 270 253 265     Lab Results   Component Value Date    TROPONINT 13 (H) 04/08/2023    TROPONINT 14 (H) 04/07/2023    TROPONINT 14 (H) 04/07/2023     Lab Results   Component Value Date    CHOL 97 11/11/2021     Lab Results   Component Value Date    TRIG 66 11/11/2021     Lab Results   Component Value Date    HDL 42 11/11/2021     No components found for: LDLCALC  Lab Results   Component Value Date    INR 1.38 (H) 04/10/2023    INR 2.66 (H) 04/08/2023    INR 2 (A) 04/07/2023    PROTIME 16.9 (H) 04/10/2023    PROTIME 28.5 (H) 04/08/2023    PROTIME 20.5 04/07/2023         Ejection Fraction: 55 to 60% by echo 4/8/2023    Assessment   Assessment:    1. Acute cholecystitis, transaminitis  1. Status post ERCP with extraction of 3 stones  2. Paroxysmal atrial fibrillation  a. Diagnosed 2021 status post SATISH/ECV  b. Remains rhythm controlled on amiodarone  c. Xarelto on hold  d. Amiodarone on hold prolonged QT  3. Moderate aortic stenosis, mean gradient 23 mmHg.  4. Mild to moderate mitral stenosis, mean gradient 6 mmHg.  5. Minimally elevated troponin secondary to demand ischemia      Plan:    Restart amiodarone at 100 mg daily post cholecystectomy  Anticipate laparoscopic  cholecystectomy, possibly tomorrow.  Xarelto on hold.    Matilde Alvarez MD  04/10/23  07:47 EDT

## 2023-04-11 ENCOUNTER — ANESTHESIA (OUTPATIENT)
Dept: PERIOP | Facility: HOSPITAL | Age: 83
DRG: 417 | End: 2023-04-11
Payer: MEDICARE

## 2023-04-11 ENCOUNTER — ANESTHESIA EVENT (OUTPATIENT)
Dept: PERIOP | Facility: HOSPITAL | Age: 83
DRG: 417 | End: 2023-04-11
Payer: MEDICARE

## 2023-04-11 LAB
ALBUMIN SERPL-MCNC: 3.2 G/DL (ref 3.5–5.2)
ALBUMIN/GLOB SERPL: 1.2 G/DL
ALP SERPL-CCNC: 119 U/L (ref 39–117)
ALT SERPL W P-5'-P-CCNC: 132 U/L (ref 1–33)
ANION GAP SERPL CALCULATED.3IONS-SCNC: 9 MMOL/L (ref 5–15)
AST SERPL-CCNC: 33 U/L (ref 1–32)
BASOPHILS # BLD AUTO: 0.05 10*3/MM3 (ref 0–0.2)
BASOPHILS NFR BLD AUTO: 0.5 % (ref 0–1.5)
BILIRUB SERPL-MCNC: 0.8 MG/DL (ref 0–1.2)
BUN SERPL-MCNC: 24 MG/DL (ref 8–23)
BUN/CREAT SERPL: 29.6 (ref 7–25)
CALCIUM SPEC-SCNC: 8.1 MG/DL (ref 8.6–10.5)
CHLORIDE SERPL-SCNC: 106 MMOL/L (ref 98–107)
CO2 SERPL-SCNC: 25 MMOL/L (ref 22–29)
CREAT SERPL-MCNC: 0.81 MG/DL (ref 0.57–1)
DEPRECATED RDW RBC AUTO: 56.2 FL (ref 37–54)
EGFRCR SERPLBLD CKD-EPI 2021: 71.7 ML/MIN/1.73
EOSINOPHIL # BLD AUTO: 0.19 10*3/MM3 (ref 0–0.4)
EOSINOPHIL NFR BLD AUTO: 1.9 % (ref 0.3–6.2)
ERYTHROCYTE [DISTWIDTH] IN BLOOD BY AUTOMATED COUNT: 17.2 % (ref 12.3–15.4)
GLOBULIN UR ELPH-MCNC: 2.6 GM/DL
GLUCOSE BLDC GLUCOMTR-MCNC: 227 MG/DL (ref 70–130)
GLUCOSE BLDC GLUCOMTR-MCNC: 262 MG/DL (ref 70–130)
GLUCOSE BLDC GLUCOMTR-MCNC: 276 MG/DL (ref 70–130)
GLUCOSE BLDC GLUCOMTR-MCNC: 301 MG/DL (ref 70–130)
GLUCOSE BLDC GLUCOMTR-MCNC: 349 MG/DL (ref 70–130)
GLUCOSE BLDC GLUCOMTR-MCNC: 419 MG/DL (ref 70–130)
GLUCOSE BLDC GLUCOMTR-MCNC: 474 MG/DL (ref 70–130)
GLUCOSE SERPL-MCNC: 287 MG/DL (ref 65–99)
HCT VFR BLD AUTO: 35.9 % (ref 34–46.6)
HGB BLD-MCNC: 11.5 G/DL (ref 12–15.9)
IMM GRANULOCYTES # BLD AUTO: 0.05 10*3/MM3 (ref 0–0.05)
IMM GRANULOCYTES NFR BLD AUTO: 0.5 % (ref 0–0.5)
LYMPHOCYTES # BLD AUTO: 1.39 10*3/MM3 (ref 0.7–3.1)
LYMPHOCYTES NFR BLD AUTO: 14.2 % (ref 19.6–45.3)
MAGNESIUM SERPL-MCNC: 1.9 MG/DL (ref 1.6–2.4)
MCH RBC QN AUTO: 28.3 PG (ref 26.6–33)
MCHC RBC AUTO-ENTMCNC: 32 G/DL (ref 31.5–35.7)
MCV RBC AUTO: 88.4 FL (ref 79–97)
MONOCYTES # BLD AUTO: 0.77 10*3/MM3 (ref 0.1–0.9)
MONOCYTES NFR BLD AUTO: 7.8 % (ref 5–12)
NEUTROPHILS NFR BLD AUTO: 7.36 10*3/MM3 (ref 1.7–7)
NEUTROPHILS NFR BLD AUTO: 75.1 % (ref 42.7–76)
NRBC BLD AUTO-RTO: 0 /100 WBC (ref 0–0.2)
PLATELET # BLD AUTO: 267 10*3/MM3 (ref 140–450)
PMV BLD AUTO: 10.7 FL (ref 6–12)
POTASSIUM SERPL-SCNC: 3.9 MMOL/L (ref 3.5–5.2)
PROT SERPL-MCNC: 5.8 G/DL (ref 6–8.5)
QT INTERVAL: 480 MS
QTC INTERVAL: 503 MS
RBC # BLD AUTO: 4.06 10*6/MM3 (ref 3.77–5.28)
SODIUM SERPL-SCNC: 140 MMOL/L (ref 136–145)
WBC NRBC COR # BLD: 9.81 10*3/MM3 (ref 3.4–10.8)

## 2023-04-11 PROCEDURE — 93005 ELECTROCARDIOGRAM TRACING: CPT | Performed by: INTERNAL MEDICINE

## 2023-04-11 PROCEDURE — 63710000001 INSULIN LISPRO (HUMAN) PER 5 UNITS

## 2023-04-11 PROCEDURE — 25010000002 PROPOFOL 10 MG/ML EMULSION: Performed by: NURSE ANESTHETIST, CERTIFIED REGISTERED

## 2023-04-11 PROCEDURE — 25010000002 ONDANSETRON PER 1 MG: Performed by: NURSE ANESTHETIST, CERTIFIED REGISTERED

## 2023-04-11 PROCEDURE — 82962 GLUCOSE BLOOD TEST: CPT

## 2023-04-11 PROCEDURE — 63710000001 INSULIN LISPRO (HUMAN) PER 5 UNITS: Performed by: PHYSICIAN ASSISTANT

## 2023-04-11 PROCEDURE — 94761 N-INVAS EAR/PLS OXIMETRY MLT: CPT

## 2023-04-11 PROCEDURE — 25010000002 FENTANYL CITRATE (PF) 100 MCG/2ML SOLUTION: Performed by: NURSE ANESTHETIST, CERTIFIED REGISTERED

## 2023-04-11 PROCEDURE — 93010 ELECTROCARDIOGRAM REPORT: CPT | Performed by: INTERNAL MEDICINE

## 2023-04-11 PROCEDURE — 94799 UNLISTED PULMONARY SVC/PX: CPT

## 2023-04-11 PROCEDURE — 99232 SBSQ HOSP IP/OBS MODERATE 35: CPT | Performed by: INTERNAL MEDICINE

## 2023-04-11 PROCEDURE — 63710000001 INSULIN DETEMIR PER 5 UNITS: Performed by: SURGERY

## 2023-04-11 PROCEDURE — 83735 ASSAY OF MAGNESIUM: CPT | Performed by: INTERNAL MEDICINE

## 2023-04-11 PROCEDURE — 99231 SBSQ HOSP IP/OBS SF/LOW 25: CPT | Performed by: INTERNAL MEDICINE

## 2023-04-11 PROCEDURE — 0FT44ZZ RESECTION OF GALLBLADDER, PERCUTANEOUS ENDOSCOPIC APPROACH: ICD-10-PCS | Performed by: SURGERY

## 2023-04-11 PROCEDURE — 63710000001 INSULIN LISPRO (HUMAN) PER 5 UNITS: Performed by: SURGERY

## 2023-04-11 PROCEDURE — 25010000002 DEXAMETHASONE PER 1 MG: Performed by: NURSE ANESTHETIST, CERTIFIED REGISTERED

## 2023-04-11 PROCEDURE — 85025 COMPLETE CBC W/AUTO DIFF WBC: CPT | Performed by: INTERNAL MEDICINE

## 2023-04-11 PROCEDURE — 25010000002 CEFTRIAXONE PER 250 MG: Performed by: INTERNAL MEDICINE

## 2023-04-11 PROCEDURE — 88304 TISSUE EXAM BY PATHOLOGIST: CPT | Performed by: SURGERY

## 2023-04-11 PROCEDURE — 25010000002 FENTANYL CITRATE (PF) 50 MCG/ML SOLUTION

## 2023-04-11 PROCEDURE — 80053 COMPREHEN METABOLIC PANEL: CPT | Performed by: INTERNAL MEDICINE

## 2023-04-11 DEVICE — LIGACLIP 12 MM ENDOSCOPIC ROTATING MULTIPLE CLIP APPLIER (LARGE)
Type: IMPLANTABLE DEVICE | Site: ABDOMEN | Status: FUNCTIONAL
Brand: LIGACLIP

## 2023-04-11 RX ORDER — SODIUM CHLORIDE 9 MG/ML
INJECTION, SOLUTION INTRAVENOUS AS NEEDED
Status: DISCONTINUED | OUTPATIENT
Start: 2023-04-11 | End: 2023-04-11 | Stop reason: HOSPADM

## 2023-04-11 RX ORDER — ENOXAPARIN SODIUM 100 MG/ML
40 INJECTION SUBCUTANEOUS NIGHTLY
Status: DISCONTINUED | OUTPATIENT
Start: 2023-04-12 | End: 2023-04-12

## 2023-04-11 RX ORDER — DROPERIDOL 2.5 MG/ML
0.62 INJECTION, SOLUTION INTRAMUSCULAR; INTRAVENOUS ONCE AS NEEDED
Status: DISCONTINUED | OUTPATIENT
Start: 2023-04-11 | End: 2023-04-11 | Stop reason: HOSPADM

## 2023-04-11 RX ORDER — SODIUM CHLORIDE 0.9 % (FLUSH) 0.9 %
10 SYRINGE (ML) INJECTION EVERY 12 HOURS SCHEDULED
Status: CANCELLED | OUTPATIENT
Start: 2023-04-11

## 2023-04-11 RX ORDER — PROPOFOL 10 MG/ML
VIAL (ML) INTRAVENOUS AS NEEDED
Status: DISCONTINUED | OUTPATIENT
Start: 2023-04-11 | End: 2023-04-11 | Stop reason: SURG

## 2023-04-11 RX ORDER — ROCURONIUM BROMIDE 10 MG/ML
INJECTION, SOLUTION INTRAVENOUS AS NEEDED
Status: DISCONTINUED | OUTPATIENT
Start: 2023-04-11 | End: 2023-04-11 | Stop reason: SURG

## 2023-04-11 RX ORDER — SODIUM CHLORIDE 0.9 % (FLUSH) 0.9 %
3 SYRINGE (ML) INJECTION EVERY 12 HOURS SCHEDULED
Status: DISCONTINUED | OUTPATIENT
Start: 2023-04-11 | End: 2023-04-11 | Stop reason: HOSPADM

## 2023-04-11 RX ORDER — FENTANYL CITRATE 50 UG/ML
INJECTION, SOLUTION INTRAMUSCULAR; INTRAVENOUS
Status: COMPLETED
Start: 2023-04-11 | End: 2023-04-11

## 2023-04-11 RX ORDER — LIDOCAINE HYDROCHLORIDE 10 MG/ML
INJECTION, SOLUTION EPIDURAL; INFILTRATION; INTRACAUDAL; PERINEURAL AS NEEDED
Status: DISCONTINUED | OUTPATIENT
Start: 2023-04-11 | End: 2023-04-11 | Stop reason: SURG

## 2023-04-11 RX ORDER — MAGNESIUM HYDROXIDE 1200 MG/15ML
LIQUID ORAL AS NEEDED
Status: DISCONTINUED | OUTPATIENT
Start: 2023-04-11 | End: 2023-04-11 | Stop reason: HOSPADM

## 2023-04-11 RX ORDER — LABETALOL HYDROCHLORIDE 5 MG/ML
5 INJECTION, SOLUTION INTRAVENOUS
Status: DISCONTINUED | OUTPATIENT
Start: 2023-04-11 | End: 2023-04-11 | Stop reason: HOSPADM

## 2023-04-11 RX ORDER — FAMOTIDINE 10 MG/ML
20 INJECTION, SOLUTION INTRAVENOUS ONCE
Status: COMPLETED | OUTPATIENT
Start: 2023-04-11 | End: 2023-04-11

## 2023-04-11 RX ORDER — PHENYLEPHRINE HCL IN 0.9% NACL 1 MG/10 ML
SYRINGE (ML) INTRAVENOUS AS NEEDED
Status: DISCONTINUED | OUTPATIENT
Start: 2023-04-11 | End: 2023-04-11 | Stop reason: SURG

## 2023-04-11 RX ORDER — SODIUM CHLORIDE, SODIUM LACTATE, POTASSIUM CHLORIDE, CALCIUM CHLORIDE 600; 310; 30; 20 MG/100ML; MG/100ML; MG/100ML; MG/100ML
9 INJECTION, SOLUTION INTRAVENOUS CONTINUOUS
Status: DISCONTINUED | OUTPATIENT
Start: 2023-04-11 | End: 2023-04-14 | Stop reason: HOSPADM

## 2023-04-11 RX ORDER — HYDROCODONE BITARTRATE AND ACETAMINOPHEN 5; 325 MG/1; MG/1
1 TABLET ORAL ONCE AS NEEDED
Status: DISCONTINUED | OUTPATIENT
Start: 2023-04-11 | End: 2023-04-11 | Stop reason: HOSPADM

## 2023-04-11 RX ORDER — FENTANYL CITRATE 50 UG/ML
50 INJECTION, SOLUTION INTRAMUSCULAR; INTRAVENOUS
Status: DISCONTINUED | OUTPATIENT
Start: 2023-04-11 | End: 2023-04-11 | Stop reason: HOSPADM

## 2023-04-11 RX ORDER — ONDANSETRON 2 MG/ML
4 INJECTION INTRAMUSCULAR; INTRAVENOUS ONCE AS NEEDED
Status: DISCONTINUED | OUTPATIENT
Start: 2023-04-11 | End: 2023-04-11 | Stop reason: HOSPADM

## 2023-04-11 RX ORDER — PROMETHAZINE HYDROCHLORIDE 25 MG/1
25 SUPPOSITORY RECTAL ONCE AS NEEDED
Status: DISCONTINUED | OUTPATIENT
Start: 2023-04-11 | End: 2023-04-11 | Stop reason: HOSPADM

## 2023-04-11 RX ORDER — PROMETHAZINE HYDROCHLORIDE 25 MG/1
25 TABLET ORAL ONCE AS NEEDED
Status: DISCONTINUED | OUTPATIENT
Start: 2023-04-11 | End: 2023-04-11 | Stop reason: HOSPADM

## 2023-04-11 RX ORDER — INSULIN LISPRO 100 [IU]/ML
INJECTION, SOLUTION INTRAVENOUS; SUBCUTANEOUS
Status: COMPLETED
Start: 2023-04-11 | End: 2023-04-11

## 2023-04-11 RX ORDER — SODIUM CHLORIDE 9 MG/ML
40 INJECTION, SOLUTION INTRAVENOUS AS NEEDED
Status: CANCELLED | OUTPATIENT
Start: 2023-04-11

## 2023-04-11 RX ORDER — HYDROMORPHONE HYDROCHLORIDE 1 MG/ML
0.5 INJECTION, SOLUTION INTRAMUSCULAR; INTRAVENOUS; SUBCUTANEOUS
Status: DISCONTINUED | OUTPATIENT
Start: 2023-04-11 | End: 2023-04-14 | Stop reason: HOSPADM

## 2023-04-11 RX ORDER — MIDAZOLAM HYDROCHLORIDE 1 MG/ML
0.5 INJECTION INTRAMUSCULAR; INTRAVENOUS
Status: DISCONTINUED | OUTPATIENT
Start: 2023-04-11 | End: 2023-04-11 | Stop reason: HOSPADM

## 2023-04-11 RX ORDER — ONDANSETRON 2 MG/ML
INJECTION INTRAMUSCULAR; INTRAVENOUS AS NEEDED
Status: DISCONTINUED | OUTPATIENT
Start: 2023-04-11 | End: 2023-04-11 | Stop reason: SURG

## 2023-04-11 RX ORDER — SODIUM CHLORIDE 0.9 % (FLUSH) 0.9 %
3-10 SYRINGE (ML) INJECTION AS NEEDED
Status: DISCONTINUED | OUTPATIENT
Start: 2023-04-11 | End: 2023-04-11 | Stop reason: HOSPADM

## 2023-04-11 RX ORDER — NALOXONE HCL 0.4 MG/ML
0.4 VIAL (ML) INJECTION AS NEEDED
Status: DISCONTINUED | OUTPATIENT
Start: 2023-04-11 | End: 2023-04-11 | Stop reason: HOSPADM

## 2023-04-11 RX ORDER — SODIUM CHLORIDE 9 MG/ML
40 INJECTION, SOLUTION INTRAVENOUS AS NEEDED
Status: DISCONTINUED | OUTPATIENT
Start: 2023-04-11 | End: 2023-04-11 | Stop reason: HOSPADM

## 2023-04-11 RX ORDER — DROPERIDOL 2.5 MG/ML
0.62 INJECTION, SOLUTION INTRAMUSCULAR; INTRAVENOUS
Status: DISCONTINUED | OUTPATIENT
Start: 2023-04-11 | End: 2023-04-11 | Stop reason: HOSPADM

## 2023-04-11 RX ORDER — IPRATROPIUM BROMIDE AND ALBUTEROL SULFATE 2.5; .5 MG/3ML; MG/3ML
3 SOLUTION RESPIRATORY (INHALATION) ONCE AS NEEDED
Status: DISCONTINUED | OUTPATIENT
Start: 2023-04-11 | End: 2023-04-11 | Stop reason: HOSPADM

## 2023-04-11 RX ORDER — BUPIVACAINE HYDROCHLORIDE AND EPINEPHRINE 5; 5 MG/ML; UG/ML
INJECTION, SOLUTION EPIDURAL; INTRACAUDAL; PERINEURAL AS NEEDED
Status: DISCONTINUED | OUTPATIENT
Start: 2023-04-11 | End: 2023-04-11 | Stop reason: HOSPADM

## 2023-04-11 RX ORDER — FAMOTIDINE 20 MG/1
20 TABLET, FILM COATED ORAL ONCE
Status: CANCELLED | OUTPATIENT
Start: 2023-04-11 | End: 2023-04-11

## 2023-04-11 RX ORDER — SODIUM CHLORIDE 0.9 % (FLUSH) 0.9 %
10 SYRINGE (ML) INJECTION AS NEEDED
Status: CANCELLED | OUTPATIENT
Start: 2023-04-11

## 2023-04-11 RX ORDER — INSULIN LISPRO 100 [IU]/ML
5 INJECTION, SOLUTION INTRAVENOUS; SUBCUTANEOUS
Status: DISCONTINUED | OUTPATIENT
Start: 2023-04-11 | End: 2023-04-12

## 2023-04-11 RX ORDER — DEXAMETHASONE SODIUM PHOSPHATE 4 MG/ML
INJECTION, SOLUTION INTRA-ARTICULAR; INTRALESIONAL; INTRAMUSCULAR; INTRAVENOUS; SOFT TISSUE AS NEEDED
Status: DISCONTINUED | OUTPATIENT
Start: 2023-04-11 | End: 2023-04-11 | Stop reason: SURG

## 2023-04-11 RX ORDER — LIDOCAINE HYDROCHLORIDE 10 MG/ML
0.5 INJECTION, SOLUTION EPIDURAL; INFILTRATION; INTRACAUDAL; PERINEURAL ONCE AS NEEDED
Status: DISCONTINUED | OUTPATIENT
Start: 2023-04-11 | End: 2023-04-11 | Stop reason: HOSPADM

## 2023-04-11 RX ORDER — HYDROCODONE BITARTRATE AND ACETAMINOPHEN 5; 325 MG/1; MG/1
1 TABLET ORAL EVERY 6 HOURS PRN
Status: DISCONTINUED | OUTPATIENT
Start: 2023-04-11 | End: 2023-04-14 | Stop reason: HOSPADM

## 2023-04-11 RX ORDER — FENTANYL CITRATE 50 UG/ML
INJECTION, SOLUTION INTRAMUSCULAR; INTRAVENOUS AS NEEDED
Status: DISCONTINUED | OUTPATIENT
Start: 2023-04-11 | End: 2023-04-11 | Stop reason: SURG

## 2023-04-11 RX ADMIN — METRONIDAZOLE 500 MG: 500 INJECTION, SOLUTION INTRAVENOUS at 03:07

## 2023-04-11 RX ADMIN — Medication 10 ML: at 20:56

## 2023-04-11 RX ADMIN — SUGAMMADEX 200 MG: 100 INJECTION, SOLUTION INTRAVENOUS at 15:19

## 2023-04-11 RX ADMIN — DEXAMETHASONE SODIUM PHOSPHATE 8 MG: 4 INJECTION, SOLUTION INTRAMUSCULAR; INTRAVENOUS at 14:34

## 2023-04-11 RX ADMIN — INSULIN DETEMIR 10 UNITS: 100 INJECTION, SOLUTION SUBCUTANEOUS at 20:55

## 2023-04-11 RX ADMIN — DOXYCYCLINE 100 MG: 100 INJECTION, POWDER, LYOPHILIZED, FOR SOLUTION INTRAVENOUS at 12:54

## 2023-04-11 RX ADMIN — INSULIN LISPRO 7 UNITS: 100 INJECTION, SOLUTION INTRAVENOUS; SUBCUTANEOUS at 20:56

## 2023-04-11 RX ADMIN — FAMOTIDINE 20 MG: 10 INJECTION INTRAVENOUS at 13:46

## 2023-04-11 RX ADMIN — IPRATROPIUM BROMIDE AND ALBUTEROL SULFATE 3 ML: 2.5; .5 SOLUTION RESPIRATORY (INHALATION) at 09:23

## 2023-04-11 RX ADMIN — IPRATROPIUM BROMIDE AND ALBUTEROL SULFATE 3 ML: 2.5; .5 SOLUTION RESPIRATORY (INHALATION) at 21:05

## 2023-04-11 RX ADMIN — FENTANYL CITRATE 25 MCG: 50 INJECTION, SOLUTION INTRAMUSCULAR; INTRAVENOUS at 16:36

## 2023-04-11 RX ADMIN — GUAIFENESIN 1200 MG: 600 TABLET, EXTENDED RELEASE ORAL at 20:56

## 2023-04-11 RX ADMIN — INSULIN LISPRO 5 UNITS: 100 INJECTION, SOLUTION INTRAVENOUS; SUBCUTANEOUS at 17:05

## 2023-04-11 RX ADMIN — OXYBUTYNIN CHLORIDE 5 MG: 5 TABLET, EXTENDED RELEASE ORAL at 11:13

## 2023-04-11 RX ADMIN — INSULIN LISPRO 5 UNITS: 100 INJECTION, SOLUTION INTRAVENOUS; SUBCUTANEOUS at 18:25

## 2023-04-11 RX ADMIN — Medication 50 MCG: at 14:34

## 2023-04-11 RX ADMIN — ROCURONIUM BROMIDE 40 MG: 10 INJECTION INTRAVENOUS at 14:34

## 2023-04-11 RX ADMIN — Medication 10 ML: at 09:36

## 2023-04-11 RX ADMIN — METRONIDAZOLE 500 MG: 500 INJECTION, SOLUTION INTRAVENOUS at 20:55

## 2023-04-11 RX ADMIN — SODIUM CHLORIDE, POTASSIUM CHLORIDE, SODIUM LACTATE AND CALCIUM CHLORIDE 9 ML/HR: 600; 310; 30; 20 INJECTION, SOLUTION INTRAVENOUS at 02:01

## 2023-04-11 RX ADMIN — SALINE NASAL SPRAY 1 SPRAY: 1.5 SOLUTION NASAL at 05:47

## 2023-04-11 RX ADMIN — CEFTRIAXONE 2 G: 2 INJECTION, POWDER, FOR SOLUTION INTRAMUSCULAR; INTRAVENOUS at 09:59

## 2023-04-11 RX ADMIN — SODIUM CHLORIDE, POTASSIUM CHLORIDE, SODIUM LACTATE AND CALCIUM CHLORIDE 9 ML/HR: 600; 310; 30; 20 INJECTION, SOLUTION INTRAVENOUS at 13:46

## 2023-04-11 RX ADMIN — SODIUM CHLORIDE, POTASSIUM CHLORIDE, SODIUM LACTATE AND CALCIUM CHLORIDE 9 ML/HR: 600; 310; 30; 20 INJECTION, SOLUTION INTRAVENOUS at 17:47

## 2023-04-11 RX ADMIN — METRONIDAZOLE 500 MG: 500 INJECTION, SOLUTION INTRAVENOUS at 11:30

## 2023-04-11 RX ADMIN — FENTANYL CITRATE 50 MCG: 50 INJECTION, SOLUTION INTRAMUSCULAR; INTRAVENOUS at 15:11

## 2023-04-11 RX ADMIN — TRIAMCINOLONE ACETONIDE 1 APPLICATION: 1 CREAM TOPICAL at 09:36

## 2023-04-11 RX ADMIN — FENTANYL CITRATE 50 MCG: 50 INJECTION, SOLUTION INTRAMUSCULAR; INTRAVENOUS at 14:34

## 2023-04-11 RX ADMIN — DOXYCYCLINE 100 MG: 100 INJECTION, POWDER, LYOPHILIZED, FOR SOLUTION INTRAVENOUS at 00:03

## 2023-04-11 RX ADMIN — IPRATROPIUM BROMIDE AND ALBUTEROL SULFATE 3 ML: 2.5; .5 SOLUTION RESPIRATORY (INHALATION) at 12:56

## 2023-04-11 RX ADMIN — PROPOFOL 60 MG: 10 INJECTION, EMULSION INTRAVENOUS at 14:34

## 2023-04-11 RX ADMIN — ONDANSETRON 4 MG: 2 INJECTION INTRAMUSCULAR; INTRAVENOUS at 14:34

## 2023-04-11 RX ADMIN — LIDOCAINE HYDROCHLORIDE 50 MG: 10 INJECTION, SOLUTION EPIDURAL; INFILTRATION; INTRACAUDAL; PERINEURAL at 14:34

## 2023-04-11 RX ADMIN — INSULIN LISPRO 3 UNITS: 100 INJECTION, SOLUTION INTRAVENOUS; SUBCUTANEOUS at 09:59

## 2023-04-11 RX ADMIN — VALSARTAN 80 MG: 80 TABLET, FILM COATED ORAL at 11:12

## 2023-04-11 RX ADMIN — TRIAMCINOLONE ACETONIDE 1 APPLICATION: 1 CREAM TOPICAL at 20:58

## 2023-04-11 RX ADMIN — Medication 100 MCG: at 14:45

## 2023-04-11 NOTE — PLAN OF CARE
Goal Outcome Evaluation:      Patient assisted with care needs, NPO after midnight was instructed to patient.       Problem: Fall Injury Risk  Goal: Absence of Fall and Fall-Related Injury  Outcome: Ongoing, Progressing  Intervention: Identify and Manage Contributors  Recent Flowsheet Documentation  Taken 4/11/2023 0400 by Shubham Bustamante RN  Medication Review/Management: medications reviewed  Taken 4/11/2023 0200 by Shubham Bustamante RN  Medication Review/Management: medications reviewed  Taken 4/11/2023 0000 by Shubham Bustamante RN  Medication Review/Management: medications reviewed  Taken 4/10/2023 2200 by Shubham Bustamante RN  Medication Review/Management: medications reviewed  Taken 4/10/2023 2000 by Shubham Bustamante RN  Medication Review/Management: medications reviewed  Intervention: Promote Injury-Free Environment  Recent Flowsheet Documentation  Taken 4/11/2023 0400 by Shubham Bustamante RN  Safety Promotion/Fall Prevention:   activity supervised   assistive device/personal items within reach   clutter free environment maintained   nonskid shoes/slippers when out of bed   room organization consistent   safety round/check completed  Taken 4/11/2023 0200 by Shubham Bustamante RN  Safety Promotion/Fall Prevention:   activity supervised   assistive device/personal items within reach   fall prevention program maintained   room organization consistent   safety round/check completed  Taken 4/11/2023 0000 by Shubham Bustamante RN  Safety Promotion/Fall Prevention:   activity supervised   assistive device/personal items within reach   clutter free environment maintained   lighting adjusted   room organization consistent   safety round/check completed   nonskid shoes/slippers when out of bed  Taken 4/10/2023 2200 by Shubham Bustamante RN  Safety Promotion/Fall Prevention:   activity supervised   assistive device/personal items within reach   safety round/check completed  Taken 4/10/2023 2000 by Shubham Bustamante RN  Safety Promotion/Fall  Prevention:   activity supervised   assistive device/personal items within reach   clutter free environment maintained   nonskid shoes/slippers when out of bed   room organization consistent   safety round/check completed     Problem: Diabetes Comorbidity  Goal: Blood Glucose Level Within Targeted Range  Outcome: Ongoing, Progressing     Problem: Hypertension Comorbidity  Goal: Blood Pressure in Desired Range  Outcome: Ongoing, Progressing  Intervention: Maintain Blood Pressure Management  Recent Flowsheet Documentation  Taken 4/11/2023 0400 by Shubham Bustamante RN  Medication Review/Management: medications reviewed  Taken 4/11/2023 0200 by Shubham Bustamante RN  Medication Review/Management: medications reviewed  Taken 4/11/2023 0000 by Shubham Bustamante RN  Medication Review/Management: medications reviewed  Taken 4/10/2023 2200 by Shubham Bustamante RN  Medication Review/Management: medications reviewed  Taken 4/10/2023 2000 by Shubham Bustamante RN  Medication Review/Management: medications reviewed     Problem: Obstructive Sleep Apnea Risk or Actual Comorbidity Management  Goal: Unobstructed Breathing During Sleep  Outcome: Ongoing, Progressing     Problem: Adult Inpatient Plan of Care  Goal: Plan of Care Review  Outcome: Ongoing, Progressing     Problem: Adult Inpatient Plan of Care  Goal: Absence of Hospital-Acquired Illness or Injury  Intervention: Prevent Skin Injury  Recent Flowsheet Documentation  Taken 4/11/2023 0400 by Shubham Bustamante RN  Body Position: position changed independently  Skin Protection:   adhesive use limited   incontinence pads utilized   transparent dressing maintained   tubing/devices free from skin contact  Taken 4/11/2023 0200 by Shubham Bustamante RN  Body Position: position changed independently  Taken 4/11/2023 0000 by Shubham Bustamante RN  Body Position: position changed independently  Skin Protection:   tubing/devices free from skin contact   transparent dressing maintained   adhesive use  limited  Taken 4/10/2023 2200 by Shubham Bustamante RN  Body Position:   position changed independently   30 degrees  Taken 4/10/2023 2000 by Shubham Bustamante RN  Body Position: (sitting in gerichair)   position changed independently   other (see comments)  Skin Protection:   tubing/devices free from skin contact   transparent dressing maintained   adhesive use limited     Problem: Adult Inpatient Plan of Care  Goal: Absence of Hospital-Acquired Illness or Injury  Intervention: Prevent and Manage VTE (Venous Thromboembolism) Risk  Recent Flowsheet Documentation  Taken 4/11/2023 0400 by Shubham Bustamante RN  Activity Management: activity encouraged  Range of Motion: active ROM (range of motion) encouraged  Taken 4/11/2023 0200 by Shubham Bustamante RN  Activity Management: activity encouraged  Taken 4/11/2023 0000 by Shubham Bustamante RN  Activity Management: activity encouraged  VTE Prevention/Management:   bilateral   sequential compression devices on  Range of Motion: active ROM (range of motion) encouraged  Taken 4/10/2023 2200 by Shubham Bustamante RN  Activity Management: activity encouraged  Taken 4/10/2023 2000 by Shubham Bustamante RN  Activity Management: activity encouraged  VTE Prevention/Management:   bilateral   sequential compression devices on  Range of Motion: active ROM (range of motion) encouraged     Problem: Adult Inpatient Plan of Care  Goal: Optimal Comfort and Wellbeing  Outcome: Ongoing, Progressing     Problem: Adult Inpatient Plan of Care  Goal: Readiness for Transition of Care  Outcome: Ongoing, Progressing

## 2023-04-11 NOTE — ANESTHESIA PREPROCEDURE EVALUATION
Anesthesia Evaluation     Patient summary reviewed and Nursing notes reviewed   no history of anesthetic complications:  NPO Solid Status: > 8 hours  NPO Liquid Status: > 8 hours           Airway   Mallampati: II  TM distance: >3 FB  Neck ROM: full  No difficulty expected  Dental      Pulmonary - normal exam   (+) shortness of breath, sleep apnea,   Cardiovascular - normal exam    (+) hypertension, dysrhythmias, hyperlipidemia,     ROS comment:   Left ventricular systolic function is normal. Left ventricular ejection fraction appears to be 56 - 60%.  •  Left ventricular wall thickness is consistent with mild concentric hypertrophy.  •  Left ventricular diastolic function is consistent with (grade II w/high LAP) pseudonormalization.  •  Left atrial volume is mildly increased.  •  There is moderate calcification of the aortic valve.  •  Moderate aortic valve stenosis is present.  •  There is moderate calcification of the mitral valve  •  Mild to moderate mitral valve stenosis is present.  •  Mild tricuspid valve regurgitation is present.  •  Estimated right ventricular systolic pressure from tricuspid regurgitation is moderately elevated (45-55 mmHg).         Neuro/Psych  (+) syncope,    GI/Hepatic/Renal/Endo    (+) obesity, morbid obesity,  diabetes mellitus,     Musculoskeletal     Abdominal    Substance History      OB/GYN          Other                      Anesthesia Plan    ASA 3     general     intravenous induction     Anesthetic plan, risks, benefits, and alternatives have been provided, discussed and informed consent has been obtained with: patient.        CODE STATUS:    Code Status (Patient has no pulse and is not breathing): CPR (Attempt to Resuscitate)  Medical Interventions (Patient has pulse or is breathing): Full Support

## 2023-04-11 NOTE — PROGRESS NOTES
[facility] Medicine Services  PROGRESS NOTE    Patient Name: Kaylen Garrison  : 1940  MRN: [id number]    Date of Admission: 2023  Primary Care Physician: Lizzette Multani MD    Subjective   Subjective     CC:  Follow-up for cholelithiasis    HPI:  I have seen and evaluated the patient this morning.  Comfortable in bed.  Oxygen requirement somewhat improved and currently at 3 L, satting 95 to 96%.  When I turn off the oxygen, her oxygen saturation was around 89 to 91%.  Denies any abdominal pain.  Currently n.p.o. for scheduled lap jenny today    ROS:  General : no fevers, chills  CVS: No chest pain, palpitations  Respiratory:++ Cough, dyspnea  GI: No N/V/D, abd pain  10 point review of systems is negative except for what is mentioned in HPI    Objective   Objective     Vital Signs:   Temp:  [97.7 °F (36.5 °C)-99 °F (37.2 °C)] 99 °F (37.2 °C)  Heart Rate:  [61-96] 96  Resp:  [18] 18  BP: (131-180)/(51-76) 180/76  Flow (L/min):  [2-4] 2     Physical Exam:  General: Comfortable, not in distress, conversant and cooperative  Head: Atraumatic and normocephalic  Eyes: No Icterus. No pallor  Ears:  Ears appear intact with no abnormalities noted  Throat: No oral lesions, no thrush  Neck: Supple, trachea midline  Lungs: Diminished air entry bilateral lung bases with coarse crackles right lung base  Heart:  Normal S1 and S2, no murmur, no gallop, No JVD, no lower extremity swelling  Abdomen:  Soft, no tenderness, no organomegaly, normal bowel sounds, no organomegaly  Extremities: pulses equal bilaterally  Skin: No bleeding, bruising or rash, normal skin turgor and elasticity  Neurologic: Cranial nerves appear intact with no evidence of facial asymmetry, normal motor and sensory functions in all 4 extremities  Psych: Alert and oriented x 3, normal mood      Results Reviewed:  LAB RESULTS:      Lab 23  0540 04/10/23  0601 04/10/23  0600 23  0541 23  0641 23  2424  04/07/23 2326 04/07/23  2321   WBC 9.81  --  11.52* 11.88* 9.26  --   --  10.83*   HEMOGLOBIN 11.5*  --  12.4 12.8 11.3*  --   --  13.3   HEMOGLOBIN, POC  --   --   --   --   --   --   --  15.0   HEMATOCRIT 35.9  --  39.6 41.9 35.4  --   --  42.9   HEMATOCRIT POC  --   --   --   --   --   --   --  44   PLATELETS 267  --  270 253 265  --   --  333   NEUTROS ABS 7.36*  --  9.88* 10.25* 7.30*  --   --  8.94*   IMMATURE GRANS (ABS) 0.05  --  0.05 0.06* 0.04  --   --  0.05   LYMPHS ABS 1.39  --  0.70 0.61* 0.98  --   --  0.98   MONOS ABS 0.77  --  0.82 0.86 0.87  --   --  0.76   EOS ABS 0.19  --  0.04 0.07 0.03  --   --  0.06   MCV 88.4  --  89.2 93.1 88.5  --   --  89.0   PROCALCITONIN  --   --  0.19  --   --   --   --   --    LACTATE  --   --   --   --   --   --   --  1.1   PROTIME  --  16.9*  --   --  28.5* 20.5 20.5*  --    APTT  --   --   --   --   --   --   --  39.1*         Lab 04/11/23  0540 04/10/23  0600 04/09/23  0541 04/08/23  0641 04/07/23 2321   SODIUM 140 137 141 140 138   POTASSIUM 3.9 4.7 3.4* 3.4* 3.5   CHLORIDE 106 103 103 103 99   CO2 25.0 24.0 25.0 24.0 25.0   ANION GAP 9.0 10.0 13.0 13.0 14.0   BUN 24* 21 13 22 22   CREATININE 0.81 0.92 0.69 0.85 0.90  0.82   EGFR 71.7 61.5 85.7 67.7 70.6  63.2   GLUCOSE 287* 374* 197* 205* 280*   CALCIUM 8.1* 8.7 8.9 8.7 9.1   MAGNESIUM 1.9  --   --  1.8  --          Lab 04/11/23  0540 04/10/23  0600 04/09/23  0541 04/08/23  0641 04/07/23  2321   TOTAL PROTEIN 5.8* 6.0 6.0 5.3* 7.1   ALBUMIN 3.2* 3.1* 3.8 3.4* 4.0   GLOBULIN 2.6 2.9 2.2 1.9 3.1   ALT (SGPT) 132* 198* 328* 408* 297*   AST (SGOT) 33* 67* 219* 590* 597*   BILIRUBIN 0.8 1.2 4.0* 2.3* 1.7*   ALK PHOS 119* 154* 185* 161* 154*   LIPASE  --  44  --   --  47         Lab 04/10/23  0601 04/08/23  0641 04/08/23  0150 04/07/23  2334 04/07/23  2326 04/07/23  2321   HSTROP T  --   --  13*  --   --  14*  14*   PROTIME 16.9* 28.5*  --  20.5 20.5*  --    INR 1.38* 2.66*  --  2* 1.8*  --                  Brief  Urine Lab Results  (Last result in the past 365 days)      Color   Clarity   Blood   Leuk Est   Nitrite   Protein   CREAT   Urine HCG        04/08/23 0113 Yellow   Clear   Negative   Negative   Negative   Trace                 Microbiology Results Abnormal     Procedure Component Value - Date/Time    Blood Culture - Blood, Arm, Right [[id number]]  (Normal) Collected: 04/08/23 0103    Lab Status: Preliminary result Specimen: Blood from Arm, Right Updated: 04/11/23 0730     Blood Culture No growth at 3 days    Blood Culture - Blood, Arm, Right [[id number]]  (Normal) Collected: 04/08/23 0103    Lab Status: Preliminary result Specimen: Blood from Arm, Right Updated: 04/11/23 0730     Blood Culture No growth at 3 days    MRSA Screen, PCR (Inpatient) - Swab, Nares [[id number]]  (Normal) Collected: 04/10/23 1306    Lab Status: Final result Specimen: Swab from Nares Updated: 04/10/23 1348     MRSA PCR Negative    Narrative:      The negative predictive value of this diagnostic test is high and should only be used to consider de-escalating anti-MRSA therapy. A positive result may indicate colonization with MRSA and must be correlated clinically.  MRSA Negative    COVID PRE-OP / PRE-PROCEDURE SCREENING ORDER (NO ISOLATION) - Swab, Nasopharynx [[id number]]  (Normal) Collected: 04/10/23 1236    Lab Status: Final result Specimen: Swab from Nasopharynx Updated: 04/10/23 1327    Narrative:      The following orders were created for panel order COVID PRE-OP / PRE-PROCEDURE SCREENING ORDER (NO ISOLATION) - Swab, Nasopharynx.  Procedure                               Abnormality         Status                     ---------                               -----------         ------                     Respiratory Panel PCR w/...[[id number]]  Normal              Final result                 Please view results for these tests on the individual orders.    Respiratory Panel PCR w/COVID-19(SARS-CoV-2) TRACY/JOSE/PHYLLIS/PAD/COR/MAD/MICHAEL In-House, NP Swab  in UTM/VTM, 3-4 HR TAT - Swab, Nasopharynx [[id number]]  (Normal) Collected: 04/10/23 1236    Lab Status: Final result Specimen: Swab from Nasopharynx Updated: 04/10/23 1327     ADENOVIRUS, PCR Not Detected     Coronavirus 229E Not Detected     Coronavirus HKU1 Not Detected     Coronavirus NL63 Not Detected     Coronavirus OC43 Not Detected     COVID19 Not Detected     Human Metapneumovirus Not Detected     Human Rhinovirus/Enterovirus Not Detected     Influenza A PCR Not Detected     Influenza B PCR Not Detected     Parainfluenza Virus 1 Not Detected     Parainfluenza Virus 2 Not Detected     Parainfluenza Virus 3 Not Detected     Parainfluenza Virus 4 Not Detected     RSV, PCR Not Detected     Bordetella pertussis pcr Not Detected     Bordetella parapertussis PCR Not Detected     Chlamydophila pneumoniae PCR Not Detected     Mycoplasma pneumo by PCR Not Detected    Narrative:      In the setting of a positive respiratory panel with a viral infection PLUS a negative procalcitonin without other underlying concern for bacterial infection, consider observing off antibiotics or discontinuation of antibiotics and continue supportive care. If the respiratory panel is positive for atypical bacterial infection (Bordetella pertussis, Chlamydophila pneumoniae, or Mycoplasma pneumoniae), consider antibiotic de-escalation to target atypical bacterial infection.          CT Angiogram Chest Pulmonary Embolism    Result Date: 4/10/2023  CT ANGIOGRAM CHEST PULMONARY EMBOLISM Date of Exam: 4/10/2023 2:00 PM EDT Indication: Pulmonary embolism (PE) suspected, high prob. Comparison: Chest x-ray 4/7/2023, CT chest 9/15/2022 Technique: Axial CT images were obtained of the chest before and after the uneventful intravenous administration of 80 mL Isovue-370 utilizing pulmonary embolism protocol.  Reconstructed coronal and sagittal images were also obtained. Automated exposure control and iterative construction methods were used. Findings:  Normal appearance of the pulmonary arteries without evidence of pulmonary embolism. There is multichamber cardiac enlargement. There are mild coronary artery calcifications. No pericardial effusion. There are moderate atherosclerotic calcifications of the thoracic aorta which appears normal in caliber. Heterogeneous multinodular thyroid gland is noted. There are numerous prominent and mildly enlarged mediastinal lymph nodes, for example a right lower paratracheal lymph node measuring 1.1 cm in short axis; these appear mildly increased in size and number compared to CT of the chest from 9/15/2022. Mildly patulous thoracic esophagus is noted. Unremarkable appearance of the chest wall soft tissues. No axillary adenopathy. The trachea and mainstem bronchi are patent. There is mild bronchial wall thickening of the perihilar and lower lobe airways. There is interlobular septal thickening compatible with interstitial edema. There are patchy perihilar-predominant groundglass opacities compatible with early alveolar edema. There are small bilateral pleural effusions with associated passive atelectasis in the dependent lung bases. There is some fluid tracking in the inferior right major fissure. No lung mass or discrete nodule. No pneumothorax. No acute or suspicious bony findings. No acute findings in the partially imaged upper abdomen. There is a small sliding hiatal hernia. There is evidence of colonic diverticulosis. There is a subcentimeter hypodense right adrenal nodule, likely adenoma.     Impression: Impression: No evidence of pulmonary embolism. Interlobular septal thickening and patchy perihilar bilateral groundglass densities compatible with interstitial edema and early alveolar edema, with associated small bilateral pleural effusions with some associated bibasilar passive atelectasis. There is multichamber cardiac enlargement. Numerous prominent and mildly enlarged mediastinal lymph nodes appearing mildly  increased in number and conspicuity from CT of the chest from 9/15/2022. The appearance is nonspecific; this could be reactive or congestive, with lymphoproliferative disorder or metastatic nodes difficult to exclude though felt to be less likely. Electronically Signed: Patricio Nguyenmary ellen  4/10/2023 2:24 PM EDT  Workstation ID: [id number]      Results for orders placed during the hospital encounter of 04/07/23    Adult Transthoracic Echo Complete W/ Cont if Necessary Per Protocol    Interpretation Summary  •  Left ventricular systolic function is normal. Left ventricular ejection fraction appears to be 56 - 60%.  •  Left ventricular wall thickness is consistent with mild concentric hypertrophy.  •  Left ventricular diastolic function is consistent with (grade II w/high LAP) pseudonormalization.  •  Left atrial volume is mildly increased.  •  There is moderate calcification of the aortic valve.  •  Moderate aortic valve stenosis is present.  •  There is moderate calcification of the mitral valve  •  Mild to moderate mitral valve stenosis is present.  •  Mild tricuspid valve regurgitation is present.  •  Estimated right ventricular systolic pressure from tricuspid regurgitation is moderately elevated (45-55 mmHg).      Current medications:  Scheduled Meds:cefTRIAXone, 2 g, Intravenous, Q24H  doxycycline, 100 mg, Intravenous, Q12H  [MAR Hold] enoxaparin, 40 mg, Subcutaneous, Nightly  [MAR Hold] guaiFENesin, 1,200 mg, Oral, Q12H  insulin detemir, 10 Units, Subcutaneous, Nightly  [MAR Hold] insulin lispro, 0-7 Units, Subcutaneous, 4x Daily With Meals & Nightly  Insulin Lispro, 5 Units, Subcutaneous, TID With Meals  [MAR Hold] ipratropium-albuterol, 3 mL, Nebulization, 4x Daily - RT  metroNIDAZOLE, 500 mg, Intravenous, Q8H  [MAR Hold] oxybutynin XL, 5 mg, Oral, Daily  [MAR Hold] sodium chloride, 10 mL, Intravenous, Q12H  [MAR Hold] triamcinolone, 1 application, Topical, Q12H  valsartan, 80 mg, Oral, Q24H      Continuous  Infusions:lactated ringers, 9 mL/hr, Last Rate: 9 mL/hr (04/11/23 0201)  lactated ringers, 9 mL/hr, Last Rate: 9 mL/hr (04/11/23 1428)      PRN Meds:.•  [MAR Hold] acetaminophen  •  [MAR Hold] Calcium Replacement - Follow Nurse / BPA Driven Protocol  •  [MAR Hold] dextrose  •  [MAR Hold] dextrose  •  [MAR Hold] glucagon (human recombinant)  •  lidocaine PF 1%  •  [MAR Hold] Magnesium Standard Dose Replacement - Follow Nurse / BPA Driven Protocol  •  midazolam  •  [MAR Hold] Phosphorus Replacement - Follow Nurse / BPA Driven Protocol  •  Potassium Replacement - Follow Nurse / BPA Driven Protocol  •  [MAR Hold] sodium chloride  •  [MAR Hold] sodium chloride  •  [MAR Hold] sodium chloride    Assessment & Plan   Assessment & Plan     Active Hospital Problems    Diagnosis  POA   • **Acute cholecystitis [K81.0]  Yes   • Paroxysmal atrial fibrillation [I48.0]  Yes   • Type 2 diabetes mellitus with hyperglycemia, without long-term current use of insulin [E11.65]  Yes   • BERNARD treated with BiPAP [G47.33]  Yes   • Hyperlipidemia [E78.5]  Yes   • Hypertension [I10]  Yes      Resolved Hospital Problems   No resolved problems to display.        Brief Hospital Course to date:  Kaylen Garrison is a 82 y.o. female with PMH significant for A-fib on Xarelto, HTN, HLD, BERNARD on BiPAP, DM2, who presents to the ED with complaint of abdominal pain who was found to have concern for acute cholecystitis.    Assessment and plan:  Acute cholecystitis  Transaminitis  · Status post ERCP by Dr. Brunner on 4/9/2023 --> sphincterotomy, balloon sweeps using 3 black multifaceted stones, prophylactic bacterial stent to pancreatic duct  · Continue IV Rocephin and Flagyl  · Scheduled for lap jenny today by Dr. GR    Acute hypoxic respiratory  Pulmonary edema with bilateral pleural effusion  · CTA chest done today 04/10/2023 --> no PE but showed bilateral groundglass opacities concerning for pulmonary edema rather than atypical pneumonia.  Also showing  bilateral pleural effusion  · Diuresed 1.2 L after 40 mg of Lasix yesterday.  Ox requirement is currently at 3 L/min  · Continue doxycycline for atypical pneumonia coverage     Elevated troponin  · Mildly elevated, suspect demand ischemia  · No chest pain     Paroxysmal atrial fibrillation  Elevated INR  · Currently rate controlled with amiodarone  · INR was elevated which is likely secondary to Xarelto.  Last dose of Xarelto was given 04/07/2023   · Continue Lovenox for DVT prophylaxis.  Restart Xarelto after laparoscopic cholecystectomy     Essential hypertension  · Continue valsartan to 80 mg daily  · Continue to hold hydrochlorothiazide and amlodipine    Hyperlipidemia  · Continue to hold statin secondary to elevated liver enzymes  · Can restart at DC     Type 2 diabetes  · A1c 7.2%  · Continue to hold glipizide, Januvia, metformin  · Add insulin Levemir 10 units at bedtime and Premeal insulin at 5 units 3 times daily and continue insulin sliding scale      Expected Discharge Location and Transportation: Home  Expected Discharge  Expected Discharge Date and Time     Expected Discharge Date Expected Discharge Time    Apr 13, 2023            DVT prophylaxis:  Medical and mechanical DVT prophylaxis orders are present.     AM-PAC 6 Clicks Score (PT): 20 (04/09/23 0842)    CODE STATUS:   Code Status and Medical Interventions:   Ordered at: 04/08/23 0117     Code Status (Patient has no pulse and is not breathing):    CPR (Attempt to Resuscitate)     Medical Interventions (Patient has pulse or is breathing):    Full Support     Copied text in this note has been reviewed and is accurate as of 04/11/23.     Jonathan Scott MD  04/11/23

## 2023-04-11 NOTE — PROGRESS NOTES
"[facility] Cardiology Daily Note       LOS: 1 day   Patient Care Team:  Lizzette Multani MD as PCP - General (Internal Medicine)  Irish Sousa PA as Physician Assistant (Endocrinology)  Harley Rose MD as Consulting Physician (Endocrinology)  Matilde Alvarez MD as Consulting Physician (Cardiology)    Chief Complaint: Elevated troponin/prolonged QT    Subjective     Subjective: 94% on 3.5 L.  Heart rates in the 60s and 70s.  Blood pressures have been good.  Supposed to go to surgery around 1230.    Review of Systems:   As above.    Medications:  cefTRIAXone, 2 g, Intravenous, Q24H  doxycycline, 100 mg, Intravenous, Q12H  enoxaparin, 40 mg, Subcutaneous, Nightly  guaiFENesin, 1,200 mg, Oral, Q12H  insulin lispro, 0-7 Units, Subcutaneous, 4x Daily With Meals & Nightly  ipratropium-albuterol, 3 mL, Nebulization, 4x Daily - RT  metroNIDAZOLE, 500 mg, Intravenous, Q8H  oxybutynin XL, 5 mg, Oral, Daily  sodium chloride, 10 mL, Intravenous, Q12H  triamcinolone, 1 application, Topical, Q12H  valsartan, 80 mg, Oral, Q24H        Objective     Vital Sign Min/Max for last 24 hours  Temp  Min: 97.8 °F (36.6 °C)  Max: 98.6 °F (37 °C)   BP  Min: 131/51  Max: 154/63   Pulse  Min: 61  Max: 70   Resp  Min: 18  Max: 18   SpO2  Min: 92 %  Max: 99 %   Flow (L/min)  Min: 3  Max: 4   No data recorded      Intake/Output Summary (Last 24 hours) at 4/11/2023 0713  Last data filed at 4/10/2023 1807  Gross per 24 hour   Intake 480 ml   Output 700 ml   Net -220 ml        Flowsheet Rows    Flowsheet Row First Filed Value   Admission Height 160 cm (63\") Documented at 04/07/2023 2326   Admission Weight 72.6 kg (160 lb) Documented at 04/07/2023 2326          Physical Exam:    General: Alert and oriented.   Cardiovascular: Heart has a nondisplaced focal PMI. Regular rate and rhythm with 2/6 SE murmur, no gallop or rub.  Lungs: Clear without rales or wheezes. Equal expansion is noted.   Abdomen: Soft, " nontender.  Extremities: Show no edema.   Skin: warm and dry.     Results Review:    I reviewed the patient's new clinical results.  EKG:  Tele: Sinus rhythm at 60 bpm    Labs:    Results from last 7 days   Lab Units 04/11/23  0540 04/10/23  0600 04/09/23  0541   SODIUM mmol/L 140 137 141   POTASSIUM mmol/L 3.9 4.7 3.4*   CHLORIDE mmol/L 106 103 103   CO2 mmol/L 25.0 24.0 25.0   BUN mg/dL 24* 21 13   CREATININE mg/dL 0.81 0.92 0.69   CALCIUM mg/dL 8.1* 8.7 8.9   BILIRUBIN mg/dL 0.8 1.2 4.0*   ALK PHOS U/L 119* 154* 185*   ALT (SGPT) U/L 132* 198* 328*   AST (SGOT) U/L 33* 67* 219*   GLUCOSE mg/dL 287* 374* 197*     Results from last 7 days   Lab Units 04/11/23  0540 04/10/23  0600 04/09/23  0541   WBC 10*3/mm3 9.81 11.52* 11.88*   HEMOGLOBIN g/dL 11.5* 12.4 12.8   HEMATOCRIT % 35.9 39.6 41.9   PLATELETS 10*3/mm3 267 270 253     Lab Results   Component Value Date    TROPONINT 13 (H) 04/08/2023    TROPONINT 14 (H) 04/07/2023    TROPONINT 14 (H) 04/07/2023     Lab Results   Component Value Date    CHOL 97 11/11/2021     Lab Results   Component Value Date    TRIG 66 11/11/2021     Lab Results   Component Value Date    HDL 42 11/11/2021     No components found for: LDLCALC  Lab Results   Component Value Date    INR 1.38 (H) 04/10/2023    INR 2.66 (H) 04/08/2023    INR 2 (A) 04/07/2023    PROTIME 16.9 (H) 04/10/2023    PROTIME 28.5 (H) 04/08/2023    PROTIME 20.5 04/07/2023         Ejection Fraction: 55 to 60% by echo 4/8/2023    Assessment   Assessment:    1. Acute cholecystitis, transaminitis  1. Status post ERCP with extraction of 3 stones  2. Paroxysmal atrial fibrillation  a. Diagnosed 2021 status post SATISH/ECV  b. Remains rhythm controlled on amiodarone  c. Xarelto on hold  d. Amiodarone on hold prolonged QT  3. Moderate aortic stenosis, mean gradient 23 mmHg.  4. Mild to moderate mitral stenosis, mean gradient 6 mmHg.  5. Minimally elevated troponin secondary to demand ischemia      Plan:    Restart amiodarone at 100  mg daily post cholecystectomy or possibly even 3 times a week.  Laparoscopic cholecystectomy today.  Xarelto on hold for surgery.    Matilde Alvarez MD  04/11/23  07:13 EDT

## 2023-04-11 NOTE — ANESTHESIA POSTPROCEDURE EVALUATION
Patient: Kaylen Garrison    Procedure Summary     Date: 04/11/23 Room / Location:  [facility] /  [facility]    Anesthesia Start: 1428 Anesthesia Stop: 1537    Procedure: CHOLECYSTECTOMY LAPAROSCOPIC (Abdomen) Diagnosis: Acute cholecystitis due to biliary calculus    Surgeons: Abraham Holbrook MD Provider: Linus Hernandez MD    Anesthesia Type: general ASA Status: 3          Anesthesia Type: general    Vitals  Vitals Value Taken Time   BP     Temp     Pulse 70 04/11/23 1536   Resp     SpO2 94 % 04/11/23 1536   Vitals shown include unvalidated device data.        Post Anesthesia Care and Evaluation    Patient location during evaluation: PACU  Patient participation: complete - patient participated  Level of consciousness: awake and alert  Pain management: adequate    Airway patency: patent  Anesthetic complications: No anesthetic complications  PONV Status: none  Cardiovascular status: hemodynamically stable and acceptable  Respiratory status: nonlabored ventilation, acceptable and nasal cannula  Hydration status: acceptable

## 2023-04-11 NOTE — BRIEF OP NOTE
CHOLECYSTECTOMY LAPAROSCOPIC POSSIBLE OPEN CHOLECYSTECTOMY  Progress Note    Kaylen Garrison  4/11/2023    Pre-op Diagnosis:   Acute calculus cholecystitis       Post-Op Diagnosis Codes:     * Acute cholecystitis due to biliary calculus [K80.00]    Procedure/CPT® Codes:        Procedure(s):  CHOLECYSTECTOMY LAPAROSCOPIC        Surgeon(s):  Abraham Holbrook MD    Anesthesia: General    Staff:   Circulator: Pietro Aguiar RN; India Alcantara RN; Ade Waller RN  Scrub Person: Caryl Carreno  Nursing Assistant: Daniel Slade         Estimated Blood Loss: minimal    Urine Voided: * No values recorded between 4/11/2023  2:28 PM and 4/11/2023  3:15 PM *    Specimens:                Specimens     ID Source Type Tests Collected By Collected At Frozen?    A Gallbladder Tissue · TISSUE PATHOLOGY EXAM   Abraham Holbrook MD 4/11/23 1504 No    Description: Gallbladder for permanent                Drains: * No LDAs found *    Findings: Mildly inflamed, distended gallbladder with large amount of stones        Complications: None          Abraham Holbrook MD     Date: 4/11/2023  Time: 15:24 EDT

## 2023-04-11 NOTE — ANESTHESIA PROCEDURE NOTES
Airway  Urgency: elective    Date/Time: 4/11/2023 2:36 PM  Airway not difficult    General Information and Staff    Patient location during procedure: OR  CRNA/CAA: Janett Stoddard CRNA    Indications and Patient Condition  Indications for airway management: airway protection    Preoxygenated: yes  MILS not maintained throughout  Mask difficulty assessment: 1 - vent by mask    Final Airway Details  Final airway type: endotracheal airway      Successful airway: ETT  Cuffed: yes   Successful intubation technique: direct laryngoscopy  Endotracheal tube insertion site: oral  Blade: James  Blade size: 3  ETT size (mm): 6.5  Cormack-Lehane Classification: grade I - full view of glottis  Placement verified by: chest auscultation and capnometry   Cuff volume (mL): 6  Measured from: lips  ETT/EBT  to lips (cm): 20  Number of attempts at approach: 1  Assessment: lips, teeth, and gum same as pre-op and atraumatic intubation    Additional Comments  Negative epigastric sounds, Breath sound equal bilaterally with symmetric chest rise and fall

## 2023-04-11 NOTE — OP NOTE
Operative Note    Kaylen Garrison  [id number]   1940     Date of Surgery:  4/11/2023    Pre-Operative Diagnosis: Acute calculus cholecystitis    Post-Operative Diagnosis: Acute calculus cholecystitis    Procedure: Laparoscopic cholecystectomy    Anesthesia:  General          Surgeon:  Abraham Holbrook MD    Circulator: Pietro Aguiar, CHAD; India Alcantara, CHDA; Ade Waller RN  Scrub Person: Caryl Carreno  Nursing Assistant: Daniel Slade          Estimated Blood Loss: Very minimal    Findings: Inflamed gallbladder.  Distended with a large amount of stones    Complications: None      Indication for Procedure: Mrs. Garrison is a pleasant 82-year-old lady with history of atrial fibrillation on Xarelto.  She presented emergency room with complaints of abdominal pain and nausea.  Work-up was remarkable for acute calculus cholecystitis with choledocholithiasis.  Following cardiac clearance, she underwent ERCP with extraction of 3 stones.  She has been off Xarelto since admission.  She has been on IV antibiotics.  She is taken to the operating room for laparoscopic cholecystectomy, possible open.    Procedure: Patient was taken to the op room anesthesia and placed supine on the table.  Following induction of general endotracheal anesthesia, SCDs were placed.  She already received IV antibiotics.  The abdomen was then prepped and draped in a sterile fashion.  Timeout was observed.  Marcaine 0.5% with epinephrine was injected in the infraumbilical region and a small incision was made.  Dissection was carried through the subcutaneous tissue to expose the fascia which was incised under direct vision to enter the abdominal cavity.  The Anaya introducer was advanced and the abdomen insufflated to 15 mmHg using CO2.  The 10 mm scope was advanced and the abdomen inspected.  Very minimal omental adhesions were noted in the right lower quadrant.  The patient was then placed in reverse Trendelenburg  position, right side up.  A 12 mm trocar was placed in the epigastric region and two 5 mm trocars were placed in the right upper quadrant under direct vision.  The gallbladder was visualized.  It was distended and inflamed.  The fundus was grasped and pulled over the liver bed.  With gentle dissection the infundibulum was well isolated.  It was pulled inferiorly and laterally.  With gentle dissection, the cystic duct was identified.  It was slightly dilated.  The junction of the common bile duct was appreciated.  The cystic artery was also well with visualized.  The cystic duct was then clipped with multiple clips and transected as well as the cystic artery.  The gallbladder was then removed off the liver bed with cautery, placed in an Endo Catch bag and delivered out of the abdomen through the umbilical port intact and sent to pathology.  Large amount of stones were appreciated in the gallbladder.  Following adequate hemostasis of the liver bed with cautery, it was well irrigated with normal saline with clear return of fluid noted.  The clips were intact with no evidence of bile leak or bleeding noted.  The trocars were then removed under direct vision with no bleeding encountered.  The abdomen was deflated.  The umbilical fascia was approximated with multiple 0 Vicryl sutures and the skin incisions were approximated with 4-0 Monocryl subcuticular suture.  Skin affix was applied.  The patient tolerated the procedure well with no complications.  She was extubated and taken to the recovery room in a stable condition.  Sponge count and needle count were correct at the end of the procedure.            Abraham Holbrook MD  04/11/23  17:46 EDT

## 2023-04-12 ENCOUNTER — APPOINTMENT (OUTPATIENT)
Dept: GENERAL RADIOLOGY | Facility: HOSPITAL | Age: 83
DRG: 417 | End: 2023-04-12
Payer: MEDICARE

## 2023-04-12 LAB
ALBUMIN SERPL-MCNC: 3.4 G/DL (ref 3.5–5.2)
ALBUMIN/GLOB SERPL: 1.5 G/DL
ALP SERPL-CCNC: 125 U/L (ref 39–117)
ALT SERPL W P-5'-P-CCNC: 120 U/L (ref 1–33)
ANION GAP SERPL CALCULATED.3IONS-SCNC: 10 MMOL/L (ref 5–15)
AST SERPL-CCNC: 57 U/L (ref 1–32)
BASOPHILS # BLD AUTO: 0.02 10*3/MM3 (ref 0–0.2)
BASOPHILS NFR BLD AUTO: 0.2 % (ref 0–1.5)
BILIRUB SERPL-MCNC: 0.8 MG/DL (ref 0–1.2)
BUN SERPL-MCNC: 16 MG/DL (ref 8–23)
BUN/CREAT SERPL: 21.6 (ref 7–25)
CALCIUM SPEC-SCNC: 8.5 MG/DL (ref 8.6–10.5)
CHLORIDE SERPL-SCNC: 104 MMOL/L (ref 98–107)
CO2 SERPL-SCNC: 26 MMOL/L (ref 22–29)
CREAT SERPL-MCNC: 0.74 MG/DL (ref 0.57–1)
DEPRECATED RDW RBC AUTO: 57.4 FL (ref 37–54)
EGFRCR SERPLBLD CKD-EPI 2021: 79.9 ML/MIN/1.73
EOSINOPHIL # BLD AUTO: 0 10*3/MM3 (ref 0–0.4)
EOSINOPHIL NFR BLD AUTO: 0 % (ref 0.3–6.2)
ERYTHROCYTE [DISTWIDTH] IN BLOOD BY AUTOMATED COUNT: 17.7 % (ref 12.3–15.4)
GLOBULIN UR ELPH-MCNC: 2.3 GM/DL
GLUCOSE BLDC GLUCOMTR-MCNC: 131 MG/DL (ref 70–130)
GLUCOSE BLDC GLUCOMTR-MCNC: 181 MG/DL (ref 70–130)
GLUCOSE BLDC GLUCOMTR-MCNC: 227 MG/DL (ref 70–130)
GLUCOSE BLDC GLUCOMTR-MCNC: 252 MG/DL (ref 70–130)
GLUCOSE SERPL-MCNC: 234 MG/DL (ref 65–99)
HCT VFR BLD AUTO: 36.2 % (ref 34–46.6)
HGB BLD-MCNC: 11.5 G/DL (ref 12–15.9)
IMM GRANULOCYTES # BLD AUTO: 0.04 10*3/MM3 (ref 0–0.05)
IMM GRANULOCYTES NFR BLD AUTO: 0.4 % (ref 0–0.5)
LYMPHOCYTES # BLD AUTO: 0.59 10*3/MM3 (ref 0.7–3.1)
LYMPHOCYTES NFR BLD AUTO: 5.5 % (ref 19.6–45.3)
MCH RBC QN AUTO: 28.2 PG (ref 26.6–33)
MCHC RBC AUTO-ENTMCNC: 31.8 G/DL (ref 31.5–35.7)
MCV RBC AUTO: 88.7 FL (ref 79–97)
MONOCYTES # BLD AUTO: 0.76 10*3/MM3 (ref 0.1–0.9)
MONOCYTES NFR BLD AUTO: 7.1 % (ref 5–12)
NEUTROPHILS NFR BLD AUTO: 86.8 % (ref 42.7–76)
NEUTROPHILS NFR BLD AUTO: 9.32 10*3/MM3 (ref 1.7–7)
NRBC BLD AUTO-RTO: 0 /100 WBC (ref 0–0.2)
PLATELET # BLD AUTO: 288 10*3/MM3 (ref 140–450)
PMV BLD AUTO: 10.2 FL (ref 6–12)
POTASSIUM SERPL-SCNC: 4.6 MMOL/L (ref 3.5–5.2)
PROT SERPL-MCNC: 5.7 G/DL (ref 6–8.5)
RBC # BLD AUTO: 4.08 10*6/MM3 (ref 3.77–5.28)
SODIUM SERPL-SCNC: 140 MMOL/L (ref 136–145)
WBC NRBC COR # BLD: 10.73 10*3/MM3 (ref 3.4–10.8)

## 2023-04-12 PROCEDURE — 63710000001 INSULIN DETEMIR PER 5 UNITS: Performed by: INTERNAL MEDICINE

## 2023-04-12 PROCEDURE — 94799 UNLISTED PULMONARY SVC/PX: CPT

## 2023-04-12 PROCEDURE — 97530 THERAPEUTIC ACTIVITIES: CPT

## 2023-04-12 PROCEDURE — 80053 COMPREHEN METABOLIC PANEL: CPT | Performed by: SURGERY

## 2023-04-12 PROCEDURE — 99232 SBSQ HOSP IP/OBS MODERATE 35: CPT | Performed by: INTERNAL MEDICINE

## 2023-04-12 PROCEDURE — 25010000002 CEFTRIAXONE PER 250 MG: Performed by: SURGERY

## 2023-04-12 PROCEDURE — 74018 RADEX ABDOMEN 1 VIEW: CPT

## 2023-04-12 PROCEDURE — 97161 PT EVAL LOW COMPLEX 20 MIN: CPT

## 2023-04-12 PROCEDURE — 82962 GLUCOSE BLOOD TEST: CPT

## 2023-04-12 PROCEDURE — 63710000001 INSULIN LISPRO (HUMAN) PER 5 UNITS: Performed by: INTERNAL MEDICINE

## 2023-04-12 PROCEDURE — 94761 N-INVAS EAR/PLS OXIMETRY MLT: CPT

## 2023-04-12 PROCEDURE — 71045 X-RAY EXAM CHEST 1 VIEW: CPT

## 2023-04-12 PROCEDURE — 85025 COMPLETE CBC W/AUTO DIFF WBC: CPT | Performed by: SURGERY

## 2023-04-12 PROCEDURE — 63710000001 INSULIN LISPRO (HUMAN) PER 5 UNITS: Performed by: SURGERY

## 2023-04-12 RX ORDER — IPRATROPIUM BROMIDE AND ALBUTEROL SULFATE 2.5; .5 MG/3ML; MG/3ML
3 SOLUTION RESPIRATORY (INHALATION) EVERY 4 HOURS PRN
Status: DISCONTINUED | OUTPATIENT
Start: 2023-04-12 | End: 2023-04-14 | Stop reason: HOSPADM

## 2023-04-12 RX ORDER — BUMETANIDE 0.25 MG/ML
2 INJECTION INTRAMUSCULAR; INTRAVENOUS ONCE
Status: COMPLETED | OUTPATIENT
Start: 2023-04-12 | End: 2023-04-12

## 2023-04-12 RX ORDER — AMLODIPINE BESYLATE 2.5 MG/1
2.5 TABLET ORAL
Status: DISCONTINUED | OUTPATIENT
Start: 2023-04-12 | End: 2023-04-13

## 2023-04-12 RX ORDER — INSULIN LISPRO 100 [IU]/ML
8 INJECTION, SOLUTION INTRAVENOUS; SUBCUTANEOUS
Status: DISCONTINUED | OUTPATIENT
Start: 2023-04-12 | End: 2023-04-14 | Stop reason: HOSPADM

## 2023-04-12 RX ORDER — AMIODARONE HYDROCHLORIDE 200 MG/1
100 TABLET ORAL 3 TIMES WEEKLY
Status: DISCONTINUED | OUTPATIENT
Start: 2023-04-12 | End: 2023-04-14 | Stop reason: HOSPADM

## 2023-04-12 RX ADMIN — IPRATROPIUM BROMIDE AND ALBUTEROL SULFATE 3 ML: 2.5; .5 SOLUTION RESPIRATORY (INHALATION) at 07:37

## 2023-04-12 RX ADMIN — VALSARTAN 80 MG: 80 TABLET, FILM COATED ORAL at 08:54

## 2023-04-12 RX ADMIN — RIVAROXABAN 15 MG: 15 TABLET, FILM COATED ORAL at 18:03

## 2023-04-12 RX ADMIN — Medication 10 ML: at 13:00

## 2023-04-12 RX ADMIN — INSULIN LISPRO 2 UNITS: 100 INJECTION, SOLUTION INTRAVENOUS; SUBCUTANEOUS at 18:03

## 2023-04-12 RX ADMIN — INSULIN LISPRO 8 UNITS: 100 INJECTION, SOLUTION INTRAVENOUS; SUBCUTANEOUS at 13:00

## 2023-04-12 RX ADMIN — INSULIN DETEMIR 10 UNITS: 100 INJECTION, SOLUTION SUBCUTANEOUS at 08:52

## 2023-04-12 RX ADMIN — INSULIN DETEMIR 10 UNITS: 100 INJECTION, SOLUTION SUBCUTANEOUS at 20:19

## 2023-04-12 RX ADMIN — BUMETANIDE 2 MG: 0.25 INJECTION, SOLUTION INTRAMUSCULAR; INTRAVENOUS at 08:52

## 2023-04-12 RX ADMIN — DOXYCYCLINE 100 MG: 100 INJECTION, POWDER, LYOPHILIZED, FOR SOLUTION INTRAVENOUS at 00:28

## 2023-04-12 RX ADMIN — HYDROCODONE BITARTRATE AND ACETAMINOPHEN 1 TABLET: 5; 325 TABLET ORAL at 09:39

## 2023-04-12 RX ADMIN — INSULIN LISPRO 8 UNITS: 100 INJECTION, SOLUTION INTRAVENOUS; SUBCUTANEOUS at 18:04

## 2023-04-12 RX ADMIN — GUAIFENESIN 1200 MG: 600 TABLET, EXTENDED RELEASE ORAL at 08:53

## 2023-04-12 RX ADMIN — METRONIDAZOLE 500 MG: 500 INJECTION, SOLUTION INTRAVENOUS at 12:28

## 2023-04-12 RX ADMIN — AMLODIPINE BESYLATE 2.5 MG: 2.5 TABLET ORAL at 14:13

## 2023-04-12 RX ADMIN — GUAIFENESIN 1200 MG: 600 TABLET, EXTENDED RELEASE ORAL at 20:20

## 2023-04-12 RX ADMIN — TRIAMCINOLONE ACETONIDE 1 APPLICATION: 1 CREAM TOPICAL at 20:20

## 2023-04-12 RX ADMIN — INSULIN LISPRO 3 UNITS: 100 INJECTION, SOLUTION INTRAVENOUS; SUBCUTANEOUS at 09:14

## 2023-04-12 RX ADMIN — CEFTRIAXONE 2 G: 2 INJECTION, POWDER, FOR SOLUTION INTRAMUSCULAR; INTRAVENOUS at 08:51

## 2023-04-12 RX ADMIN — INSULIN LISPRO 5 UNITS: 100 INJECTION, SOLUTION INTRAVENOUS; SUBCUTANEOUS at 00:28

## 2023-04-12 RX ADMIN — INSULIN LISPRO 4 UNITS: 100 INJECTION, SOLUTION INTRAVENOUS; SUBCUTANEOUS at 13:02

## 2023-04-12 RX ADMIN — AMIODARONE HYDROCHLORIDE 100 MG: 200 TABLET ORAL at 09:42

## 2023-04-12 RX ADMIN — DOXYCYCLINE 100 MG: 100 INJECTION, POWDER, LYOPHILIZED, FOR SOLUTION INTRAVENOUS at 13:01

## 2023-04-12 RX ADMIN — METRONIDAZOLE 500 MG: 500 INJECTION, SOLUTION INTRAVENOUS at 20:20

## 2023-04-12 RX ADMIN — OXYBUTYNIN CHLORIDE 5 MG: 5 TABLET, EXTENDED RELEASE ORAL at 08:54

## 2023-04-12 RX ADMIN — METRONIDAZOLE 500 MG: 500 INJECTION, SOLUTION INTRAVENOUS at 04:24

## 2023-04-12 RX ADMIN — INSULIN LISPRO 8 UNITS: 100 INJECTION, SOLUTION INTRAVENOUS; SUBCUTANEOUS at 08:53

## 2023-04-12 RX ADMIN — Medication 10 ML: at 20:20

## 2023-04-12 NOTE — PROGRESS NOTES
[facility]  PROGRESS NOTE    Patient Name: Kaylen Garrison  : 1940  MRN: [id number]    Date of Admission: 2023  Primary Care Physician: Lizzette Multani MD    Subjective   Subjective     CC:  Follow-up for cholelithiasis    HPI:  I have seen and evaluated the patient this morning.  Finished almost her breakfast.  Minimal abdominal discomfort.  No dyspnea.  Minimal cough.  Oxygen requirements down to 2 L and satting 94%    ROS:  General : no fevers, chills  CVS: No chest pain, palpitations  Respiratory: Improving cough, dyspnea  GI: No N/V/D, abd pain  10 point review of systems is negative except for what is mentioned in HPI    Objective   Objective     Vital Signs:   Temp:  [97.1 °F (36.2 °C)-99 °F (37.2 °C)] 97.6 °F (36.4 °C)  Heart Rate:  [63-96] 66  Resp:  [12-18] 18  BP: (108-180)/(51-87) 177/80  Flow (L/min):  [2-12] 4     Physical Exam:  General: Comfortable, not in distress, conversant and cooperative  Head: Atraumatic and normocephalic  Eyes: No Icterus. No pallor  Ears:  Ears appear intact with no abnormalities noted  Throat: No oral lesions, no thrush  Neck: Supple, trachea midline  Lungs: Diminished air entry bilateral lung bases with coarse crackles right lung base  Heart:  Normal S1 and S2, no murmur, no gallop, No JVD, no lower extremity swelling  Abdomen:  Soft, no tenderness, no organomegaly, normal bowel sounds, no organomegaly  Extremities: pulses equal bilaterally  Skin: No bleeding, bruising or rash, normal skin turgor and elasticity  Neurologic: Cranial nerves appear intact with no evidence of facial asymmetry, normal motor and sensory functions in all 4 extremities  Psych: Alert and oriented x 3, normal mood      Results Reviewed:  LAB RESULTS:      Lab 23  0540 04/10/23  0601 04/10/23  0600 23  0541 23  0641 23  2334 23  2326 23  2321   WBC 9.81  --  11.52* 11.88* 9.26  --   --  10.83*   HEMOGLOBIN  11.5*  --  12.4 12.8 11.3*  --   --  13.3   HEMOGLOBIN, POC  --   --   --   --   --   --   --  15.0   HEMATOCRIT 35.9  --  39.6 41.9 35.4  --   --  42.9   HEMATOCRIT POC  --   --   --   --   --   --   --  44   PLATELETS 267  --  270 253 265  --   --  333   NEUTROS ABS 7.36*  --  9.88* 10.25* 7.30*  --   --  8.94*   IMMATURE GRANS (ABS) 0.05  --  0.05 0.06* 0.04  --   --  0.05   LYMPHS ABS 1.39  --  0.70 0.61* 0.98  --   --  0.98   MONOS ABS 0.77  --  0.82 0.86 0.87  --   --  0.76   EOS ABS 0.19  --  0.04 0.07 0.03  --   --  0.06   MCV 88.4  --  89.2 93.1 88.5  --   --  89.0   PROCALCITONIN  --   --  0.19  --   --   --   --   --    LACTATE  --   --   --   --   --   --   --  1.1   PROTIME  --  16.9*  --   --  28.5* 20.5 20.5*  --    APTT  --   --   --   --   --   --   --  39.1*         Lab 04/11/23  0540 04/10/23  0600 04/09/23  0541 04/08/23  0641 04/07/23  2321   SODIUM 140 137 141 140 138   POTASSIUM 3.9 4.7 3.4* 3.4* 3.5   CHLORIDE 106 103 103 103 99   CO2 25.0 24.0 25.0 24.0 25.0   ANION GAP 9.0 10.0 13.0 13.0 14.0   BUN 24* 21 13 22 22   CREATININE 0.81 0.92 0.69 0.85 0.90  0.82   EGFR 71.7 61.5 85.7 67.7 70.6  63.2   GLUCOSE 287* 374* 197* 205* 280*   CALCIUM 8.1* 8.7 8.9 8.7 9.1   MAGNESIUM 1.9  --   --  1.8  --          Lab 04/11/23  0540 04/10/23  0600 04/09/23  0541 04/08/23  0641 04/07/23  2321   TOTAL PROTEIN 5.8* 6.0 6.0 5.3* 7.1   ALBUMIN 3.2* 3.1* 3.8 3.4* 4.0   GLOBULIN 2.6 2.9 2.2 1.9 3.1   ALT (SGPT) 132* 198* 328* 408* 297*   AST (SGOT) 33* 67* 219* 590* 597*   BILIRUBIN 0.8 1.2 4.0* 2.3* 1.7*   ALK PHOS 119* 154* 185* 161* 154*   LIPASE  --  44  --   --  47         Lab 04/10/23  0601 04/08/23  0641 04/08/23  0150 04/07/23  2334 04/07/23  2326 04/07/23  2321   HSTROP T  --   --  13*  --   --  14*  14*   PROTIME 16.9* 28.5*  --  20.5 20.5*  --    INR 1.38* 2.66*  --  2* 1.8*  --                  Brief Urine Lab Results  (Last result in the past 365 days)      Color   Clarity   Blood   Leuk Est    Nitrite   Protein   CREAT   Urine HCG        04/08/23 0113 Yellow   Clear   Negative   Negative   Negative   Trace                 Microbiology Results Abnormal     Procedure Component Value - Date/Time    Blood Culture - Blood, Arm, Right [[id number]]  (Normal) Collected: 04/08/23 0103    Lab Status: Preliminary result Specimen: Blood from Arm, Right Updated: 04/12/23 0730     Blood Culture No growth at 4 days    Blood Culture - Blood, Arm, Right [[id number]]  (Normal) Collected: 04/08/23 0103    Lab Status: Preliminary result Specimen: Blood from Arm, Right Updated: 04/12/23 0730     Blood Culture No growth at 4 days    MRSA Screen, PCR (Inpatient) - Swab, Nares [[id number]]  (Normal) Collected: 04/10/23 1306    Lab Status: Final result Specimen: Swab from Nares Updated: 04/10/23 1348     MRSA PCR Negative    Narrative:      The negative predictive value of this diagnostic test is high and should only be used to consider de-escalating anti-MRSA therapy. A positive result may indicate colonization with MRSA and must be correlated clinically.  MRSA Negative    COVID PRE-OP / PRE-PROCEDURE SCREENING ORDER (NO ISOLATION) - Swab, Nasopharynx [[id number]]  (Normal) Collected: 04/10/23 1236    Lab Status: Final result Specimen: Swab from Nasopharynx Updated: 04/10/23 1327    Narrative:      The following orders were created for panel order COVID PRE-OP / PRE-PROCEDURE SCREENING ORDER (NO ISOLATION) - Swab, Nasopharynx.  Procedure                               Abnormality         Status                     ---------                               -----------         ------                     Respiratory Panel PCR w/...[[id number]]  Normal              Final result                 Please view results for these tests on the individual orders.    Respiratory Panel PCR w/COVID-19(SARS-CoV-2) TRACY/JOSE/PHYLLIS/PAD/COR/MAD/MICHAEL In-House, NP Swab in UTM/VTM, 3-4 HR TAT - Swab, Nasopharynx [[id number]]  (Normal) Collected: 04/10/23 1236     Lab Status: Final result Specimen: Swab from Nasopharynx Updated: 04/10/23 1327     ADENOVIRUS, PCR Not Detected     Coronavirus 229E Not Detected     Coronavirus HKU1 Not Detected     Coronavirus NL63 Not Detected     Coronavirus OC43 Not Detected     COVID19 Not Detected     Human Metapneumovirus Not Detected     Human Rhinovirus/Enterovirus Not Detected     Influenza A PCR Not Detected     Influenza B PCR Not Detected     Parainfluenza Virus 1 Not Detected     Parainfluenza Virus 2 Not Detected     Parainfluenza Virus 3 Not Detected     Parainfluenza Virus 4 Not Detected     RSV, PCR Not Detected     Bordetella pertussis pcr Not Detected     Bordetella parapertussis PCR Not Detected     Chlamydophila pneumoniae PCR Not Detected     Mycoplasma pneumo by PCR Not Detected    Narrative:      In the setting of a positive respiratory panel with a viral infection PLUS a negative procalcitonin without other underlying concern for bacterial infection, consider observing off antibiotics or discontinuation of antibiotics and continue supportive care. If the respiratory panel is positive for atypical bacterial infection (Bordetella pertussis, Chlamydophila pneumoniae, or Mycoplasma pneumoniae), consider antibiotic de-escalation to target atypical bacterial infection.          CT Angiogram Chest Pulmonary Embolism    Result Date: 4/10/2023  CT ANGIOGRAM CHEST PULMONARY EMBOLISM Date of Exam: 4/10/2023 2:00 PM EDT Indication: Pulmonary embolism (PE) suspected, high prob. Comparison: Chest x-ray 4/7/2023, CT chest 9/15/2022 Technique: Axial CT images were obtained of the chest before and after the uneventful intravenous administration of 80 mL Isovue-370 utilizing pulmonary embolism protocol.  Reconstructed coronal and sagittal images were also obtained. Automated exposure control and iterative construction methods were used. Findings: Normal appearance of the pulmonary arteries without evidence of pulmonary embolism. There is  multichamber cardiac enlargement. There are mild coronary artery calcifications. No pericardial effusion. There are moderate atherosclerotic calcifications of the thoracic aorta which appears normal in caliber. Heterogeneous multinodular thyroid gland is noted. There are numerous prominent and mildly enlarged mediastinal lymph nodes, for example a right lower paratracheal lymph node measuring 1.1 cm in short axis; these appear mildly increased in size and number compared to CT of the chest from 9/15/2022. Mildly patulous thoracic esophagus is noted. Unremarkable appearance of the chest wall soft tissues. No axillary adenopathy. The trachea and mainstem bronchi are patent. There is mild bronchial wall thickening of the perihilar and lower lobe airways. There is interlobular septal thickening compatible with interstitial edema. There are patchy perihilar-predominant groundglass opacities compatible with early alveolar edema. There are small bilateral pleural effusions with associated passive atelectasis in the dependent lung bases. There is some fluid tracking in the inferior right major fissure. No lung mass or discrete nodule. No pneumothorax. No acute or suspicious bony findings. No acute findings in the partially imaged upper abdomen. There is a small sliding hiatal hernia. There is evidence of colonic diverticulosis. There is a subcentimeter hypodense right adrenal nodule, likely adenoma.     Impression: Impression: No evidence of pulmonary embolism. Interlobular septal thickening and patchy perihilar bilateral groundglass densities compatible with interstitial edema and early alveolar edema, with associated small bilateral pleural effusions with some associated bibasilar passive atelectasis. There is multichamber cardiac enlargement. Numerous prominent and mildly enlarged mediastinal lymph nodes appearing mildly increased in number and conspicuity from CT of the chest from 9/15/2022. The appearance is  nonspecific; this could be reactive or congestive, with lymphoproliferative disorder or metastatic nodes difficult to exclude though felt to be less likely. Electronically Signed: Patricio Moyer  4/10/2023 2:24 PM EDT  Workstation ID: [id number]      Results for orders placed during the hospital encounter of 04/07/23    Adult Transthoracic Echo Complete W/ Cont if Necessary Per Protocol    Interpretation Summary  •  Left ventricular systolic function is normal. Left ventricular ejection fraction appears to be 56 - 60%.  •  Left ventricular wall thickness is consistent with mild concentric hypertrophy.  •  Left ventricular diastolic function is consistent with (grade II w/high LAP) pseudonormalization.  •  Left atrial volume is mildly increased.  •  There is moderate calcification of the aortic valve.  •  Moderate aortic valve stenosis is present.  •  There is moderate calcification of the mitral valve  •  Mild to moderate mitral valve stenosis is present.  •  Mild tricuspid valve regurgitation is present.  •  Estimated right ventricular systolic pressure from tricuspid regurgitation is moderately elevated (45-55 mmHg).      Current medications:  Scheduled Meds:cefTRIAXone, 2 g, Intravenous, Q24H  doxycycline, 100 mg, Intravenous, Q12H  enoxaparin, 40 mg, Subcutaneous, Nightly  guaiFENesin, 1,200 mg, Oral, Q12H  insulin detemir, 10 Units, Subcutaneous, Q12H  insulin lispro, 0-7 Units, Subcutaneous, 4x Daily With Meals & Nightly  Insulin Lispro, 8 Units, Subcutaneous, TID With Meals  ipratropium-albuterol, 3 mL, Nebulization, 4x Daily - RT  metroNIDAZOLE, 500 mg, Intravenous, Q8H  oxybutynin XL, 5 mg, Oral, Daily  sodium chloride, 10 mL, Intravenous, Q12H  triamcinolone, 1 application, Topical, Q12H  valsartan, 80 mg, Oral, Q24H      Continuous Infusions:lactated ringers, 9 mL/hr, Last Rate: 9 mL/hr (04/11/23 1747)  lactated ringers, 9 mL/hr, Last Rate: Stopped (04/11/23 1523)      PRN Meds:.•  acetaminophen  •  Calcium  Replacement - Follow Nurse / BPA Driven Protocol  •  dextrose  •  dextrose  •  glucagon (human recombinant)  •  HYDROcodone-acetaminophen  •  HYDROmorphone  •  Magnesium Standard Dose Replacement - Follow Nurse / BPA Driven Protocol  •  Phosphorus Replacement - Follow Nurse / BPA Driven Protocol  •  Potassium Replacement - Follow Nurse / BPA Driven Protocol  •  sodium chloride  •  sodium chloride  •  sodium chloride    Assessment & Plan   Assessment & Plan     Active Hospital Problems    Diagnosis  POA   • **Acute cholecystitis [K81.0]  Yes   • Paroxysmal atrial fibrillation [I48.0]  Yes   • Type 2 diabetes mellitus with hyperglycemia, without long-term current use of insulin [E11.65]  Yes   • BERNARD treated with BiPAP [G47.33]  Yes   • Hyperlipidemia [E78.5]  Yes   • Hypertension [I10]  Yes      Resolved Hospital Problems   No resolved problems to display.        Brief Hospital Course to date:  Kaylen Garrison is a 82 y.o. female with PMH significant for A-fib on Xarelto, HTN, HLD, BERNARD on BiPAP, DM2, who presents to the ED with complaint of abdominal pain who was found to have concern for acute cholecystitis.    Assessment and plan:  Acute cholecystitis  Elevated liver enzymes, improved  · Status post ERCP by Dr. Brunner on 4/9/2023 --> sphincterotomy, balloon sweeps using 3 black multifaceted stones, prophylactic bacterial stent to pancreatic duct  · Continue IV Rocephin and Flagyl  · Status post laparoscopic cholecystectomy by Dr. GR on 4/11/2023 and doing well postoperatively  · Tolerating diet well  · Encourage ambulation    Acute hypoxic respiratory insufficiency, improving  Pulmonary edema with bilateral pleural effusion  · CTA chest done today 04/10/2023 --> no PE but showed bilateral groundglass opacities concerning for pulmonary edema rather than atypical pneumonia.  Also showing bilateral pleural effusion  · Oxygen requirement is improving with diuresis  · Discussed with Dr. Alvarez/cardiology today  who recommended to continue diuresis.  We will give another dose of Bumex 2 mg IV this morning  · Continue doxycycline for atypical pneumonia coverage     Elevated troponin  · Mildly elevated, suspect demand ischemia  · No chest pain     Paroxysmal atrial fibrillation  Elevated INR  · Currently rate controlled with amiodarone  · INR was elevated which is likely secondary to Xarelto.  Last dose of Xarelto was given 04/07/2023   · Restart Xarelto today (postoperative day 1)     Essential hypertension  · Continue valsartan to 80 mg daily  · Restart amlodipine  · Continue to hold hydrochlorothiazide    Hyperlipidemia  · Continue to hold statin secondary to elevated liver enzymes  · Can restart at DC     Type 2 diabetes  · A1c 7.2%  · Continue to hold glipizide, Januvia, metformin  · Increase insulin Levemir 10 units twice daily and Premeal insulin to 8 units 3 times daily and continue insulin sliding scale      Expected Discharge Location and Transportation: Home  Expected Discharge  Expected Discharge Date and Time     Expected Discharge Date Expected Discharge Time    Apr 13, 2023            DVT prophylaxis:  Medical and mechanical DVT prophylaxis orders are present.     AM-PAC 6 Clicks Score (PT): 20 (04/09/23 0842)    CODE STATUS:   Code Status and Medical Interventions:   Ordered at: 04/08/23 0117     Code Status (Patient has no pulse and is not breathing):    CPR (Attempt to Resuscitate)     Medical Interventions (Patient has pulse or is breathing):    Full Support     Copied text in this note has been reviewed and is accurate as of 04/12/23.     Jonathan Scott MD  04/12/23

## 2023-04-12 NOTE — PROGRESS NOTES
"Kaylengretta Garrison  1940  [id number]    Surgery Progress Note    Date of visit: 4/12/2023    Subjective: No complaints of abdominal pain  4 L of oxygen nasal cannula  Tolerating breakfast    Objective:    /80 (BP Location: Left arm, Patient Position: Lying)   Pulse 66   Temp 97.6 °F (36.4 °C) (Oral)   Resp 18   Ht 160 cm (63\")   Wt 76.3 kg (168 lb 4.8 oz)   SpO2 98%   BMI 29.81 kg/m²     Intake/Output Summary (Last 24 hours) at 4/12/2023 0829  Last data filed at 4/12/2023 0424  Gross per 24 hour   Intake 1187.45 ml   Output 1350 ml   Net -162.55 ml       CV: Irregular irregular  Abd: Soft  Minimal puncture site tenderness      LABS:    Results from last 7 days   Lab Units 04/12/23  0430   WBC 10*3/mm3 10.73   HEMOGLOBIN g/dL 11.5*   HEMATOCRIT % 36.2   PLATELETS 10*3/mm3 288     Results from last 7 days   Lab Units 04/11/23  0540   SODIUM mmol/L 140   POTASSIUM mmol/L 3.9   CHLORIDE mmol/L 106   CO2 mmol/L 25.0   BUN mg/dL 24*   CREATININE mg/dL 0.81   CALCIUM mg/dL 8.1*   BILIRUBIN mg/dL 0.8   ALK PHOS U/L 119*   ALT (SGPT) U/L 132*   AST (SGOT) U/L 33*   GLUCOSE mg/dL 287*     Results from last 7 days   Lab Units 04/11/23  0540   SODIUM mmol/L 140   POTASSIUM mmol/L 3.9   CHLORIDE mmol/L 106   CO2 mmol/L 25.0   BUN mg/dL 24*   CREATININE mg/dL 0.81   GLUCOSE mg/dL 287*   CALCIUM mg/dL 8.1*     Lab Results   Lab Value Date/Time    LIPASE 44 04/10/2023 0600    LIPASE 47 04/07/2023 2321         Assessment/ Plan: Stable course status post ERCP followed by laparoscopic cholecystectomy  Patient is progressing slowly  She is requiring 4 L of nasal cannula oxygen  CT scan of the chest is concerning for bilateral effusions as well as pulmonary edema  Chest x-ray is pending   Discussed the findings and the plan with Dr. Scott and Dr. Alvarez  Continue with close observation      Problem List Items Addressed This Visit        Gastrointestinal Abdominal     * (Principal) Acute cholecystitis - Primary "    Relevant Orders    Tissue Pathology Exam   Other Visit Diagnoses     Acute abdominal pain        Uncontrolled hypertension        Relevant Medications    labetalol (NORMODYNE,TRANDATE) injection 20 mg (Completed)    furosemide (LASIX) injection 40 mg (Completed)    valsartan (DIOVAN) tablet 80 mg    bumetanide (BUMEX) injection 2 mg (Start on 4/12/2023  9:15 AM)    Choledocholithiasis                Abraham Holbrook MD  4/12/2023  08:29 EDT

## 2023-04-12 NOTE — PLAN OF CARE
Goal Outcome Evaluation:  Plan of Care Reviewed With: patient, son           Outcome Evaluation: Pt demonstrates balance and endurance below baseline contributing to impairments in ambulation and overall fxnl mobility. Pt will benefit from PT to address aforementioned deficits and return to PLOF. Pt and son educated to perform ambulation as tolerated in hallway with walker with nsg in agreement. PT rec home with assist and OP PT upon dc.

## 2023-04-12 NOTE — PROGRESS NOTES
KUB reviewed.  Prophylactic pancreas duct stent has passed spontaneously.    Reviewed these findings with patient's son, Maxi.    Will sign off.  Please call with questions or concerns.

## 2023-04-12 NOTE — PLAN OF CARE
Goal Outcome Evaluation:   Patient assessment unchanged, NSR on telemetry, 4 L via nasal cannula. Up with x1 assist with walker to bathroom. Passing gas post surgery. No BM this shift. No c/o pain. Elevated blood sugars overnight, extra sliding scale dose administered per provider instruction. Lap sites clean, dry, intact. No new complaints at this time. Safety precautions remain in place.

## 2023-04-12 NOTE — CASE MANAGEMENT/SOCIAL WORK
Continued Stay Note  [facility]     Patient Name: Kaylen Garrison  MRN: [id number]  Today's Date: 4/12/2023    Admit Date: 4/7/2023    Plan: Home   Discharge Plan     Row Name 04/12/23 1407       Plan    Plan Home    Plan Comments I spoke with patient and her son earlier today. She resides with her spouse, Elton in [place]. She has the following DME: Rolling walker, shower chair, grab bars, bipap with oxygen at night. She would like a rollator.   She uses outpatient [facility] on [facility]. Her insurance is [facility]. Her PCP is Lizzette Multani. She has no advanced directives. I will see what therapy recommends; home with outpatient [facility]? Currently on 1.5 liters/NC. I will be glad to arrange home oxygen, if indicated. (SHe uses [employer] for her bipap)    Final Discharge Disposition Code 01 - home or self-care               Discharge Codes    No documentation.               Expected Discharge Date and Time     Expected Discharge Date Expected Discharge Time    Apr 13, 2023             Sonya Ley RN
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
## 2023-04-12 NOTE — PROGRESS NOTES
"[facility] Cardiology Daily Note       LOS: 2 days   Patient Care Team:  Lizzette Multani MD as PCP - General (Internal Medicine)  Irish Sousa PA as Physician Assistant (Endocrinology)  Harley Rose MD as Consulting Physician (Endocrinology)  Matilde Alvarez MD as Consulting Physician (Cardiology)    Chief Complaint: Elevated troponin/prolonged QT    Subjective     Subjective: Remains 97% on 4 L nasal cannula.  Blood pressures have been mildly elevated.  Heart rates have been in the 60s.  Surgery went well yesterday.    Review of Systems:   As above.    Medications:  cefTRIAXone, 2 g, Intravenous, Q24H  doxycycline, 100 mg, Intravenous, Q12H  enoxaparin, 40 mg, Subcutaneous, Nightly  guaiFENesin, 1,200 mg, Oral, Q12H  insulin detemir, 10 Units, Subcutaneous, Nightly  insulin lispro, 0-7 Units, Subcutaneous, 4x Daily With Meals & Nightly  Insulin Lispro, 5 Units, Subcutaneous, TID With Meals  ipratropium-albuterol, 3 mL, Nebulization, 4x Daily - RT  metroNIDAZOLE, 500 mg, Intravenous, Q8H  oxybutynin XL, 5 mg, Oral, Daily  sodium chloride, 10 mL, Intravenous, Q12H  triamcinolone, 1 application, Topical, Q12H  valsartan, 80 mg, Oral, Q24H        Objective     Vital Sign Min/Max for last 24 hours  Temp  Min: 97.1 °F (36.2 °C)  Max: 99 °F (37.2 °C)   BP  Min: 108/51  Max: 180/76   Pulse  Min: 63  Max: 96   Resp  Min: 12  Max: 18   SpO2  Min: 84 %  Max: 99 %   Flow (L/min)  Min: 2  Max: 12   Weight  Min: 66.7 kg (147 lb)  Max: 76.3 kg (168 lb 4.8 oz)      Intake/Output Summary (Last 24 hours) at 4/12/2023 0704  Last data filed at 4/12/2023 0424  Gross per 24 hour   Intake 1187.45 ml   Output 1350 ml   Net -162.55 ml        Flowsheet Rows    Flowsheet Row First Filed Value   Admission Height 160 cm (63\") Documented at 04/07/2023 2326   Admission Weight 72.6 kg (160 lb) Documented at 04/07/2023 2326          Physical Exam:    General: Alert and oriented.   Cardiovascular: " Heart has a nondisplaced focal PMI. Regular rate and rhythm with 2/6 SE murmur, no gallop or rub.  Lungs: Clear without rales or wheezes.  Decreased bases bilaterally right greater than left  Extremities: Show no edema.   Skin: warm and dry.     Results Review:    I reviewed the patient's new clinical results.  EKG:  Tele: Sinus rhythm at 60 bpm    Labs:    Results from last 7 days   Lab Units 04/11/23  0540 04/10/23  0600 04/09/23  0541   SODIUM mmol/L 140 137 141   POTASSIUM mmol/L 3.9 4.7 3.4*   CHLORIDE mmol/L 106 103 103   CO2 mmol/L 25.0 24.0 25.0   BUN mg/dL 24* 21 13   CREATININE mg/dL 0.81 0.92 0.69   CALCIUM mg/dL 8.1* 8.7 8.9   BILIRUBIN mg/dL 0.8 1.2 4.0*   ALK PHOS U/L 119* 154* 185*   ALT (SGPT) U/L 132* 198* 328*   AST (SGOT) U/L 33* 67* 219*   GLUCOSE mg/dL 287* 374* 197*     Results from last 7 days   Lab Units 04/11/23  0540 04/10/23  0600 04/09/23  0541   WBC 10*3/mm3 9.81 11.52* 11.88*   HEMOGLOBIN g/dL 11.5* 12.4 12.8   HEMATOCRIT % 35.9 39.6 41.9   PLATELETS 10*3/mm3 267 270 253     Lab Results   Component Value Date    TROPONINT 13 (H) 04/08/2023    TROPONINT 14 (H) 04/07/2023    TROPONINT 14 (H) 04/07/2023     Lab Results   Component Value Date    CHOL 97 11/11/2021     Lab Results   Component Value Date    TRIG 66 11/11/2021     Lab Results   Component Value Date    HDL 42 11/11/2021     No components found for: LDLCALC  Lab Results   Component Value Date    INR 1.38 (H) 04/10/2023    INR 2.66 (H) 04/08/2023    INR 2 (A) 04/07/2023    PROTIME 16.9 (H) 04/10/2023    PROTIME 28.5 (H) 04/08/2023    PROTIME 20.5 04/07/2023         Ejection Fraction: 55 to 60% by echo 4/8/2023    Assessment   Assessment:    1. Acute cholecystitis, transaminitis  1. Status post ERCP with extraction of 3 stones  2. Status post laparoscopic cholecystectomy for 1123  2. Paroxysmal atrial fibrillation  a. Diagnosed 2021 status post SATISH/ECV  b. Remains rhythm controlled on amiodarone  c. Xarelto on hold  d. Amiodarone  on hold prolonged QT  3. Bilateral pleural effusions/pulmonary edema 2 days following admission by CT scan.  (Was not present on her initial abdominal CT at admission.)  4. Moderate aortic stenosis, mean gradient 23 mmHg.  5. Mild to moderate mitral stenosis, mean gradient 6 mmHg.  6. Minimally elevated troponin secondary to demand ischemia      Plan:    Restart amiodarone at 100 mg 3 times a week beginning today.    Bumex 2 mg IV x1  Xarelto on hold for surgery.  Wean oxygen as able.    Matilde Alvarez MD  04/12/23  07:23 EDT

## 2023-04-13 LAB
ALBUMIN SERPL-MCNC: 3.1 G/DL (ref 3.5–5.2)
ALBUMIN/GLOB SERPL: 1.5 G/DL
ALP SERPL-CCNC: 106 U/L (ref 39–117)
ALT SERPL W P-5'-P-CCNC: 98 U/L (ref 1–33)
ANION GAP SERPL CALCULATED.3IONS-SCNC: 8 MMOL/L (ref 5–15)
AST SERPL-CCNC: 62 U/L (ref 1–32)
BACTERIA SPEC AEROBE CULT: NORMAL
BACTERIA SPEC AEROBE CULT: NORMAL
BASOPHILS # BLD AUTO: 0.04 10*3/MM3 (ref 0–0.2)
BASOPHILS NFR BLD AUTO: 0.4 % (ref 0–1.5)
BILIRUB SERPL-MCNC: 0.6 MG/DL (ref 0–1.2)
BUN SERPL-MCNC: 17 MG/DL (ref 8–23)
BUN/CREAT SERPL: 28.8 (ref 7–25)
CALCIUM SPEC-SCNC: 8.4 MG/DL (ref 8.6–10.5)
CHLORIDE SERPL-SCNC: 109 MMOL/L (ref 98–107)
CO2 SERPL-SCNC: 27 MMOL/L (ref 22–29)
CREAT SERPL-MCNC: 0.59 MG/DL (ref 0.57–1)
CYTO UR: NORMAL
DEPRECATED RDW RBC AUTO: 57.3 FL (ref 37–54)
EGFRCR SERPLBLD CKD-EPI 2021: 89 ML/MIN/1.73
EOSINOPHIL # BLD AUTO: 0.33 10*3/MM3 (ref 0–0.4)
EOSINOPHIL NFR BLD AUTO: 3.6 % (ref 0.3–6.2)
ERYTHROCYTE [DISTWIDTH] IN BLOOD BY AUTOMATED COUNT: 17.8 % (ref 12.3–15.4)
GLOBULIN UR ELPH-MCNC: 2.1 GM/DL
GLUCOSE BLDC GLUCOMTR-MCNC: 193 MG/DL (ref 70–130)
GLUCOSE BLDC GLUCOMTR-MCNC: 236 MG/DL (ref 70–130)
GLUCOSE BLDC GLUCOMTR-MCNC: 274 MG/DL (ref 70–130)
GLUCOSE BLDC GLUCOMTR-MCNC: 52 MG/DL (ref 70–130)
GLUCOSE BLDC GLUCOMTR-MCNC: 90 MG/DL (ref 70–130)
GLUCOSE SERPL-MCNC: 48 MG/DL (ref 65–99)
HCT VFR BLD AUTO: 37 % (ref 34–46.6)
HGB BLD-MCNC: 11.8 G/DL (ref 12–15.9)
IMM GRANULOCYTES # BLD AUTO: 0.05 10*3/MM3 (ref 0–0.05)
IMM GRANULOCYTES NFR BLD AUTO: 0.5 % (ref 0–0.5)
LAB AP CASE REPORT: NORMAL
LAB AP CLINICAL INFORMATION: NORMAL
LYMPHOCYTES # BLD AUTO: 1.9 10*3/MM3 (ref 0.7–3.1)
LYMPHOCYTES NFR BLD AUTO: 20.5 % (ref 19.6–45.3)
MAGNESIUM SERPL-MCNC: 2.1 MG/DL (ref 1.6–2.4)
MCH RBC QN AUTO: 28.2 PG (ref 26.6–33)
MCHC RBC AUTO-ENTMCNC: 31.9 G/DL (ref 31.5–35.7)
MCV RBC AUTO: 88.3 FL (ref 79–97)
MONOCYTES # BLD AUTO: 1.07 10*3/MM3 (ref 0.1–0.9)
MONOCYTES NFR BLD AUTO: 11.6 % (ref 5–12)
NEUTROPHILS NFR BLD AUTO: 5.87 10*3/MM3 (ref 1.7–7)
NEUTROPHILS NFR BLD AUTO: 63.4 % (ref 42.7–76)
NRBC BLD AUTO-RTO: 0 /100 WBC (ref 0–0.2)
PATH REPORT.FINAL DX SPEC: NORMAL
PATH REPORT.GROSS SPEC: NORMAL
PHOSPHATE SERPL-MCNC: 2.9 MG/DL (ref 2.5–4.5)
PLATELET # BLD AUTO: 286 10*3/MM3 (ref 140–450)
PMV BLD AUTO: 9.9 FL (ref 6–12)
POTASSIUM SERPL-SCNC: 3.8 MMOL/L (ref 3.5–5.2)
PROT SERPL-MCNC: 5.2 G/DL (ref 6–8.5)
RBC # BLD AUTO: 4.19 10*6/MM3 (ref 3.77–5.28)
SODIUM SERPL-SCNC: 144 MMOL/L (ref 136–145)
WBC NRBC COR # BLD: 9.26 10*3/MM3 (ref 3.4–10.8)

## 2023-04-13 PROCEDURE — 94799 UNLISTED PULMONARY SVC/PX: CPT

## 2023-04-13 PROCEDURE — 99232 SBSQ HOSP IP/OBS MODERATE 35: CPT | Performed by: INTERNAL MEDICINE

## 2023-04-13 PROCEDURE — 93005 ELECTROCARDIOGRAM TRACING: CPT | Performed by: INTERNAL MEDICINE

## 2023-04-13 PROCEDURE — 99222 1ST HOSP IP/OBS MODERATE 55: CPT | Performed by: INTERNAL MEDICINE

## 2023-04-13 PROCEDURE — 85025 COMPLETE CBC W/AUTO DIFF WBC: CPT | Performed by: INTERNAL MEDICINE

## 2023-04-13 PROCEDURE — 82962 GLUCOSE BLOOD TEST: CPT

## 2023-04-13 PROCEDURE — 63710000001 INSULIN LISPRO (HUMAN) PER 5 UNITS: Performed by: SURGERY

## 2023-04-13 PROCEDURE — 93010 ELECTROCARDIOGRAM REPORT: CPT | Performed by: INTERNAL MEDICINE

## 2023-04-13 PROCEDURE — 80053 COMPREHEN METABOLIC PANEL: CPT | Performed by: INTERNAL MEDICINE

## 2023-04-13 PROCEDURE — 63710000001 INSULIN LISPRO (HUMAN) PER 5 UNITS: Performed by: INTERNAL MEDICINE

## 2023-04-13 PROCEDURE — 84100 ASSAY OF PHOSPHORUS: CPT | Performed by: INTERNAL MEDICINE

## 2023-04-13 PROCEDURE — 83735 ASSAY OF MAGNESIUM: CPT | Performed by: INTERNAL MEDICINE

## 2023-04-13 PROCEDURE — 25010000002 CEFTRIAXONE PER 250 MG: Performed by: SURGERY

## 2023-04-13 PROCEDURE — 63710000001 INSULIN DETEMIR PER 5 UNITS: Performed by: INTERNAL MEDICINE

## 2023-04-13 RX ORDER — NIFEDIPINE 10 MG/1
20 CAPSULE ORAL ONCE
Status: COMPLETED | OUTPATIENT
Start: 2023-04-13 | End: 2023-04-13

## 2023-04-13 RX ORDER — IPRATROPIUM BROMIDE AND ALBUTEROL SULFATE 2.5; .5 MG/3ML; MG/3ML
3 SOLUTION RESPIRATORY (INHALATION)
Status: DISCONTINUED | OUTPATIENT
Start: 2023-04-13 | End: 2023-04-14 | Stop reason: HOSPADM

## 2023-04-13 RX ORDER — BUMETANIDE 1 MG/1
2 TABLET ORAL ONCE
Status: COMPLETED | OUTPATIENT
Start: 2023-04-13 | End: 2023-04-13

## 2023-04-13 RX ORDER — AMLODIPINE BESYLATE 5 MG/1
5 TABLET ORAL
Status: DISCONTINUED | OUTPATIENT
Start: 2023-04-14 | End: 2023-04-14 | Stop reason: HOSPADM

## 2023-04-13 RX ORDER — HYDROCHLOROTHIAZIDE 12.5 MG/1
12.5 TABLET ORAL DAILY
Status: DISCONTINUED | OUTPATIENT
Start: 2023-04-13 | End: 2023-04-14 | Stop reason: HOSPADM

## 2023-04-13 RX ADMIN — TRIAMCINOLONE ACETONIDE 1 APPLICATION: 1 CREAM TOPICAL at 10:18

## 2023-04-13 RX ADMIN — DEXTROSE 15 G: 15 GEL ORAL at 08:05

## 2023-04-13 RX ADMIN — INSULIN DETEMIR 10 UNITS: 100 INJECTION, SOLUTION SUBCUTANEOUS at 20:58

## 2023-04-13 RX ADMIN — Medication 10 ML: at 20:59

## 2023-04-13 RX ADMIN — Medication 10 ML: at 08:04

## 2023-04-13 RX ADMIN — RIVAROXABAN 15 MG: 15 TABLET, FILM COATED ORAL at 18:40

## 2023-04-13 RX ADMIN — HYDROCHLOROTHIAZIDE 12.5 MG: 12.5 CAPSULE ORAL at 18:11

## 2023-04-13 RX ADMIN — OXYBUTYNIN CHLORIDE 5 MG: 5 TABLET, EXTENDED RELEASE ORAL at 08:04

## 2023-04-13 RX ADMIN — DOXYCYCLINE 100 MG: 100 INJECTION, POWDER, LYOPHILIZED, FOR SOLUTION INTRAVENOUS at 00:46

## 2023-04-13 RX ADMIN — CEFTRIAXONE 2 G: 2 INJECTION, POWDER, FOR SOLUTION INTRAMUSCULAR; INTRAVENOUS at 08:04

## 2023-04-13 RX ADMIN — INSULIN LISPRO 2 UNITS: 100 INJECTION, SOLUTION INTRAVENOUS; SUBCUTANEOUS at 13:01

## 2023-04-13 RX ADMIN — BUMETANIDE 2 MG: 1 TABLET ORAL at 10:18

## 2023-04-13 RX ADMIN — DOXYCYCLINE 100 MG: 100 INJECTION, POWDER, LYOPHILIZED, FOR SOLUTION INTRAVENOUS at 13:01

## 2023-04-13 RX ADMIN — NIFEDIPINE 20 MG: 10 CAPSULE ORAL at 18:11

## 2023-04-13 RX ADMIN — AMLODIPINE BESYLATE 2.5 MG: 2.5 TABLET ORAL at 08:04

## 2023-04-13 RX ADMIN — METRONIDAZOLE 500 MG: 500 INJECTION, SOLUTION INTRAVENOUS at 04:14

## 2023-04-13 RX ADMIN — VALSARTAN 80 MG: 80 TABLET, FILM COATED ORAL at 08:04

## 2023-04-13 RX ADMIN — GUAIFENESIN 1200 MG: 600 TABLET, EXTENDED RELEASE ORAL at 08:04

## 2023-04-13 RX ADMIN — INSULIN LISPRO 4 UNITS: 100 INJECTION, SOLUTION INTRAVENOUS; SUBCUTANEOUS at 20:58

## 2023-04-13 RX ADMIN — IPRATROPIUM BROMIDE AND ALBUTEROL SULFATE 3 ML: .5; 3 SOLUTION RESPIRATORY (INHALATION) at 20:18

## 2023-04-13 RX ADMIN — INSULIN LISPRO 8 UNITS: 100 INJECTION, SOLUTION INTRAVENOUS; SUBCUTANEOUS at 18:12

## 2023-04-13 RX ADMIN — INSULIN LISPRO 8 UNITS: 100 INJECTION, SOLUTION INTRAVENOUS; SUBCUTANEOUS at 13:00

## 2023-04-13 RX ADMIN — HYDROCODONE BITARTRATE AND ACETAMINOPHEN 1 TABLET: 5; 325 TABLET ORAL at 00:46

## 2023-04-13 RX ADMIN — INSULIN LISPRO 2 UNITS: 100 INJECTION, SOLUTION INTRAVENOUS; SUBCUTANEOUS at 18:12

## 2023-04-13 RX ADMIN — GUAIFENESIN 1200 MG: 600 TABLET, EXTENDED RELEASE ORAL at 20:58

## 2023-04-13 NOTE — PROGRESS NOTES
INPATIENT PULMONARY SERVICE   PROGRESS NOTE       Chief complaint: Hypoxia    History of Present Illness: Patient Bladimir is a 82-year-old female with past medical history of atrial fibrillation (on anticoagulation) BERNARD on CPAP, diabetes, hypertension.  She was admitted on 4/07/2023 with acute onset abdominal pain and was found to have acute cholecystitis.  She is status post ERCP on 4/09 and laparoscopic cholecystectomy on 4/11/2023.  Hospital course complicated by hypoxic respiratory failure.  Imaging consistent with pulmonary edema and bilateral pleural effusions.  Patient is overall improving and only requiring 1 to 2 L nasal cannula supplemental oxygen.  Pulmonary called at family's request to evaluate chart given ongoing oxygen need.  On exam today patient is pleasant sitting in bedside chair on 2 L with appropriate oxygen saturations in the mid 90s.  She is comfortable and had recently finished a meal.  Patient afebrile and hemodynamically stable. Labs/electrolytes reviewed and relatively normal.  Normal white count.  Transaminases remain elevated but are overall downtrending.    PMH: She  has a past medical history of Atrial fibrillation, Benign hypertension, History of echocardiogram (04/2012), Hypercalcemia, Hypercholesterolemia, Hypertension, Meniere's disease, Obesity, Overweight (BMI 25.0-29.9), Primary hyperparathyroidism, Sleep apnea with use of continuous positive airway pressure (CPAP), Syncope (04/2012), Type 2 diabetes mellitus, and Vitamin D deficiency.   PSxH: She  has a past surgical history that includes Hysterectomy; Breast surgery (Left); Cataract extraction (Bilateral); ERCP (N/A, 4/9/2023); and Cholecystectomy (N/A, 4/11/2023).     Allergies: She is allergic to adhesive tape, latex, and penicillins.   FH: Her family history includes Heart attack in her mother; Lactose intolerance in her brother; No Known Problems in her father, sister, and sister.   SH: She  reports that she has never  smoked. She has never used smokeless tobacco. She reports that she does not drink alcohol and does not use drugs.     The patient's relevant past medical, surgical and social history were reviewed and updated in Epic as appropriate.      Medications:  No current facility-administered medications on file prior to encounter.     Current Outpatient Medications on File Prior to Encounter   Medication Sig   • amiodarone (PACERONE) 200 MG tablet Take 1 tablet by mouth once daily   • amLODIPine (NORVASC) 2.5 MG tablet Take 1 tablet by mouth Daily.   • atenolol (TENORMIN) 25 MG tablet Take 12.5 mg by mouth Daily. A half tablet daily   • atorvastatin (LIPITOR) 40 MG tablet Take 1 tablet by mouth Daily.   • Blood Glucose Monitoring Suppl (Accu-Chek Guide) w/Device kit 1 Device See Admin Instructions. Use to check blood sugar once daily.  Dx E11.9   • Blood Glucose Monitoring Suppl device Use as directed to check blood sugar please dispense insurance preferred   • Cholecalciferol (VITAMIN D3 PO) Take  by mouth.   • Cinnamon 500 MG capsule Take 1 capsule by mouth Daily.   • glipizide (GLUCOTROL XL) 2.5 MG 24 hr tablet Take 2 tablets by mouth Daily. 2 times daily   • Glucose Blood (Blood Glucose Test) strip 1 strip daily dispense strips to go with meter dx e11.65   • glucose blood (ReliOn Premier Test) test strip Use to test blood sugar daily.  Dx E11.9.   • hydroCHLOROthiazide (HYDRODIURIL) 25 MG tablet Take 12.5 mg by mouth Daily.   • Januvia 100 MG tablet Take 1 tablet by mouth once daily   • Lancets 33G misc 1 each Daily.   • metFORMIN ER (GLUCOPHAGE-XR) 500 MG 24 hr tablet Take 2 tablets by mouth 2 (Two) Times a Day With Meals.   • omega-3 acid ethyl esters (LOVAZA) 1 g capsule Take 1 capsule by mouth Daily.   • oxybutynin XL (DITROPAN-XL) 5 MG 24 hr tablet Take 1 tablet by mouth Daily.   • rivaroxaban (XARELTO) 15 MG tablet Take 1 tablet by mouth Daily With Dinner. Indications: Atrial Fibrillation   • valsartan (DIOVAN) 80  MG tablet Take 1 tablet by mouth Daily.      Review of Systems: Fourteen point review of system performed and negative except for that mentioned in HPI.     Exam:  Vitals:    04/13/23 1411   BP: 148/66   Pulse: 68   Resp: 20   Temp: 98.1 °F (36.7 °C)   SpO2: 97%     General: The patient appears in no acute distress. Alert, cooperative and interactive.  HEENT:NC/AT, PERRL, Normal nasal mucosa.   Neck: Trachea midline, No masses.   Chest: Clear to auscultation bilaterally, No wheezing, rhonchi, or rales. Normal work of breathing. Equal chest rise.  Cardiac: Regular rhythm, normal rate, S1S2 auscultated. No murmurs, rubs or gallops.   Extremities: Nonpitting lower extremity edema. No clubbing or cyanosis.  Neuro: Motor power grossly intact bilaterally. Speech fluid and fluent. Thought process coherent.  Psych: Alert and oriented x 3, Mood stable.    Results Reviewed:  I reviewed the patient's new laboratory and imaging results.   I independently reviewed the patient's new images.    Assessment/Plan:    #Acute hypoxic respiratory failure    Personally reviewed patient's chart and serial imaging.  Given clinical history, comorbidities and recent event with cholecystitis--feel that she is doing very well and recovering appropriately.  She does remain on 1-2L of supplemental oxygen.  Feel that this is likely due to pulmonary edema given associated, bilateral pleural effusions.  Certainly agree with ongoing home CPAP use and diuresis as renal function will allow.  Also feel that it is appropriate to continue course of antibiotics for at least 7x days just in terms of possibility of superimposed pneumonia.  Patient should follow-up with outpatient pulmonary in 4 to 6 weeks with CT scan (without contrast) looking for resolution of airspace disease.  At that time can perform 6-minute walk test to see if supplemental oxygen needs remain.    -- Jason Meeks MD  Pulmonary/Critical Care      [x] Inpatient Pulmonary Consult  Service

## 2023-04-13 NOTE — CASE MANAGEMENT/SOCIAL WORK
Continued Stay Note  [facility]     Patient Name: Kayeln Garrison  MRN: [id number]  Today's Date: 4/13/2023    Admit Date: 4/7/2023    Plan: Home   Discharge Plan     Row Name 04/13/23 1216       Plan    Plan Home    Plan Comments Resume outpatient PT orders placed in system for outpatient KORT.   Home oxygen arranged with Allyson with portable tank to be delivered to her room prior to discharge. She may/may not have a qualifying diagnosis and Allyson will review this.  Rollator is coming to room as well provided by Allyson.    Final Discharge Disposition Code 01 - home or self-care               Discharge Codes    No documentation.               Expected Discharge Date and Time     Expected Discharge Date Expected Discharge Time    Apr 13, 2023             Sonya Ley RN

## 2023-04-13 NOTE — PROGRESS NOTES
[facility]  PROGRESS NOTE    Patient Name: Kaylen Garrison  : 1940  MRN: [id number]    Date of Admission: 2023  Primary Care Physician: Lizzette Multani MD    Subjective   Subjective     CC:  Follow-up for cholelithiasis    HPI:  I have seen and evaluated the patient this morning.  Again saw her in the afternoon.  Feeling much better.  Still hypoxic and will desat to 82 to 84% on room air.  But currently oxygen requirement is 2 L/min which is enough to keep her oxygen saturation above 92%.  Discussed the plan of care and discharge planning with the son at bedside.  We will ask pulmonary to see her today    ROS:  General : no fevers, chills  CVS: No chest pain, palpitations  Respiratory: Improving cough, dyspnea  GI: No N/V/D, abd pain  10 point review of systems is negative except for what is mentioned in HPI    Objective   Objective     Vital Signs:   Temp:  [97.7 °F (36.5 °C)-98.6 °F (37 °C)] 98.1 °F (36.7 °C)  Heart Rate:  [60-72] 60  Resp:  [18-20] 20  BP: (165-180)/(67-82) 167/70  Flow (L/min):  [2-3] 3     Physical Exam:  General: Comfortable, not in distress, conversant and cooperative  Head: Atraumatic and normocephalic  Eyes: No Icterus. No pallor  Ears:  Ears appear intact with no abnormalities noted  Throat: No oral lesions, no thrush  Neck: Supple, trachea midline  Lungs: Diminished air entry bilateral lung bases with coarse crackles right lung base  Heart:  Normal S1 and S2, no murmur, no gallop, No JVD, no lower extremity swelling  Abdomen:  Soft, no tenderness, no organomegaly, normal bowel sounds, no organomegaly  Extremities: pulses equal bilaterally  Skin: No bleeding, bruising or rash, normal skin turgor and elasticity  Neurologic: Cranial nerves appear intact with no evidence of facial asymmetry, normal motor and sensory functions in all 4 extremities  Psych: Alert and oriented x 3, normal mood      Results Reviewed:  LAB RESULTS:       Lab 04/13/23  0631 04/12/23  0430 04/11/23  0540 04/10/23  0601 04/10/23  0600 04/09/23  0541 04/08/23  0641 04/07/23  2334 04/07/23  2326 04/07/23  2321   WBC 9.26 10.73 9.81  --  11.52* 11.88* 9.26  --   --  10.83*   HEMOGLOBIN 11.8* 11.5* 11.5*  --  12.4 12.8 11.3*  --   --  13.3   HEMOGLOBIN, POC  --   --   --   --   --   --   --   --   --  15.0   HEMATOCRIT 37.0 36.2 35.9  --  39.6 41.9 35.4  --   --  42.9   HEMATOCRIT POC  --   --   --   --   --   --   --   --   --  44   PLATELETS 286 288 267  --  270 253 265  --   --  333   NEUTROS ABS 5.87 9.32* 7.36*  --  9.88* 10.25* 7.30*  --   --  8.94*   IMMATURE GRANS (ABS) 0.05 0.04 0.05  --  0.05 0.06* 0.04  --   --  0.05   LYMPHS ABS 1.90 0.59* 1.39  --  0.70 0.61* 0.98  --   --  0.98   MONOS ABS 1.07* 0.76 0.77  --  0.82 0.86 0.87  --   --  0.76   EOS ABS 0.33 0.00 0.19  --  0.04 0.07 0.03  --   --  0.06   MCV 88.3 88.7 88.4  --  89.2 93.1 88.5  --   --  89.0   PROCALCITONIN  --   --   --   --  0.19  --   --   --   --   --    LACTATE  --   --   --   --   --   --   --   --   --  1.1   PROTIME  --   --   --  16.9*  --   --  28.5* 20.5 20.5*  --    APTT  --   --   --   --   --   --   --   --   --  39.1*         Lab 04/13/23  0631 04/12/23  0430 04/11/23  0540 04/10/23  0600 04/09/23  0541 04/08/23  0641   SODIUM 144 140 140 137 141 140   POTASSIUM 3.8 4.6 3.9 4.7 3.4* 3.4*   CHLORIDE 109* 104 106 103 103 103   CO2 27.0 26.0 25.0 24.0 25.0 24.0   ANION GAP 8.0 10.0 9.0 10.0 13.0 13.0   BUN 17 16 24* 21 13 22   CREATININE 0.59 0.74 0.81 0.92 0.69 0.85   EGFR 89.0 79.9 71.7 61.5 85.7 67.7   GLUCOSE 48* 234* 287* 374* 197* 205*   CALCIUM 8.4* 8.5* 8.1* 8.7 8.9 8.7   MAGNESIUM 2.1  --  1.9  --   --  1.8   PHOSPHORUS 2.9  --   --   --   --   --          Lab 04/13/23  0631 04/12/23  0430 04/11/23  0540 04/10/23  0600 04/09/23  0541 04/08/23  0641 04/07/23  2321   TOTAL PROTEIN 5.2* 5.7* 5.8* 6.0 6.0   < > 7.1   ALBUMIN 3.1* 3.4* 3.2* 3.1* 3.8   < > 4.0   GLOBULIN 2.1 2.3  2.6 2.9 2.2   < > 3.1   ALT (SGPT) 98* 120* 132* 198* 328*   < > 297*   AST (SGOT) 62* 57* 33* 67* 219*   < > 597*   BILIRUBIN 0.6 0.8 0.8 1.2 4.0*   < > 1.7*   ALK PHOS 106 125* 119* 154* 185*   < > 154*   LIPASE  --   --   --  44  --   --  47    < > = values in this interval not displayed.         Lab 04/10/23  0601 04/08/23  0641 04/08/23  0150 04/07/23  2334 04/07/23  2326 04/07/23  2321   HSTROP T  --   --  13*  --   --  14*  14*   PROTIME 16.9* 28.5*  --  20.5 20.5*  --    INR 1.38* 2.66*  --  2* 1.8*  --                  Brief Urine Lab Results  (Last result in the past 365 days)      Color   Clarity   Blood   Leuk Est   Nitrite   Protein   CREAT   Urine HCG        04/08/23 0113 Yellow   Clear   Negative   Negative   Negative   Trace                 Microbiology Results Abnormal     Procedure Component Value - Date/Time    Blood Culture - Blood, Arm, Right [[id number]]  (Normal) Collected: 04/08/23 0103    Lab Status: Final result Specimen: Blood from Arm, Right Updated: 04/13/23 0731     Blood Culture No growth at 5 days    Blood Culture - Blood, Arm, Right [[id number]]  (Normal) Collected: 04/08/23 0103    Lab Status: Final result Specimen: Blood from Arm, Right Updated: 04/13/23 0730     Blood Culture No growth at 5 days    MRSA Screen, PCR (Inpatient) - Swab, Nares [[id number]]  (Normal) Collected: 04/10/23 1306    Lab Status: Final result Specimen: Swab from Nares Updated: 04/10/23 1348     MRSA PCR Negative    Narrative:      The negative predictive value of this diagnostic test is high and should only be used to consider de-escalating anti-MRSA therapy. A positive result may indicate colonization with MRSA and must be correlated clinically.  MRSA Negative    COVID PRE-OP / PRE-PROCEDURE SCREENING ORDER (NO ISOLATION) - Swab, Nasopharynx [[id number]]  (Normal) Collected: 04/10/23 1236    Lab Status: Final result Specimen: Swab from Nasopharynx Updated: 04/10/23 1327    Narrative:      The following orders  were created for panel order COVID PRE-OP / PRE-PROCEDURE SCREENING ORDER (NO ISOLATION) - Swab, Nasopharynx.  Procedure                               Abnormality         Status                     ---------                               -----------         ------                     Respiratory Panel PCR w/...[[id number]]  Normal              Final result                 Please view results for these tests on the individual orders.    Respiratory Panel PCR w/COVID-19(SARS-CoV-2) TRACY/JOSE/PHYLLIS/PAD/COR/MAD/MICHAEL In-House, NP Swab in UTM/VTM, 3-4 HR TAT - Swab, Nasopharynx [[id number]]  (Normal) Collected: 04/10/23 1236    Lab Status: Final result Specimen: Swab from Nasopharynx Updated: 04/10/23 1327     ADENOVIRUS, PCR Not Detected     Coronavirus 229E Not Detected     Coronavirus HKU1 Not Detected     Coronavirus NL63 Not Detected     Coronavirus OC43 Not Detected     COVID19 Not Detected     Human Metapneumovirus Not Detected     Human Rhinovirus/Enterovirus Not Detected     Influenza A PCR Not Detected     Influenza B PCR Not Detected     Parainfluenza Virus 1 Not Detected     Parainfluenza Virus 2 Not Detected     Parainfluenza Virus 3 Not Detected     Parainfluenza Virus 4 Not Detected     RSV, PCR Not Detected     Bordetella pertussis pcr Not Detected     Bordetella parapertussis PCR Not Detected     Chlamydophila pneumoniae PCR Not Detected     Mycoplasma pneumo by PCR Not Detected    Narrative:      In the setting of a positive respiratory panel with a viral infection PLUS a negative procalcitonin without other underlying concern for bacterial infection, consider observing off antibiotics or discontinuation of antibiotics and continue supportive care. If the respiratory panel is positive for atypical bacterial infection (Bordetella pertussis, Chlamydophila pneumoniae, or Mycoplasma pneumoniae), consider antibiotic de-escalation to target atypical bacterial infection.          XR Chest 1 View    Result Date:  4/12/2023  XR CHEST 1 VW Date of Exam: 4/12/2023 8:41 AM EDT Indication: SOB. Comparison: April 7, 2023 Findings: The heart size is within normal limits. There is persistent prominence of the interstitial and alveolar densities consistent with pulmonary edema. Small bilateral pleural effusions with bilateral basilar atelectasis persists. The osseous structures are normal for age.     Impression: Impression: No significant change in pulmonary vascular congestion and basilar atelectasis with small pleural effusions. Electronically Signed: Ari An  4/12/2023 9:10 AM EDT  Workstation ID: [id number]    XR Abdomen KUB    Result Date: 4/12/2023  XR ABDOMEN KUB Date of Exam: 4/12/2023 4:16 AM EDT Indication: Check for spontaneous passage of PD stent. Comparison: CT abdomen and pelvis 4/7/2023 Findings: Minimal bibasilar pulmonary opacities may represent atelectasis or infection. Cholecystectomy clips seen. Nonobstructive bowel gas pattern. Vascular calcifications in the left upper quadrant. No evidence of acute osseous abnormality. No radiopaque pancreatic duct stent is seen.     Impression: Impression: No radiopaque pancreatic duct stent is seen. Minimal bibasilar pulmonary opacities may represent atelectasis or infection. Electronically Signed: Liang Levy  4/12/2023 8:09 AM EDT  Workstation ID: [id number]      Results for orders placed during the hospital encounter of 04/07/23    Adult Transthoracic Echo Complete W/ Cont if Necessary Per Protocol    Interpretation Summary  •  Left ventricular systolic function is normal. Left ventricular ejection fraction appears to be 56 - 60%.  •  Left ventricular wall thickness is consistent with mild concentric hypertrophy.  •  Left ventricular diastolic function is consistent with (grade II w/high LAP) pseudonormalization.  •  Left atrial volume is mildly increased.  •  There is moderate calcification of the aortic valve.  •  Moderate aortic valve stenosis is present.  •  There  is moderate calcification of the mitral valve  •  Mild to moderate mitral valve stenosis is present.  •  Mild tricuspid valve regurgitation is present.  •  Estimated right ventricular systolic pressure from tricuspid regurgitation is moderately elevated (45-55 mmHg).      Current medications:  Scheduled Meds:amiodarone, 100 mg, Oral, Once per day on Mon Wed Fri  amLODIPine, 2.5 mg, Oral, Q24H  cefTRIAXone, 2 g, Intravenous, Q24H  doxycycline, 100 mg, Intravenous, Q12H  guaiFENesin, 1,200 mg, Oral, Q12H  insulin detemir, 10 Units, Subcutaneous, Q12H  insulin lispro, 0-7 Units, Subcutaneous, 4x Daily With Meals & Nightly  Insulin Lispro, 8 Units, Subcutaneous, TID With Meals  ipratropium-albuterol, 3 mL, Nebulization, 4x Daily - RT  oxybutynin XL, 5 mg, Oral, Daily  rivaroxaban, 15 mg, Oral, Daily With Dinner  sodium chloride, 10 mL, Intravenous, Q12H  triamcinolone, 1 application, Topical, Q12H  valsartan, 80 mg, Oral, Q24H      Continuous Infusions:lactated ringers, 9 mL/hr, Last Rate: 9 mL/hr (04/11/23 1747)  lactated ringers, 9 mL/hr, Last Rate: Stopped (04/11/23 1523)      PRN Meds:.•  acetaminophen  •  Calcium Replacement - Follow Nurse / BPA Driven Protocol  •  dextrose  •  dextrose  •  glucagon (human recombinant)  •  HYDROcodone-acetaminophen  •  HYDROmorphone  •  ipratropium-albuterol  •  Magnesium Standard Dose Replacement - Follow Nurse / BPA Driven Protocol  •  Phosphorus Replacement - Follow Nurse / BPA Driven Protocol  •  Potassium Replacement - Follow Nurse / BPA Driven Protocol  •  sodium chloride  •  sodium chloride  •  sodium chloride    Assessment & Plan   Assessment & Plan     Active Hospital Problems    Diagnosis  POA   • **Acute cholecystitis [K81.0]  Yes   • Paroxysmal atrial fibrillation [I48.0]  Yes   • Type 2 diabetes mellitus with hyperglycemia, without long-term current use of insulin [E11.65]  Yes   • BERNARD treated with BiPAP [G47.33]  Yes   • Hyperlipidemia [E78.5]  Yes   • Hypertension  [I10]  Yes      Resolved Hospital Problems   No resolved problems to display.        Brief Hospital Course to date:  Kaylen Garrison is a 82 y.o. female with PMH significant for A-fib on Xarelto, HTN, HLD, BERNARD on BiPAP, DM2, who presents to the ED with complaint of abdominal pain who was found to have concern for acute cholecystitis.    Assessment and plan:  Acute cholecystitis  Elevated liver enzymes, improved  · Status post ERCP by Dr. Brunner on 4/9/2023 --> sphincterotomy, balloon sweeps using 3 black multifaceted stones, prophylactic bacterial stent to pancreatic duct  · Continue IV Rocephin and Flagyl  · Status post laparoscopic cholecystectomy by Dr. GR on 4/11/2023 and doing well postoperatively  · Tolerating diet well  · Encourage ambulation    Acute hypoxic respiratory insufficiency, slowly improving  Pulmonary edema with bilateral pleural effusion  · CTA chest done today 04/10/2023 --> no PE but showed bilateral groundglass opacities concerning for pulmonary edema rather than atypical pneumonia.  Also showing bilateral pleural effusion  · Oxygen requirement is improving with diuresis.  Received IV Bumex 2 mg 4/12/2023.  Given Bumex 2 mg p.o. today  · Adequate diuresis  · Still requiring oxygen (not on home O2 ), will desat to mid 80s on room air.  Likely etiology of her hypoxia is compression atelectasis related to her pleural effusion.  I explained to the son that she might need home O2 for few weeks till her pleural effusion resolves and he voiced understanding  · Will let the patient use her home BiPAP machine overnight and check ABG in the a.m.  · Currently on finishing IV Rocephin for her acute cholecystitis.  Also on doxycycline for possible atypical pneumonia  · Discussed the case with Dr. Meeks/pulmonary who graciously accepted to see her as a consult     Elevated troponin  · Mildly elevated, suspect demand ischemia  · No chest pain     Paroxysmal atrial fibrillation  Elevated INR  · Currently  rate controlled with amiodarone  · INR was elevated which is likely secondary to Xarelto.  Last dose of Xarelto was given 04/07/2023   · Continue Xarelto      Essential hypertension  · Blood pressure still not very well controlled despite being on her home meds including valsartan to 80 mg daily and amlodipine 2.5 mg  · Increasing regimen dose to 5 mg daily  · Restart home dose hydrochlorothiazide 12.5 daily    Hyperlipidemia  · Continue to hold statin secondary to elevated liver enzymes  · Can restart at DC     Type 2 diabetes  · A1c 7.2%  · Continue to hold glipizide, Januvia, metformin  · Blood sugars better controlled.  Continue insulin Levemir and Premeal insulin with sliding scale      Expected Discharge Location and Transportation: Home  Expected Discharge  Expected Discharge Date and Time     Expected Discharge Date Expected Discharge Time    Apr 13, 2023            DVT prophylaxis:  Medical and mechanical DVT prophylaxis orders are present.     AM-PAC 6 Clicks Score (PT): 21 (04/12/23 1441)    CODE STATUS:   Code Status and Medical Interventions:   Ordered at: 04/08/23 0117     Code Status (Patient has no pulse and is not breathing):    CPR (Attempt to Resuscitate)     Medical Interventions (Patient has pulse or is breathing):    Full Support     Copied text in this note has been reviewed and is accurate as of 04/13/23.     Jonathan Scott MD  04/13/23

## 2023-04-13 NOTE — PROGRESS NOTES
"[facility] Cardiology Daily Note       LOS: 3 days   Patient Care Team:  Lizzette Multnai MD as PCP - General (Internal Medicine)  Irish Sousa PA as Physician Assistant (Endocrinology)  Harley Rose MD as Consulting Physician (Endocrinology)  Matilde Alvarez MD as Consulting Physician (Cardiology)    Chief Complaint: Elevated troponin/prolonged QT    Subjective     Subjective: Breathing is better.  96% on 2 L.  Was on room air briefly yesterday with relatively lower sats.  Blood pressures have been elevated.  Heart rates have been near 60.      Review of Systems:   As above.    Medications:  amiodarone, 100 mg, Oral, Once per day on Mon Wed Fri  amLODIPine, 2.5 mg, Oral, Q24H  cefTRIAXone, 2 g, Intravenous, Q24H  doxycycline, 100 mg, Intravenous, Q12H  guaiFENesin, 1,200 mg, Oral, Q12H  insulin detemir, 10 Units, Subcutaneous, Q12H  insulin lispro, 0-7 Units, Subcutaneous, 4x Daily With Meals & Nightly  Insulin Lispro, 8 Units, Subcutaneous, TID With Meals  oxybutynin XL, 5 mg, Oral, Daily  rivaroxaban, 15 mg, Oral, Daily With Dinner  sodium chloride, 10 mL, Intravenous, Q12H  triamcinolone, 1 application, Topical, Q12H  valsartan, 80 mg, Oral, Q24H        Objective     Vital Sign Min/Max for last 24 hours  Temp  Min: 97.5 °F (36.4 °C)  Max: 98.6 °F (37 °C)   BP  Min: 137/65  Max: 180/81   Pulse  Min: 60  Max: 72   Resp  Min: 16  Max: 19   SpO2  Min: 85 %  Max: 97 %   Flow (L/min)  Min: 1.5  Max: 2   Weight  Min: 75.4 kg (166 lb 4.8 oz)  Max: 75.4 kg (166 lb 4.8 oz)      Intake/Output Summary (Last 24 hours) at 4/13/2023 0800  Last data filed at 4/12/2023 1414  Gross per 24 hour   Intake --   Output 1700 ml   Net -1700 ml        Flowsheet Rows    Flowsheet Row First Filed Value   Admission Height 160 cm (63\") Documented at 04/07/2023 2326   Admission Weight 72.6 kg (160 lb) Documented at 04/07/2023 5467          Physical Exam:    General: Alert and oriented. "   Cardiovascular: Heart has a nondisplaced focal PMI. Regular rate and rhythm with 2/6 SE murmur, no gallop or rub.  Lungs: Clear without rales or wheezes.  Decreased bases bilaterally right greater than left  Extremities: Show no edema.   Skin: warm and dry.     Results Review:    I reviewed the patient's new clinical results.  EKG:  Tele: Sinus rhythm at 60 bpm    Labs:    Results from last 7 days   Lab Units 04/13/23  0631 04/12/23  0430 04/11/23  0540   SODIUM mmol/L 144 140 140   POTASSIUM mmol/L 3.8 4.6 3.9   CHLORIDE mmol/L 109* 104 106   CO2 mmol/L 27.0 26.0 25.0   BUN mg/dL 17 16 24*   CREATININE mg/dL 0.59 0.74 0.81   CALCIUM mg/dL 8.4* 8.5* 8.1*   BILIRUBIN mg/dL 0.6 0.8 0.8   ALK PHOS U/L 106 125* 119*   ALT (SGPT) U/L 98* 120* 132*   AST (SGOT) U/L 62* 57* 33*   GLUCOSE mg/dL 48* 234* 287*     Results from last 7 days   Lab Units 04/13/23  0631 04/12/23  0430 04/11/23  0540   WBC 10*3/mm3 9.26 10.73 9.81   HEMOGLOBIN g/dL 11.8* 11.5* 11.5*   HEMATOCRIT % 37.0 36.2 35.9   PLATELETS 10*3/mm3 286 288 267     Lab Results   Component Value Date    TROPONINT 13 (H) 04/08/2023    TROPONINT 14 (H) 04/07/2023    TROPONINT 14 (H) 04/07/2023     Lab Results   Component Value Date    CHOL 97 11/11/2021     Lab Results   Component Value Date    TRIG 66 11/11/2021     Lab Results   Component Value Date    HDL 42 11/11/2021     No components found for: LDLCALC  Lab Results   Component Value Date    INR 1.38 (H) 04/10/2023    INR 2.66 (H) 04/08/2023    INR 2 (A) 04/07/2023    PROTIME 16.9 (H) 04/10/2023    PROTIME 28.5 (H) 04/08/2023    PROTIME 20.5 04/07/2023         Ejection Fraction: 55 to 60% by echo 4/8/2023    Assessment   Assessment:    1. Acute cholecystitis, transaminitis  1. Status post ERCP with extraction of 3 stones  2. Status post laparoscopic cholecystectomy for 1123  2. Paroxysmal atrial fibrillation  a. Diagnosed 2021 status post SATISH/ECV  b. Remains rhythm controlled on amiodarone  c. Xarelto on  hold  d. Amiodarone on hold prolonged QT  3. Bilateral pleural effusions/pulmonary edema 2 days following admission by CT scan.  (Was not present on her initial abdominal CT at admission.)  4. Moderate aortic stenosis, mean gradient 23 mmHg.  5. Mild to moderate mitral stenosis, mean gradient 6 mmHg.  6. Minimally elevated troponin secondary to demand ischemia      Plan:    Restart amiodarone at 100 mg 3 times a week beginning today.    Bumex 2 mg IV x1 again today  Xarelto on hold for surgery.  Wean oxygen as able.  May need additional antihypertensive medication.    Matilde Alvarez MD  04/13/23  08:00 EDT

## 2023-04-13 NOTE — PROGRESS NOTES
"Kaylengretta Garrison  1940  [id number]    Surgery Progress Note    Date of visit: 4/13/2023    Subjective: No new complaints  Tolerating diet  Diuresed well  Better oxygenation    Objective:    /81 (BP Location: Left arm, Patient Position: Lying)   Pulse 60   Temp 98 °F (36.7 °C) (Oral)   Resp 19   Ht 160 cm (63\")   Wt 75.4 kg (166 lb 4.8 oz)   SpO2 96%   BMI 29.46 kg/m²     Intake/Output Summary (Last 24 hours) at 4/13/2023 0802  Last data filed at 4/12/2023 1414  Gross per 24 hour   Intake --   Output 1700 ml   Net -1700 ml       CV: Irregular irregular  L: normal air entry  Abd: Soft  Puncture sites are healing nicely      LABS:    Results from last 7 days   Lab Units 04/13/23  0631   WBC 10*3/mm3 9.26   HEMOGLOBIN g/dL 11.8*   HEMATOCRIT % 37.0   PLATELETS 10*3/mm3 286     Results from last 7 days   Lab Units 04/13/23  0631   SODIUM mmol/L 144   POTASSIUM mmol/L 3.8   CHLORIDE mmol/L 109*   CO2 mmol/L 27.0   BUN mg/dL 17   CREATININE mg/dL 0.59   CALCIUM mg/dL 8.4*   BILIRUBIN mg/dL 0.6   ALK PHOS U/L 106   ALT (SGPT) U/L 98*   AST (SGOT) U/L 62*   GLUCOSE mg/dL 48*     Results from last 7 days   Lab Units 04/13/23  0631   SODIUM mmol/L 144   POTASSIUM mmol/L 3.8   CHLORIDE mmol/L 109*   CO2 mmol/L 27.0   BUN mg/dL 17   CREATININE mg/dL 0.59   GLUCOSE mg/dL 48*   CALCIUM mg/dL 8.4*     Lab Results   Lab Value Date/Time    LIPASE 44 04/10/2023 0600    LIPASE 47 04/07/2023 2321         Assessment/ Plan: Stable course status post ERCP followed by laparoscopic cholecystectomy  Patient is progressing nicely  She diuresed well  Hopefully home today  Control blood sugar closely  Avoid any lifting more than 10 pounds  No need for narcotics  Follow-up in the office in 1 week    Problem List Items Addressed This Visit        Gastrointestinal Abdominal     * (Principal) Acute cholecystitis - Primary    Relevant Orders    Tissue Pathology Exam   Other Visit Diagnoses     Acute abdominal pain        " Uncontrolled hypertension        Relevant Medications    labetalol (NORMODYNE,TRANDATE) injection 20 mg (Completed)    furosemide (LASIX) injection 40 mg (Completed)    valsartan (DIOVAN) tablet 80 mg    bumetanide (BUMEX) injection 2 mg (Completed)    amLODIPine (NORVASC) tablet 2.5 mg    Choledocholithiasis                Abraham Holbrook MD  4/13/2023  08:02 EDT

## 2023-04-14 ENCOUNTER — READMISSION MANAGEMENT (OUTPATIENT)
Dept: CALL CENTER | Facility: HOSPITAL | Age: 83
End: 2023-04-14
Payer: MEDICARE

## 2023-04-14 VITALS
WEIGHT: 164.4 LBS | HEIGHT: 63 IN | RESPIRATION RATE: 16 BRPM | SYSTOLIC BLOOD PRESSURE: 161 MMHG | TEMPERATURE: 98.2 F | OXYGEN SATURATION: 97 % | BODY MASS INDEX: 29.13 KG/M2 | DIASTOLIC BLOOD PRESSURE: 54 MMHG | HEART RATE: 68 BPM

## 2023-04-14 LAB
ALBUMIN SERPL-MCNC: 3.2 G/DL (ref 3.5–5.2)
ALBUMIN/GLOB SERPL: 1.5 G/DL
ALP SERPL-CCNC: 107 U/L (ref 39–117)
ALT SERPL W P-5'-P-CCNC: 78 U/L (ref 1–33)
ANION GAP SERPL CALCULATED.3IONS-SCNC: 9 MMOL/L (ref 5–15)
ARTERIAL PATENCY WRIST A: ABNORMAL
AST SERPL-CCNC: 35 U/L (ref 1–32)
ATMOSPHERIC PRESS: ABNORMAL MM[HG]
BASE EXCESS BLDA CALC-SCNC: 10 MMOL/L (ref 0–2)
BASOPHILS # BLD AUTO: 0.04 10*3/MM3 (ref 0–0.2)
BASOPHILS NFR BLD AUTO: 0.4 % (ref 0–1.5)
BDY SITE: ABNORMAL
BILIRUB SERPL-MCNC: 0.6 MG/DL (ref 0–1.2)
BODY TEMPERATURE: 37 C
BUN SERPL-MCNC: 19 MG/DL (ref 8–23)
BUN/CREAT SERPL: 29.2 (ref 7–25)
CALCIUM SPEC-SCNC: 8.8 MG/DL (ref 8.6–10.5)
CHLORIDE SERPL-SCNC: 104 MMOL/L (ref 98–107)
CO2 BLDA-SCNC: 34.7 MMOL/L (ref 22–33)
CO2 SERPL-SCNC: 31 MMOL/L (ref 22–29)
COHGB MFR BLD: 0.6 % (ref 0–2)
CREAT SERPL-MCNC: 0.65 MG/DL (ref 0.57–1)
DEPRECATED RDW RBC AUTO: 56 FL (ref 37–54)
EGFRCR SERPLBLD CKD-EPI 2021: 86.9 ML/MIN/1.73
EOSINOPHIL # BLD AUTO: 0.29 10*3/MM3 (ref 0–0.4)
EOSINOPHIL NFR BLD AUTO: 3.1 % (ref 0.3–6.2)
EPAP: 0
ERYTHROCYTE [DISTWIDTH] IN BLOOD BY AUTOMATED COUNT: 17.9 % (ref 12.3–15.4)
GLOBULIN UR ELPH-MCNC: 2.2 GM/DL
GLUCOSE BLDC GLUCOMTR-MCNC: 88 MG/DL (ref 70–130)
GLUCOSE SERPL-MCNC: 90 MG/DL (ref 65–99)
HCO3 BLDA-SCNC: 33.5 MMOL/L (ref 20–26)
HCT VFR BLD AUTO: 38.5 % (ref 34–46.6)
HCT VFR BLD CALC: 39.6 % (ref 38–51)
HGB BLD-MCNC: 12.4 G/DL (ref 12–15.9)
HGB BLDA-MCNC: 12.9 G/DL (ref 14–18)
IMM GRANULOCYTES # BLD AUTO: 0.07 10*3/MM3 (ref 0–0.05)
IMM GRANULOCYTES NFR BLD AUTO: 0.8 % (ref 0–0.5)
INHALED O2 CONCENTRATION: 28 %
IPAP: 0
LYMPHOCYTES # BLD AUTO: 1.73 10*3/MM3 (ref 0.7–3.1)
LYMPHOCYTES NFR BLD AUTO: 18.8 % (ref 19.6–45.3)
MAGNESIUM SERPL-MCNC: 1.8 MG/DL (ref 1.6–2.4)
MCH RBC QN AUTO: 27.8 PG (ref 26.6–33)
MCHC RBC AUTO-ENTMCNC: 32.2 G/DL (ref 31.5–35.7)
MCV RBC AUTO: 86.3 FL (ref 79–97)
METHGB BLD QL: 0.3 % (ref 0–1.5)
MODALITY: ABNORMAL
MONOCYTES # BLD AUTO: 1.06 10*3/MM3 (ref 0.1–0.9)
MONOCYTES NFR BLD AUTO: 11.5 % (ref 5–12)
NEUTROPHILS NFR BLD AUTO: 6.03 10*3/MM3 (ref 1.7–7)
NEUTROPHILS NFR BLD AUTO: 65.4 % (ref 42.7–76)
NOTE: ABNORMAL
NRBC BLD AUTO-RTO: 0 /100 WBC (ref 0–0.2)
OXYHGB MFR BLDV: 95.1 % (ref 94–99)
PAW @ PEAK INSP FLOW SETTING VENT: 0 CMH2O
PCO2 BLDA: 39.6 MM HG (ref 35–45)
PCO2 TEMP ADJ BLD: 39.6 MM HG (ref 35–45)
PH BLDA: 7.54 PH UNITS (ref 7.35–7.45)
PH, TEMP CORRECTED: 7.54 PH UNITS
PHOSPHATE SERPL-MCNC: 3.3 MG/DL (ref 2.5–4.5)
PLATELET # BLD AUTO: 329 10*3/MM3 (ref 140–450)
PMV BLD AUTO: 10.1 FL (ref 6–12)
PO2 BLDA: 80.8 MM HG (ref 83–108)
PO2 TEMP ADJ BLD: 80.8 MM HG (ref 83–108)
POTASSIUM SERPL-SCNC: 3.7 MMOL/L (ref 3.5–5.2)
PROT SERPL-MCNC: 5.4 G/DL (ref 6–8.5)
RBC # BLD AUTO: 4.46 10*6/MM3 (ref 3.77–5.28)
SODIUM SERPL-SCNC: 144 MMOL/L (ref 136–145)
TOTAL RATE: 0 BREATHS/MINUTE
VENTILATOR MODE: ABNORMAL
WBC NRBC COR # BLD: 9.22 10*3/MM3 (ref 3.4–10.8)

## 2023-04-14 PROCEDURE — 80053 COMPREHEN METABOLIC PANEL: CPT | Performed by: INTERNAL MEDICINE

## 2023-04-14 PROCEDURE — 99239 HOSP IP/OBS DSCHRG MGMT >30: CPT | Performed by: INTERNAL MEDICINE

## 2023-04-14 PROCEDURE — 99232 SBSQ HOSP IP/OBS MODERATE 35: CPT | Performed by: INTERNAL MEDICINE

## 2023-04-14 PROCEDURE — 82805 BLOOD GASES W/O2 SATURATION: CPT

## 2023-04-14 PROCEDURE — 25010000002 CEFTRIAXONE PER 250 MG: Performed by: SURGERY

## 2023-04-14 PROCEDURE — 85025 COMPLETE CBC W/AUTO DIFF WBC: CPT | Performed by: INTERNAL MEDICINE

## 2023-04-14 PROCEDURE — 83735 ASSAY OF MAGNESIUM: CPT | Performed by: INTERNAL MEDICINE

## 2023-04-14 PROCEDURE — 82962 GLUCOSE BLOOD TEST: CPT

## 2023-04-14 PROCEDURE — 63710000001 INSULIN DETEMIR PER 5 UNITS: Performed by: INTERNAL MEDICINE

## 2023-04-14 PROCEDURE — 83050 HGB METHEMOGLOBIN QUAN: CPT

## 2023-04-14 PROCEDURE — 84100 ASSAY OF PHOSPHORUS: CPT | Performed by: INTERNAL MEDICINE

## 2023-04-14 PROCEDURE — 36600 WITHDRAWAL OF ARTERIAL BLOOD: CPT

## 2023-04-14 PROCEDURE — 82375 ASSAY CARBOXYHB QUANT: CPT

## 2023-04-14 RX ORDER — ACETAZOLAMIDE 250 MG/1
250 TABLET ORAL 2 TIMES DAILY
Qty: 6 TABLET | Refills: 0 | Status: SHIPPED | OUTPATIENT
Start: 2023-04-14 | End: 2023-04-17

## 2023-04-14 RX ORDER — CEFDINIR 300 MG/1
300 CAPSULE ORAL 2 TIMES DAILY
Qty: 6 CAPSULE | Refills: 0 | Status: SHIPPED | OUTPATIENT
Start: 2023-04-14 | End: 2023-04-17

## 2023-04-14 RX ORDER — AMIODARONE HYDROCHLORIDE 100 MG/1
100 TABLET ORAL 3 TIMES WEEKLY
Qty: 12 TABLET | Refills: 3 | Status: SHIPPED | OUTPATIENT
Start: 2023-04-14 | End: 2023-05-14

## 2023-04-14 RX ORDER — DOXYCYCLINE HYCLATE 100 MG/1
100 CAPSULE ORAL 2 TIMES DAILY
Qty: 6 CAPSULE | Refills: 0 | Status: SHIPPED | OUTPATIENT
Start: 2023-04-14 | End: 2023-04-17

## 2023-04-14 RX ORDER — AMLODIPINE BESYLATE 5 MG/1
5 TABLET ORAL DAILY
Qty: 30 TABLET | Refills: 3 | Status: SHIPPED | OUTPATIENT
Start: 2023-04-14 | End: 2023-05-14

## 2023-04-14 RX ORDER — GUAIFENESIN 600 MG/1
1200 TABLET, EXTENDED RELEASE ORAL EVERY 12 HOURS SCHEDULED
Qty: 14 TABLET | Refills: 0 | Status: SHIPPED | OUTPATIENT
Start: 2023-04-14 | End: 2023-04-18

## 2023-04-14 RX ADMIN — AMIODARONE HYDROCHLORIDE 100 MG: 200 TABLET ORAL at 08:38

## 2023-04-14 RX ADMIN — HYDROCHLOROTHIAZIDE 12.5 MG: 12.5 CAPSULE ORAL at 08:38

## 2023-04-14 RX ADMIN — CEFTRIAXONE 2 G: 2 INJECTION, POWDER, FOR SOLUTION INTRAMUSCULAR; INTRAVENOUS at 08:38

## 2023-04-14 RX ADMIN — INSULIN DETEMIR 10 UNITS: 100 INJECTION, SOLUTION SUBCUTANEOUS at 09:53

## 2023-04-14 RX ADMIN — AMLODIPINE BESYLATE 5 MG: 5 TABLET ORAL at 08:38

## 2023-04-14 RX ADMIN — DOXYCYCLINE 100 MG: 100 INJECTION, POWDER, LYOPHILIZED, FOR SOLUTION INTRAVENOUS at 00:51

## 2023-04-14 RX ADMIN — Medication 10 ML: at 08:43

## 2023-04-14 RX ADMIN — VALSARTAN 80 MG: 80 TABLET, FILM COATED ORAL at 08:38

## 2023-04-14 RX ADMIN — TRIAMCINOLONE ACETONIDE 1 APPLICATION: 1 CREAM TOPICAL at 08:43

## 2023-04-14 RX ADMIN — GUAIFENESIN 1200 MG: 600 TABLET, EXTENDED RELEASE ORAL at 08:33

## 2023-04-14 RX ADMIN — OXYBUTYNIN CHLORIDE 5 MG: 5 TABLET, EXTENDED RELEASE ORAL at 08:38

## 2023-04-14 NOTE — DISCHARGE SUMMARY
[facility]  DISCHARGE SUMMARY    Patient Name: Kaylen Garrison  : 1940  MRN: [id number]    Date of Admission: 2023 11:01 PM  Date of Discharge: 2023  Primary Care Physician: Lizzette Multani MD    Consults     Date and Time Order Name Status Description    2023 11:31 AM Inpatient Pulmonology Consult Completed     2023  9:30 AM Inpatient Gastroenterology Consult Completed     2023  9:30 AM Inpatient Cardiology Consult Completed     2023  9:28 AM Inpatient Cardiology Consult Completed     2023  9:27 AM Inpatient Gastroenterology Consult Completed     2023 12:16 AM Inpatient General Surgery Consult Completed           Hospital Course     Presenting Problem:   Acute cholecystitis [K81.0]    Active Hospital Problems    Diagnosis  POA   • **Acute cholecystitis [K81.0]  Yes   • Paroxysmal atrial fibrillation [I48.0]  Yes   • Type 2 diabetes mellitus with hyperglycemia, without long-term current use of insulin [E11.65]  Yes   • BERNARD treated with BiPAP [G47.33]  Yes   • Hyperlipidemia [E78.5]  Yes   • Hypertension [I10]  Yes      Resolved Hospital Problems   No resolved problems to display.        Hospital Course:  Kaylen Garrison is a 82 y.o. female with PMH significant for A-fib on Xarelto, HTN, HLD, BERNARD on BiPAP, DM2, who presents to the ED with complaint of abdominal pain who was found to have concern for acute cholecystitis.  Started on empiric treatment with IV Rocephin and Flagyl.  Underwent ERCP on 2023 with sphincterotomy and 3 stone removal with prophylactic pancreatic duct stenting.  Eventually underwent lap jenny by Dr. GR on 2023.  Hospital stay was complicated with respiratory insufficiency secondary to bilateral pleural effusion and compression atelectasis.  CTA chest was negative for PE.  Respiratory insufficiency improved with diuresis and seen by pulmonary team during this hospitalization.  Discharged on  p.o. cefdinir and doxycycline     Acute cholecystitis  Elevated liver enzymes, improved  • Status post ERCP by Dr. Brunner on 4/9/2023 --> sphincterotomy, balloon sweeps using 3 black multifaceted stones, prophylactic stent to pancreatic duct  • Received IV Rocephin and Flagyl.  Transition to p.o. cefdinir upon discharge  • Status post laparoscopic cholecystectomy by Dr. GR on 4/11/2023 and doing well postoperatively  • Tolerating diet well  • Encourage ambulation  • Follow-up with Dr. GR in 1 week     Acute hypoxic respiratory insufficiency, slowly improving  Pulmonary edema with bilateral pleural effusion  • CTA chest done today 04/10/2023 --> no PE but showed bilateral groundglass opacities concerning for pulmonary edema rather than atypical pneumonia.  Also showing bilateral pleural effusion  • Oxygen requirement is improving with diuresis.  Developed mild contraction alkalosis and discharged on p.o. acetazolamide  • Oxygen requirement improved with diuresis.  Discharged on home O2 at 2 L/min.  Already have oxygen concentrator at home  • Seen by Dr. Meeks/pulmonary who agreed to the treatment plan     Elevated troponin  • Mildly elevated, suspect demand ischemia  • No chest pain  • Defer any ischemic work-up     Paroxysmal atrial fibrillation  Elevated INR  • Currently rate controlled with amiodarone  • INR was elevated which is likely secondary to Xarelto.  Last dose of Xarelto was given 04/07/2023   • Continue Xarelto      Essential hypertension  • Continue valsartan 80 mg daily  • Increase the dose of amlodipine from 2.5 mg to 5 mg daily for better blood pressure control  • Continue hydrochlorothiazide  • Given 3-day course of acetazolamide to counteract the contraction alkalosis caused by diuresis with Bumex during this hospitalization     Hyperlipidemia  • Statins     Type 2 diabetes  • A1c 7.2%  • Continue to home medications glipizide, Januvia, metformin     Discharge Follow Up Recommendations for  outpatient labs/diagnostics:  PCP in 1 week  Dr. GR/general surgery in 1 week  Dr. Alvarez in 2 to 4 weeks  Pulmonary in 4 to 6 weeks  GI referral given to address her pancreatic stent    Day of Discharge     HPI:   I have seen and evaluated the patient this morning.  Comfortable in bed.  Breathing much improved.  Oxygen requirement stable at 2 L/min.  Excited to go home    Review of Systems  General : no fevers, chills  CVS: No chest pain, palpitations  Respiratory: No cough, dyspnea  GI: No N/V/D, abd pain  10 point review of systems is negative except for what is mentioned in HPI    Vital Signs:   Temp:  [97.2 °F (36.2 °C)-98.2 °F (36.8 °C)] 98.2 °F (36.8 °C)  Heart Rate:  [62-74] 68  Resp:  [16-20] 16  BP: (113-167)/(54-70) 161/54  Flow (L/min):  [2-3] 2      Physical Exam:  General: Comfortable, not in distress, conversant and cooperative  Head: Atraumatic and normocephalic  Eyes: No Icterus. No pallor  Ears:  Ears appear intact with no abnormalities noted  Throat: No oral lesions, no thrush  Neck: Supple, trachea midline  Lungs: Improved air entry bilateral lung bases.  No crackles   Heart:  Normal S1 and S2, no murmur, no gallop, No JVD, no lower extremity swelling  Abdomen:  Soft, no tenderness, no organomegaly, normal bowel sounds, no organomegaly  Extremities: pulses equal bilaterally  Skin: No bleeding, bruising or rash, normal skin turgor and elasticity  Neurologic: Cranial nerves appear intact with no evidence of facial asymmetry, normal motor and sensory functions in all 4 extremities  Psych: Alert and oriented x 3, normal mood    Pertinent  and/or Most Recent Results     LAB RESULTS:      Lab 04/14/23  0541 04/13/23  0631 04/12/23  0430 04/11/23  0540 04/10/23  0601 04/10/23  0600 04/09/23  0541 04/08/23  0641 04/07/23  2334 04/07/23  2326 04/07/23  2321   WBC 9.22 9.26 10.73 9.81  --  11.52*   < > 9.26  --   --  10.83*   HEMOGLOBIN 12.4 11.8* 11.5* 11.5*  --  12.4   < > 11.3*  --   --  13.3    HEMOGLOBIN, POC  --   --   --   --   --   --   --   --   --   --  15.0   HEMATOCRIT 38.5 37.0 36.2 35.9  --  39.6   < > 35.4  --   --  42.9   HEMATOCRIT POC  --   --   --   --   --   --   --   --   --   --  44   PLATELETS 329 286 288 267  --  270   < > 265  --   --  333   NEUTROS ABS 6.03 5.87 9.32* 7.36*  --  9.88*   < > 7.30*  --   --  8.94*   IMMATURE GRANS (ABS) 0.07* 0.05 0.04 0.05  --  0.05   < > 0.04  --   --  0.05   LYMPHS ABS 1.73 1.90 0.59* 1.39  --  0.70   < > 0.98  --   --  0.98   MONOS ABS 1.06* 1.07* 0.76 0.77  --  0.82   < > 0.87  --   --  0.76   EOS ABS 0.29 0.33 0.00 0.19  --  0.04   < > 0.03  --   --  0.06   MCV 86.3 88.3 88.7 88.4  --  89.2   < > 88.5  --   --  89.0   PROCALCITONIN  --   --   --   --   --  0.19  --   --   --   --   --    LACTATE  --   --   --   --   --   --   --   --   --   --  1.1   PROTIME  --   --   --   --  16.9*  --   --  28.5* 20.5 20.5*  --    APTT  --   --   --   --   --   --   --   --   --   --  39.1*    < > = values in this interval not displayed.         Lab 04/14/23 0541 04/13/23 0631 04/12/23  0430 04/11/23  0540 04/10/23  0600 04/09/23  0541 04/08/23  0641   SODIUM 144 144 140 140 137   < > 140   POTASSIUM 3.7 3.8 4.6 3.9 4.7   < > 3.4*   CHLORIDE 104 109* 104 106 103   < > 103   CO2 31.0* 27.0 26.0 25.0 24.0   < > 24.0   ANION GAP 9.0 8.0 10.0 9.0 10.0   < > 13.0   BUN 19 17 16 24* 21   < > 22   CREATININE 0.65 0.59 0.74 0.81 0.92   < > 0.85   EGFR 86.9 89.0 79.9 71.7 61.5   < > 67.7   GLUCOSE 90 48* 234* 287* 374*   < > 205*   CALCIUM 8.8 8.4* 8.5* 8.1* 8.7   < > 8.7   MAGNESIUM 1.8 2.1  --  1.9  --   --  1.8   PHOSPHORUS 3.3 2.9  --   --   --   --   --     < > = values in this interval not displayed.         Lab 04/14/23  0541 04/13/23  0631 04/12/23  0430 04/11/23  0540 04/10/23  0600 04/08/23  0641 04/07/23  2321   TOTAL PROTEIN 5.4* 5.2* 5.7* 5.8* 6.0   < > 7.1   ALBUMIN 3.2* 3.1* 3.4* 3.2* 3.1*   < > 4.0   GLOBULIN 2.2 2.1 2.3 2.6 2.9   < > 3.1   ALT  (SGPT) 78* 98* 120* 132* 198*   < > 297*   AST (SGOT) 35* 62* 57* 33* 67*   < > 597*   BILIRUBIN 0.6 0.6 0.8 0.8 1.2   < > 1.7*   ALK PHOS 107 106 125* 119* 154*   < > 154*   LIPASE  --   --   --   --  44  --  47    < > = values in this interval not displayed.         Lab 04/10/23  0601 04/08/23  0641 04/08/23  0150 04/07/23  2334 04/07/23  2326 04/07/23  2321   HSTROP T  --   --  13*  --   --  14*  14*   PROTIME 16.9* 28.5*  --  20.5 20.5*  --    INR 1.38* 2.66*  --  2* 1.8*  --                  Lab 04/14/23  0504   PH, ARTERIAL 7.536*   PCO2, ARTERIAL 39.6   PO2 ART 80.8*   FIO2 28   HCO3 ART 33.5*   BASE EXCESS ART 10.0*   CARBOXYHEMOGLOBIN 0.6     Brief Urine Lab Results  (Last result in the past 365 days)      Color   Clarity   Blood   Leuk Est   Nitrite   Protein   CREAT   Urine HCG        04/08/23 0113 Yellow   Clear   Negative   Negative   Negative   Trace               Microbiology Results (last 10 days)     Procedure Component Value - Date/Time    MRSA Screen, PCR (Inpatient) - Swab, Nares [[id number]]  (Normal) Collected: 04/10/23 1306    Lab Status: Final result Specimen: Swab from Nares Updated: 04/10/23 1348     MRSA PCR Negative    Narrative:      The negative predictive value of this diagnostic test is high and should only be used to consider de-escalating anti-MRSA therapy. A positive result may indicate colonization with MRSA and must be correlated clinically.  MRSA Negative    COVID PRE-OP / PRE-PROCEDURE SCREENING ORDER (NO ISOLATION) - Swab, Nasopharynx [[id number]]  (Normal) Collected: 04/10/23 1236    Lab Status: Final result Specimen: Swab from Nasopharynx Updated: 04/10/23 1327    Narrative:      The following orders were created for panel order COVID PRE-OP / PRE-PROCEDURE SCREENING ORDER (NO ISOLATION) - Swab, Nasopharynx.  Procedure                               Abnormality         Status                     ---------                               -----------         ------                      Respiratory Panel PCR w/...[[id number]]  Normal              Final result                 Please view results for these tests on the individual orders.    Respiratory Panel PCR w/COVID-19(SARS-CoV-2) TRACY/JOSE/PHYLLIS/PAD/COR/MAD/MICHAEL In-House, NP Swab in UTM/VTM, 3-4 HR TAT - Swab, Nasopharynx [[id number]]  (Normal) Collected: 04/10/23 1236    Lab Status: Final result Specimen: Swab from Nasopharynx Updated: 04/10/23 1327     ADENOVIRUS, PCR Not Detected     Coronavirus 229E Not Detected     Coronavirus HKU1 Not Detected     Coronavirus NL63 Not Detected     Coronavirus OC43 Not Detected     COVID19 Not Detected     Human Metapneumovirus Not Detected     Human Rhinovirus/Enterovirus Not Detected     Influenza A PCR Not Detected     Influenza B PCR Not Detected     Parainfluenza Virus 1 Not Detected     Parainfluenza Virus 2 Not Detected     Parainfluenza Virus 3 Not Detected     Parainfluenza Virus 4 Not Detected     RSV, PCR Not Detected     Bordetella pertussis pcr Not Detected     Bordetella parapertussis PCR Not Detected     Chlamydophila pneumoniae PCR Not Detected     Mycoplasma pneumo by PCR Not Detected    Narrative:      In the setting of a positive respiratory panel with a viral infection PLUS a negative procalcitonin without other underlying concern for bacterial infection, consider observing off antibiotics or discontinuation of antibiotics and continue supportive care. If the respiratory panel is positive for atypical bacterial infection (Bordetella pertussis, Chlamydophila pneumoniae, or Mycoplasma pneumoniae), consider antibiotic de-escalation to target atypical bacterial infection.    Blood Culture - Blood, Arm, Right [[id number]]  (Normal) Collected: 04/08/23 0103    Lab Status: Final result Specimen: Blood from Arm, Right Updated: 04/13/23 0730     Blood Culture No growth at 5 days    Blood Culture - Blood, Arm, Right [[id number]]  (Normal) Collected: 04/08/23 0103    Lab Status: Final result Specimen:  Blood from Arm, Right Updated: 04/13/23 0731     Blood Culture No growth at 5 days          Adult Transthoracic Echo Complete W/ Cont if Necessary Per Protocol    Result Date: 4/8/2023  •  Left ventricular systolic function is normal. Left ventricular ejection fraction appears to be 56 - 60%. •  Left ventricular wall thickness is consistent with mild concentric hypertrophy. •  Left ventricular diastolic function is consistent with (grade II w/high LAP) pseudonormalization. •  Left atrial volume is mildly increased. •  There is moderate calcification of the aortic valve. •  Moderate aortic valve stenosis is present. •  There is moderate calcification of the mitral valve •  Mild to moderate mitral valve stenosis is present. •  Mild tricuspid valve regurgitation is present. •  Estimated right ventricular systolic pressure from tricuspid regurgitation is moderately elevated (45-55 mmHg).     CT Abdomen Pelvis Without Contrast    Result Date: 4/7/2023  EXAM:  CT SCAN OF THE ABDOMEN AND PELVIS WITHOUT INTRAVENOUS CONTRAST DATE: 4/7/2023 11:06 PM HISTORY: Abdominal pain TECHNIQUE: CT examination of the abdomen and pelvis with sagittal and coronal reformations was performed without intravenous contrast.  CT dose lowering techniques were used, to include: automated exposure control, adjustment for patient size, and/or use  of iterative reconstruction. Note: The exam is limited because some types of pathology may not be adequately demonstrated due to lack of contrast enhancement. COMPARISON: None.   FINDINGS: ABDOMEN/PELVIS: LOWER CHEST: Mildly coarse basilar septal thickening LIVER: Normal.  GALLBLADDER/BILIARY: Distended gallbladder, with stones/sludge, suggestion of slight wall thickening. Small calcification near the aston hepatis PANCREAS: Normal. SPLEEN: Normal.  ADRENAL GLANDS: Normal. KIDNEYS/URETERS/BLADDER: Normal kidneys. Normal bladder GI TRACT: Normal bowel. Normal appendix (axial image 77) MESENTERY/PERITONEUM:  Normal mesentery REPRODUCTIVE: Hysterectomy VASCULATURE: Normal.   LYMPH NODES: Normal ABDOMINAL WALL: Normal. MUSCULOSKELETAL: Normal.     1.  Distended gallbladder, cholelithiasis/sludge, correlate for clinical evidence of cholecystitis. 2.  Small calcification at the aston hepatis, with consideration of choledocholithiasis, cystic duct stone. 3.  Basilar septal thickening/edema. Electronically signed by:  Lorraine Bridges M.D.  4/7/2023 9:32 PM Mountain Time    FL ERCP pancreatic and biliary ducts    Result Date: 4/9/2023  FL ERCP PANCREATIC AND BILIARY DUCTS Date of Exam: 4/9/2023 11:20 AM EDT Indication: ercp. Comparison: None available. Technique:  A series of radiographic digital spot films were obtained in conjunction with a endoscopic catheterization of the biliary and pancreatic ductal system, performed by the gastroenterologist. Fluoroscopic Time: 86.1 seconds Number of Images: 3 Findings: Intraoperative imaging was utilized during ERCP.     Impression: Intraoperative imaging during ERCP. Please see the endoscopists operative note. Electronically Signed: Maxi Kim  4/9/2023 11:53 AM EDT  Workstation ID: [id number]    XR Chest 1 View    Result Date: 4/12/2023  XR CHEST 1 VW Date of Exam: 4/12/2023 8:41 AM EDT Indication: SOB. Comparison: April 7, 2023 Findings: The heart size is within normal limits. There is persistent prominence of the interstitial and alveolar densities consistent with pulmonary edema. Small bilateral pleural effusions with bilateral basilar atelectasis persists. The osseous structures are normal for age.     Impression: No significant change in pulmonary vascular congestion and basilar atelectasis with small pleural effusions. Electronically Signed: Ari An  4/12/2023 9:10 AM EDT  Workstation ID: [id number]    XR Chest 1 View    Result Date: 4/7/2023  Exam:  Single view chest Date: April 7, 2023 History: Neurologic symptoms Comparison: November 10, 2021 Technique: Single frontal  radiograph of the chest was obtained Findings: The heart size is stable. There are atherosclerotic changes in the aortic arch. There are markedly coarsened interstitial lung markings in both lungs. There is patchy groundglass appearing airspace disease in the right lower lobe. There is no pneumothorax or sizable pleural fluid collection.     1. Redemonstration of markedly coarsened interstitial lung markings in both lungs. Findings could be related to etiologies of chronic interstitial lung disease. Alternatively, consider atypical infection or pulmonary edema. 2. There is patchy groundglass appearing airspace disease in the right lower lobe. The appearance is concerning for right lower lobe pneumonia. Slot 63 Electronically signed by:  Jarvis Pina M.D.  4/7/2023 9:56 PM Mountain Time    CT Angiogram Chest Pulmonary Embolism    Result Date: 4/10/2023  CT ANGIOGRAM CHEST PULMONARY EMBOLISM Date of Exam: 4/10/2023 2:00 PM EDT Indication: Pulmonary embolism (PE) suspected, high prob. Comparison: Chest x-ray 4/7/2023, CT chest 9/15/2022 Technique: Axial CT images were obtained of the chest before and after the uneventful intravenous administration of 80 mL Isovue-370 utilizing pulmonary embolism protocol.  Reconstructed coronal and sagittal images were also obtained. Automated exposure control and iterative construction methods were used. Findings: Normal appearance of the pulmonary arteries without evidence of pulmonary embolism. There is multichamber cardiac enlargement. There are mild coronary artery calcifications. No pericardial effusion. There are moderate atherosclerotic calcifications of the thoracic aorta which appears normal in caliber. Heterogeneous multinodular thyroid gland is noted. There are numerous prominent and mildly enlarged mediastinal lymph nodes, for example a right lower paratracheal lymph node measuring 1.1 cm in short axis; these appear mildly increased in size and number compared to CT of  the chest from 9/15/2022. Mildly patulous thoracic esophagus is noted. Unremarkable appearance of the chest wall soft tissues. No axillary adenopathy. The trachea and mainstem bronchi are patent. There is mild bronchial wall thickening of the perihilar and lower lobe airways. There is interlobular septal thickening compatible with interstitial edema. There are patchy perihilar-predominant groundglass opacities compatible with early alveolar edema. There are small bilateral pleural effusions with associated passive atelectasis in the dependent lung bases. There is some fluid tracking in the inferior right major fissure. No lung mass or discrete nodule. No pneumothorax. No acute or suspicious bony findings. No acute findings in the partially imaged upper abdomen. There is a small sliding hiatal hernia. There is evidence of colonic diverticulosis. There is a subcentimeter hypodense right adrenal nodule, likely adenoma.     Impression: No evidence of pulmonary embolism. Interlobular septal thickening and patchy perihilar bilateral groundglass densities compatible with interstitial edema and early alveolar edema, with associated small bilateral pleural effusions with some associated bibasilar passive atelectasis. There is multichamber cardiac enlargement. Numerous prominent and mildly enlarged mediastinal lymph nodes appearing mildly increased in number and conspicuity from CT of the chest from 9/15/2022. The appearance is nonspecific; this could be reactive or congestive, with lymphoproliferative disorder or metastatic nodes difficult to exclude though felt to be less likely. Electronically Signed: Patricio Moyer  4/10/2023 2:24 PM EDT  Workstation ID: [id number]    MRI abdomen wo contrast mrcp    Result Date: 4/8/2023  MRI ABDOMEN WO CONTRAST MRCP Date of Exam: 4/8/2023 3:01 AM EDT Indication: acute jenny, eval for choledocholithiasis.  Comparison: CT abdomen and pelvis dated 4/7/2023 Technique:  Routine  multiplanar/multisequence images of the abdomen were obtained with MRCP sequences without contrast administration. Findings: The liver length is at the upper limit of normal. There is no evidence of hepatic steatosis or iron deposition. There is no focal liver lesion by noncontrast technique. The gallbladder is present with innumerable stones. There is gallbladder wall thickening and pericholecystic fluid compatible with acute cholecystitis. There is dilation of the common hepatic duct measures up to 11 mm and dilation of the common bile duct  measuring up to 8 mm. There are at least 2 small stones within the common bile duct and likely 1 at the ampulla causing a blunted appearance of the common bile duct. Findings are compatible with choledocholithiasis. The spleen is normal in size. There is motion limited evaluation of the adrenal glands. There is mild prominence of the pancreatic duct within the pancreatic head measuring up to 6 mm. No visible obstructing mass. The kidneys are symmetric in size. There is a small simple cyst within the posterior aspect of the right kidney. There is no hydronephrosis. There are no ossific pulmonary changes with trace bilateral pleural effusions.     Impression: 1. Choledocholithiasis with at least 3 filling defects, one of which is near the ampulla and causes a blunted appearance of the distal common bile duct. 2. Innumerable filling defects within the gallbladder with associated gallbladder wall thickening and pericholecystic edema likely representing acute cholecystitis. 3. Prominence of the pancreatic duct within the pancreatic head measuring up to 5 mm. No visible obstructing pancreatic duct stone or mass. The remainder of the duct within the pancreatic body and tail appear normal in caliber. Electronically Signed: Vin Bojorquez  4/8/2023 8:41 AM EDT  Workstation ID: [id number]    CT Head Without Contrast Stroke Protocol    Result Date: 4/7/2023  EXAMINATION: CT HEAD WITHOUT  CONTRAST DATE: 4/7/2023 11:00 PM  INDICATION: Neuro deficit, acute, stroke suspected Slurred speechRelease to patient->Routine Release  COMPARISON: None.  TECHNIQUE:  Routine axial images through the head without contrast. Coronal reformations. Low-dose CT acquisition technique included one or more of the following options: 1. Automated exposure control, 2. Adjustment of mA and/or KV according to patient's size and/or 3. Use of iterative reconstruction. FINDINGS: Limited by motion. Intracranial contents: Ventricular and cisternal spaces are prominent from volume loss. Mild to moderate periventricular and subcortical white matter hypodensities. Thickened falx, partly could be motion artifact. No dominant mass, midline shift, hydrocephalus, extra axial fluid collection or acute hemorrhage. No asymmetric hyperdensity of the proximal major cerebral arteries. Craniocervical junction is patent. Bones and extracranial soft tissues: Mild mucosal thickening ethmoid air cells. Otherwise, Visualized paranasal sinuses and mastoid air cells are clear.  Skull is intact. Cataract surgery. Degenerative changes temporomandibular joints. Calcifications distal internal carotid arteries and distal vertebral arteries.     1. No acute intracranial process. MRI is more sensitive for the detection of acute nonhemorrhagic infarct. 2. Mild to moderate changes small vessel ischemic disease of indeterminate age, presumably mostly chronic. Volume loss. Atherosclerosis. Report called to stroke navigator Shannan Cordova at 4/7/2023 11:12 PM Electronically signed by:  Brenden Soto M.D.  4/7/2023 9:15 PM Mountain Time    XR Abdomen KUB    Result Date: 4/12/2023  XR ABDOMEN KUB Date of Exam: 4/12/2023 4:16 AM EDT Indication: Check for spontaneous passage of PD stent. Comparison: CT abdomen and pelvis 4/7/2023 Findings: Minimal bibasilar pulmonary opacities may represent atelectasis or infection. Cholecystectomy clips seen. Nonobstructive bowel  gas pattern. Vascular calcifications in the left upper quadrant. No evidence of acute osseous abnormality. No radiopaque pancreatic duct stent is seen.     Impression: No radiopaque pancreatic duct stent is seen. Minimal bibasilar pulmonary opacities may represent atelectasis or infection. Electronically Signed: Liang Mildred  4/12/2023 8:09 AM EDT  Workstation ID: [id number]              Results for orders placed during the hospital encounter of 04/07/23    Adult Transthoracic Echo Complete W/ Cont if Necessary Per Protocol    Interpretation Summary  •  Left ventricular systolic function is normal. Left ventricular ejection fraction appears to be 56 - 60%.  •  Left ventricular wall thickness is consistent with mild concentric hypertrophy.  •  Left ventricular diastolic function is consistent with (grade II w/high LAP) pseudonormalization.  •  Left atrial volume is mildly increased.  •  There is moderate calcification of the aortic valve.  •  Moderate aortic valve stenosis is present.  •  There is moderate calcification of the mitral valve  •  Mild to moderate mitral valve stenosis is present.  •  Mild tricuspid valve regurgitation is present.  •  Estimated right ventricular systolic pressure from tricuspid regurgitation is moderately elevated (45-55 mmHg).      Plan for Follow-up of Pending Labs/Results:     Discharge Details        Discharge Medications      New Medications      Instructions Start Date   acetaZOLAMIDE 250 MG tablet  Commonly known as: DIAMOX   250 mg, Oral, 2 Times Daily      cefdinir 300 MG capsule  Commonly known as: OMNICEF   300 mg, Oral, 2 Times Daily      doxycycline 100 MG capsule  Commonly known as: VIBRAMYCIN   100 mg, Oral, 2 Times Daily      guaiFENesin 600 MG 12 hr tablet  Commonly known as: MUCINEX   1,200 mg, Oral, Every 12 Hours Scheduled         Changes to Medications      Instructions Start Date   amiodarone 100 MG tablet  Commonly known as: PACERONE  What changed:   · medication  strength  · how much to take  · when to take this   100 mg, Oral, 3 Times Weekly      amLODIPine 5 MG tablet  Commonly known as: NORVASC  What changed:   · medication strength  · how much to take   5 mg, Oral, Daily         Continue These Medications      Instructions Start Date   Accu-Chek Guide w/Device kit   1 Device, Does not apply, See Admin Instructions, Use to check blood sugar once daily.  Dx E11.9      Blood Glucose Monitoring Suppl device   Use as directed to check blood sugar please dispense insurance preferred      atenolol 25 MG tablet  Commonly known as: TENORMIN   12.5 mg, Oral, Daily, A half tablet daily      atorvastatin 40 MG tablet  Commonly known as: LIPITOR   40 mg, Oral, Daily      Cinnamon 500 MG capsule   500 mg, Oral, Daily      glipizide 2.5 MG 24 hr tablet  Commonly known as: GLUCOTROL XL   5 mg, Oral, Daily, 2 times daily      hydroCHLOROthiazide 25 MG tablet  Commonly known as: HYDRODIURIL   12.5 mg, Oral, Daily      Januvia 100 MG tablet  Generic drug: SITagliptin   Take 1 tablet by mouth once daily      Lancets 33G misc   1 each, Does not apply, Daily      metFORMIN  MG 24 hr tablet  Commonly known as: GLUCOPHAGE-XR   1,000 mg, Oral, 2 Times Daily With Meals      omega-3 acid ethyl esters 1 g capsule  Commonly known as: LOVAZA   1 g, Oral, Daily      oxybutynin XL 5 MG 24 hr tablet  Commonly known as: DITROPAN-XL   5 mg, Oral, Daily      ReliOn Premier Test test strip  Generic drug: glucose blood   Use to test blood sugar daily.  Dx E11.9.      Blood Glucose Test strip   1 strip daily dispense strips to go with meter dx e11.65      rivaroxaban 15 MG tablet  Commonly known as: XARELTO   15 mg, Oral, Daily With Dinner      valsartan 80 MG tablet  Commonly known as: DIOVAN   80 mg, Oral, Daily      VITAMIN D3 PO   Oral             Allergies   Allergen Reactions   • Adhesive Tape Rash   • Latex Rash   • Penicillins Rash         Discharge Disposition:  Home or Self  Care    Diet:  Hospital:  Diet Order   Procedures   • Diet: Regular/House Diet, Diabetic Diets; Consistent Carbohydrate; Texture: Regular Texture (IDDSI 7); Fluid Consistency: Thin (IDDSI 0)       Activity:  Activity Instructions     Activity as Tolerated            Restrictions or Other Recommendations:  None        CODE STATUS:    Code Status and Medical Interventions:   Ordered at: 04/08/23 0117     Code Status (Patient has no pulse and is not breathing):    CPR (Attempt to Resuscitate)     Medical Interventions (Patient has pulse or is breathing):    Full Support       Future Appointments   Date Time Provider Department Center   5/26/2023  8:00 AM Irish Sousa PA [facility]   6/5/2023 11:45 AM Deuec Tyson MD [facility]   8/10/2023 11:45 AM Matilde Alvarez MD [facility]       Additional Instructions for the Follow-ups that You Need to Schedule     Ambulatory Referral to Physical Therapy Evaluate and treat   As directed      Specialty needed: Evaluate and treat    Follow-up needed: Yes         CT Chest Without Contrast   May 14, 2023      Release to patient: Routine Release                     Jonathan Scott MD  04/14/23      Time Spent on Discharge:  I spent  45  minutes on this discharge activity which included: face-to-face encounter with the patient, reviewing the data in the system, coordination of the care with the nursing staff as well as consultants, documentation, and entering orders.

## 2023-04-14 NOTE — PROGRESS NOTES
[facility] Cardiology Daily Note       LOS: 4 days   Patient Care Team:  Lizzette Mlutani MD as PCP - General (Internal Medicine)  Irish Sousa PA as Physician Assistant (Endocrinology)  Harley Rose MD as Consulting Physician (Endocrinology)  Matilde Alvarez MD as Consulting Physician (Cardiology)    Chief Complaint: Elevated troponin/prolonged QT    Subjective     Subjective: No complaints.  Breathing is good.  Hopes to go home today.  97% on 2 L.  Was on room air briefly yesterday with relatively lower sats.  Blood pressures have been somewhat labile.  Heart rates have been near 60.  4750 mL out yesterday..      Review of Systems:   As above.    Medications:  amiodarone, 100 mg, Oral, Once per day on Mon Wed Fri  amLODIPine, 5 mg, Oral, Q24H  cefTRIAXone, 2 g, Intravenous, Q24H  doxycycline, 100 mg, Intravenous, Q12H  guaiFENesin, 1,200 mg, Oral, Q12H  hydroCHLOROthiazide, 12.5 mg, Oral, Daily  insulin detemir, 10 Units, Subcutaneous, Q12H  insulin lispro, 0-7 Units, Subcutaneous, 4x Daily With Meals & Nightly  Insulin Lispro, 8 Units, Subcutaneous, TID With Meals  ipratropium-albuterol, 3 mL, Nebulization, 4x Daily - RT  oxybutynin XL, 5 mg, Oral, Daily  rivaroxaban, 15 mg, Oral, Daily With Dinner  sodium chloride, 10 mL, Intravenous, Q12H  triamcinolone, 1 application, Topical, Q12H  valsartan, 80 mg, Oral, Q24H        Objective     Vital Sign Min/Max for last 24 hours  Temp  Min: 97.2 °F (36.2 °C)  Max: 98.2 °F (36.8 °C)   BP  Min: 113/55  Max: 167/70   Pulse  Min: 62  Max: 74   Resp  Min: 16  Max: 20   SpO2  Min: 85 %  Max: 99 %   Flow (L/min)  Min: 2  Max: 3   Weight  Min: 74.6 kg (164 lb 6.4 oz)  Max: 74.6 kg (164 lb 6.4 oz)      Intake/Output Summary (Last 24 hours) at 4/14/2023 0762  Last data filed at 4/14/2023 0500  Gross per 24 hour   Intake 2400 ml   Output 4750 ml   Net -2350 ml        Flowsheet Rows    Flowsheet Row First Filed Value   Admission Height  "160 cm (63\") Documented at 04/07/2023 2326   Admission Weight 72.6 kg (160 lb) Documented at 04/07/2023 2326          Physical Exam:    General: Alert and oriented.   Cardiovascular: Heart has a nondisplaced focal PMI. Regular rate and rhythm with 2/6 SE murmur, no gallop or rub.  Lungs: A few basilar crackles are heard.  Extremities: Show no edema.   Skin: warm and dry.     Results Review:    I reviewed the patient's new clinical results.  EKG:  Tele: Sinus rhythm at 60 bpm    Labs:    Results from last 7 days   Lab Units 04/14/23  0541 04/13/23  0631 04/12/23  0430   SODIUM mmol/L 144 144 140   POTASSIUM mmol/L 3.7 3.8 4.6   CHLORIDE mmol/L 104 109* 104   CO2 mmol/L 31.0* 27.0 26.0   BUN mg/dL 19 17 16   CREATININE mg/dL 0.65 0.59 0.74   CALCIUM mg/dL 8.8 8.4* 8.5*   BILIRUBIN mg/dL 0.6 0.6 0.8   ALK PHOS U/L 107 106 125*   ALT (SGPT) U/L 78* 98* 120*   AST (SGOT) U/L 35* 62* 57*   GLUCOSE mg/dL 90 48* 234*     Results from last 7 days   Lab Units 04/14/23 0541 04/13/23 0631 04/12/23  0430   WBC 10*3/mm3 9.22 9.26 10.73   HEMOGLOBIN g/dL 12.4 11.8* 11.5*   HEMATOCRIT % 38.5 37.0 36.2   PLATELETS 10*3/mm3 329 286 288     Lab Results   Component Value Date    TROPONINT 13 (H) 04/08/2023    TROPONINT 14 (H) 04/07/2023    TROPONINT 14 (H) 04/07/2023     Lab Results   Component Value Date    CHOL 97 11/11/2021     Lab Results   Component Value Date    TRIG 66 11/11/2021     Lab Results   Component Value Date    HDL 42 11/11/2021     No components found for: LDLCALC  Lab Results   Component Value Date    INR 1.38 (H) 04/10/2023    INR 2.66 (H) 04/08/2023    INR 2 (A) 04/07/2023    PROTIME 16.9 (H) 04/10/2023    PROTIME 28.5 (H) 04/08/2023    PROTIME 20.5 04/07/2023         Ejection Fraction: 55 to 60% by echo 4/8/2023    Assessment   Assessment:    1. Acute cholecystitis, transaminitis  1. Status post ERCP with extraction of 3 stones  2. Status post laparoscopic cholecystectomy for 1123  2. Paroxysmal atrial " fibrillation  a. Diagnosed 2021 status post SATISH/ECV  b. Remains rhythm controlled on amiodarone  c. Xarelto on hold  d. Amiodarone on hold prolonged QT  3. Bilateral pleural effusions/pulmonary edema 2 days following admission by CT scan.  (Was not present on her initial abdominal CT at admission.)  4. Moderate aortic stenosis, mean gradient 23 mmHg.  5. Mild to moderate mitral stenosis, mean gradient 6 mmHg.  6. Minimally elevated troponin secondary to demand ischemia      Plan:    Continue amiodarone at 100 mg 3 times a week.  Continue Xarelto for A-fib.  Wean oxygen as able.  Discussed with Dr. Scott yesterday.  I do not think that the volume overload is a valve related or necessarily related to diastolic dysfunction as these problems are not acute for this patient.  I think the volume overload is more likely related to her cholecystitis and possibly hypertension related to cholecystitis.  Agree with Bumex 0.5 mg for the next 3 days at home.  Home today was okay with me      Matilde Alvarez MD  04/14/23  07:45 EDT

## 2023-04-15 NOTE — OUTREACH NOTE
Prep Survey    Flowsheet Row Responses   [facility] discharged from? [place]   Is LACE score < 7 ? No   Eligibility Readm Mgmt   Discharge diagnosis Laparoscopic cholecystectomy   Does the patient have one of the following disease processes/diagnoses(primary or secondary)? General Surgery   Does the patient have Home health ordered? No   Is there a DME ordered? Yes   What DME was ordered? Home oxygen arranged with [employer]   Prep survey completed? Yes          Berkley ROSE - Registered Nurse

## 2023-04-17 LAB
QT INTERVAL: 484 MS
QTC INTERVAL: 491 MS

## 2023-04-18 ENCOUNTER — READMISSION MANAGEMENT (OUTPATIENT)
Dept: CALL CENTER | Facility: HOSPITAL | Age: 83
End: 2023-04-18
Payer: MEDICARE

## 2023-04-18 LAB
QT INTERVAL: 522 MS
QTC INTERVAL: 551 MS

## 2023-04-18 NOTE — OUTREACH NOTE
General Surgery Week 1 Survey    Flowsheet Row Responses   [facility] patient discharged from? [place]   Does the patient have one of the following disease processes/diagnoses(primary or secondary)? General Surgery   Week 1 attempt successful? Yes   Call start time 1750   Call end time 1756   Discharge diagnosis Laparoscopic cholecystectomy, Paroxysmal atrial fibrillation   Person spoke with today (if not patient) and relationship patient   Meds reviewed with patient/caregiver? Yes   Does the patient have all medications related to this admission filled (includes all antibiotics, pain medications, etc.) Yes   Is the patient taking all medications as directed (includes completed medication regime)? Yes   Does the patient have a follow up appointment scheduled with their surgeon? Yes  [4/20]   Has the patient kept scheduled appointments due by today? Yes   Has home health visited the patient within 72 hours of discharge? N/A   What DME was ordered? Home oxygen arranged with [employer]   Has all DME been delivered? Yes   Psychosocial issues? No   Did the patient receive a copy of their discharge instructions? Yes   Nursing interventions Reviewed instructions with patient   What is the patient's perception of their health status since discharge? Improving   Nursing interventions Nurse provided patient education   Is the patient /caregiver able to teach back basic post-op care? Keep incision areas clean,dry and protected   Is the patient/caregiver able to teach back signs and symptoms of incisional infection? Increased redness, swelling or pain at the incisonal site, Increased drainage or bleeding, Fever   Is the patient/caregiver able to teach back steps to recovery at home? Set small, achievable goals for return to baseline health, Rest and rebuild strength, gradually increase activity   If the patient is a current smoker, are they able to teach back resources for cessation? Not a smoker   Is the  patient/caregiver able to teach back the hierarchy of who to call/visit for symptoms/problems? PCP, Specialist, Home health nurse, Urgent Care, ED, 911 Yes   Week 1 call completed? Yes   Is the patient interested in additional calls from an ambulatory ?  NOTE:  applies to high risk patients requiring additional follow-up. Francie MENDOZA - Registered Nurse

## 2023-04-30 ENCOUNTER — TELEPHONE (OUTPATIENT)
Dept: CARDIOLOGY | Facility: HOSPITAL | Age: 83
End: 2023-04-30

## 2023-04-30 ENCOUNTER — TELEPHONE (OUTPATIENT)
Dept: CARDIOLOGY | Facility: HOSPITAL | Age: 83
End: 2023-04-30
Payer: MEDICARE

## 2023-04-30 NOTE — TELEPHONE ENCOUNTER
Patient was referred to [facility] by the [facility] ER. Several attempts have been made to contact the patient with no success. Letter was mailed to patient to contact the office to schedule.

## 2023-05-16 NOTE — PROGRESS NOTES
[facility]  [facility]    Chief Complaint  Atrial Flutter and Establish Care    Subjective    History of Present Illness {CC  Problem List  Visit  Diagnosis   Encounters  Notes  Medications  Labs  Result Review Imaging  Media :23}     Kaylen Garrison is a 82 y.o. female with Paroxysmal atrial fibrillation, hypertension, hyperlipidemia, BERNARD, diabetes who presents today as a hospital referral for atrial fibrillation.    Patient was admitted on 4/7 for acute cholecystitis.  She underwent ERCP on 4/9 with sphincterotomy and 3 stone removal with prophylactic pancreatic duct stenting.  She eventually underwent lap cholecystectomy on 4/11.  Hospital stay was complicated with bilateral pleural effusions and compression atelectasis.  Respiratory insufficiency improved with diuresis.  Her amlodipine was increased from 2.5 mg to 5 mg daily.  She was given a 3 days course of acetazolamide to counteract the contraction alkalosis caused by diuresis with Bumex.  Amiodarone was held due to prolonged QTc and was decreased to 100 mg 3 times a week.  Echo showed normal LVEF, grade 2 diastolic dysfunction, moderate aortic valve stenosis, mild to moderate mitral valve stenosis, moderately elevated RVSP.    She reports that she is doing very well. Continues to work with PT and feels she is getting stronger.Denies any palpitations or shortness of breath unless PT puts weights on her legs. Denies lower extremity edema. She refuses home health, she says they were terrible and feels PT helps her more. She checks her BP at home. Reports this morning it was 166 but after taking her meds it was in the 140s. Reports SBP usually in the 150s. Sometimes 160s. She is concerned about this. Denies any dizziness, She saw her PCP recently and amiodarone was changed to 50mg daily to help simplify her medication regimen    [facility]  She reports he son is a MD in [place] who monitors her  "closely        Objective     Vital Signs:   Vitals:    05/18/23 0757 05/18/23 0759 05/18/23 0800   BP: (!) 182/72 164/70 168/72   BP Location: Right arm Left arm Left arm   Patient Position: Sitting Standing Sitting   Cuff Size: Adult Adult Adult   Pulse: 61 64 72   Resp:   16   Temp: 97.8 °F (36.6 °C) 97.8 °F (36.6 °C) 97.8 °F (36.6 °C)   TempSrc: Temporal Temporal Temporal   SpO2: 97% 99% 99%   Weight:   66.3 kg (146 lb 1.6 oz)   Height:   160 cm (63\")     Body mass index is 25.88 kg/m².  Physical Exam  Vitals reviewed.   Constitutional:       Appearance: Normal appearance.   HENT:      Head: Normocephalic.   Neck:      Vascular: No carotid bruit.   Cardiovascular:      Rate and Rhythm: Normal rate and regular rhythm.      Pulses: Normal pulses.      Heart sounds: S1 normal and S2 normal. Murmur heard.    Systolic murmur is present with a grade of 2/6.  Pulmonary:      Effort: Pulmonary effort is normal. No respiratory distress.      Breath sounds: Normal breath sounds.   Chest:      Chest wall: No tenderness.   Abdominal:      General: Abdomen is flat.      Palpations: Abdomen is soft.   Musculoskeletal:      Cervical back: Neck supple.      Right lower leg: No edema.      Left lower leg: No edema.   Skin:     General: Skin is warm and dry.   Neurological:      General: No focal deficit present.      Mental Status: She is alert and oriented to person, place, and time. Mental status is at baseline.   Psychiatric:         Mood and Affect: Mood normal.         Behavior: Behavior normal.         Thought Content: Thought content normal.              Result Review  Data Reviewed:{ Labs  Result Review  Imaging  Med Tab  Media :23}   ECG 12 Lead QT Measurement (04/13/2023 05:11)  POC Glucose Once (04/14/2023 07:15)  Phosphorus (04/14/2023 05:41)  Magnesium (04/14/2023 05:41)  Comprehensive Metabolic Panel (04/14/2023 05:41)  CBC & Differential (04/14/2023 05:41)  Adult Transthoracic Echo Complete W/ Cont if Necessary " Per Protocol (04/08/2023 13:46)    EKG today shows normal sinus rhythm, right bundle branch block, left anterior fascicular block, , QTc 495           Assessment and Plan {CC Problem List  Visit Diagnosis  ROS  Review (Popup)  Health Maintenance  Quality  BestPractice  Medications  SmartSets  SnapShot Encounters  Media :23}   1. PAF (paroxysmal atrial fibrillation)  -Maintaining normal sinus rhythm.  Amiodarone was reduced to 100 mg 3 times a week secondary to prolonged QTc.  PCP recently cut it in half to 50 mg daily to simplify her medication regimen  -Continue Xarelto  - ECG 12 Lead; Future    2. Essential hypertension  -She has concerns for some elevated blood pressures, especially when she does physical therapy.  Since her blood pressure is also high today we will go ahead and increase her valsartan to 80 mg twice a day.  However, I have advised her to call if systolic blood pressures are consistently 120s or lower.  Would allow some permissive hypertension due to age    3. VHD (valvular heart disease)  -Moderate AS, mild to moderate MS  - She is asymptomatic        Follow Up {Instructions Charge Capture  Follow-up Communications :23}   Return if symptoms worsen or fail to improve.    Patient was given instructions and counseling regarding her condition or for health maintenance advice. Please see specific information pulled into the AVS if appropriate.  Advised to call the [facility] with any questions, concerns, or worsening symptoms.

## 2023-05-18 ENCOUNTER — OFFICE VISIT (OUTPATIENT)
Dept: CARDIOLOGY | Facility: HOSPITAL | Age: 83
End: 2023-05-18
Payer: MEDICARE

## 2023-05-18 ENCOUNTER — HOSPITAL ENCOUNTER (OUTPATIENT)
Dept: CARDIOLOGY | Facility: HOSPITAL | Age: 83
Discharge: HOME | End: 2023-05-18
Payer: MEDICARE

## 2023-05-18 VITALS
DIASTOLIC BLOOD PRESSURE: 72 MMHG | OXYGEN SATURATION: 99 % | HEIGHT: 63 IN | BODY MASS INDEX: 25.89 KG/M2 | SYSTOLIC BLOOD PRESSURE: 168 MMHG | WEIGHT: 146.1 LBS | HEART RATE: 72 BPM | TEMPERATURE: 97.8 F | RESPIRATION RATE: 16 BRPM

## 2023-05-18 DIAGNOSIS — I48.0 PAF (PAROXYSMAL ATRIAL FIBRILLATION): Primary | ICD-10-CM

## 2023-05-18 DIAGNOSIS — I38 VHD (VALVULAR HEART DISEASE): ICD-10-CM

## 2023-05-18 DIAGNOSIS — I10 ESSENTIAL HYPERTENSION: ICD-10-CM

## 2023-05-18 DIAGNOSIS — I48.0 PAF (PAROXYSMAL ATRIAL FIBRILLATION): ICD-10-CM

## 2023-05-18 PROCEDURE — 93005 ELECTROCARDIOGRAM TRACING: CPT | Performed by: NURSE PRACTITIONER

## 2023-05-18 RX ORDER — AMLODIPINE BESYLATE 5 MG/1
5 TABLET ORAL DAILY
COMMUNITY

## 2023-05-18 RX ORDER — VALSARTAN 80 MG/1
80 TABLET ORAL 2 TIMES DAILY
Qty: 60 TABLET | Refills: 3
Start: 2023-05-18

## 2023-05-18 RX ORDER — AMIODARONE HYDROCHLORIDE 200 MG/1
50 TABLET ORAL DAILY
COMMUNITY

## 2023-05-18 NOTE — PATIENT INSTRUCTIONS
Increase valsartan to twice a day. You can take your second dose with the rest of your evening meds

## 2023-05-20 LAB
QT INTERVAL: 492 MS
QTC INTERVAL: 495 MS

## 2023-05-22 ENCOUNTER — TELEPHONE (OUTPATIENT)
Dept: CARDIOLOGY | Facility: HOSPITAL | Age: 83
End: 2023-05-22
Payer: MEDICARE

## 2023-05-22 NOTE — TELEPHONE ENCOUNTER
Will you call patient and ask her to look at her bottles. If she has the 320mg then I would continue, just make sure is not taking it twice a day. Please have her to continue monitoring her BP and call if SBP consistently 150 or greater

## 2023-05-22 NOTE — TELEPHONE ENCOUNTER
----- Message from Gaye Lynn PharmD sent at 5/22/2023  9:14 AM EDT -----  [facility] pharmacy last filled valsartan 320mg daily on 1/28/23 for 90 days. This was her dose since March 2022.   ----- Message -----  From: Negra Cook APRN  Sent: 5/19/2023   4:40 PM EDT  To: Gaye Lynn PharmD    Would you mind calling her pharmacy and seeing for sure what dose of valsartan she has been on?  ----- Message -----  From: Gaye Norton CMA  Sent: 5/19/2023   2:32 PM EDT  To: VALERIANO Malagon at Dr. Lizzette Multani's office called and wanted to update you on patient's Valsartan. They received the note stating that you had increased the medication to 80mg BID. She has been on 320mg since 5/2022.

## 2023-05-23 NOTE — TELEPHONE ENCOUNTER
Patient called and left message for return call. Attempted to call her back but had to leave a voicemail.

## 2023-05-26 ENCOUNTER — OFFICE VISIT (OUTPATIENT)
Dept: ENDOCRINOLOGY | Facility: CLINIC | Age: 83
End: 2023-05-26
Payer: MEDICARE

## 2023-05-26 ENCOUNTER — OFFICE VISIT (OUTPATIENT)
Dept: PULMONOLOGY | Facility: CLINIC | Age: 83
End: 2023-05-26
Payer: MEDICARE

## 2023-05-26 VITALS
HEIGHT: 63 IN | DIASTOLIC BLOOD PRESSURE: 56 MMHG | OXYGEN SATURATION: 96 % | TEMPERATURE: 97.1 F | HEART RATE: 64 BPM | SYSTOLIC BLOOD PRESSURE: 126 MMHG | WEIGHT: 147 LBS | BODY MASS INDEX: 26.05 KG/M2

## 2023-05-26 VITALS
HEIGHT: 63 IN | OXYGEN SATURATION: 98 % | WEIGHT: 146 LBS | SYSTOLIC BLOOD PRESSURE: 130 MMHG | DIASTOLIC BLOOD PRESSURE: 68 MMHG | HEART RATE: 64 BPM | BODY MASS INDEX: 25.87 KG/M2

## 2023-05-26 DIAGNOSIS — G47.33 OSA TREATED WITH BIPAP: ICD-10-CM

## 2023-05-26 DIAGNOSIS — E11.65 TYPE 2 DIABETES MELLITUS WITH HYPERGLYCEMIA, WITHOUT LONG-TERM CURRENT USE OF INSULIN: Primary | Chronic | ICD-10-CM

## 2023-05-26 DIAGNOSIS — E04.2 MULTINODULAR GOITER (NONTOXIC): ICD-10-CM

## 2023-05-26 DIAGNOSIS — E27.8 ADRENAL NODULE: ICD-10-CM

## 2023-05-26 DIAGNOSIS — E21.0 PRIMARY HYPERPARATHYROIDISM: Chronic | ICD-10-CM

## 2023-05-26 DIAGNOSIS — R06.09 DYSPNEA ON EXERTION: Primary | ICD-10-CM

## 2023-05-26 DIAGNOSIS — I10 PRIMARY HYPERTENSION: Chronic | ICD-10-CM

## 2023-05-26 DIAGNOSIS — R09.02 EXERCISE HYPOXEMIA: ICD-10-CM

## 2023-05-26 LAB
ALBUMIN SERPL-MCNC: 4.3 G/DL (ref 3.5–5.2)
ALBUMIN UR-MCNC: 4 MG/DL
ANION GAP SERPL CALCULATED.3IONS-SCNC: 10.6 MMOL/L (ref 5–15)
BUN SERPL-MCNC: 31 MG/DL (ref 8–23)
BUN/CREAT SERPL: 33 (ref 7–25)
CA-I BLD-MCNC: 5.3 MG/DL (ref 4.6–5.4)
CA-I SERPL ISE-MCNC: 1.33 MMOL/L (ref 1.15–1.35)
CALCIUM SPEC-SCNC: 10.4 MG/DL (ref 8.6–10.5)
CALCIUM SPEC-SCNC: 10.5 MG/DL (ref 8.6–10.5)
CHLORIDE SERPL-SCNC: 100 MMOL/L (ref 98–107)
CO2 SERPL-SCNC: 28.4 MMOL/L (ref 22–29)
CREAT SERPL-MCNC: 0.94 MG/DL (ref 0.57–1)
CREAT UR-MCNC: 58.1 MG/DL
EGFRCR SERPLBLD CKD-EPI 2021: 60 ML/MIN/1.73
EXPIRATION DATE: ABNORMAL
EXPIRATION DATE: NORMAL
GLUCOSE BLDC GLUCOMTR-MCNC: 377 MG/DL (ref 70–130)
GLUCOSE SERPL-MCNC: 254 MG/DL (ref 65–99)
HBA1C MFR BLD: 7.5 %
Lab: ABNORMAL
Lab: NORMAL
MICROALBUMIN/CREAT UR: 68.8 MG/G
PHOSPHATE SERPL-MCNC: 4.2 MG/DL (ref 2.5–4.5)
POTASSIUM SERPL-SCNC: 4 MMOL/L (ref 3.5–5.2)
PTH-INTACT SERPL-MCNC: 77.7 PG/ML (ref 15–65)
SODIUM SERPL-SCNC: 139 MMOL/L (ref 136–145)
T4 FREE SERPL-MCNC: 1.79 NG/DL (ref 0.93–1.7)
TSH SERPL DL<=0.05 MIU/L-ACNC: 2.51 UIU/ML (ref 0.27–4.2)

## 2023-05-26 PROCEDURE — 84439 ASSAY OF FREE THYROXINE: CPT | Performed by: PHYSICIAN ASSISTANT

## 2023-05-26 PROCEDURE — 82330 ASSAY OF CALCIUM: CPT | Performed by: PHYSICIAN ASSISTANT

## 2023-05-26 PROCEDURE — 80069 RENAL FUNCTION PANEL: CPT | Performed by: PHYSICIAN ASSISTANT

## 2023-05-26 PROCEDURE — 84443 ASSAY THYROID STIM HORMONE: CPT | Performed by: PHYSICIAN ASSISTANT

## 2023-05-26 PROCEDURE — 82570 ASSAY OF URINE CREATININE: CPT | Performed by: PHYSICIAN ASSISTANT

## 2023-05-26 PROCEDURE — 82043 UR ALBUMIN QUANTITATIVE: CPT | Performed by: PHYSICIAN ASSISTANT

## 2023-05-26 PROCEDURE — 82088 ASSAY OF ALDOSTERONE: CPT | Performed by: PHYSICIAN ASSISTANT

## 2023-05-26 PROCEDURE — 83970 ASSAY OF PARATHORMONE: CPT | Performed by: PHYSICIAN ASSISTANT

## 2023-05-26 PROCEDURE — 84244 ASSAY OF RENIN: CPT | Performed by: PHYSICIAN ASSISTANT

## 2023-05-26 PROCEDURE — 84403 ASSAY OF TOTAL TESTOSTERONE: CPT | Performed by: PHYSICIAN ASSISTANT

## 2023-05-26 PROCEDURE — 82310 ASSAY OF CALCIUM: CPT | Performed by: PHYSICIAN ASSISTANT

## 2023-05-26 PROCEDURE — 82627 DEHYDROEPIANDROSTERONE: CPT | Performed by: PHYSICIAN ASSISTANT

## 2023-05-26 RX ORDER — PERINDOPRIL ERBUMINE 2 MG/1
2 TABLET ORAL DAILY
COMMUNITY

## 2023-05-26 NOTE — PROGRESS NOTES
[facility] Pulmonary Follow up    CHIEF COMPLAINT    Mild dyspnea with exertion    HISTORY OF PRESENT ILLNESS    Kaylen Garrison is a 82 y.o.female here today for a hospital follow-up.  She was hospitalized at [facility] on 4/7 to 4/14 for acute cholecystitis.  She was treated with antibiotics and had a lap jenny on 4/11 she did require oxygen after surgery due to bilateral pleural effusions and compression atelectasis.  She had a CTA that was negative for PE.  Her oxygenation improved with diuresis and was seen by Dr. Meeks while hospitalized.  She was discharged home on antibiotics.    She states that she has not used her oxygen in the last 4 days.  She does have it in case she needs it.  She states that her oxygen level has been above 90% at all times for the last several days.    She continues to wear her BiPAP nightly with oxygen bled into the machine at 2 L.  She does need some new supplies and follows up with Dr. Tyson in June.    She denies any sputum production or hemoptysis.  She denies any chest pain or palpitations.  She denies any lower extremity edema or calf tenderness.  She does have a history of a flutter and follows with cardiology closely.    She has mild shortness of breath with exertion but is able to do normal activities without difficulty.    She denies any reflux symptoms.  She denies any sinus or allergy symptoms.    She is a lifetime non-smoker.    Patient Active Problem List   Diagnosis   • Syncope   • Hypertension   • Hyperlipidemia   • Overweight (BMI 25.0-29.9)   • Meniere's disease   • Dyspnea on exertion   • BERNARD treated with BiPAP   • Primary hyperparathyroidism   • Osteopenia   • Atrial flutter with rapid ventricular response   • Type 2 diabetes mellitus with hyperglycemia, without long-term current use of insulin   • Exercise hypoxemia   • Acute cholecystitis   • Paroxysmal atrial fibrillation       Allergies   Allergen Reactions   • Adhesive Tape Rash   • Latex  Rash   • Penicillins Rash       Current Outpatient Medications:   •  amiodarone (PACERONE) 100 MG half tablet, Take 50 mg by mouth Daily., Disp: , Rfl:   •  amLODIPine (NORVASC) 5 MG tablet, Take 1 tablet by mouth Daily., Disp: , Rfl:   •  atenolol (TENORMIN) 25 MG tablet, Take 12.5 mg by mouth Daily. A half tablet daily, Disp: , Rfl:   •  atorvastatin (LIPITOR) 40 MG tablet, Take 1 tablet by mouth Daily., Disp: , Rfl:   •  Blood Glucose Monitoring Suppl (Accu-Chek Guide) w/Device kit, 1 Device See Admin Instructions. Use to check blood sugar once daily.  Dx E11.9, Disp: 1 kit, Rfl: 0  •  Blood Glucose Monitoring Suppl device, Use as directed to check blood sugar please dispense insurance preferred, Disp: 1 each, Rfl: 0  •  Cholecalciferol (VITAMIN D3 PO), Take  by mouth., Disp: , Rfl:   •  Cinnamon 500 MG capsule, Take 1 capsule by mouth Daily., Disp: , Rfl:   •  glipizide (GLUCOTROL XL) 2.5 MG 24 hr tablet, Take 2 tablets by mouth Daily. 2 times daily, Disp: , Rfl:   •  Glucose Blood (Blood Glucose Test) strip, 1 strip daily dispense strips to go with meter dx e11.65, Disp: 100 each, Rfl: 3  •  glucose blood (ReliOn Premier Test) test strip, Use to test blood sugar daily.  Dx E11.9., Disp: 100 each, Rfl: 3  •  hydroCHLOROthiazide (HYDRODIURIL) 25 MG tablet, Take 12.5 mg by mouth Daily., Disp: , Rfl:   •  Januvia 100 MG tablet, Take 1 tablet by mouth once daily, Disp: 90 tablet, Rfl: 0  •  Lancets 33G misc, 1 each Daily., Disp: 100 each, Rfl: 3  •  metFORMIN ER (GLUCOPHAGE-XR) 500 MG 24 hr tablet, Take 2 tablets by mouth 2 (Two) Times a Day With Meals., Disp: 120 tablet, Rfl: 12  •  omega-3 acid ethyl esters (LOVAZA) 1 g capsule, Take 1 capsule by mouth Daily., Disp: , Rfl:   •  oxybutynin XL (DITROPAN-XL) 5 MG 24 hr tablet, Take 1 tablet by mouth Daily., Disp: , Rfl:   •  perindopril (ACEON) 2 MG tablet, Take 1 tablet by mouth Daily., Disp: , Rfl:   •  rivaroxaban (XARELTO) 15 MG tablet, Take 1 tablet by mouth  "Daily With Dinner. Indications: Atrial Fibrillation, Disp: 30 tablet, Rfl: 3  •  valsartan (DIOVAN) 80 MG tablet, Take 1 tablet by mouth 2 (Two) Times a Day., Disp: 60 tablet, Rfl: 3  MEDICATION LIST AND ALLERGIES REVIEWED.    Social History     Tobacco Use   • Smoking status: Never   • Smokeless tobacco: Never   Vaping Use   • Vaping Use: Never used   Substance Use Topics   • Alcohol use: No   • Drug use: No       FAMILY AND SOCIAL HISTORY REVIEWED.    Review of Systems   Constitutional: Negative for activity change, appetite change, fatigue, fever and unexpected weight change.   HENT: Negative for congestion, postnasal drip, rhinorrhea, sinus pressure, sore throat and voice change.    Eyes: Negative for visual disturbance.   Respiratory: Positive for shortness of breath. Negative for cough, chest tightness and wheezing.    Cardiovascular: Negative for chest pain, palpitations and leg swelling.   Gastrointestinal: Negative for abdominal distention, abdominal pain, nausea and vomiting.   Endocrine: Negative for cold intolerance and heat intolerance.   Genitourinary: Negative for difficulty urinating and urgency.   Musculoskeletal: Negative for arthralgias, back pain and neck pain.   Skin: Negative for color change and pallor.   Allergic/Immunologic: Negative for environmental allergies and food allergies.   Neurological: Negative for dizziness, syncope, weakness and light-headedness.   Hematological: Negative for adenopathy. Does not bruise/bleed easily.   Psychiatric/Behavioral: Negative for agitation and behavioral problems.   .    /56 (BP Location: Left arm, Patient Position: Sitting, Cuff Size: Adult)   Pulse 64   Temp 97.1 °F (36.2 °C)   Ht 160 cm (63\")   Wt 66.7 kg (147 lb)   SpO2 96% Comment: Room air at rest  BMI 26.04 kg/m²     Immunization History   Administered Date(s) Administered   • COVID-19 (PFIZER) Purple Cap Monovalent 09/27/2021   • FluLaval/Fluzone >6mos 09/29/2018   • Fluzone High " Dose =>65 Years ([employer] ONLY) 09/19/2017, 09/30/2019   • Fluzone High-Dose 65+yrs 09/10/2020, 09/24/2021   • Hepatitis A 10/15/2018, 09/23/2019   • Hepatitis B Adult/Adolescent IM 10/11/2019, 11/05/2019, 06/24/2020   • Influenza, Unspecified 09/10/2020, 09/29/2020       Physical Exam  Vitals and nursing note reviewed.   Constitutional:       Appearance: She is well-developed. She is not diaphoretic.   HENT:      Head: Normocephalic and atraumatic.   Eyes:      Pupils: Pupils are equal, round, and reactive to light.   Neck:      Thyroid: No thyromegaly.   Cardiovascular:      Rate and Rhythm: Normal rate and regular rhythm.      Heart sounds: Normal heart sounds. No murmur heard.    No friction rub. No gallop.   Pulmonary:      Effort: Pulmonary effort is normal. No respiratory distress.      Breath sounds: Normal breath sounds. No wheezing or rales.   Chest:      Chest wall: No tenderness.   Abdominal:      General: Bowel sounds are normal.      Palpations: Abdomen is soft.      Tenderness: There is no abdominal tenderness.   Musculoskeletal:         General: No swelling. Normal range of motion.      Cervical back: Normal range of motion and neck supple.   Lymphadenopathy:      Cervical: No cervical adenopathy.   Skin:     General: Skin is warm and dry.      Capillary Refill: Capillary refill takes less than 2 seconds.   Neurological:      Mental Status: She is alert and oriented to person, place, and time.   Psychiatric:         Behavior: Behavior normal.           RESULTS    CT Angiogram Chest Pulmonary Embolism    Result Date: 4/10/2023  Impression: No evidence of pulmonary embolism. Interlobular septal thickening and patchy perihilar bilateral groundglass densities compatible with interstitial edema and early alveolar edema, with associated small bilateral pleural effusions with some associated bibasilar passive atelectasis. There is multichamber cardiac enlargement. Numerous prominent and mildly enlarged  mediastinal lymph nodes appearing mildly increased in number and conspicuity from CT of the chest from 9/15/2022. The appearance is nonspecific; this could be reactive or congestive, with lymphoproliferative disorder or metastatic nodes difficult to exclude though felt to be less likely. Electronically Signed: Patricio Tulioyeim  4/10/2023 2:24 PM EDT  Workstation ID: [id number]    PROBLEM LIST    Problem List Items Addressed This Visit        Cardiac and Vasculature    Dyspnea on exertion - Primary       Pulmonary and Pneumonias    Exercise hypoxemia       Sleep    BERNARD treated with BiPAP    Overview     Auto BIPAP              DISCUSSION  Ms. Garrison was here for a hospital follow-up.  She seems to be doing well and has not used her oxygen during the day for several days.  I would like her to continue using it if her saturations are below 88% with activity.    She will continue to wear her BiPAP at night with 2 L bled into the machine.  She does follow-up with Dr. Tyson in June.  She does need new supplies ordered at this time.  She is thinking about switching DME companies as well.    She was supposed to have a follow-up CT scan in 4 to 6 weeks per her prior CT scan that was done in April.  We will have this arranged in the next week or 2.  I will call her with the results.    If her CT scan is normal she will not need to follow back up in the pulmonary office.  If it is abnormal we will address once it has been completed.    Did encourage her to stay physically active to help with deconditioning.    She will follow-up in 2 to 3 months.    I personally spent a total of 31 minutes on patient visit today including chart review, face to face with the patient obtaining the history and physical exam, review of pertinent images and tests, counseling and discussion and/or coordination of care as described above, and documentation.  Total time excludes time spent on other separate services such as performing procedures or  test interpretation, if applicable.        Leigh VASILE Tijerina, APRN  05/26/202314:53 EDT  Electronically signed     Please note that portions of this note were completed with a voice recognition program.        CC: Lizzette Multani MD

## 2023-05-26 NOTE — PROGRESS NOTES
Office Note      Date: 2023  Patient Name: Kaylen Garrison  MRN: [id number]  : 1940    Chief Complaint   Patient presents with    Diabetes       History of Present Illness  Kaylen Garrison is a 82 y.o. female who presents today for follow up on type 2 diabetes.   Diabetes was diagnosed in .  Current diabetic medications: Metformin, Januvia, glipizide.  She is testing fasting BG daily.  Readings have been in range of 110s-160s with 30-day average of 149.  She did have one reading 232 after forgetting medication the night before.  She denies any hypoglycemia.  Feet: No issues.  Foot exam up to date.  Last eye exam: .  ACE inhibitor/ARB: Valsartan.  Statin: Atorvastatin.  She has been taking amiodarone since 2021.  Followed by [facility] cardiology.  Valsartan was recently increased.  History of mild primary hyperparathyroidism.  Last DEXA scan showed osteopenia in 2021.  No known fractures.  No history of kidney stones.    She was admitted to [facility] on 2023 with acute cholecystitis.  She eventually underwent cholecystectomy on 2023.  She reports that she has recovered well.  She continues to do PT.  She was doing PT prior to surgery as well.  She reports mild shortness of breath while at PT.  She reports that she is using O2 during the day on occasion.  She has f/u with pulmonology this afternoon.    She developed respiratory insufficiency during recent admission.  She had CTA on 4/10/2023 that was negative for PE.  Incidental note was made of a subcentimeter hypodense right adrenal nodule, likely adenoma.  She was also noted to have a heterogenous multinodular thyroid gland.  She has not noticed any change in the size of her neck.  She denies trouble swallowing or other compressive symptoms.  No hoarseness or voice changes.      Review of Systems   Constitutional: Negative.    HENT:  Negative for trouble swallowing and voice change.    Respiratory:   "Positive for shortness of breath (mild).    Cardiovascular: Negative.    Endocrine: Negative.      Past Medical History:   Diagnosis Date    Atrial fibrillation     Benign hypertension     History of echocardiogram 04/2012    Hypercalcemia     Hypercholesterolemia     Hypertension     Meniere's disease     Obesity     Overweight (BMI 25.0-29.9)     Primary hyperparathyroidism     Sleep apnea with use of continuous positive airway pressure (CPAP)     Syncope 04/2012    Type 2 diabetes mellitus     Vitamin D deficiency       Past Surgical History:   Procedure Laterality Date    ERCP N/A 4/9/2023    Procedure: ENDOSCOPIC RETROGRADE CHOLANGIOPANCREATOGRAPHY WITH STENT PLACEMENT;  Surgeon: Brunner, Mark I, MD;  Location: [facility] ENDOSCOPY;  Service: Gastroenterology;  Laterality: N/A;  Sphincterotomy made to ampula of coomon bile duct. CBD swept with 9mm-12 mm balloon. Multiple stones and sludge removed. Stent placed in pancreatic duct    CHOLECYSTECTOMY N/A 4/11/2023    Procedure: CHOLECYSTECTOMY LAPAROSCOPIC;  Surgeon: Abraham Holbrook MD;  Location: [facility] OR;  Service: General;  Laterality: N/A;    BREAST SURGERY Left     benign tumor removal    CATARACT EXTRACTION Bilateral     HYSTERECTOMY       The following portions of the patient's history were reviewed and updated as appropriate: allergies, current medications, past family history, past medical history, past social history, past surgical history and problem list.    Vitals:  /68 (BP Location: Left arm, Patient Position: Sitting, Cuff Size: Adult)   Pulse 64   Ht 160 cm (63\")   Wt 66.2 kg (146 lb)   SpO2 98%   BMI 25.86 kg/m²     Physical Exam  Vitals reviewed.   Constitutional:       General: She is not in acute distress.  Neck:      Comments: Thyroid difficult to palpate, low in neck.  Cardiovascular:      Rate and Rhythm: Normal rate and regular rhythm.      Heart sounds: Murmur heard.   Systolic murmur is present.   Lymphadenopathy:      " Cervical: No cervical adenopathy.      Upper Body:      Right upper body: No supraclavicular adenopathy.      Left upper body: No supraclavicular adenopathy.   Neurological:      Mental Status: She is alert and oriented to person, place, and time.   Psychiatric:         Mood and Affect: Affect normal.       Labs/Imaging    Hemoglobin A1c  Lab Results   Component Value Date    HGBA1C 7.5 05/26/2023    HGBA1C 7.2 02/07/2023    HGBA1C 6.4 09/12/2022     Glucose  Lab Results   Component Value Date    POCGLU 377 (A) 05/26/2023   Repeat fingerstick     CT Angiogram Chest Pulmonary Embolism (04/10/2023 14:09)     Current Outpatient Medications   Medication Instructions    amiodarone (PACERONE) 50 mg, Oral, Daily    amLODIPine (NORVASC) 5 mg, Oral, Daily    atenolol (TENORMIN) 12.5 mg, Oral, Daily, A half tablet daily    atorvastatin (LIPITOR) 40 mg, Oral, Daily    Blood Glucose Monitoring Suppl (Accu-Chek Guide) w/Device kit 1 Device, Does not apply, See Admin Instructions, Use to check blood sugar once daily.  Dx E11.9    Blood Glucose Monitoring Suppl device Use as directed to check blood sugar please dispense insurance preferred    Cholecalciferol (VITAMIN D3 PO) Oral    Cinnamon 500 mg, Oral, Daily    glipizide (GLUCOTROL XL) 5 mg, Oral, Daily, 2 times daily    Glucose Blood (Blood Glucose Test) strip 1 strip daily dispense strips to go with meter dx e11.65    glucose blood (ReliOn Premier Test) test strip Use to test blood sugar daily.  Dx E11.9.    hydroCHLOROthiazide (HYDRODIURIL) 12.5 mg, Oral, Daily    Januvia 100 MG tablet Take 1 tablet by mouth once daily    Lancets 33G misc 1 each, Does not apply, Daily    metFORMIN ER (GLUCOPHAGE-XR) 1,000 mg, Oral, 2 Times Daily With Meals    omega-3 acid ethyl esters (LOVAZA) 1 g, Oral, Daily    oxybutynin XL (DITROPAN-XL) 5 mg, Oral, Daily    perindopril (ACEON) 2 mg, Oral, Daily    rivaroxaban (XARELTO) 15 mg, Oral, Daily With Dinner    valsartan (DIOVAN) 80 mg,  Oral, 2 Times Daily       Assessment & Plan  1. Type 2 diabetes mellitus with hyperglycemia, without long-term current use of insulin  A1c increased, but still reasonable at 7.5%.  She was acutely hyperglycemic in the office today.  Recommend to check BGs 2h after breakfast and call with readings if staying over 200.  Encouraged nutritious dietary choices, moderation of carbohydrates, and regular physical activity as tolerated.  Encouraged to increase protein.  Continue metformin ER, Januvia, glipizide.  - POC Glucose, Blood  - POC Glycosylated Hemoglobin (Hb A1C)  - Renal Function Panel; Future  - Microalbumin / Creatinine Urine Ratio - Urine, Clean Catch; Future    2. Primary hypertension  BP improved today.  She will continue current medications.  Continue follow up with cardiology.    3. Primary hyperparathyroidism  This has remained mild and stable.  Continue to observe.  - PTH, Intact & Calcium; Future  - Calcium, Ionized; Future  - Renal Function Panel; Future  - Calcium, Urine, 24 Hour - Urine, Clean Catch; Future    4. Multinodular goiter (nontoxic)  No nodules palpable on neck exam.  Thyroid is difficult to palpate, low in neck.  Schedule thyroid ultrasound.  - TSH; Future  - T4, Free; Future  - US Thyroid    5. Adrenal nodule  Subcentimeter right adrenal nodule, likely representing adenoma, incidentally noted on recent CT.  We discussed evaluation for hormonal hypersecretion.  - Aldosterone / Renin Ratio; Future  - DHEA-Sulfate; Future  - Testosterone, Total, Women, Children, and Hypogonadal Males; Future  - Metanephrines, Urine, 24 Hour - Urine, Clean Catch; Future  - Cortisol, Urine, Free 24Hr - Urine, Clean Catch; Future    Will notify her of lab results and imaging when completed.    Patient was examined and plan discussed with Dr. Harley Rose today.    Return in about 3 months (around 8/26/2023) for next scheduled follow up. She was advised to contact the office with any interval questions or  concerns.    LISA Stone  Endocrinology  05/26/2023

## 2023-05-27 LAB — DHEA-S SERPL-MCNC: 57.9 UG/DL (ref 13.9–142.8)

## 2023-05-30 ENCOUNTER — LAB (OUTPATIENT)
Dept: ENDOCRINOLOGY | Facility: CLINIC | Age: 83
End: 2023-05-30

## 2023-05-30 ENCOUNTER — TELEPHONE (OUTPATIENT)
Dept: ENDOCRINOLOGY | Facility: CLINIC | Age: 83
End: 2023-05-30

## 2023-05-30 DIAGNOSIS — E27.8 ADRENAL NODULE: ICD-10-CM

## 2023-05-30 DIAGNOSIS — E21.0 PRIMARY HYPERPARATHYROIDISM: ICD-10-CM

## 2023-05-30 RX ORDER — GLUCOSAMINE HCL/CHONDROITIN SU 500-400 MG
CAPSULE ORAL
Qty: 100 EACH | Refills: 3 | Status: SHIPPED | OUTPATIENT
Start: 2023-05-30

## 2023-06-01 LAB — TESTOST SERPL-MCNC: 9.7 NG/DL

## 2023-06-02 ENCOUNTER — LAB (OUTPATIENT)
Dept: ENDOCRINOLOGY | Facility: CLINIC | Age: 83
End: 2023-06-02
Payer: MEDICARE

## 2023-06-02 DIAGNOSIS — I10 ESSENTIAL HYPERTENSION: ICD-10-CM

## 2023-06-02 DIAGNOSIS — E21.0 PRIMARY HYPERPARATHYROIDISM: ICD-10-CM

## 2023-06-02 DIAGNOSIS — I10 PRIMARY HYPERTENSION: Primary | ICD-10-CM

## 2023-06-02 DIAGNOSIS — E04.2 MULTINODULAR GOITER (NONTOXIC): ICD-10-CM

## 2023-06-02 DIAGNOSIS — R00.0 TACHYCARDIA: ICD-10-CM

## 2023-06-02 DIAGNOSIS — M85.852 OSTEOPENIA OF NECK OF LEFT FEMUR: ICD-10-CM

## 2023-06-02 DIAGNOSIS — E78.00 PURE HYPERCHOLESTEROLEMIA: ICD-10-CM

## 2023-06-02 DIAGNOSIS — E11.9 TYPE 2 DIABETES MELLITUS WITHOUT COMPLICATION, WITHOUT LONG-TERM CURRENT USE OF INSULIN: ICD-10-CM

## 2023-06-02 DIAGNOSIS — E55.9 VITAMIN D DEFICIENCY: ICD-10-CM

## 2023-06-02 DIAGNOSIS — E11.65 TYPE 2 DIABETES MELLITUS WITH HYPERGLYCEMIA, WITHOUT LONG-TERM CURRENT USE OF INSULIN: ICD-10-CM

## 2023-06-02 DIAGNOSIS — R06.89 DECREASED BREATH SOUNDS: ICD-10-CM

## 2023-06-02 DIAGNOSIS — E27.8 ADRENAL NODULE: ICD-10-CM

## 2023-06-02 LAB
CALCIUM 24H UR-MCNC: 8.6 MG/DL
CALCIUM 24H UR-MRATE: 68.8 MG/24 HR (ref 100–300)
COLLECT DURATION TIME UR: 24 HRS
SPECIMEN VOL 24H UR: 800 ML

## 2023-06-02 PROCEDURE — 82530 CORTISOL FREE: CPT | Performed by: PHYSICIAN ASSISTANT

## 2023-06-02 PROCEDURE — 81050 URINALYSIS VOLUME MEASURE: CPT | Performed by: PHYSICIAN ASSISTANT

## 2023-06-02 PROCEDURE — 82340 ASSAY OF CALCIUM IN URINE: CPT | Performed by: PHYSICIAN ASSISTANT

## 2023-06-02 PROCEDURE — 83835 ASSAY OF METANEPHRINES: CPT | Performed by: PHYSICIAN ASSISTANT

## 2023-06-04 LAB
ALDOST SERPL-MCNC: 9 NG/DL (ref 0–30)
ALDOST/RENIN PLAS-RTO: 28.1 {RATIO} (ref 0–30)
RENIN PLAS-CCNC: 0.32 NG/ML/HR (ref 0.17–5.38)

## 2023-06-05 ENCOUNTER — OFFICE VISIT (OUTPATIENT)
Dept: SLEEP MEDICINE | Facility: HOSPITAL | Age: 83
End: 2023-06-05
Payer: MEDICARE

## 2023-06-05 VITALS
WEIGHT: 145.8 LBS | BODY MASS INDEX: 25.83 KG/M2 | OXYGEN SATURATION: 95 % | HEART RATE: 66 BPM | DIASTOLIC BLOOD PRESSURE: 69 MMHG | TEMPERATURE: 97.8 F | SYSTOLIC BLOOD PRESSURE: 158 MMHG

## 2023-06-05 DIAGNOSIS — R09.02 EXERCISE HYPOXEMIA: ICD-10-CM

## 2023-06-05 DIAGNOSIS — R06.09 DYSPNEA ON EXERTION: ICD-10-CM

## 2023-06-05 DIAGNOSIS — G47.33 OSA TREATED WITH BIPAP: Primary | ICD-10-CM

## 2023-06-05 PROBLEM — I48.92 ATRIAL FLUTTER WITH RAPID VENTRICULAR RESPONSE: Status: RESOLVED | Noted: 2021-11-10 | Resolved: 2023-06-05

## 2023-06-05 PROBLEM — K81.0 ACUTE CHOLECYSTITIS: Status: RESOLVED | Noted: 2023-04-08 | Resolved: 2023-06-05

## 2023-06-05 PROCEDURE — 3077F SYST BP >= 140 MM HG: CPT | Performed by: INTERNAL MEDICINE

## 2023-06-05 PROCEDURE — 99214 OFFICE O/P EST MOD 30 MIN: CPT | Performed by: INTERNAL MEDICINE

## 2023-06-05 PROCEDURE — 3078F DIAST BP <80 MM HG: CPT | Performed by: INTERNAL MEDICINE

## 2023-06-05 PROCEDURE — 1159F MED LIST DOCD IN RCRD: CPT | Performed by: INTERNAL MEDICINE

## 2023-06-05 PROCEDURE — 1160F RVW MEDS BY RX/DR IN RCRD: CPT | Performed by: INTERNAL MEDICINE

## 2023-06-05 NOTE — PROGRESS NOTES
Subjective:     Chief Complaint:   No chief complaint on file.      HPI:    Kaylen Garrison is a 82 y.o. female here for follow-up of obstructive sleep apnea.    She has a longstanding history of obstructive sleep apnea.  She was proven to have oxygen desaturations by nocturnal pulse oximetry on CPAP therapy and therefore underwent a titration study on 6/12/2022.  This revealed that CPAP was inadequate and she was placed on BiPAP therapy with improved AHI and better saturations.    I last saw her in September of last year.      In addition to her sleep apnea I was concerned about her dyspnea on exertion and exertional hypoxemia.  She underwent an HRCT which revealed some mild lower lobe bronchiectasis but there is not evidence of significant residual lung disease.  Multiple shotty lymph nodes were present.    PFTs revealed no clear evidence of obstruction or restriction.  The DLCO was quite low but this was likely technical in nature.  Exercise oximetry at the time revealed the lowest oxygen saturation to be 91% and therefore oxygen was not ordered with exertion during the day.    In regards to her sleep apnea, I reviewed her download of her BiPAP today reveals her AHI to be normal at 1.9.  Average pressure is 18/12.  She remains on auto BiPAP therapy.  Compliance is excellent with usage at 86 of 90 nights for an average of 7 hours and 54 minutes.  She uses 2 L with her BiPAP.  The only caveat is a high amount of mask leak.  She uses a nasal pillow mask and notes occasional mask leak but really cannot state exactly where any specific leak is emanating from.  Further details are as follows:      Denver Scale scored as 2/24.    Type of mask: Nasal pillow mask    Overall, she is benefiting from PAP therapy.    Current medications are:   Current Outpatient Medications:     amiodarone (PACERONE) 100 MG half tablet, Take 50 mg by mouth Daily., Disp: , Rfl:     amLODIPine (NORVASC) 5 MG tablet, Take 1 tablet by  mouth Daily., Disp: , Rfl:     atenolol (TENORMIN) 25 MG tablet, Take 12.5 mg by mouth Daily. A half tablet daily, Disp: , Rfl:     atorvastatin (LIPITOR) 40 MG tablet, Take 1 tablet by mouth Daily., Disp: , Rfl:     Blood Glucose Monitoring Suppl (Accu-Chek Guide) w/Device kit, 1 Device See Admin Instructions. Use to check blood sugar once daily.  Dx E11.9, Disp: 1 kit, Rfl: 0    Blood Glucose Monitoring Suppl device, Use as directed to check blood sugar please dispense insurance preferred, Disp: 1 each, Rfl: 0    Cholecalciferol (VITAMIN D3 PO), Take  by mouth., Disp: , Rfl:     Cinnamon 500 MG capsule, Take 1 capsule by mouth Daily., Disp: , Rfl:     glipizide (GLUCOTROL XL) 2.5 MG 24 hr tablet, Take 2 tablets by mouth Daily. 2 times daily, Disp: , Rfl:     Glucose Blood (Blood Glucose Test) strip, 1 strip daily dispense strips to go with meter dx e11.65, Disp: 100 each, Rfl: 3    glucose blood (ReliOn Premier Test) test strip, Use to test blood sugar daily.  Dx E11.9., Disp: 100 each, Rfl: 3    hydroCHLOROthiazide (HYDRODIURIL) 25 MG tablet, Take 12.5 mg by mouth Daily., Disp: , Rfl:     Januvia 100 MG tablet, Take 1 tablet by mouth once daily, Disp: 90 tablet, Rfl: 0    Lancets 33G misc, 1 each Daily., Disp: 100 each, Rfl: 3    metFORMIN ER (GLUCOPHAGE-XR) 500 MG 24 hr tablet, Take 2 tablets by mouth 2 (Two) Times a Day With Meals., Disp: 120 tablet, Rfl: 12    omega-3 acid ethyl esters (LOVAZA) 1 g capsule, Take 1 capsule by mouth Daily., Disp: , Rfl:     oxybutynin XL (DITROPAN-XL) 5 MG 24 hr tablet, Take 1 tablet by mouth Daily., Disp: , Rfl:     perindopril (ACEON) 2 MG tablet, Take 1 tablet by mouth Daily., Disp: , Rfl:     rivaroxaban (XARELTO) 15 MG tablet, Take 1 tablet by mouth Daily With Dinner. Indications: Atrial Fibrillation, Disp: 30 tablet, Rfl: 3    valsartan (DIOVAN) 80 MG tablet, Take 1 tablet by mouth 2 (Two) Times a Day., Disp: 60 tablet, Rfl: 3.    The patient's relevant past medical,  surgical, family and social history were reviewed and updated in Epic as appropriate.     ROS:    Review of Systems      Objective:    Physical Exam  Vitals reviewed.   Constitutional:       Appearance: She is well-developed.   HENT:      Head: Normocephalic and atraumatic.      Mouth/Throat:      Mouth: Mucous membranes are moist.      Pharynx: Oropharynx is clear.   Neck:      Thyroid: No thyromegaly.   Cardiovascular:      Rate and Rhythm: Normal rate and regular rhythm.      Heart sounds: No murmur heard.    No friction rub. No gallop.   Pulmonary:      Effort: Pulmonary effort is normal. No respiratory distress.      Breath sounds: No wheezing or rales.   Musculoskeletal:      Cervical back: Neck supple.   Skin:     General: Skin is warm and dry.   Neurological:      Mental Status: She is alert and oriented to person, place, and time.   Psychiatric:         Behavior: Behavior normal.         Thought Content: Thought content normal.       Data:    Patient's PAP download was personally interpreted by me and has not otherwise been independently reported.    PAP download findings:  Average pressure: 18/12  Average AHI: 1.9  Average minutes in large leak per night: High    Assessment:    Problem List Items Addressed This Visit          Pulmonary Problems    Dyspnea on exertion    BERNARD treated with BiPAP - Primary    Overview     Auto BIPAP         Relevant Orders    PAP Therapy       Other    Exercise hypoxemia       Obstructive sleep apnea: Underwent a titration study in June 2022 and continues on auto BiPAP therapy with a normal AHI and good compliance with improved sleep quality subjectively.  She does have an ongoing high mask leak but she does not notice this and this does not appear to affect her AHI.  She is now using a nasal pillow mask and finds this comfortable.  Exertional hypoxemia: No evidence of ILD by HRCT.  PFTs were also unrevealing for a significant restrictive or obstructive process.  She does not  require oxygen with exertion though she does desaturate into the low 90s.  She has shotty mediastinal adenopathy but this does not appear to be much change compared to last year and is likely benign in etiology.    Plan:     We discussed her mask leak and she is going to work on isolating and controlling this.  She needs to keep her mask renewed and clean of any skin oils  No change in auto BiPAP settings  I renewed her supplies for the next year  Continue oxygen at night with her BiPAP  Routine follow-up      Discussed in detail with the patient.  She will call prior to her follow up visit for any new problems.    Level of service justified based on 31 minutes spent in patient care on this date of service including, but not limited to: preparing to see the patient, obtaining and/or reviewing history, performing medically appropriate examination, ordering tests/medicine/procedures, independently interpreting results, documenting clinical information in EHR, and counseling/education of patient/family/caregiver.  This is exclusive of time spent on other separate services such as performing procedures or test interpretation, if applicable.  (Level 4 30-39 minutes; Level 5 40-54 minutes)      Signed by  Deuce Tyson MD

## 2023-06-06 LAB
CORTIS F 24H UR-MCNC: 11 UG/L
CORTIS F 24H UR-MRATE: 9 UG/24 HR (ref 3–49)

## 2023-06-07 LAB
METANEPH 24H UR-MRATE: 80 UG/24 HR (ref 36–209)
METANEPHS 24H UR-MCNC: 100 UG/L
NORMETANEPHRINE 24H UR-MCNC: 245 UG/L
NORMETANEPHRINE 24H UR-MRATE: 196 UG/24 HR (ref 131–612)

## 2023-06-09 ENCOUNTER — HOSPITAL ENCOUNTER (OUTPATIENT)
Dept: ULTRASOUND IMAGING | Facility: HOSPITAL | Age: 83
Discharge: HOME | End: 2023-06-09
Payer: MEDICARE

## 2023-06-09 PROCEDURE — 76536 US EXAM OF HEAD AND NECK: CPT

## 2023-06-14 ENCOUNTER — TELEPHONE (OUTPATIENT)
Dept: ENDOCRINOLOGY | Facility: CLINIC | Age: 83
End: 2023-06-14
Payer: MEDICARE

## 2023-06-14 NOTE — TELEPHONE ENCOUNTER
Spoke with patient to discuss thyroid ultrasound and lab results.  She was incidentally noted to have subcentimeter right adrenal nodule, likely adenoma, on CTA 4/10/2023.  Advised that there was no evidence of hormonal hypersecretion on recent labs.  She has history of primary hyperparathyroidism.  This appears stable and mild with calcium in upper normal range, mildly elevated intact PTH of 78.  No treatment indicated, continue to monitor.  There were several nodules noted on thyroid ultrasound US Thyroid (06/09/2023 13:31).  The largest was 2.6 x 1.8 x 2.4 cm located posteriorly in inferior aspect of right lobe, TI-RADS category 4, but considered moderately suspicious given maximal diameter.  I reviewed ultrasound results with Dr. Harley Rose.  He suggested follow-up ultrasound in 6 months to assess stability of nodules.  However, could pursue FNA if patient preference.  Her son, Maxi, is an ENT in [place] and she would like me to discuss with him.  She has my contact information and will have him call me.

## 2023-07-13 PROBLEM — I45.10 RIGHT BUNDLE BRANCH BLOCK: Status: ACTIVE | Noted: 2023-07-13

## 2023-07-13 PROBLEM — I38 VALVULAR HEART DISEASE: Status: ACTIVE | Noted: 2023-07-13

## 2023-07-27 ENCOUNTER — OFFICE VISIT (OUTPATIENT)
Dept: PULMONOLOGY | Facility: CLINIC | Age: 83
End: 2023-07-27
Payer: MEDICARE

## 2023-07-27 VITALS
RESPIRATION RATE: 16 BRPM | BODY MASS INDEX: 26.8 KG/M2 | DIASTOLIC BLOOD PRESSURE: 62 MMHG | TEMPERATURE: 97 F | HEIGHT: 63 IN | SYSTOLIC BLOOD PRESSURE: 112 MMHG | HEART RATE: 61 BPM | WEIGHT: 151.25 LBS | OXYGEN SATURATION: 99 %

## 2023-07-27 DIAGNOSIS — R09.02 EXERCISE HYPOXEMIA: ICD-10-CM

## 2023-07-27 DIAGNOSIS — G47.33 OSA TREATED WITH BIPAP: ICD-10-CM

## 2023-07-27 DIAGNOSIS — J96.21 ACUTE ON CHRONIC RESPIRATORY FAILURE WITH HYPOXIA: Primary | ICD-10-CM

## 2023-07-27 DIAGNOSIS — R06.09 DYSPNEA ON EXERTION: ICD-10-CM

## 2023-07-27 PROCEDURE — 3078F DIAST BP <80 MM HG: CPT | Performed by: NURSE PRACTITIONER

## 2023-07-27 PROCEDURE — 1160F RVW MEDS BY RX/DR IN RCRD: CPT | Performed by: NURSE PRACTITIONER

## 2023-07-27 PROCEDURE — 3074F SYST BP LT 130 MM HG: CPT | Performed by: NURSE PRACTITIONER

## 2023-07-27 PROCEDURE — 1159F MED LIST DOCD IN RCRD: CPT | Performed by: NURSE PRACTITIONER

## 2023-07-27 PROCEDURE — 99214 OFFICE O/P EST MOD 30 MIN: CPT | Performed by: NURSE PRACTITIONER

## 2023-07-27 RX ORDER — AMLODIPINE BESYLATE 2.5 MG/1
2.5 TABLET ORAL DAILY
COMMUNITY

## 2023-07-27 RX ORDER — ROSUVASTATIN CALCIUM 20 MG/1
20 TABLET, COATED ORAL DAILY
COMMUNITY

## 2023-07-27 RX ORDER — FLUOCINONIDE 0.5 MG/G
1 OINTMENT TOPICAL 2 TIMES DAILY
COMMUNITY
Start: 2023-07-17

## 2023-07-27 RX ORDER — TRIAMTERENE AND HYDROCHLOROTHIAZIDE 37.5; 25 MG/1; MG/1
1 CAPSULE ORAL DAILY
COMMUNITY

## 2023-07-27 NOTE — PROGRESS NOTES
[facility] Pulmonary Follow up    CHIEF COMPLAINT    dyspnea    HISTORY OF PRESENT ILLNESS    Kaylen Garrison is a 83 y.o.female here today for follow-up.  She was last seen in the office by me in May.  She denies any respiratory illnesses since her last appointment.    She has seen Dr. Tyson since her last appointment and continues to wear her BiPAP nightly.    She was hospitalized in April and discharged home on oxygen.  She states that she has been using her oxygen occasionally when her oxygen drops below 88%.  She states that she does have small tanks at home and would like a portable concentrator to have to travel.    She denies any sleeping difficulties with her BiPAP.  She uses 2 L bled into the machine.    She denies sputum production or hemoptysis.  She denies any chest pain or palpitations.  She denies any lower extremity edema or calf tenderness.    She is a lifetime non-smoker.    Patient Active Problem List   Diagnosis    Syncope    Primary hypertension    Hyperlipidemia LDL goal <100    Overweight (BMI 25.0-29.9)    Meniere's disease    Dyspnea on exertion    BERNARD treated with BiPAP    Primary hyperparathyroidism    Osteopenia    Type 2 diabetes mellitus with hyperglycemia, without long-term current use of insulin    Exercise hypoxemia    Paroxysmal atrial fibrillation    Right bundle branch block    Valvular heart disease       Allergies   Allergen Reactions    Adhesive Tape Rash    Latex Rash    Penicillins Rash       Current Outpatient Medications:     amiodarone (PACERONE) 100 MG half tablet, Take 50 mg by mouth Daily., Disp: , Rfl:     amLODIPine (NORVASC) 2.5 MG tablet, Take 1 tablet by mouth Daily., Disp: , Rfl:     amLODIPine (NORVASC) 5 MG tablet, Take 1 tablet by mouth Daily., Disp: , Rfl:     atenolol (TENORMIN) 25 MG tablet, Take 0.5 tablets by mouth Daily. A half tablet daily, Disp: , Rfl:     atorvastatin (LIPITOR) 40 MG tablet, Take 1 tablet by mouth Daily., Disp: , Rfl:     Blood  Glucose Monitoring Suppl (Accu-Chek Guide) w/Device kit, 1 Device See Admin Instructions. Use to check blood sugar once daily.  Dx E11.9, Disp: 1 kit, Rfl: 0    Cholecalciferol (VITAMIN D3 PO), Take  by mouth. 2000 IU daily, Disp: , Rfl:     Cinnamon 500 MG capsule, Take 1 capsule by mouth Daily., Disp: , Rfl:     fluocinonide (LIDEX) 0.05 % ointment, Apply 1 application  topically to the appropriate area as directed 2 (Two) Times a Day., Disp: , Rfl:     glipizide (GLUCOTROL XL) 2.5 MG 24 hr tablet, Take 2 tablets by mouth Daily. 2 times daily, Disp: , Rfl:     glucose blood (Accu-Chek Guide) test strip, Check blood sugar daily dx e11.65, Disp: 100 each, Rfl: 3    hydroCHLOROthiazide (HYDRODIURIL) 25 MG tablet, Take 0.5 tablets by mouth Daily., Disp: , Rfl:     Januvia 100 MG tablet, Take 1 tablet by mouth once daily, Disp: 90 tablet, Rfl: 0    Lancets 33G misc, 1 each Daily. Dx e11.65, Disp: 100 each, Rfl: 3    metFORMIN ER (GLUCOPHAGE-XR) 500 MG 24 hr tablet, Take 2 tablets by mouth 2 (Two) Times a Day With Meals., Disp: 120 tablet, Rfl: 12    omega-3 acid ethyl esters (LOVAZA) 1 g capsule, Take 1 capsule by mouth Daily., Disp: , Rfl:     oxybutynin XL (DITROPAN-XL) 5 MG 24 hr tablet, Take 1 tablet by mouth Daily., Disp: , Rfl:     perindopril (ACEON) 2 MG tablet, Take 0.5 tablets by mouth Daily., Disp: , Rfl:     rivaroxaban (XARELTO) 15 MG tablet, Take 1 tablet by mouth Daily With Dinner. Indications: Atrial Fibrillation, Disp: 30 tablet, Rfl: 3    rosuvastatin (CRESTOR) 20 MG tablet, Take 1 tablet by mouth Daily., Disp: , Rfl:     triamterene-hydrochlorothiazide (DYAZIDE) 37.5-25 MG per capsule, Take 1 capsule by mouth Daily., Disp: , Rfl:     valsartan (DIOVAN) 320 MG tablet, Take 1 tablet by mouth Every Night., Disp: , Rfl:   MEDICATION LIST AND ALLERGIES REVIEWED.    Social History     Tobacco Use    Smoking status: Never    Smokeless tobacco: Never   Vaping Use    Vaping Use: Never used   Substance Use  "Topics    Alcohol use: No    Drug use: No       FAMILY AND SOCIAL HISTORY REVIEWED.    Review of Systems   Constitutional:  Negative for activity change, appetite change, fatigue, fever and unexpected weight change.   HENT:  Negative for congestion, postnasal drip, rhinorrhea, sinus pressure, sore throat and voice change.    Eyes:  Negative for visual disturbance.   Respiratory:  Negative for cough, chest tightness, shortness of breath and wheezing.    Cardiovascular:  Negative for chest pain, palpitations and leg swelling.   Gastrointestinal:  Negative for abdominal distention, abdominal pain, nausea and vomiting.   Endocrine: Negative for cold intolerance and heat intolerance.   Genitourinary:  Negative for difficulty urinating and urgency.   Musculoskeletal:  Negative for arthralgias, back pain and neck pain.   Skin:  Negative for color change and pallor.   Allergic/Immunologic: Negative for environmental allergies and food allergies.   Neurological:  Negative for dizziness, syncope, weakness and light-headedness.   Hematological:  Negative for adenopathy. Does not bruise/bleed easily.   Psychiatric/Behavioral:  Negative for agitation and behavioral problems.  .    /62 (BP Location: Left arm, Patient Position: Sitting, Cuff Size: Adult)   Pulse 61   Temp 97 °F (36.1 °C)   Resp 16   Ht 160 cm (62.99\")   Wt 68.6 kg (151 lb 4 oz)   SpO2 99% Comment: room air at rest  BMI 26.80 kg/m²     Immunization History   Administered Date(s) Administered    COVID-19 (PFIZER) Purple Cap Monovalent 09/27/2021    FluLaval/Fluzone >6mos 09/29/2018    Fluzone High Dose =>65 Years ([employer] ONLY) 09/19/2017, 09/30/2019    Fluzone High-Dose 65+yrs 09/10/2020, 09/24/2021    Hepatitis A 10/15/2018, 09/23/2019    Hepatitis B Adult/Adolescent IM 10/11/2019, 11/05/2019, 06/24/2020    Influenza, Unspecified 09/10/2020, 09/29/2020       Physical Exam  Vitals and nursing note reviewed.   Constitutional:       Appearance: She is " well-developed. She is not diaphoretic.   HENT:      Head: Normocephalic and atraumatic.   Eyes:      Pupils: Pupils are equal, round, and reactive to light.   Neck:      Thyroid: No thyromegaly.   Cardiovascular:      Rate and Rhythm: Normal rate and regular rhythm.      Heart sounds: Normal heart sounds. No murmur heard.    No friction rub. No gallop.   Pulmonary:      Effort: Pulmonary effort is normal. No respiratory distress.      Breath sounds: Normal breath sounds. No wheezing or rales.   Chest:      Chest wall: No tenderness.   Abdominal:      General: Bowel sounds are normal.      Palpations: Abdomen is soft.      Tenderness: There is no abdominal tenderness.   Musculoskeletal:         General: No swelling. Normal range of motion.      Cervical back: Normal range of motion and neck supple.   Lymphadenopathy:      Cervical: No cervical adenopathy.   Skin:     General: Skin is warm and dry.      Capillary Refill: Capillary refill takes less than 2 seconds.   Neurological:      Mental Status: She is alert and oriented to person, place, and time.   Psychiatric:         Mood and Affect: Mood normal.         Behavior: Behavior normal.         RESULTS      PROBLEM LIST    Problem List Items Addressed This Visit          Cardiac and Vasculature    Dyspnea on exertion       Pulmonary and Pneumonias    Exercise hypoxemia       Sleep    BERNARD treated with BiPAP    Overview     Auto BIPAP          Other Visit Diagnoses       Acute on chronic respiratory failure with hypoxia    -  Primary              DISCUSSION    Ms. Garrison was here for follow-up.  She seems be doing well from pulmonary standpoint.    She will continue to wear her BiPAP at night for her BERNARD.  She uses 2 L bled into the machine at night as well.    I did encourage her to continue wearing her oxygen during the day to maintain a saturation above 88%.  I would like to order her a portable concentrator.  I will send the order over through [employer].  If  they will not provide her with one, we will send the order to [employer].    I did encourage her to stay physically active to help with her dyspnea.    We will have her follow-up yearly.    I personally spent a total of 32 minutes on patient visit today including chart review, face to face with the patient obtaining the history and physical exam, review of pertinent images and tests, counseling and discussion and/or coordination of care as described above, and documentation.  Total time excludes time spent on other separate services such as performing procedures or test interpretation, if applicable.        Leigh Tijerina, VALERIANO  07/27/202310:51 EDT  Electronically signed     Please note that portions of this note were completed with a voice recognition program.        CC: Lizzette Multani MD

## 2023-07-31 DIAGNOSIS — R09.02 EXERCISE HYPOXEMIA: Primary | ICD-10-CM

## 2023-07-31 DIAGNOSIS — I48.0 PAROXYSMAL ATRIAL FIBRILLATION: ICD-10-CM

## 2023-08-30 ENCOUNTER — EXTERNAL PBMM DATA (OUTPATIENT)
Dept: PHARMACY | Facility: OTHER | Age: 83
End: 2023-08-30
Payer: MEDICARE

## 2023-10-09 RX ORDER — METFORMIN HYDROCHLORIDE 500 MG/1
1000 TABLET, EXTENDED RELEASE ORAL 2 TIMES DAILY WITH MEALS
Qty: 360 TABLET | Refills: 0 | Status: SHIPPED | OUTPATIENT
Start: 2023-10-09

## 2023-10-09 NOTE — TELEPHONE ENCOUNTER
Rx Refill Note  Requested Prescriptions     Pending Prescriptions Disp Refills    metFORMIN ER (GLUCOPHAGE-XR) 500 MG 24 hr tablet [Pharmacy Med Name: metFORMIN HCl  MG Oral Tablet Extended Release 24 Hour] 120 tablet 0     Sig: TAKE 2 TABLETS BY MOUTH TWICE DAILY WITH MEALS      Last office visit with prescribing clinician: 7/8/2022   Last telemedicine visit with prescribing clinician: Visit date not found   Next office visit with prescribing clinician: Visit date not found                         Would you like a call back once the refill request has been completed: [] Yes [] No    If the office needs to give you a call back, can they leave a voicemail: [] Yes [] No    Meera Nixon CMA  10/09/23, 08:09 EDT

## 2023-10-09 NOTE — TELEPHONE ENCOUNTER
x Refill Note  Requested Prescriptions     Pending Prescriptions Disp Refills    metFORMIN ER (GLUCOPHAGE-XR) 500 MG 24 hr tablet [Pharmacy Med Name: metFORMIN HCl  MG Oral Tablet Extended Release 24 Hour] 120 tablet 0     Sig: TAKE 2 TABLETS BY MOUTH TWICE DAILY WITH MEALS      Last office visit with prescribing clinician: 7/8/2022   Last telemedicine visit with prescribing clinician: 11/29/2023   Next office visit with prescribing clinician: Visit date not found                         Would you like a call back once the refill request has been completed: [] Yes [] No    If the office needs to give you a call back, can they leave a voicemail: [] Yes [] No    Meera Nixon CMA  10/09/23, 08:10 EDT

## 2023-10-25 ENCOUNTER — EXTERNAL PBMM DATA (OUTPATIENT)
Dept: PHARMACY | Facility: OTHER | Age: 83
End: 2023-10-25
Payer: MEDICARE

## 2023-11-08 ENCOUNTER — OFFICE VISIT (OUTPATIENT)
Dept: CARDIOLOGY | Facility: CLINIC | Age: 83
End: 2023-11-08
Payer: MEDICARE

## 2023-11-08 VITALS
DIASTOLIC BLOOD PRESSURE: 42 MMHG | WEIGHT: 150 LBS | HEART RATE: 62 BPM | OXYGEN SATURATION: 94 % | SYSTOLIC BLOOD PRESSURE: 130 MMHG | BODY MASS INDEX: 27.6 KG/M2 | HEIGHT: 62 IN

## 2023-11-08 DIAGNOSIS — I38 VALVULAR HEART DISEASE: ICD-10-CM

## 2023-11-08 DIAGNOSIS — I10 PRIMARY HYPERTENSION: ICD-10-CM

## 2023-11-08 DIAGNOSIS — G47.33 OSA TREATED WITH BIPAP: ICD-10-CM

## 2023-11-08 DIAGNOSIS — E78.5 HYPERLIPIDEMIA LDL GOAL <100: ICD-10-CM

## 2023-11-08 DIAGNOSIS — I48.0 PAROXYSMAL ATRIAL FIBRILLATION: Primary | ICD-10-CM

## 2023-11-08 NOTE — PROGRESS NOTES
[facility] Cardiology    Patient ID: Kaylen Garrison is a 83 y.o. female.  : 1940   Contact: [phone]    Encounter date: 2023    PCP: Lizzette Multani MD      Chief complaint:   Chief Complaint   Patient presents with    Paroxysmal atrial fibrillation       Problem List:  Paroxysmal atrial fibrillation  New onset atrial flutter with RVR 11/10/2021 in setting of nocturnal hypoxia  [id number] = 5 on Xarelto   SATISH, 2021: EF 50%. Mild LAE. No evidence of intracardiac thrombus or mass, clear JENNA, Trace MR and TR.   Successful ECV, 2021  Started on Amiodarone 2021  Aortic stenosis/Mitral stenosis/Valvular heart disease  Echo (2023): EF 56 to 60%.  LV wall thickness consistent with mild concentric hypertrophy.  LV diastolic function consistent with (grade 2 with high lap) pseudonormalization.  LA volume mildly increased.  Moderate calcification of aortic valve.  Moderate AS. Moderate calcification of mitral valve.  Mild to moderate MS.  Mild TR.  RVSP is moderately elevated at 53.9 mmHg.  Hypertension  Dyslipidemia  RBBB  Type 2 diabetes mellitus  Obstructive sleep apnea, on BiPAP  Acute cholecystitis  S/p ERCP with extraction of 3 stones 2023.  Syncope, 2012  Echocardiogram, 2012: EF 55 to 60% with no valvular abnormalities.  Limited studies, 2012: 0-49% bilateral carotid artery stenosis.  No acute findings per CT scan.  MRI of the brain, 2012: Cortical atrophy, chronic ischemic changes noted.  Abnormal MPS, 2012: EF 79% with apical hypoperfusion suspicious for ischemia, Dr. Arnaud Augustine.  MPS 2016: EF greater than 70%.  No evidence of inducible ischemia.  Borderline inferior ST segment changes.  Dyspnea  Echo, 2023: EF 56-60%.  Left ventricular diastolic function is consistent with (grade II w/high LAP) pseudonormalization. Left atrial volume is mildly increased. There is moderate calcification of the aortic valve.  Moderate aortic valve stenosis. There is moderate calcification of the mitral valve. Mild to moderate mitral valve stenosis. Mild TR is present. RVSP is moderately elevated at 53.9 mmHg.  Obesity  Ménière's disease  Surgical history  Hysterectomy  Benign tumor removal from left breast    Allergies   Allergen Reactions    Adhesive Tape Rash    Latex Rash    Penicillins Rash       Current Medications:    Current Outpatient Medications:     amiodarone (PACERONE) 100 MG half tablet, Take 50 mg by mouth Daily., Disp: , Rfl:     amLODIPine (NORVASC) 2.5 MG tablet, Take 1 tablet by mouth Daily., Disp: , Rfl:     atenolol (TENORMIN) 25 MG tablet, Take 0.5 tablets by mouth Daily. A half tablet daily, Disp: , Rfl:     atorvastatin (LIPITOR) 40 MG tablet, Take 1 tablet by mouth Daily., Disp: , Rfl:     Blood Glucose Monitoring Suppl (Accu-Chek Guide) w/Device kit, 1 Device See Admin Instructions. Use to check blood sugar once daily.  Dx E11.9, Disp: 1 kit, Rfl: 0    Cholecalciferol (VITAMIN D3 PO), Take  by mouth. 2000 IU daily, Disp: , Rfl:     Cinnamon 500 MG capsule, Take 1 capsule by mouth Daily., Disp: , Rfl:     fluocinonide (LIDEX) 0.05 % ointment, Apply 1 application  topically to the appropriate area as directed 2 (Two) Times a Day., Disp: , Rfl:     glipizide (GLUCOTROL XL) 2.5 MG 24 hr tablet, Take 2 tablets by mouth Daily. 2 times daily, Disp: , Rfl:     glucose blood (Accu-Chek Guide) test strip, Check blood sugar daily dx e11.65, Disp: 100 each, Rfl: 3    hydroCHLOROthiazide (HYDRODIURIL) 25 MG tablet, Take 0.5 tablets by mouth Daily., Disp: , Rfl:     Januvia 100 MG tablet, Take 1 tablet by mouth once daily, Disp: 90 tablet, Rfl: 0    Lancets 33G misc, 1 each Daily. Dx e11.65, Disp: 100 each, Rfl: 3    metFORMIN ER (GLUCOPHAGE-XR) 500 MG 24 hr tablet, TAKE 2 TABLETS BY MOUTH TWICE DAILY WITH MEALS, Disp: 360 tablet, Rfl: 0    omega-3 acid ethyl esters (LOVAZA) 1 g capsule, Take 1 capsule by mouth Daily., Disp: ,  "Rfl:     oxybutynin XL (DITROPAN-XL) 5 MG 24 hr tablet, Take 1 tablet by mouth Daily., Disp: , Rfl:     perindopril (ACEON) 2 MG tablet, Take 0.5 tablets by mouth Daily., Disp: , Rfl:     rivaroxaban (XARELTO) 15 MG tablet, Take 1 tablet by mouth Daily With Dinner. Indications: Atrial Fibrillation, Disp: 30 tablet, Rfl: 3    rosuvastatin (CRESTOR) 20 MG tablet, Take 1 tablet by mouth Daily., Disp: , Rfl:     triamterene-hydrochlorothiazide (DYAZIDE) 37.5-25 MG per capsule, Take 1 capsule by mouth Daily., Disp: , Rfl:     valsartan (DIOVAN) 320 MG tablet, Take 1 tablet by mouth Every Night., Disp: , Rfl:     HPI    Kaylen Garrison is a 83 y.o. female who presents today for a 4 month follow up of PAF and cardiac risk factors. Since last visit, patient has been doing well from a cardiac standpoint.  She has been going to physical therapy and is tolerating this well.  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  She does have some shortness of breath when she has to ambulate long distances but does not have shortness of breath with her regular daily activities.  She does state that she has recently started having small drops in her O2 saturation while working with physical therapy.  This started occurring after her hydrochlorothiazide was decreased to 12.5 mg daily rather than 25 mg daily.  She questions whether she needs to go back on 25 mg daily.  Overall, she feels like she is doing well.      The following portions of the patient's history were reviewed and updated as appropriate: allergies, current medications and problem list.    Pertinent positives as listed in the HPI.  All other systems reviewed are negative.         Vitals:    11/08/23 1411   BP: 130/42   BP Location: Left arm   Patient Position: Sitting   Pulse: 62   SpO2: 94%   Weight: 68 kg (150 lb)   Height: 157.5 cm (62\")       Physical Exam:  General: Alert and " oriented.  Neck: Jugular venous pressure is within normal limits. Carotids have normal upstrokes without bruits.   Cardiovascular: Heart has a nondisplaced focal PMI. Regular rate and rhythm.  2/6 systolic ejection murmur right upper sternal border.  Lungs: Clear, no rales or wheezes. Equal expansion is noted.   Extremities: Show no edema.  Skin: Warm and dry.  Neurologic: Nonfocal.     Diagnostic Data (reviewed with patient):  Lab Results   Component Value Date    GLUCOSE 254 (H) 05/26/2023    BUN 31 (H) 05/26/2023    CREATININE 0.94 05/26/2023    EGFRIFNONA 55 (L) 02/10/2022    BCR 33.0 (H) 05/26/2023     05/26/2023    K 4.0 05/26/2023     05/26/2023    CO2 28.4 05/26/2023    CALCIUM 10.4 05/26/2023    CALCIUM 10.5 05/26/2023    ALBUMIN 4.3 05/26/2023    ALKPHOS 107 04/14/2023    AST 35 (H) 04/14/2023    ALT 78 (H) 04/14/2023     Lab Results   Component Value Date    WBC 9.22 04/14/2023    RBC 4.46 04/14/2023    HGB 12.4 04/14/2023    HCT 38.5 04/14/2023    MCV 86.3 04/14/2023     04/14/2023      Lab Results   Component Value Date    TSH 2.510 05/26/2023   Labs reviewed from 9/20/2023.  Lipid panel: , TG 61, HDL 59, LDL 44  Creatinine 1.09           ECG 12 Lead    Date/Time: 11/8/2023 2:48 PM  Performed by: Carmela Garcia PA-C    Authorized by: Carmela Garcia PA-C  Comparison: compared with previous ECG from 5/20/2023  Comparison to previous ECG: No longer with bifascicular block.  Rhythm: sinus rhythm  BPM: 62  Other findings: non-specific ST-T wave changes    Clinical impression: abnormal EKG            Assessment:    ICD-10-CM ICD-9-CM   1. Paroxysmal atrial fibrillation  I48.0 427.31   2. Primary hypertension  I10 401.9   3. Hyperlipidemia LDL goal <100  E78.5 272.4   4. Valvular heart disease  I38 424.90   5. BERNARD treated with BiPAP  G47.33 327.23         Plan:  Increase HCTZ to 25mg daily for fluid retention.  If she does not have improvement in symptoms, will need repeat  echocardiogram to re-evaluate aortic valve gradient.   Continue on amiodarone 50 mg daily for rhythm control.   Continue on amlodipine 2.5 mg daily for hypertension.   Continue on atorvastatin 40 mg daily for hyperlipidemia.   Continue on Omega-3 fatty acids for hyperlipidemia.  Continue on Xarelto 15 mg daily for stroke prophylaxis.   Continue on rosuvastatin 20 mg daily for hyperlipidemia.   Continue on valsartan 320 mg daily for hypertension.   Continue all other current medications.  F/up in 4 months, sooner if needed.    Carmela Garcia PA-C

## 2023-11-29 ENCOUNTER — OFFICE VISIT (OUTPATIENT)
Dept: ENDOCRINOLOGY | Facility: CLINIC | Age: 83
End: 2023-11-29
Payer: MEDICARE

## 2023-11-29 VITALS
WEIGHT: 150 LBS | HEART RATE: 61 BPM | DIASTOLIC BLOOD PRESSURE: 58 MMHG | BODY MASS INDEX: 27.6 KG/M2 | OXYGEN SATURATION: 97 % | HEIGHT: 62 IN | SYSTOLIC BLOOD PRESSURE: 118 MMHG

## 2023-11-29 DIAGNOSIS — E21.0 PRIMARY HYPERPARATHYROIDISM: ICD-10-CM

## 2023-11-29 DIAGNOSIS — E11.65 TYPE 2 DIABETES MELLITUS WITH HYPERGLYCEMIA, WITHOUT LONG-TERM CURRENT USE OF INSULIN: Primary | ICD-10-CM

## 2023-11-29 DIAGNOSIS — E04.2 MULTIPLE THYROID NODULES: ICD-10-CM

## 2023-11-29 DIAGNOSIS — E27.8 ADRENAL NODULE: ICD-10-CM

## 2023-11-29 DIAGNOSIS — I10 PRIMARY HYPERTENSION: ICD-10-CM

## 2023-11-29 DIAGNOSIS — E78.5 HYPERLIPIDEMIA LDL GOAL <100: ICD-10-CM

## 2023-11-29 PROBLEM — E27.9 ADRENAL NODULE: Status: ACTIVE | Noted: 2023-11-29

## 2023-11-29 LAB
EXPIRATION DATE: ABNORMAL
EXPIRATION DATE: ABNORMAL
GLUCOSE BLDC GLUCOMTR-MCNC: 254 MG/DL (ref 70–130)
HBA1C MFR BLD: 7.3 % (ref 4.5–5.7)
Lab: ABNORMAL
Lab: ABNORMAL

## 2023-11-29 NOTE — PROGRESS NOTES
"     Office Note      Date: 2023  Patient Name: Kaylen Garrison  MRN: [id number]  : 1940    Chief Complaint   Patient presents with    Diabetes     Type II       History of Present Illness:   Kaylen Garrison is a 83 y.o. female who presents for Diabetes type 2. Diagnosed in: . Treated in past with oral agents. Current treatments: metformin, januvia and glipizide. Number of insulin shots per day: none. Checks blood sugar 1 times a day. Has low blood sugar: no. Aspirin use: No - on xarelto . Statin use: Yes. ACE-I/ARB use: Yes. Changes in health since last visit: none. Last eye exam 2023.    Subjective      Diabetic Complications:  Eyes: No  Kidneys: No  Feet: No  Heart: No    Diet and Exercise:  Meals per day: 3  Minutes of exercise per week: 0 mins.    Review of Systems:   Review of Systems   Constitutional: Negative.    Cardiovascular: Negative.    Gastrointestinal: Negative.    Endocrine: Negative.        The following portions of the patient's history were reviewed and updated as appropriate: allergies, current medications, past family history, past medical history, past social history, past surgical history, and problem list.    Objective     Visit Vitals  /58 (BP Location: Left arm, Patient Position: Sitting, Cuff Size: Adult)   Pulse 61   Ht 157.5 cm (62\")   Wt 68 kg (150 lb)   SpO2 97%   BMI 27.44 kg/m²       Physical Exam:  Physical Exam  Constitutional:       Appearance: Normal appearance.   Neck:      Comments: Thyroid low in neck and difficult to palpate  Lymphadenopathy:      Cervical: No cervical adenopathy.   Neurological:      Mental Status: She is alert.         Labs:    HbA1c  Lab Results   Component Value Date    HGBA1C 7.3 (A) 2023       CMP  Lab Results   Component Value Date    GLUCOSE 254 (H) 2023    BUN 31 (H) 2023    CREATININE 0.94 2023    EGFRIFNONA 55 (L) 02/10/2022    BCR 33.0 (H) 2023    K 4.0 2023    CO2 28.4 2023 "    CALCIUM 10.4 05/26/2023    CALCIUM 10.5 05/26/2023    AST 35 (H) 04/14/2023    ALT 78 (H) 04/14/2023        Lipid Panel  Lab Results   Component Value Date    HDL 42 11/11/2021    LDL 40 11/11/2021    TRIG 66 11/11/2021        TSH  Lab Results   Component Value Date    TSH 2.510 05/26/2023    FREET4 1.79 (H) 05/26/2023        Hemoglobin A1C  Lab Results   Component Value Date    HGBA1C 7.3 (A) 11/29/2023        Microalbumin/Creatinine  Lab Results   Component Value Date    MALBCRERATIO 68.8 05/26/2023    MICROALBUR 4.0 05/26/2023           Assessment / Plan      Assessment & Plan:  Diagnoses and all orders for this visit:    1. Type 2 diabetes mellitus with hyperglycemia, without long-term current use of insulin (Primary)  Assessment & Plan:  Diabetes is unchanged.  A1c acceptable.  Continue current treatment regimen.  Diabetes will be reassessed in 3 months.    Orders:  -     POC Glucose, Blood  -     POC Glycosylated Hemoglobin (Hb A1C)    2. Primary hypertension  Assessment & Plan:  Hypertension is unchanged.  Continue current treatment regimen.  Blood pressure will be reassessed at the next regular appointment.      3. Hyperlipidemia LDL goal <100  Assessment & Plan:  Continue statin.  Lipids have been checked by cardiologist.      4. Primary hyperparathyroidism  Assessment & Plan:  Last calcium was normal.  Continue observation.       5. Adrenal nodule  Assessment & Plan:  Small nonfunctioning adrenal nodule.  We discussed continued observation of this.        6. Multiple thyroid nodules  Assessment & Plan:  She has been euthyroid.  Neck u/s from 5/2023 showed multiple thyroid nodules.  The largest was 2.6cm.  Given her age and other health issues, I would suggest observation at this time.        Current Outpatient Medications   Medication Instructions    amiodarone (PACERONE) 50 mg, Oral, Daily    amLODIPine (NORVASC) 2.5 mg, Oral, Daily    atenolol (TENORMIN) 12.5 mg, Oral, Daily, A half tablet daily     atorvastatin (LIPITOR) 40 mg, Oral, Daily    Blood Glucose Monitoring Suppl (Accu-Chek Guide) w/Device kit 1 Device, Does not apply, See Admin Instructions, Use to check blood sugar once daily.  Dx E11.9    Cholecalciferol (VITAMIN D3 PO) Oral, 2000 IU daily    Cinnamon 500 mg, Oral, Daily    fluocinonide (LIDEX) 0.05 % ointment 1 application , Topical, 2 Times Daily    glipizide (GLUCOTROL XL) 5 mg, Oral, Daily, 2 times daily    glucose blood (Accu-Chek Guide) test strip Check blood sugar daily dx e11.65    hydroCHLOROthiazide (HYDRODIURIL) 12.5 mg, Oral, Daily    Januvia 100 MG tablet Take 1 tablet by mouth once daily    Lancets 33G misc 1 each, Does not apply, Daily, Dx e11.65    metFORMIN ER (GLUCOPHAGE-XR) 1,000 mg, Oral, 2 Times Daily With Meals    omega-3 acid ethyl esters (LOVAZA) 1 g, Oral, Daily    oxybutynin XL (DITROPAN-XL) 5 mg, Oral, Daily    perindopril (ACEON) 1 mg, Oral, Daily    rivaroxaban (XARELTO) 15 mg, Oral, Daily With Dinner    rosuvastatin (CRESTOR) 20 mg, Oral, Daily    valsartan (DIOVAN) 320 mg, Oral, Nightly      Return in about 3 months (around 2/29/2024) for Recheck with A1c, CMP, TSH, microalbumin, foot exam.    Harley Rose MD   11/29/2023

## 2023-11-29 NOTE — ASSESSMENT & PLAN NOTE
She has been euthyroid.  Neck u/s from 5/2023 showed multiple thyroid nodules.  The largest was 2.6cm.  Given her age and other health issues, I would suggest observation at this time.

## 2023-11-29 NOTE — ASSESSMENT & PLAN NOTE
Diabetes is unchanged.  A1c acceptable.  Continue current treatment regimen.  Diabetes will be reassessed in 3 months.

## 2023-12-12 ENCOUNTER — TELEPHONE (OUTPATIENT)
Dept: CARDIOLOGY | Facility: CLINIC | Age: 83
End: 2023-12-12
Payer: MEDICARE

## 2023-12-12 NOTE — TELEPHONE ENCOUNTER
"    Caller: Kaylen Garrison \"Kaylen Garrison\"     Relationship: SELF    Best call back number: [phone]    What is your medical concern? PT HAS BEEN GETTING LOW OXYGEN READINGS WHEN SHE IS AT PHYSICAL THERAPY. IT IS AFFECTING HER ABILITY TO EXERCISE. SHE THINKS THAT CHANGING HER MEDICATION AROUND MAY HELP. IT WAS CHANGED 11.08.23. HYDROCHLOROTHIAZIDE 25 MG. SHE WAS TAKING HALF OF ONE AND WAS TOLD TO START TAKING A WHOLE ONE. SHE WOULD LIKE TO SPEAK TO THE NURSE ABOUT THIS. PLEASE REACH OUT TO PT TO ADVISE.     How long has this issue been going on? SINCE ABOUT 11.08.23 WHEN SHE HAD HER LAST APPOINTMENT    Is your provider already aware of this issue? NO    Have you been treated for this issue? NO      "

## 2023-12-12 NOTE — TELEPHONE ENCOUNTER
"Patient returned call.  She states her oxygen has been dropping during physical therapy and at other random times.  \"It isn't consistent\".  She states during PT her oxygen can drop to 87-89%.  She is not using her oxygen during PT.  This was ordered by pulmonology and she is encouraged to call them about oxygen usage - if she needs to increase or decrease.  I will discuss with [facility] her ongoing symptoms and call her back if she wants to order anything.  She verbalizes understanding.  "

## 2023-12-14 ENCOUNTER — DOCUMENTATION (OUTPATIENT)
Dept: SLEEP MEDICINE | Facility: HOSPITAL | Age: 83
End: 2023-12-14
Payer: MEDICARE

## 2023-12-14 NOTE — PROGRESS NOTES
Discussed her case with Dr. Alvarez today.  Physical therapy has noted that her oxygen desaturations have become more prominent and are going down into the 80s with exercise.  Dr. Alvarez with was concerned about the amiodarone.  I share her concern.  Could consider further evaluation such as an HRCT or additional PFTs but I do not think this would change the decision process and neither does Dr. VERDUGO.  She has had problems performing PFTs in the past.    Current plans are to discontinue the amiodarone and consider another antiarrhythmic.  I will get her an earlier follow-up in the pulmonary clinic.    Deuce Tyson MD

## 2023-12-14 NOTE — TELEPHONE ENCOUNTER
Per [facility] - She has reviewed patients chart and wants patient to stop amiodarone.  Spoke with patient.  She is instructed to stop amiodarone.  If she does not improve, she is to call back.  She verbalizes understanding.

## 2023-12-20 ENCOUNTER — OFFICE VISIT (OUTPATIENT)
Dept: PULMONOLOGY | Facility: CLINIC | Age: 83
End: 2023-12-20
Payer: MEDICARE

## 2023-12-20 VITALS
WEIGHT: 151 LBS | BODY MASS INDEX: 27.79 KG/M2 | SYSTOLIC BLOOD PRESSURE: 122 MMHG | HEART RATE: 67 BPM | OXYGEN SATURATION: 96 % | HEIGHT: 62 IN | TEMPERATURE: 98.2 F | DIASTOLIC BLOOD PRESSURE: 42 MMHG

## 2023-12-20 DIAGNOSIS — G47.33 OSA TREATED WITH BIPAP: ICD-10-CM

## 2023-12-20 DIAGNOSIS — R06.02 SHORTNESS OF BREATH: Primary | ICD-10-CM

## 2023-12-20 DIAGNOSIS — R09.02 EXERCISE HYPOXEMIA: ICD-10-CM

## 2023-12-20 DIAGNOSIS — I48.0 PAROXYSMAL ATRIAL FIBRILLATION: ICD-10-CM

## 2023-12-20 DIAGNOSIS — R06.09 DYSPNEA ON EXERTION: ICD-10-CM

## 2023-12-20 NOTE — PROGRESS NOTES
[facility] Pulmonary Follow up    CHIEF COMPLAINT    Low oxygenation at physical therapy    HISTORY OF PRESENT ILLNESS    Kaylen Garrison is a 83 y.o.female here today for follow-up.  She was last seen in the office by me in July.  I had started her on oxygen at that time to wear during the day with activity.  I also ordered her portable concentrator and unfortunately her  told the company that she did not need it.    She has been going to physical therapy mainly therapist was concerned that her oxygen was dropping low.  She contacted her cardiologist and they consulted with Dr. Tyson and was concerned that maybe the amiodarone was causing some of her desaturations.  The amiodarone has been held since last Friday.  She is supposed to call back and discussed with cardiology later this week.    She has been wearing her oxygen mostly when she is sitting and at night.  She has not been using any oxygen when she is going to physical therapy.  She leaves it in the car.  She is concerned that her oxygen tanks are  and has not had anyone checked her equipment and is concerned about the equipments functionality.    She was admitted to [facility] in April for acute cholecystitis, she had a lap jenny on  and required oxygen after discharge.    She continues to follow closely with Dr. Tyson for her history of BERNARD.  She uses a BiPAP nightly with 2 L bled into the machine.    She has had an occasional cough recently.  She is not producing any sputum.  She denies any fever, chills or night sweats.  She denies any chest pain or palpitations.  She denies any lower extremity edema or calf tenderness.    She states she feels better than she has in a long time.  She feels like physical therapy has helped and she is not using her cane as much currently.    She denies any reflux symptoms.    She is a lifetime non-smoker.    Patient Active Problem List   Diagnosis    Syncope    Primary  hypertension    Hyperlipidemia LDL goal <100    Overweight (BMI 25.0-29.9)    Meniere's disease    Dyspnea on exertion    BERNARD treated with BiPAP    Primary hyperparathyroidism    Osteopenia    Type 2 diabetes mellitus with hyperglycemia, without long-term current use of insulin    Exercise hypoxemia    Paroxysmal atrial fibrillation    Right bundle branch block    Valvular heart disease    Adrenal nodule    Multiple thyroid nodules       Allergies   Allergen Reactions    Adhesive Tape Rash    Latex Rash    Penicillins Rash       Current Outpatient Medications:     amiodarone (PACERONE) 100 MG half tablet, Take 50 mg by mouth Daily., Disp: , Rfl:     amLODIPine (NORVASC) 2.5 MG tablet, Take 1 tablet by mouth Daily., Disp: , Rfl:     atenolol (TENORMIN) 25 MG tablet, Take 0.5 tablets by mouth Daily. A half tablet daily, Disp: , Rfl:     atorvastatin (LIPITOR) 40 MG tablet, Take 1 tablet by mouth Daily., Disp: , Rfl:     Blood Glucose Monitoring Suppl (Accu-Chek Guide) w/Device kit, 1 Device See Admin Instructions. Use to check blood sugar once daily.  Dx E11.9, Disp: 1 kit, Rfl: 0    Cholecalciferol (VITAMIN D3 PO), Take  by mouth. 2000 IU daily, Disp: , Rfl:     Cinnamon 500 MG capsule, Take 1 capsule by mouth Daily., Disp: , Rfl:     fluocinonide (LIDEX) 0.05 % ointment, Apply 1 application  topically to the appropriate area as directed 2 (Two) Times a Day., Disp: , Rfl:     glipizide (GLUCOTROL XL) 2.5 MG 24 hr tablet, Take 2 tablets by mouth Daily. 2 times daily, Disp: , Rfl:     glucose blood (Accu-Chek Guide) test strip, Check blood sugar daily dx e11.65, Disp: 100 each, Rfl: 3    hydroCHLOROthiazide (HYDRODIURIL) 25 MG tablet, Take 0.5 tablets by mouth Daily., Disp: , Rfl:     Januvia 100 MG tablet, Take 1 tablet by mouth once daily, Disp: 90 tablet, Rfl: 0    Lancets 33G misc, 1 each Daily. Dx e11.65, Disp: 100 each, Rfl: 3    metFORMIN ER (GLUCOPHAGE-XR) 500 MG 24 hr tablet, TAKE 2 TABLETS BY MOUTH TWICE  DAILY WITH MEALS, Disp: 360 tablet, Rfl: 0    omega-3 acid ethyl esters (LOVAZA) 1 g capsule, Take 1 capsule by mouth Daily., Disp: , Rfl:     oxybutynin XL (DITROPAN-XL) 5 MG 24 hr tablet, Take 1 tablet by mouth Daily., Disp: , Rfl:     perindopril (ACEON) 2 MG tablet, Take 0.5 tablets by mouth Daily., Disp: , Rfl:     rivaroxaban (XARELTO) 15 MG tablet, Take 1 tablet by mouth Daily With Dinner. Indications: Atrial Fibrillation, Disp: 30 tablet, Rfl: 3    rosuvastatin (CRESTOR) 20 MG tablet, Take 1 tablet by mouth Daily., Disp: , Rfl:     valsartan (DIOVAN) 320 MG tablet, Take 1 tablet by mouth Every Night., Disp: , Rfl:   MEDICATION LIST AND ALLERGIES REVIEWED.    Social History     Tobacco Use    Smoking status: Never     Passive exposure: Never    Smokeless tobacco: Never   Vaping Use    Vaping Use: Never used   Substance Use Topics    Alcohol use: No    Drug use: No       FAMILY AND SOCIAL HISTORY REVIEWED.    Review of Systems   Constitutional:  Negative for activity change, appetite change, fatigue, fever and unexpected weight change.   HENT:  Negative for congestion, postnasal drip, rhinorrhea, sinus pressure, sore throat and voice change.    Eyes:  Negative for visual disturbance.   Respiratory:  Positive for cough. Negative for chest tightness, shortness of breath and wheezing.    Cardiovascular:  Negative for chest pain, palpitations and leg swelling.   Gastrointestinal:  Negative for abdominal distention, abdominal pain, nausea and vomiting.   Endocrine: Negative for cold intolerance and heat intolerance.   Genitourinary:  Negative for difficulty urinating and urgency.   Musculoskeletal:  Negative for arthralgias, back pain and neck pain.   Skin:  Negative for color change and pallor.   Allergic/Immunologic: Negative for environmental allergies and food allergies.   Neurological:  Negative for dizziness, syncope, weakness and light-headedness.   Hematological:  Negative for adenopathy. Does not  "bruise/bleed easily.   Psychiatric/Behavioral:  Negative for agitation and behavioral problems.    .    /42 (BP Location: Right arm, Patient Position: Sitting, Cuff Size: Adult)   Pulse 67   Temp 98.2 °F (36.8 °C)   Ht 157.5 cm (62\")   Wt 68.5 kg (151 lb)   SpO2 96% Comment: Room air at rest  BMI 27.62 kg/m²     Immunization History   Administered Date(s) Administered    COVID-19 (PFIZER) Purple Cap Monovalent 09/27/2021    COVID-19 F23 (MODERNA) 12YRS+ (SPIKEVAX) 10/17/2023    Fluzone (or Fluarix & Flulaval for VFC) >6mos 09/29/2018    Fluzone High Dose =>65 Years (Vaxcare ONLY) 09/19/2017, 09/30/2019    Fluzone High-Dose 65+yrs 09/10/2020, 09/24/2021    Hepatitis A 10/15/2018, 09/23/2019    Hepatitis B Adult/Adolescent IM 10/11/2019, 11/05/2019, 06/24/2020    Influenza, Unspecified 09/10/2020, 09/29/2020       Physical Exam  Vitals and nursing note reviewed.   Constitutional:       Appearance: She is well-developed. She is not diaphoretic.   HENT:      Head: Normocephalic and atraumatic.   Eyes:      Pupils: Pupils are equal, round, and reactive to light.   Neck:      Thyroid: No thyromegaly.   Cardiovascular:      Rate and Rhythm: Normal rate and regular rhythm.      Heart sounds: Normal heart sounds. No murmur heard.     No friction rub. No gallop.   Pulmonary:      Effort: Pulmonary effort is normal. No respiratory distress.      Breath sounds: Normal breath sounds. No wheezing or rales.   Chest:      Chest wall: No tenderness.   Abdominal:      General: Bowel sounds are normal.      Palpations: Abdomen is soft.      Tenderness: There is no abdominal tenderness.   Musculoskeletal:         General: No swelling. Normal range of motion.      Cervical back: Normal range of motion and neck supple.   Lymphadenopathy:      Cervical: No cervical adenopathy.   Skin:     General: Skin is warm and dry.      Capillary Refill: Capillary refill takes less than 2 seconds.   Neurological:      Mental Status: She is " alert and oriented to person, place, and time.   Psychiatric:         Mood and Affect: Mood normal.         Behavior: Behavior normal.           RESULTS    PFTS in the office today, read by me, FVC 1.77 78% predicted, FEV1 1.39 83% predicted, FEV1/FVC 79% predicted, TLC 4.43 99% predicted, DLCO 59% predicted no obstruction, no restriction and severe diffusion.    Chest PA/lateral: Official report pending    PROBLEM LIST    Problem List Items Addressed This Visit          Cardiac and Vasculature    Dyspnea on exertion    Paroxysmal atrial fibrillation       Pulmonary and Pneumonias    Exercise hypoxemia    Relevant Orders    Oxygen Therapy       Sleep    BERNARD treated with BiPAP    Overview     Auto BIPAP          Other Visit Diagnoses       Shortness of breath    -  Primary    Relevant Orders    XR Chest PA & Lateral    CT Chest Hi Resolution    Spirometry with Diffusion Capacity & Lung Volumes (Completed)              DISCUSSION  Ms. Garrison was here for follow-up.  We did review her oxygen orders and did advise her that she needs to wear her oxygen at any time her oxygen is less than 88%.  I would rather her wear her oxygen with exertion then at rest.  She has not been wearing her oxygen with physical therapy.  I did advise her to go to physical therapy today with her oxygen and use it while she was exercising.    She does not appear to be infected.  We did review her chest x-ray and the official report is pending.  I will notify her of any abnormalities.  I will go ahead and order an HRCT to further evaluate her dry cough.    She will continue to wear her BiPAP nightly for BERNARD.  She uses 2 L bled into the machine.    I did urge her to stay physically active to help with her dyspnea.    She will continue close follow-up with cardiology and I did encourage her to call to discuss changing to a different antiarrhythmic.    We will have her follow-up in 3 to 4 months.    I personally spent a total of 34 minutes on  patient visit today including chart review, face to face with the patient obtaining the history and physical exam, review of pertinent images and tests, counseling and discussion and/or coordination of care as described above, and documentation.  Total time excludes time spent on other separate services such as performing procedures or test interpretation, if applicable.        Leigh Tijerina, APRN  12/20/202315:17 EST  Electronically signed     Please note that portions of this note were completed with a voice recognition program.        CC: Lizzette Multani MD

## 2023-12-22 ENCOUNTER — TELEPHONE (OUTPATIENT)
Dept: CARDIOLOGY | Facility: CLINIC | Age: 83
End: 2023-12-22
Payer: MEDICARE

## 2023-12-22 NOTE — TELEPHONE ENCOUNTER
"Patient left voice mail message about the \"pill she was supposed to be off of\" and requested a return call.  Patient was requested to stop amiodarone.  Spoke with patient.  She has been off of amiodarone.  She went to physical therapy and they are using oxygen while she exercises and is doing much better.  She doesn't want to carry the heavy O2 tanks and is encouraged to continue using.  She may not need it much longer.  And she verbalizes understanding.  "

## 2024-01-03 RX ORDER — METFORMIN HYDROCHLORIDE 500 MG/1
1000 TABLET, EXTENDED RELEASE ORAL 2 TIMES DAILY WITH MEALS
Qty: 360 TABLET | Refills: 3 | Status: SHIPPED | OUTPATIENT
Start: 2024-01-03

## 2024-01-03 NOTE — TELEPHONE ENCOUNTER
Rx Refill Note  Requested Prescriptions     Pending Prescriptions Disp Refills    metFORMIN ER (GLUCOPHAGE-XR) 500 MG 24 hr tablet [Pharmacy Med Name: metFORMIN HCl  MG Oral Tablet Extended Release 24 Hour] 360 tablet 0     Sig: TAKE 2 TABLETS BY MOUTH TWICE DAILY WITH MEALS      Last office visit with prescribing clinician: 11/29/2023     Next office visit with prescribing clinician: 04/16/2024                 Joe Madsen MA  01/03/24, 09:35 EST

## 2024-01-16 ENCOUNTER — HOSPITAL ENCOUNTER (OUTPATIENT)
Dept: CT IMAGING | Facility: HOSPITAL | Age: 84
Discharge: HOME | End: 2024-01-16
Admitting: NURSE PRACTITIONER
Payer: MEDICARE

## 2024-01-16 DIAGNOSIS — R06.02 SHORTNESS OF BREATH: ICD-10-CM

## 2024-01-16 PROCEDURE — 71250 CT THORAX DX C-: CPT

## 2024-01-18 ENCOUNTER — TELEPHONE (OUTPATIENT)
Dept: PULMONOLOGY | Facility: CLINIC | Age: 84
End: 2024-01-18
Payer: MEDICARE

## 2024-01-18 NOTE — TELEPHONE ENCOUNTER
I tried to call Ms. Garrison this morning to go over her CT scan results.  If she calls please let her know the results are similar to her previous scans, no acute process and nothing to treat at this time.

## 2024-01-18 NOTE — TELEPHONE ENCOUNTER
Pt called to ask if she has to go back on amiodarone. States she feels fine without it. Told pt you were not in the office and you'd be back tomorrow. Also informed her of your attempt to reach her earlier today and what you stated in that note. Please advise.

## 2024-01-19 ENCOUNTER — TELEPHONE (OUTPATIENT)
Dept: CARDIOLOGY | Facility: CLINIC | Age: 84
End: 2024-01-19
Payer: MEDICARE

## 2024-01-19 NOTE — TELEPHONE ENCOUNTER
Patient left voice mail message.  She would like to know if she should stay on the medication they took her off of.  She requests a return call.  Spoke with patient.  She is talking about amiodarone.  She is encouraged to stay off of amiodarone and verbalizes understanding.

## 2024-03-19 RX ORDER — BLOOD SUGAR DIAGNOSTIC
STRIP MISCELLANEOUS
Qty: 100 EACH | Refills: 1 | Status: SHIPPED | OUTPATIENT
Start: 2024-03-19

## 2024-03-19 NOTE — TELEPHONE ENCOUNTER
Rx Refill Note  Requested Prescriptions     Pending Prescriptions Disp Refills    glucose blood (Accu-Chek Guide) test strip 100 each 3     Sig: Check blood sugar daily dx e11.65      Last office visit with prescribing clinician: 11/29/2023     Next office visit with prescribing clinician: 4/16/2024

## 2024-03-20 ENCOUNTER — OFFICE VISIT (OUTPATIENT)
Dept: PULMONOLOGY | Facility: CLINIC | Age: 84
End: 2024-03-20
Payer: MEDICARE

## 2024-03-20 VITALS
DIASTOLIC BLOOD PRESSURE: 62 MMHG | HEIGHT: 62 IN | OXYGEN SATURATION: 96 % | HEART RATE: 76 BPM | BODY MASS INDEX: 27.6 KG/M2 | WEIGHT: 150 LBS | TEMPERATURE: 97.7 F | SYSTOLIC BLOOD PRESSURE: 126 MMHG

## 2024-03-20 DIAGNOSIS — G47.33 OSA TREATED WITH BIPAP: Primary | ICD-10-CM

## 2024-03-20 DIAGNOSIS — R06.09 DYSPNEA ON EXERTION: ICD-10-CM

## 2024-03-20 PROCEDURE — 99214 OFFICE O/P EST MOD 30 MIN: CPT | Performed by: NURSE PRACTITIONER

## 2024-03-20 PROCEDURE — 1159F MED LIST DOCD IN RCRD: CPT | Performed by: NURSE PRACTITIONER

## 2024-03-20 PROCEDURE — 1160F RVW MEDS BY RX/DR IN RCRD: CPT | Performed by: NURSE PRACTITIONER

## 2024-03-20 PROCEDURE — 3078F DIAST BP <80 MM HG: CPT | Performed by: NURSE PRACTITIONER

## 2024-03-20 PROCEDURE — 3074F SYST BP LT 130 MM HG: CPT | Performed by: NURSE PRACTITIONER

## 2024-03-20 NOTE — PROGRESS NOTES
[facility] Follow up    CHIEF COMPLAINT    Mild dyspnea with exertion    HISTORY OF PRESENT ILLNESS    Kaylen Garrison is a 83 y.o.female here today for follow-up.  She was last seen in the office by me in December.  She denies any rest or illnesses since her last appointment.    She had been having low oxygen levels at physical therapy at her last appointment and I had started her on portable oxygen.  She states that her oxygenation with PT has been 95% or better on room air.  She has not been using her oxygen during the day but continues to wear it at nighttime through her BiPAP machine.    She denies any sleeping difficulties and does continue to use her BiPAP nightly.    She denies any sputum production or hemoptysis.  She denies any chest pain or palpitations.  She denies any lower extremity edema or calf tenderness.    She denies any reflux symptoms.    She is a lifetime non-smoker.    Patient Active Problem List   Diagnosis    Syncope    Primary hypertension    Hyperlipidemia LDL goal <100    Overweight (BMI 25.0-29.9)    Meniere's disease    Dyspnea on exertion    BERNARD treated with BiPAP    Primary hyperparathyroidism    Osteopenia    Type 2 diabetes mellitus with hyperglycemia, without long-term current use of insulin    Exercise hypoxemia    Paroxysmal atrial fibrillation    Right bundle branch block    Valvular heart disease    Adrenal nodule    Multiple thyroid nodules       Allergies   Allergen Reactions    Adhesive Tape Rash    Latex Rash    Penicillins Rash       Current Outpatient Medications:     amLODIPine (NORVASC) 2.5 MG tablet, Take 1 tablet by mouth Daily., Disp: , Rfl:     atenolol (TENORMIN) 25 MG tablet, Take 0.5 tablets by mouth Daily. A half tablet daily, Disp: , Rfl:     atorvastatin (LIPITOR) 40 MG tablet, Take 1 tablet by mouth Daily., Disp: , Rfl:     Blood Glucose Monitoring Suppl (Accu-Chek Guide) w/Device kit, 1 Device See Admin Instructions. Use to check blood sugar once  daily.  Dx E11.9, Disp: 1 kit, Rfl: 0    Cholecalciferol (VITAMIN D3 PO), Take  by mouth. 2000 IU daily, Disp: , Rfl:     Cinnamon 500 MG capsule, Take 1 capsule by mouth Daily., Disp: , Rfl:     fluocinonide (LIDEX) 0.05 % ointment, Apply 1 Application topically to the appropriate area as directed 2 (Two) Times a Day., Disp: , Rfl:     glipizide (GLUCOTROL XL) 2.5 MG 24 hr tablet, Take 2 tablets by mouth Daily. 2 times daily, Disp: , Rfl:     glucose blood (Accu-Chek Guide) test strip, Check blood sugar daily dx e11.65, Disp: 100 each, Rfl: 1    hydroCHLOROthiazide (HYDRODIURIL) 25 MG tablet, Take 0.5 tablets by mouth Daily., Disp: , Rfl:     Januvia 100 MG tablet, Take 1 tablet by mouth once daily, Disp: 90 tablet, Rfl: 0    Lancets 33G misc, 1 each Daily. Dx e11.65, Disp: 100 each, Rfl: 3    metFORMIN ER (GLUCOPHAGE-XR) 500 MG 24 hr tablet, TAKE 2 TABLETS BY MOUTH TWICE DAILY WITH MEALS, Disp: 360 tablet, Rfl: 3    omega-3 acid ethyl esters (LOVAZA) 1 g capsule, Take 1 capsule by mouth Daily., Disp: , Rfl:     oxybutynin XL (DITROPAN-XL) 5 MG 24 hr tablet, Take 1 tablet by mouth Daily., Disp: , Rfl:     perindopril (ACEON) 2 MG tablet, Take 0.5 tablets by mouth Daily., Disp: , Rfl:     rivaroxaban (XARELTO) 15 MG tablet, Take 1 tablet by mouth Daily With Dinner. Indications: Atrial Fibrillation, Disp: 30 tablet, Rfl: 3    rosuvastatin (CRESTOR) 20 MG tablet, Take 1 tablet by mouth Daily., Disp: , Rfl:     valsartan (DIOVAN) 320 MG tablet, Take 1 tablet by mouth Every Night., Disp: , Rfl:   MEDICATION LIST AND ALLERGIES REVIEWED.    Social History     Tobacco Use    Smoking status: Never     Passive exposure: Never    Smokeless tobacco: Never   Vaping Use    Vaping status: Never Used   Substance Use Topics    Alcohol use: No    Drug use: No       FAMILY AND SOCIAL HISTORY REVIEWED.    Review of Systems   Constitutional:  Negative for activity change, appetite change, fatigue, fever and unexpected weight change.  "  HENT:  Negative for congestion, postnasal drip, rhinorrhea, sinus pressure, sore throat and voice change.    Eyes:  Negative for visual disturbance.   Respiratory:  Positive for shortness of breath. Negative for cough, chest tightness and wheezing.    Cardiovascular:  Negative for chest pain, palpitations and leg swelling.   Gastrointestinal:  Negative for abdominal distention, abdominal pain, nausea and vomiting.   Endocrine: Negative for cold intolerance and heat intolerance.   Genitourinary:  Negative for difficulty urinating and urgency.   Musculoskeletal:  Negative for arthralgias, back pain and neck pain.   Skin:  Negative for color change and pallor.   Allergic/Immunologic: Negative for environmental allergies and food allergies.   Neurological:  Negative for dizziness, syncope, weakness and light-headedness.   Hematological:  Negative for adenopathy. Does not bruise/bleed easily.   Psychiatric/Behavioral:  Negative for agitation and behavioral problems.    .    /62   Pulse 76   Temp 97.7 °F (36.5 °C) (Infrared)   Ht 157.5 cm (62.01\")   Wt 68 kg (150 lb)   SpO2 96% Comment: room air at rest  BMI 27.43 kg/m²     Immunization History   Administered Date(s) Administered    COVID-19 (PFIZER) Purple Cap Monovalent 09/27/2021    COVID-19 F23 (MODERNA) 12YRS+ (SPIKEVAX) 10/17/2023    Fluzone (or Fluarix & Flulaval for VFC) >6mos 09/29/2018    Fluzone High Dose =>65 Years (Vaxcare ONLY) 09/19/2017, 09/30/2019, 09/20/2023    Fluzone High-Dose 65+yrs 09/10/2020, 09/24/2021    Hepatitis A 10/15/2018, 09/23/2019    Hepatitis B Adult/Adolescent IM 10/11/2019, 11/05/2019, 06/24/2020    Influenza, Unspecified 09/10/2020, 09/29/2020       Physical Exam  Vitals and nursing note reviewed.   Constitutional:       Appearance: She is well-developed. She is not diaphoretic.   HENT:      Head: Normocephalic and atraumatic.   Eyes:      Pupils: Pupils are equal, round, and reactive to light.   Neck:      Thyroid: No " thyromegaly.   Cardiovascular:      Rate and Rhythm: Normal rate and regular rhythm.      Heart sounds: Normal heart sounds. No murmur heard.     No friction rub. No gallop.   Pulmonary:      Effort: Pulmonary effort is normal. No respiratory distress.      Breath sounds: Normal breath sounds. No wheezing or rales.   Chest:      Chest wall: No tenderness.   Abdominal:      General: Bowel sounds are normal.      Palpations: Abdomen is soft.      Tenderness: There is no abdominal tenderness.   Musculoskeletal:         General: No swelling. Normal range of motion.      Cervical back: Normal range of motion and neck supple.   Lymphadenopathy:      Cervical: No cervical adenopathy.   Skin:     General: Skin is warm and dry.      Capillary Refill: Capillary refill takes less than 2 seconds.   Neurological:      Mental Status: She is alert and oriented to person, place, and time.   Psychiatric:         Behavior: Behavior normal.           RESULTS    BiPAP download: Patient is 100% compliant, should her average use is 7 hours and 53 minutes, she is on a max IPAP 25, minimum EPAP 8 with pressure support of 6, her mode VAuto.  Her average AHI is 1.9.    PROBLEM LIST    Problem List Items Addressed This Visit          Cardiac and Vasculature    Dyspnea on exertion       Sleep    BERNARD treated with BiPAP - Primary    Overview     Auto BIPAP              DISCUSSION    Ms. Garrison was here for follow-up.  She seems to doing very well from a pulmonary standpoint.  She does continue to have some mild shortness of breath with exertion but is getting around very well without oxygen during the day.    I did encourage her to continue using her oxygen during the day to maintain a saturation above 88% at all times.  I do want her to continue wearing her oxygen through her BiPAP at nighttime.    She will continue her BiPAP at nighttime for her BERNARD.  She does follow closely with the sleep center.    We will have her follow-up in 6  months.    I personally spent a total of 31 minutes on patient visit today including chart review, face to face with the patient obtaining the history and physical exam, review of pertinent images and tests, counseling and discussion and/or coordination of care as described above, and documentation.  Total time excludes time spent on other separate services such as performing procedures or test interpretation, if applicable.        Leighgiana Tijerina, APRN  03/20/202415:54 EDT  Electronically signed     Please note that portions of this note were completed with a voice recognition program.        CC: Lizzette Multani MD

## 2024-03-27 ENCOUNTER — OFFICE VISIT (OUTPATIENT)
Dept: CARDIOLOGY | Facility: CLINIC | Age: 84
End: 2024-03-27
Payer: MEDICARE

## 2024-03-27 VITALS
SYSTOLIC BLOOD PRESSURE: 122 MMHG | BODY MASS INDEX: 26.58 KG/M2 | OXYGEN SATURATION: 96 % | WEIGHT: 150 LBS | HEART RATE: 76 BPM | HEIGHT: 63 IN | DIASTOLIC BLOOD PRESSURE: 50 MMHG

## 2024-03-27 DIAGNOSIS — I48.0 PAROXYSMAL ATRIAL FIBRILLATION: Primary | ICD-10-CM

## 2024-03-27 DIAGNOSIS — E78.5 HYPERLIPIDEMIA LDL GOAL <100: ICD-10-CM

## 2024-03-27 DIAGNOSIS — I10 PRIMARY HYPERTENSION: ICD-10-CM

## 2024-03-27 DIAGNOSIS — G47.33 OSA TREATED WITH BIPAP: ICD-10-CM

## 2024-03-27 DIAGNOSIS — I38 VALVULAR HEART DISEASE: ICD-10-CM

## 2024-03-27 PROCEDURE — 3078F DIAST BP <80 MM HG: CPT | Performed by: INTERNAL MEDICINE

## 2024-03-27 PROCEDURE — 3074F SYST BP LT 130 MM HG: CPT | Performed by: INTERNAL MEDICINE

## 2024-03-27 PROCEDURE — 99214 OFFICE O/P EST MOD 30 MIN: CPT | Performed by: INTERNAL MEDICINE

## 2024-03-27 NOTE — PROGRESS NOTES
[facility] Cardiology    Patient ID: Kaylen Garrison is a 83 y.o. female.  : 1940   Contact: [phone]    Encounter date: 2024    PCP: Lizzette Multani MD      Chief complaint:   Chief Complaint   Patient presents with    Atrial Fibrillation       Problem List:  Paroxysmal atrial fibrillation  New onset atrial flutter with RVR 11/10/2021 in setting of nocturnal hypoxia  [id number] = 5 on Xarelto   SATISH, 2021: EF 50%. Mild LAE. No evidence of intracardiac thrombus or mass, clear JENNA, Trace MR and TR.   Successful ECV, 2021  Started on Amiodarone 2021  Aortic stenosis/Mitral stenosis/Valvular heart disease  Echo (2023): EF 56 to 60%.  LV wall thickness consistent with mild concentric hypertrophy.  LV diastolic function consistent with (grade 2 with high lap) pseudonormalization.  LA volume mildly increased.  Moderate calcification of aortic valve.  Moderate AS. Moderate calcification of mitral valve.  Mild to moderate MS.  Mild TR.  RVSP is moderately elevated at 53.9 mmHg.  Hypertension  Dyslipidemia  RBBB  Type 2 diabetes mellitus  Obstructive sleep apnea, on BiPAP  Acute cholecystitis  S/p ERCP with extraction of 3 stones 2023.  Syncope, 2012  Echocardiogram, 2012: EF 55 to 60% with no valvular abnormalities.  Limited studies, 2012: 0-49% bilateral carotid artery stenosis.  No acute findings per CT scan.  MRI of the brain, 2012: Cortical atrophy, chronic ischemic changes noted.  Abnormal MPS, 2012: EF 79% with apical hypoperfusion suspicious for ischemia, Dr. Arnaud Augustine.  MPS 2016: EF greater than 70%.  No evidence of inducible ischemia.  Borderline inferior ST segment changes.  Dyspnea  Echo, 2023: EF 56-60%.  Left ventricular diastolic function is consistent with (grade II w/high LAP) pseudonormalization. Left atrial volume is mildly increased. There is moderate calcification of the aortic valve. Moderate aortic  valve stenosis. There is moderate calcification of the mitral valve. Mild to moderate mitral valve stenosis. Mild TR is present. RVSP is moderately elevated at 53.9 mmHg.  Obesity  Ménière's disease  Surgical history  Hysterectomy  Benign tumor removal from left breast    Allergies   Allergen Reactions    Adhesive Tape Rash    Latex Rash    Penicillins Rash       Current Medications:    Current Outpatient Medications:     amLODIPine (NORVASC) 2.5 MG tablet, Take 1 tablet by mouth Daily., Disp: , Rfl:     atenolol (TENORMIN) 25 MG tablet, Take 0.5 tablets by mouth Daily. A half tablet daily, Disp: , Rfl:     atorvastatin (LIPITOR) 40 MG tablet, Take 1 tablet by mouth Daily., Disp: , Rfl:     Blood Glucose Monitoring Suppl (Accu-Chek Guide) w/Device kit, 1 Device See Admin Instructions. Use to check blood sugar once daily.  Dx E11.9, Disp: 1 kit, Rfl: 0    Cholecalciferol (VITAMIN D3 PO), Take  by mouth. 2000 IU daily, Disp: , Rfl:     Cinnamon 500 MG capsule, Take 1 capsule by mouth Daily., Disp: , Rfl:     fluocinonide (LIDEX) 0.05 % ointment, Apply 1 Application topically to the appropriate area as directed 2 (Two) Times a Day., Disp: , Rfl:     glipizide (GLUCOTROL XL) 2.5 MG 24 hr tablet, Take 2 tablets by mouth Daily. 2 times daily, Disp: , Rfl:     glucose blood (Accu-Chek Guide) test strip, Check blood sugar daily dx e11.65, Disp: 100 each, Rfl: 1    hydroCHLOROthiazide (HYDRODIURIL) 25 MG tablet, Take 1 tablet by mouth Daily., Disp: , Rfl:     Januvia 100 MG tablet, Take 1 tablet by mouth once daily, Disp: 90 tablet, Rfl: 0    Lancets 33G misc, 1 each Daily. Dx e11.65, Disp: 100 each, Rfl: 3    metFORMIN ER (GLUCOPHAGE-XR) 500 MG 24 hr tablet, TAKE 2 TABLETS BY MOUTH TWICE DAILY WITH MEALS, Disp: 360 tablet, Rfl: 3    omega-3 acid ethyl esters (LOVAZA) 1 g capsule, Take 1 capsule by mouth Daily., Disp: , Rfl:     oxybutynin XL (DITROPAN-XL) 5 MG 24 hr tablet, Take 1 tablet by mouth Daily., Disp: , Rfl:      "perindopril (ACEON) 2 MG tablet, Take 0.5 tablets by mouth Daily., Disp: , Rfl:     rivaroxaban (XARELTO) 15 MG tablet, Take 1 tablet by mouth Daily With Dinner. Indications: Atrial Fibrillation, Disp: 30 tablet, Rfl: 3    rosuvastatin (CRESTOR) 20 MG tablet, Take 1 tablet by mouth Daily., Disp: , Rfl:     valsartan (DIOVAN) 320 MG tablet, Take 1 tablet by mouth Every Night., Disp: , Rfl:     HPI    Kaylen Garrison is a 83 y.o. female who presents today for a follow up of PAF, VHD, and cardiac risk factors. Since last visit, patient has been doing well overall from a cardiovascular standpoint. She reports that she has recently returned from a trip to [facility] in [facility]. While on her trip, she reports a fall after a dog jumped on her legs and she lost her balance. Patient sustained bruises on her right arm and leg along with several lacerations. She believes she still needs to be on supplemental oxygen while sleeping. Patient denies chest pain, shortness of breath, orthopnea, palpitations, edema, dizziness, and syncope.       The following portions of the patient's history were reviewed and updated as appropriate: allergies, current medications and problem list.    Pertinent positives as listed in the HPI.  All other systems reviewed are negative.         Vitals:    03/27/24 1500 03/27/24 1510   BP: 98/46 122/50   BP Location: Left arm    Patient Position: Sitting    Pulse: 76    SpO2: 96%    Weight: 68 kg (150 lb)    Height: 160 cm (63\")        Physical Exam:  General: Alert and oriented.  Neck: Jugular venous pressure is within normal limits. Carotids have normal upstrokes without bruits.   Cardiovascular: Heart has a nondisplaced focal PMI. Regular rate and rhythm. Trace SE murmur, no gallop or rub.  Lungs: Clear, no rales or wheezes. Equal expansion is noted.   Extremities: Show no edema.  Skin: Warm and dry.  Neurologic: Nonfocal.     Diagnostic Data (reviewed with patient):  Lab date: " 09/20/2023  FLP: , TG 61, HDL 59, LDL 44  CMP: Glu 108, BUN 27, Creat 1.09, eGFR 50, Na 144, K 4.1, Cl 102, CO2 26, Ca 10.1, Alk Phos 66, AST 21, ALT 18  CBC: WBC 9.1, RBC 4.46, HGB 12.5, HCT 39.3    Advance Care Planning   ACP discussion was held with the patient during this visit. Patient does not have an advance directive, information provided.         Procedures      Assessment:    ICD-10-CM ICD-9-CM   1. Paroxysmal atrial fibrillation  I48.0 427.31   2. Valvular heart disease  I38 424.90   3. Primary hypertension  I10 401.9   4. Hyperlipidemia LDL goal <100  E78.5 272.4   5. BERNARD treated with BiPAP  G47.33 327.23         Plan:  Continue on amlodipine 2.5 mg daily for hypertension.   Continue on atorvastatin 40 mg daily for hyperlipidemia.   Continue on hydrochlorothiazide 25 mg daily for hypertension.  Continue on Xarelto 15 mg daily for stroke prophylaxis.   Continue on rosuvastatin 20 mg daily for hyperlipidemia.   Continue on valsartan 320 mg daily for hypertension.   Continue all other current medications.  F/up in 6 months, sooner if needed.      Scribed for Matilde Alvarez MD by Usha Gates. 3/27/2024 15:10 EDT    I Matilde Alvarez MD personally performed the services described in this documentation as scribed by the above individual in my presence, and it is both accurate and complete.    Matilde Alvarez MD, FACC

## 2024-04-16 ENCOUNTER — OFFICE VISIT (OUTPATIENT)
Dept: ENDOCRINOLOGY | Facility: CLINIC | Age: 84
End: 2024-04-16
Payer: MEDICARE

## 2024-04-16 VITALS
BODY MASS INDEX: 26.58 KG/M2 | DIASTOLIC BLOOD PRESSURE: 48 MMHG | HEIGHT: 63 IN | HEART RATE: 60 BPM | OXYGEN SATURATION: 97 % | SYSTOLIC BLOOD PRESSURE: 120 MMHG | WEIGHT: 150 LBS

## 2024-04-16 DIAGNOSIS — E11.65 TYPE 2 DIABETES MELLITUS WITH HYPERGLYCEMIA, WITHOUT LONG-TERM CURRENT USE OF INSULIN: Primary | ICD-10-CM

## 2024-04-16 DIAGNOSIS — I10 PRIMARY HYPERTENSION: ICD-10-CM

## 2024-04-16 DIAGNOSIS — E78.5 HYPERLIPIDEMIA LDL GOAL <100: ICD-10-CM

## 2024-04-16 DIAGNOSIS — E27.8 ADRENAL NODULE: ICD-10-CM

## 2024-04-16 DIAGNOSIS — E04.2 MULTIPLE THYROID NODULES: ICD-10-CM

## 2024-04-16 DIAGNOSIS — E21.0 PRIMARY HYPERPARATHYROIDISM: ICD-10-CM

## 2024-04-16 LAB
ALBUMIN SERPL-MCNC: 4.2 G/DL (ref 3.5–5.2)
ALBUMIN UR-MCNC: 1.3 MG/DL
ALBUMIN/GLOB SERPL: 1.5 G/DL
ALP SERPL-CCNC: 60 U/L (ref 39–117)
ALT SERPL W P-5'-P-CCNC: 17 U/L (ref 1–33)
ANION GAP SERPL CALCULATED.3IONS-SCNC: 12.6 MMOL/L (ref 5–15)
AST SERPL-CCNC: 19 U/L (ref 1–32)
BILIRUB SERPL-MCNC: 0.5 MG/DL (ref 0–1.2)
BUN SERPL-MCNC: 30 MG/DL (ref 8–23)
BUN/CREAT SERPL: 31.6 (ref 7–25)
CALCIUM SPEC-SCNC: 10.3 MG/DL (ref 8.6–10.5)
CHLORIDE SERPL-SCNC: 106 MMOL/L (ref 98–107)
CO2 SERPL-SCNC: 25.4 MMOL/L (ref 22–29)
CREAT SERPL-MCNC: 0.95 MG/DL (ref 0.57–1)
CREAT UR-MCNC: 83.1 MG/DL
EGFRCR SERPLBLD CKD-EPI 2021: 58.8 ML/MIN/1.73
EXPIRATION DATE: ABNORMAL
EXPIRATION DATE: ABNORMAL
GLOBULIN UR ELPH-MCNC: 2.8 GM/DL
GLUCOSE BLDC GLUCOMTR-MCNC: 238 MG/DL (ref 70–130)
GLUCOSE SERPL-MCNC: 186 MG/DL (ref 65–99)
HBA1C MFR BLD: 7.4 % (ref 4.5–5.7)
Lab: ABNORMAL
Lab: ABNORMAL
MICROALBUMIN/CREAT UR: 15.6 MG/G (ref 0–29)
POTASSIUM SERPL-SCNC: 3.9 MMOL/L (ref 3.5–5.2)
PROT SERPL-MCNC: 7 G/DL (ref 6–8.5)
SODIUM SERPL-SCNC: 144 MMOL/L (ref 136–145)
TSH SERPL DL<=0.05 MIU/L-ACNC: 3.81 UIU/ML (ref 0.27–4.2)

## 2024-04-16 PROCEDURE — 80053 COMPREHEN METABOLIC PANEL: CPT | Performed by: INTERNAL MEDICINE

## 2024-04-16 PROCEDURE — 99214 OFFICE O/P EST MOD 30 MIN: CPT | Performed by: INTERNAL MEDICINE

## 2024-04-16 PROCEDURE — 1160F RVW MEDS BY RX/DR IN RCRD: CPT | Performed by: INTERNAL MEDICINE

## 2024-04-16 PROCEDURE — 82947 ASSAY GLUCOSE BLOOD QUANT: CPT | Performed by: INTERNAL MEDICINE

## 2024-04-16 PROCEDURE — 36415 COLL VENOUS BLD VENIPUNCTURE: CPT | Performed by: INTERNAL MEDICINE

## 2024-04-16 PROCEDURE — 82043 UR ALBUMIN QUANTITATIVE: CPT | Performed by: INTERNAL MEDICINE

## 2024-04-16 PROCEDURE — 3074F SYST BP LT 130 MM HG: CPT | Performed by: INTERNAL MEDICINE

## 2024-04-16 PROCEDURE — 3078F DIAST BP <80 MM HG: CPT | Performed by: INTERNAL MEDICINE

## 2024-04-16 PROCEDURE — 84443 ASSAY THYROID STIM HORMONE: CPT | Performed by: INTERNAL MEDICINE

## 2024-04-16 PROCEDURE — 3051F HG A1C>EQUAL 7.0%<8.0%: CPT | Performed by: INTERNAL MEDICINE

## 2024-04-16 PROCEDURE — 83036 HEMOGLOBIN GLYCOSYLATED A1C: CPT | Performed by: INTERNAL MEDICINE

## 2024-04-16 PROCEDURE — 1159F MED LIST DOCD IN RCRD: CPT | Performed by: INTERNAL MEDICINE

## 2024-04-16 PROCEDURE — 82570 ASSAY OF URINE CREATININE: CPT | Performed by: INTERNAL MEDICINE

## 2024-04-16 NOTE — PROGRESS NOTES
"     Office Note      Date: 2024  Patient Name: Kaylen Garrison  MRN: [id number]  : 1940    Chief Complaint   Patient presents with    Diabetes     Type II    Adrenal Problem    Thyroid Problem       History of Present Illness:   Kaylen Garrison is a 83 y.o. female who presents for Diabetes type 2. Diagnosed in: . Treated in past with oral agents. Current treatments: metformin, januvia and glipizide. Number of insulin shots per day: none. Checks blood sugar 1 time a day. Has low blood sugar: no. Aspirin use: No - on xarelto . Statin use: Yes. ACE-I/ARB use: Yes. Changes in health since last visit: none. Last eye exam 2023.     Subjective      Diabetic Complications:  Eyes: No  Kidneys: No  Feet: No  Heart: No    Diet and Exercise:  Meals per day: 3  Minutes of exercise per week: 0 mins.    Review of Systems:   Review of Systems   Constitutional: Negative.    Cardiovascular: Negative.    Gastrointestinal: Negative.    Endocrine: Negative.        The following portions of the patient's history were reviewed and updated as appropriate: allergies, current medications, past family history, past medical history, past social history, past surgical history, and problem list.    Objective     Visit Vitals  /48 (BP Location: Left arm, Patient Position: Sitting, Cuff Size: Adult)   Pulse 60   Ht 160 cm (63\")   Wt 68 kg (150 lb)   SpO2 97%   BMI 26.57 kg/m²       Physical Exam:  Physical Exam  Constitutional:       Appearance: Normal appearance.   Neck:      Thyroid: No thyroid mass, thyromegaly or thyroid tenderness.      Comments: Thyroid low in neck and difficult to palpate  Cardiovascular:      Pulses:           Dorsalis pedis pulses are 2+ on the right side and 2+ on the left side.        Posterior tibial pulses are 2+ on the right side and 2+ on the left side.   Feet:      Right foot:      Protective Sensation: 5 sites tested.        Skin integrity: Skin integrity normal.      Toenail " Condition: Right toenails are long.      Left foot:      Protective Sensation: 5 sites tested.  5 sites sensed.      Skin integrity: Skin integrity normal.      Toenail Condition: Left toenails are long.   Lymphadenopathy:      Cervical: No cervical adenopathy.   Neurological:      Mental Status: She is alert.         Labs:    HbA1c  Lab Results   Component Value Date    HGBA1C 7.4 (A) 04/16/2024       CMP  Lab Results   Component Value Date    GLUCOSE 254 (H) 05/26/2023    BUN 31 (H) 05/26/2023    CREATININE 0.94 05/26/2023    EGFRIFNONA 55 (L) 02/10/2022    BCR 33.0 (H) 05/26/2023    K 4.0 05/26/2023    CO2 28.4 05/26/2023    CALCIUM 10.4 05/26/2023    CALCIUM 10.5 05/26/2023    AST 35 (H) 04/14/2023    ALT 78 (H) 04/14/2023        Lipid Panel  Lab Results   Component Value Date    HDL 42 11/11/2021    LDL 40 11/11/2021    TRIG 66 11/11/2021        TSH  Lab Results   Component Value Date    TSH 2.510 05/26/2023    FREET4 1.79 (H) 05/26/2023        Hemoglobin A1C  Lab Results   Component Value Date    HGBA1C 7.4 (A) 04/16/2024        Microalbumin/Creatinine  Lab Results   Component Value Date    MALBCRERATIO 68.8 05/26/2023    MICROALBUR 4.0 05/26/2023           Assessment / Plan      Assessment & Plan:  Diagnoses and all orders for this visit:    1. Type 2 diabetes mellitus with hyperglycemia, without long-term current use of insulin (Primary)  Assessment & Plan:  Diabetes is stable.  A1c acceptable for her.  Continue current treatment regimen.  Diabetes will be reassessed in 6 months.    Orders:  -     POC Glucose, Blood  -     POC Glycosylated Hemoglobin (Hb A1C)  -     Comprehensive Metabolic Panel; Future  -     Microalbumin / Creatinine Urine Ratio - Urine, Clean Catch; Future  -     TSH; Future    2. Primary hypertension  Assessment & Plan:  Hypertension is stable and controlled  Continue current treatment regimen.  Blood pressure will be reassessed in 6 months.      3. Hyperlipidemia LDL goal  <100  Assessment & Plan:  Continue statin.      4. Primary hyperparathyroidism  Assessment & Plan:  Check calcium today.      5. Multiple thyroid nodules  Assessment & Plan:  Check TSH today.  Continue with periodic neck exams.      6. Adrenal nodule  Assessment & Plan:  Nonfunctioning adenoma.        Current Outpatient Medications   Medication Instructions    amLODIPine (NORVASC) 2.5 mg, Oral, Daily    atenolol (TENORMIN) 12.5 mg, Oral, Daily, A half tablet daily    atorvastatin (LIPITOR) 40 mg, Oral, Daily    Blood Glucose Monitoring Suppl (Accu-Chek Guide) w/Device kit 1 Device, Does not apply, See Admin Instructions, Use to check blood sugar once daily.  Dx E11.9    Cholecalciferol (VITAMIN D3 PO) Oral, 2000 IU daily    Cinnamon 500 mg, Oral, Daily    fluocinonide (LIDEX) 0.05 % ointment 1 application , Topical, 2 Times Daily    glipizide (GLUCOTROL XL) 5 mg, Oral, Daily, 2 times daily    glucose blood (Accu-Chek Guide) test strip Check blood sugar daily dx e11.65    hydroCHLOROthiazide 25 mg, Oral, Daily    Januvia 100 MG tablet Take 1 tablet by mouth once daily    Lancets 33G misc 1 each, Does not apply, Daily, Dx e11.65    metFORMIN ER (GLUCOPHAGE-XR) 1,000 mg, Oral, 2 Times Daily With Meals    omega-3 acid ethyl esters (LOVAZA) 1 g, Oral, Daily    oxybutynin XL (DITROPAN-XL) 5 mg, Oral, Daily    perindopril (ACEON) 1 mg, Oral, Daily    rivaroxaban (XARELTO) 15 mg, Oral, Daily With Dinner    rosuvastatin (CRESTOR) 20 mg, Oral, Daily    valsartan (DIOVAN) 320 mg, Oral, Nightly      Return in about 6 months (around 10/16/2024) for Recheck with A1c, CMP, TSH.    Electronically signed by: Harley Rose MD  04/16/2024

## 2024-04-16 NOTE — ASSESSMENT & PLAN NOTE
Diabetes is stable.  A1c acceptable for her.  Continue current treatment regimen.  Diabetes will be reassessed in 6 months.

## 2024-06-10 ENCOUNTER — OFFICE VISIT (OUTPATIENT)
Dept: SLEEP MEDICINE | Facility: CLINIC | Age: 84
End: 2024-06-10
Payer: MEDICARE

## 2024-06-10 VITALS
OXYGEN SATURATION: 93 % | WEIGHT: 148 LBS | HEART RATE: 64 BPM | TEMPERATURE: 97.7 F | DIASTOLIC BLOOD PRESSURE: 58 MMHG | HEIGHT: 63 IN | SYSTOLIC BLOOD PRESSURE: 106 MMHG | BODY MASS INDEX: 26.22 KG/M2

## 2024-06-10 DIAGNOSIS — G47.34 NOCTURNAL HYPOXEMIA: ICD-10-CM

## 2024-06-10 DIAGNOSIS — G47.33 OSA (OBSTRUCTIVE SLEEP APNEA): Primary | ICD-10-CM

## 2024-06-10 PROCEDURE — 3074F SYST BP LT 130 MM HG: CPT | Performed by: NURSE PRACTITIONER

## 2024-06-10 PROCEDURE — 99213 OFFICE O/P EST LOW 20 MIN: CPT | Performed by: NURSE PRACTITIONER

## 2024-06-10 PROCEDURE — 3078F DIAST BP <80 MM HG: CPT | Performed by: NURSE PRACTITIONER

## 2024-06-10 NOTE — PROGRESS NOTES
Chief Complaint:   Chief Complaint   Patient presents with    Follow-up    Sleep Apnea       HPI:    Kaylen Garrison is a 83 y.o. female here for follow-up of sleep apnea.  Patient was last seen 6/5/2023.  Patient continues to do well with BiPAP therapy and 1 L of oxygen at night.  She is sleeping 8-1/2 hours nightly and goes to sleep easily.  She does go to the restroom x 1 or more if she increases her fluid intake.  Patient puts her Fairlee score at 1/24.  Patient states she is always busy and does volunteer frequently.  She does well with heated tubing and nasal mask.  She has no concerns or complaints and will continue therapy.        Current medications are:   Current Outpatient Medications:     amLODIPine (NORVASC) 2.5 MG tablet, Take 1 tablet by mouth Daily., Disp: , Rfl:     atenolol (TENORMIN) 25 MG tablet, Take 0.5 tablets by mouth Daily. A half tablet daily, Disp: , Rfl:     atorvastatin (LIPITOR) 40 MG tablet, Take 1 tablet by mouth Daily., Disp: , Rfl:     Blood Glucose Monitoring Suppl (Accu-Chek Guide) w/Device kit, 1 Device See Admin Instructions. Use to check blood sugar once daily.  Dx E11.9, Disp: 1 kit, Rfl: 0    Cholecalciferol (VITAMIN D3 PO), Take  by mouth. 2000 IU daily, Disp: , Rfl:     Cinnamon 500 MG capsule, Take 1 capsule by mouth Daily., Disp: , Rfl:     fluocinonide (LIDEX) 0.05 % ointment, Apply 1 Application topically to the appropriate area as directed 2 (Two) Times a Day., Disp: , Rfl:     glipizide (GLUCOTROL XL) 2.5 MG 24 hr tablet, Take 2 tablets by mouth Daily. 2 times daily, Disp: , Rfl:     glucose blood (Accu-Chek Guide) test strip, Check blood sugar daily dx e11.65, Disp: 100 each, Rfl: 1    hydroCHLOROthiazide (HYDRODIURIL) 25 MG tablet, Take 1 tablet by mouth Daily., Disp: , Rfl:     Januvia 100 MG tablet, Take 1 tablet by mouth once daily, Disp: 90 tablet, Rfl: 0    Lancets 33G misc, 1 each Daily. Dx e11.65, Disp: 100 each, Rfl: 3    metFORMIN ER (GLUCOPHAGE-XR) 500  MG 24 hr tablet, TAKE 2 TABLETS BY MOUTH TWICE DAILY WITH MEALS, Disp: 360 tablet, Rfl: 3    omega-3 acid ethyl esters (LOVAZA) 1 g capsule, Take 1 capsule by mouth Daily., Disp: , Rfl:     oxybutynin XL (DITROPAN-XL) 5 MG 24 hr tablet, Take 1 tablet by mouth Daily., Disp: , Rfl:     perindopril (ACEON) 2 MG tablet, Take 0.5 tablets by mouth Daily., Disp: , Rfl:     rivaroxaban (XARELTO) 15 MG tablet, Take 1 tablet by mouth Daily With Dinner. Indications: Atrial Fibrillation, Disp: 30 tablet, Rfl: 3    rosuvastatin (CRESTOR) 20 MG tablet, Take 1 tablet by mouth Daily., Disp: , Rfl:     valsartan (DIOVAN) 320 MG tablet, Take 1 tablet by mouth Every Night., Disp: , Rfl: .      The patient's relevant past medical, surgical, family and social history were reviewed and updated in Epic as appropriate.       Review of Systems   Eyes:  Positive for visual disturbance.   Respiratory:  Positive for apnea and shortness of breath.    Musculoskeletal:  Positive for arthralgias, back pain and gait problem.   Neurological:  Positive for dizziness.   Psychiatric/Behavioral:  Positive for sleep disturbance.    All other systems reviewed and are negative.        Objective:    Physical Exam  Constitutional:       Appearance: Normal appearance.   HENT:      Head: Normocephalic and atraumatic.      Mouth/Throat:      Comments: Mallampati 2 anatomy  Cardiovascular:      Rate and Rhythm: Normal rate and regular rhythm.   Pulmonary:      Effort: Pulmonary effort is normal.      Breath sounds: Normal breath sounds.   Skin:     General: Skin is warm and dry.   Neurological:      Mental Status: She is alert and oriented to person, place, and time.   Psychiatric:         Mood and Affect: Mood normal.         Behavior: Behavior normal.         Thought Content: Thought content normal.         Judgment: Judgment normal.         CPAP Report    90/90 days of use  Greater than 4-hour use 98%  AHI 2.0  Maximum IPAP 25  Minimum EPAP 8  The patient  continues to use and benefit from CPAP therapy.    ASSESSMENT/PLAN    Diagnoses and all orders for this visit:    1. BERNARD (obstructive sleep apnea) (Primary)  -     PAP Therapy    2. Nocturnal hypoxemia  -     PAP Therapy        Counseled patient regarding multimodal approach with healthy nutrition, healthy sleep, regular physical activity, social activities, counseling, and medications. Encouraged to practice lateral sleep position. Avoid alcohol and sedatives close to bedtime.    Refill supplies x 1 year.  Return to clinic 1 year or sooner as symptoms warrant.      Signed by  Donita Cat, APRN    Mony 10, 2024      CC: Lizzette Multani MD         No ref. provider found

## 2024-08-02 RX ORDER — LANCETS 33 GAUGE
1 EACH MISCELLANEOUS DAILY
Qty: 100 EACH | Refills: 3 | Status: SHIPPED | OUTPATIENT
Start: 2024-08-02

## 2024-08-02 NOTE — TELEPHONE ENCOUNTER
Rx Refill Note  Requested Prescriptions     Pending Prescriptions Disp Refills    Lancets 33G misc 100 each 3     Sig: Use 1 each Daily. Dx e11.65      Last office visit with prescribing clinician: 4/16/2024   Last telemedicine visit with prescribing clinician: Visit date not found   Next office visit with prescribing clinician: 11/6/2024                         Would you like a call back once the refill request has been completed: [] Yes [] No    If the office needs to give you a call back, can they leave a voicemail: [] Yes [] No    Elly Balderrama MA  08/02/24, 09:00 EDT

## 2024-09-19 ENCOUNTER — APPOINTMENT (OUTPATIENT)
Dept: GENERAL RADIOLOGY | Facility: HOSPITAL | Age: 84
End: 2024-09-19
Payer: MEDICARE

## 2024-09-19 ENCOUNTER — APPOINTMENT (OUTPATIENT)
Facility: HOSPITAL | Age: 84
End: 2024-09-19
Payer: MEDICARE

## 2024-09-19 ENCOUNTER — HOSPITAL ENCOUNTER (EMERGENCY)
Facility: HOSPITAL | Age: 84
Discharge: HOME | End: 2024-09-19
Attending: EMERGENCY MEDICINE
Payer: MEDICARE

## 2024-09-19 ENCOUNTER — HOSPITAL ENCOUNTER (OUTPATIENT)
Facility: HOSPITAL | Age: 84
Setting detail: OBSERVATION
Discharge: HOME | End: 2024-09-22
Attending: EMERGENCY MEDICINE | Admitting: INTERNAL MEDICINE
Payer: MEDICARE

## 2024-09-19 VITALS
TEMPERATURE: 97.9 F | HEIGHT: 62 IN | WEIGHT: 143.96 LBS | HEART RATE: 88 BPM | RESPIRATION RATE: 20 BRPM | DIASTOLIC BLOOD PRESSURE: 91 MMHG | OXYGEN SATURATION: 92 % | SYSTOLIC BLOOD PRESSURE: 141 MMHG | BODY MASS INDEX: 26.49 KG/M2

## 2024-09-19 DIAGNOSIS — I48.91 ATRIAL FIBRILLATION WITH RAPID VENTRICULAR RESPONSE: Primary | ICD-10-CM

## 2024-09-19 DIAGNOSIS — N30.00 ACUTE CYSTITIS WITHOUT HEMATURIA: ICD-10-CM

## 2024-09-19 DIAGNOSIS — G47.30 SLEEP APNEA, UNSPECIFIED TYPE: ICD-10-CM

## 2024-09-19 DIAGNOSIS — I50.9 HEART FAILURE, UNSPECIFIED HF CHRONICITY, UNSPECIFIED HEART FAILURE TYPE: ICD-10-CM

## 2024-09-19 LAB
ALBUMIN SERPL-MCNC: 3.9 G/DL (ref 3.5–5.2)
ALBUMIN SERPL-MCNC: 4.3 G/DL (ref 3.5–5.2)
ALBUMIN/GLOB SERPL: 1.4 G/DL
ALBUMIN/GLOB SERPL: 1.4 G/DL
ALP SERPL-CCNC: 61 U/L (ref 39–117)
ALP SERPL-CCNC: 66 U/L (ref 39–117)
ALT SERPL W P-5'-P-CCNC: 12 U/L (ref 1–33)
ALT SERPL W P-5'-P-CCNC: 16 U/L (ref 1–33)
ANION GAP SERPL CALCULATED.3IONS-SCNC: 14 MMOL/L (ref 5–15)
ANION GAP SERPL CALCULATED.3IONS-SCNC: 17 MMOL/L (ref 5–15)
AST SERPL-CCNC: 16 U/L (ref 1–32)
AST SERPL-CCNC: 22 U/L (ref 1–32)
BASOPHILS # BLD AUTO: 0.04 10*3/MM3 (ref 0–0.2)
BASOPHILS # BLD AUTO: 0.07 10*3/MM3 (ref 0–0.2)
BASOPHILS NFR BLD AUTO: 0.3 % (ref 0–1.5)
BASOPHILS NFR BLD AUTO: 0.5 % (ref 0–1.5)
BILIRUB SERPL-MCNC: 0.4 MG/DL (ref 0–1.2)
BILIRUB SERPL-MCNC: 0.6 MG/DL (ref 0–1.2)
BUN SERPL-MCNC: 32 MG/DL (ref 8–23)
BUN SERPL-MCNC: 33 MG/DL (ref 8–23)
BUN/CREAT SERPL: 32 (ref 7–25)
BUN/CREAT SERPL: 33.3 (ref 7–25)
CALCIUM SPEC-SCNC: 9.6 MG/DL (ref 8.6–10.5)
CALCIUM SPEC-SCNC: 9.7 MG/DL (ref 8.6–10.5)
CHLORIDE SERPL-SCNC: 101 MMOL/L (ref 98–107)
CHLORIDE SERPL-SCNC: 102 MMOL/L (ref 98–107)
CO2 SERPL-SCNC: 21 MMOL/L (ref 22–29)
CO2 SERPL-SCNC: 25 MMOL/L (ref 22–29)
CREAT SERPL-MCNC: 0.99 MG/DL (ref 0.57–1)
CREAT SERPL-MCNC: 1 MG/DL (ref 0.57–1)
D-LACTATE SERPL-SCNC: 1.4 MMOL/L (ref 0.5–2)
DEPRECATED RDW RBC AUTO: 50.9 FL (ref 37–54)
DEPRECATED RDW RBC AUTO: 51.4 FL (ref 37–54)
EGFRCR SERPLBLD CKD-EPI 2021: 55 ML/MIN/1.73
EGFRCR SERPLBLD CKD-EPI 2021: 55.6 ML/MIN/1.73
EOSINOPHIL # BLD AUTO: 0.24 10*3/MM3 (ref 0–0.4)
EOSINOPHIL # BLD AUTO: 0.28 10*3/MM3 (ref 0–0.4)
EOSINOPHIL NFR BLD AUTO: 1.8 % (ref 0.3–6.2)
EOSINOPHIL NFR BLD AUTO: 2.3 % (ref 0.3–6.2)
ERYTHROCYTE [DISTWIDTH] IN BLOOD BY AUTOMATED COUNT: 15.7 % (ref 12.3–15.4)
ERYTHROCYTE [DISTWIDTH] IN BLOOD BY AUTOMATED COUNT: 15.9 % (ref 12.3–15.4)
GEN 5 2HR TROPONIN T REFLEX: 22 NG/L
GLOBULIN UR ELPH-MCNC: 2.8 GM/DL
GLOBULIN UR ELPH-MCNC: 3.1 GM/DL
GLUCOSE SERPL-MCNC: 198 MG/DL (ref 65–99)
GLUCOSE SERPL-MCNC: 256 MG/DL (ref 65–99)
HCT VFR BLD AUTO: 41.3 % (ref 34–46.6)
HCT VFR BLD AUTO: 45.1 % (ref 34–46.6)
HGB BLD-MCNC: 13.5 G/DL (ref 12–15.9)
HGB BLD-MCNC: 14.9 G/DL (ref 12–15.9)
HOLD SPECIMEN: NORMAL
IMM GRANULOCYTES # BLD AUTO: 0.03 10*3/MM3 (ref 0–0.05)
IMM GRANULOCYTES # BLD AUTO: 0.07 10*3/MM3 (ref 0–0.05)
IMM GRANULOCYTES NFR BLD AUTO: 0.2 % (ref 0–0.5)
IMM GRANULOCYTES NFR BLD AUTO: 0.5 % (ref 0–0.5)
LIPASE SERPL-CCNC: 36 U/L (ref 13–60)
LYMPHOCYTES # BLD AUTO: 2.49 10*3/MM3 (ref 0.7–3.1)
LYMPHOCYTES # BLD AUTO: 2.87 10*3/MM3 (ref 0.7–3.1)
LYMPHOCYTES NFR BLD AUTO: 20.6 % (ref 19.6–45.3)
LYMPHOCYTES NFR BLD AUTO: 21.5 % (ref 19.6–45.3)
MAGNESIUM SERPL-MCNC: 1.9 MG/DL (ref 1.6–2.4)
MAGNESIUM SERPL-MCNC: 2 MG/DL (ref 1.6–2.4)
MCH RBC QN AUTO: 28.7 PG (ref 26.6–33)
MCH RBC QN AUTO: 28.8 PG (ref 26.6–33)
MCHC RBC AUTO-ENTMCNC: 32.7 G/DL (ref 31.5–35.7)
MCHC RBC AUTO-ENTMCNC: 33 G/DL (ref 31.5–35.7)
MCV RBC AUTO: 87.1 FL (ref 79–97)
MCV RBC AUTO: 87.9 FL (ref 79–97)
MONOCYTES # BLD AUTO: 0.91 10*3/MM3 (ref 0.1–0.9)
MONOCYTES # BLD AUTO: 1.13 10*3/MM3 (ref 0.1–0.9)
MONOCYTES NFR BLD AUTO: 7.5 % (ref 5–12)
MONOCYTES NFR BLD AUTO: 8.5 % (ref 5–12)
NEUTROPHILS NFR BLD AUTO: 67.2 % (ref 42.7–76)
NEUTROPHILS NFR BLD AUTO: 69.1 % (ref 42.7–76)
NEUTROPHILS NFR BLD AUTO: 8.32 10*3/MM3 (ref 1.7–7)
NEUTROPHILS NFR BLD AUTO: 8.94 10*3/MM3 (ref 1.7–7)
NRBC BLD AUTO-RTO: 0 /100 WBC (ref 0–0.2)
NT-PROBNP SERPL-MCNC: 4975 PG/ML (ref 0–1800)
NT-PROBNP SERPL-MCNC: 629 PG/ML (ref 0–1800)
PLATELET # BLD AUTO: 348 10*3/MM3 (ref 140–450)
PLATELET # BLD AUTO: 396 10*3/MM3 (ref 140–450)
PMV BLD AUTO: 9.6 FL (ref 6–12)
PMV BLD AUTO: 9.6 FL (ref 6–12)
POTASSIUM SERPL-SCNC: 3.9 MMOL/L (ref 3.5–5.2)
POTASSIUM SERPL-SCNC: 4 MMOL/L (ref 3.5–5.2)
PROT SERPL-MCNC: 6.7 G/DL (ref 6–8.5)
PROT SERPL-MCNC: 7.4 G/DL (ref 6–8.5)
QT INTERVAL: 360 MS
QTC INTERVAL: 459 MS
RBC # BLD AUTO: 4.7 10*6/MM3 (ref 3.77–5.28)
RBC # BLD AUTO: 5.18 10*6/MM3 (ref 3.77–5.28)
SODIUM SERPL-SCNC: 140 MMOL/L (ref 136–145)
SODIUM SERPL-SCNC: 140 MMOL/L (ref 136–145)
TROPONIN T DELTA: 0 NG/L
TROPONIN T SERPL HS-MCNC: 22 NG/L
TROPONIN T SERPL HS-MCNC: 25 NG/L
TSH SERPL DL<=0.05 MIU/L-ACNC: 6.63 UIU/ML (ref 0.27–4.2)
WBC NRBC COR # BLD AUTO: 12.07 10*3/MM3 (ref 3.4–10.8)
WBC NRBC COR # BLD AUTO: 13.32 10*3/MM3 (ref 3.4–10.8)
WHOLE BLOOD HOLD COAG: NORMAL
WHOLE BLOOD HOLD COAG: NORMAL
WHOLE BLOOD HOLD SPECIMEN: NORMAL
WHOLE BLOOD HOLD SPECIMEN: NORMAL

## 2024-09-19 PROCEDURE — 93005 ELECTROCARDIOGRAM TRACING: CPT

## 2024-09-19 PROCEDURE — 36415 COLL VENOUS BLD VENIPUNCTURE: CPT

## 2024-09-19 PROCEDURE — 80053 COMPREHEN METABOLIC PANEL: CPT | Performed by: EMERGENCY MEDICINE

## 2024-09-19 PROCEDURE — 93005 ELECTROCARDIOGRAM TRACING: CPT | Performed by: EMERGENCY MEDICINE

## 2024-09-19 PROCEDURE — 96365 THER/PROPH/DIAG IV INF INIT: CPT

## 2024-09-19 PROCEDURE — 83735 ASSAY OF MAGNESIUM: CPT | Performed by: EMERGENCY MEDICINE

## 2024-09-19 PROCEDURE — 83690 ASSAY OF LIPASE: CPT | Performed by: EMERGENCY MEDICINE

## 2024-09-19 PROCEDURE — 71045 X-RAY EXAM CHEST 1 VIEW: CPT

## 2024-09-19 PROCEDURE — 25010000002 AMIODARONE IN DEXTROSE 5% 150-4.21 MG/100ML-% SOLUTION: Performed by: EMERGENCY MEDICINE

## 2024-09-19 PROCEDURE — 96366 THER/PROPH/DIAG IV INF ADDON: CPT

## 2024-09-19 PROCEDURE — 83605 ASSAY OF LACTIC ACID: CPT | Performed by: EMERGENCY MEDICINE

## 2024-09-19 PROCEDURE — 93005 ELECTROCARDIOGRAM TRACING: CPT | Performed by: STUDENT IN AN ORGANIZED HEALTH CARE EDUCATION/TRAINING PROGRAM

## 2024-09-19 PROCEDURE — 83880 ASSAY OF NATRIURETIC PEPTIDE: CPT | Performed by: EMERGENCY MEDICINE

## 2024-09-19 PROCEDURE — 96375 TX/PRO/DX INJ NEW DRUG ADDON: CPT

## 2024-09-19 PROCEDURE — 84484 ASSAY OF TROPONIN QUANT: CPT | Performed by: EMERGENCY MEDICINE

## 2024-09-19 PROCEDURE — 84439 ASSAY OF FREE THYROXINE: CPT | Performed by: STUDENT IN AN ORGANIZED HEALTH CARE EDUCATION/TRAINING PROGRAM

## 2024-09-19 PROCEDURE — 96376 TX/PRO/DX INJ SAME DRUG ADON: CPT

## 2024-09-19 PROCEDURE — 99285 EMERGENCY DEPT VISIT HI MDM: CPT

## 2024-09-19 PROCEDURE — 25010000002 AMIODARONE IN DEXTROSE 5% 360-4.14 MG/200ML-% SOLUTION: Performed by: EMERGENCY MEDICINE

## 2024-09-19 PROCEDURE — 71275 CT ANGIOGRAPHY CHEST: CPT

## 2024-09-19 PROCEDURE — 84443 ASSAY THYROID STIM HORMONE: CPT | Performed by: EMERGENCY MEDICINE

## 2024-09-19 PROCEDURE — 85025 COMPLETE CBC W/AUTO DIFF WBC: CPT | Performed by: EMERGENCY MEDICINE

## 2024-09-19 PROCEDURE — 25810000003 SODIUM CHLORIDE 0.9 % SOLUTION: Performed by: STUDENT IN AN ORGANIZED HEALTH CARE EDUCATION/TRAINING PROGRAM

## 2024-09-19 PROCEDURE — 25510000001 IOPAMIDOL PER 1 ML: Performed by: EMERGENCY MEDICINE

## 2024-09-19 PROCEDURE — 25810000003 SODIUM CHLORIDE 0.9 % SOLUTION: Performed by: EMERGENCY MEDICINE

## 2024-09-19 RX ORDER — METOPROLOL SUCCINATE 25 MG/1
50 TABLET, EXTENDED RELEASE ORAL ONCE
Status: COMPLETED | OUTPATIENT
Start: 2024-09-19 | End: 2024-09-19

## 2024-09-19 RX ORDER — SODIUM CHLORIDE 0.9 % (FLUSH) 0.9 %
10 SYRINGE (ML) INJECTION AS NEEDED
Status: DISCONTINUED | OUTPATIENT
Start: 2024-09-19 | End: 2024-09-19 | Stop reason: HOSPADM

## 2024-09-19 RX ORDER — DILTIAZEM HYDROCHLORIDE 5 MG/ML
INJECTION INTRAVENOUS
Status: ACTIVE
Start: 2024-09-19 | End: 2024-09-20

## 2024-09-19 RX ORDER — DILTIAZEM HYDROCHLORIDE 5 MG/ML
5 INJECTION INTRAVENOUS ONCE
Status: COMPLETED | OUTPATIENT
Start: 2024-09-19 | End: 2024-09-19

## 2024-09-19 RX ORDER — METOPROLOL TARTRATE 1 MG/ML
5 INJECTION, SOLUTION INTRAVENOUS ONCE
Status: COMPLETED | OUTPATIENT
Start: 2024-09-19 | End: 2024-09-19

## 2024-09-19 RX ORDER — SODIUM CHLORIDE 0.9 % (FLUSH) 0.9 %
10 SYRINGE (ML) INJECTION AS NEEDED
Status: DISCONTINUED | OUTPATIENT
Start: 2024-09-19 | End: 2024-09-22 | Stop reason: HOSPADM

## 2024-09-19 RX ORDER — METOPROLOL SUCCINATE 50 MG/1
50 TABLET, EXTENDED RELEASE ORAL DAILY
Qty: 14 TABLET | Refills: 0 | Status: SHIPPED | OUTPATIENT
Start: 2024-09-19 | End: 2024-10-03

## 2024-09-19 RX ORDER — IOPAMIDOL 755 MG/ML
100 INJECTION, SOLUTION INTRAVASCULAR
Status: COMPLETED | OUTPATIENT
Start: 2024-09-19 | End: 2024-09-19

## 2024-09-19 RX ADMIN — DILTIAZEM HYDROCHLORIDE 5 MG: 5 INJECTION INTRAVENOUS at 23:43

## 2024-09-19 RX ADMIN — AMIODARONE HYDROCHLORIDE 150 MG: 1.5 INJECTION, SOLUTION INTRAVENOUS at 03:50

## 2024-09-19 RX ADMIN — METOPROLOL TARTRATE 5 MG: 5 INJECTION INTRAVENOUS at 05:18

## 2024-09-19 RX ADMIN — SODIUM CHLORIDE 500 ML: 9 INJECTION, SOLUTION INTRAVENOUS at 09:45

## 2024-09-19 RX ADMIN — SODIUM CHLORIDE 500 ML: 9 INJECTION, SOLUTION INTRAVENOUS at 23:43

## 2024-09-19 RX ADMIN — AMIODARONE HYDROCHLORIDE 1 MG/MIN: 1.8 INJECTION, SOLUTION INTRAVENOUS at 04:18

## 2024-09-19 RX ADMIN — IOPAMIDOL 85 ML: 755 INJECTION, SOLUTION INTRAVENOUS at 13:15

## 2024-09-19 RX ADMIN — METOPROLOL SUCCINATE 50 MG: 25 TABLET, EXTENDED RELEASE ORAL at 12:55

## 2024-09-19 RX ADMIN — METOPROLOL TARTRATE 5 MG: 5 INJECTION INTRAVENOUS at 05:57

## 2024-09-19 RX ADMIN — METOPROLOL TARTRATE 5 MG: 1 INJECTION, SOLUTION INTRAVENOUS at 11:11

## 2024-09-19 RX ADMIN — METOPROLOL TARTRATE 5 MG: 5 INJECTION INTRAVENOUS at 06:36

## 2024-09-19 NOTE — Clinical Note
Level of Care: Telemetry [5]   Diagnosis: A-fib [342824]   Admitting Physician: ARNOLD SCHAFER [[id number]]   Attending Physician: ARNOLD SCHAFER [[id number]]

## 2024-09-19 NOTE — FS[facility] PROVIDER NOTE
Subjective  History of Present Illness:    Patient Stephontz to the emergency department the high heart rate.  She states that she has a history of paroxysmal atrial fibrillation is on amiodarone and Xarelto.  The patient is followed by Dr. Alvarez.  The patient states that she got up to use the bathroom and took her BiPAP mask off and was checking her oxygen saturation and noticed that her heart rate was 135.  She called EMS to bring her to the emergency department for evaluation.  She denies any chest pain or chest pressure.  Denies any worsening shortness of breath at her baseline.  Denies any recent illnesses.    Nurses Notes reviewed and agree, including vitals, allergies, social history and prior medical history.     REVIEW OF SYSTEMS: All systems reviewed and not pertinent unless noted.  Review of Systems   Cardiovascular:         Elevated heart rate       Past Medical History:   Diagnosis Date    Aortic valve stenosis     Atrial fibrillation     Benign hypertension     History of echocardiogram 04/2012    Hypercalcemia     Hypercholesterolemia     Hypertension     Meniere's disease     Obesity     Overweight (BMI 25.0-29.9)     Primary hyperparathyroidism     Sleep apnea with use of continuous positive airway pressure (CPAP)     Syncope 04/2012    Type 2 diabetes mellitus     Vitamin D deficiency        Allergies:    Adhesive tape, Latex, and Penicillins      Past Surgical History:   Procedure Laterality Date    BREAST SURGERY Left     benign tumor removal    CATARACT EXTRACTION Bilateral     CHOLECYSTECTOMY N/A 4/11/2023    Procedure: CHOLECYSTECTOMY LAPAROSCOPIC;  Surgeon: Abraham oHlbrook MD;  Location: [facility] OR;  Service: General;  Laterality: N/A;    ERCP N/A 4/9/2023    Procedure: ENDOSCOPIC RETROGRADE CHOLANGIOPANCREATOGRAPHY WITH STENT PLACEMENT;  Surgeon: Brunner, Mark I, MD;  Location: [facility] ENDOSCOPY;  Service: Gastroenterology;  Laterality: N/A;  Sphincterotomy made to ampbridgette  "bile duct. CBD swept with 9mm-12 mm balloon. Multiple stones and sludge removed. Stent placed in pancreatic duct    HYSTERECTOMY           Social History     Socioeconomic History    Marital status:    Tobacco Use    Smoking status: Never     Passive exposure: Never    Smokeless tobacco: Never   Vaping Use    Vaping status: Never Used   Substance and Sexual Activity    Alcohol use: No    Drug use: No    Sexual activity: Defer         Family History   Problem Relation Age of Onset    Heart attack Mother     No Known Problems Father     No Known Problems Sister     Lactose intolerance Brother     No Known Problems Sister        Objective  Physical Exam:  /91   Pulse 88   Temp 97.9 °F (36.6 °C)   Resp 20   Ht 157.5 cm (62.01\")   Wt 65.3 kg (143 lb 15.4 oz)   SpO2 92%   BMI 26.32 kg/m²      Physical Exam  Vitals and nursing note reviewed.   Constitutional:       General: She is not in acute distress.     Appearance: Normal appearance. She is normal weight. She is not ill-appearing, toxic-appearing or diaphoretic.   HENT:      Head: Normocephalic and atraumatic.      Mouth/Throat:      Mouth: Mucous membranes are moist.   Eyes:      Extraocular Movements: Extraocular movements intact.      Conjunctiva/sclera: Conjunctivae normal.      Pupils: Pupils are equal, round, and reactive to light.   Cardiovascular:      Rate and Rhythm: Tachycardia present. Rhythm irregular.      Pulses: Normal pulses.      Heart sounds: Normal heart sounds.   Pulmonary:      Effort: Pulmonary effort is normal.      Breath sounds: Normal breath sounds. No wheezing or rales.   Abdominal:      General: Abdomen is flat. Bowel sounds are normal.      Palpations: Abdomen is soft.      Tenderness: There is no abdominal tenderness.   Musculoskeletal:         General: Normal range of motion.   Skin:     General: Skin is warm.      Capillary Refill: Capillary refill takes less than 2 seconds.   Neurological:      General: No focal " deficit present.      Mental Status: She is alert and oriented to person, place, and time.   Psychiatric:         Behavior: Behavior normal.         Thought Content: Thought content normal.         Judgment: Judgment normal.         ECG 12 Lead ED Triage Standing Order; Chest Pain      Date/Time: 9/19/2024 3:53 AM    Performed by: Allan Frausto DO  Authorized by: Allan Frausto DO  Interpreted by ED physician  Comparison: not compared with previous ECG   Rhythm: atrial fibrillation  Rate: tachycardic  BPM: 131  ST Depression: V4, V5 and V6  Clinical impression: dysrhythmia - atrial        ED Course:         Lab Results (last 24 hours)       Procedure Component Value Units Date/Time    High Sensitivity Troponin T [[id number]]  (Abnormal) Collected: 09/19/24 0334    Specimen: Blood Updated: 09/19/24 0357     HS Troponin T 22 ng/L     CBC & Differential [[id number]]  (Abnormal) Collected: 09/19/24 0334    Specimen: Blood Updated: 09/19/24 0342    Narrative:      The following orders were created for panel order CBC & Differential.  Procedure                               Abnormality         Status                     ---------                               -----------         ------                     CBC Auto Differential[[id number]]        Abnormal            Final result                 Please view results for these tests on the individual orders.    Comprehensive Metabolic Panel [[id number]]  (Abnormal) Collected: 09/19/24 0334    Specimen: Blood Updated: 09/19/24 0400     Glucose 256 mg/dL      BUN 32 mg/dL      Creatinine 1.00 mg/dL      Sodium 140 mmol/L      Potassium 3.9 mmol/L      Chloride 102 mmol/L      CO2 21.0 mmol/L      Calcium 9.6 mg/dL      Total Protein 6.7 g/dL      Albumin 3.9 g/dL      ALT (SGPT) 12 U/L      AST (SGOT) 22 U/L      Alkaline Phosphatase 61 U/L      Total Bilirubin 0.4 mg/dL      Globulin 2.8 gm/dL      A/G Ratio 1.4 g/dL      BUN/Creatinine Ratio 32.0     Anion Gap 17.0  mmol/L      eGFR 55.0 mL/min/1.73     Narrative:      GFR Normal >60  Chronic Kidney Disease <60  Kidney Failure <15    The GFR formula is only valid for adults with stable renal function between ages 18 and 70.    Lipase [[id number]]  (Normal) Collected: 09/19/24 0334    Specimen: Blood Updated: 09/19/24 0400     Lipase 36 U/L     BNP [[id number]]  (Normal) Collected: 09/19/24 0334    Specimen: Blood Updated: 09/19/24 0358     proBNP 629.0 pg/mL     Narrative:      This assay is used as an aid in the diagnosis of individuals suspected of having heart failure. It can be used as an aid in the diagnosis of acute decompensated heart failure (ADHF) in patients presenting with signs and symptoms of ADHF to the emergency department (ED). In addition, NT-proBNP of <300 pg/mL indicates ADHF is not likely.    Age Range Result Interpretation  NT-proBNP Concentration (pg/mL:      <50             Positive            >450                   Gray                 300-450                    Negative             <300    50-75           Positive            >900                  Gray                300-900                  Negative            <300      >75             Positive            >1800                  Gray                300-1800                  Negative            <300    CBC Auto Differential [[id number]]  (Abnormal) Collected: 09/19/24 0334    Specimen: Blood Updated: 09/19/24 0342     WBC 12.07 10*3/mm3      RBC 4.70 10*6/mm3      Hemoglobin 13.5 g/dL      Hematocrit 41.3 %      MCV 87.9 fL      MCH 28.7 pg      MCHC 32.7 g/dL      RDW 15.7 %      RDW-SD 51.4 fl      MPV 9.6 fL      Platelets 348 10*3/mm3      Neutrophil % 69.1 %      Lymphocyte % 20.6 %      Monocyte % 7.5 %      Eosinophil % 2.3 %      Basophil % 0.3 %      Immature Grans % 0.2 %      Neutrophils, Absolute 8.32 10*3/mm3      Lymphocytes, Absolute 2.49 10*3/mm3      Monocytes, Absolute 0.91 10*3/mm3      Eosinophils, Absolute 0.28 10*3/mm3      Basophils,  Absolute 0.04 10*3/mm3      Immature Grans, Absolute 0.03 10*3/mm3     Magnesium [[id number]]  (Normal) Collected: 09/19/24 0334    Specimen: Blood Updated: 09/19/24 0411     Magnesium 1.9 mg/dL     High Sensitivity Troponin T 2Hr [[id number]]  (Abnormal) Collected: 09/19/24 0541    Specimen: Blood Updated: 09/19/24 0602     HS Troponin T 22 ng/L      Troponin T Delta 0 ng/L              CT Angiogram Chest Pulmonary Embolism    Result Date: 9/19/2024  CT ANGIOGRAM CHEST PULMONARY EMBOLISM Date of Exam: 9/19/2024 1:04 PM EDT Indication: Afib. Comparison: Chest x-ray 9/19/2024 and CT chest 1/16/2024 and 4/10/2023 Technique: Axial CT images were obtained of the chest after the uneventful intravenous administration of 85 mL Isovue-370 utilizing pulmonary embolism protocol.  Reconstructed coronal and sagittal images were also obtained. Automated exposure control and  iterative construction methods were used. Findings: PULMONARY VASCULATURE: Pulmonary arteries are widely patent without evidence of embolus.Main pulmonary artery is normal in size. No evidence of right heart strain. MEDIASTINUM:Unremarkable. Aortic and heart size are normal. No aortic dissection identified. No mass nor pericardial effusion. CORONARY ARTERIES: Mild to moderate calcified atherosclerotic disease. LUNGS: Lungs are clear. No consolidation. There is a 3 mm nodule in the left upper lung unchanged from prior studies.. PLEURAL SPACE: No effusion, mass, nor pneumothorax. LYMPH NODES: There is essentially stable appearance to mediastinal and bilateral hilar lymph nodes. UPPER ABDOMEN: The gallbladder is surgically absent. There is a cyst in the medial cortex of the right kidney measuring 1.2 cm. There is a small sliding hiatal hernia. OSSEOUS STRUCTURES: Appropriate for age with no acute process identified.     Impression: Impression: 1. No evidence for pulmonary embolus. 2. Stable examination with stable appearance to a 3 mm left upper lung nodule. 3.  Mediastinal and hilar lymphadenopathy unchanged. Electronically Signed: Maddi Velazquez MD  9/19/2024 1:35 PM EDT  Workstation ID: [id number]    XR Chest 1 View    Result Date: 9/19/2024  XR CHEST 1 VW Date of Exam: 9/19/2024 3:35 AM EDT Indication: Chest Pain Triage Protocol Comparison: 1/16/2024, 12/20/2023. Findings: Stable chronic interstitial changes are present. There are no airspace consolidations. No pleural fluid. No pneumothorax. The pulmonary vasculature appears within normal limits. The cardiac and mediastinal silhouette appear unremarkable. No acute osseous  abnormality identified.     Impression: Impression: No acute cardiopulmonary process. Stable chronic interstitial changes. Electronically Signed: Sharon Fan MD  9/19/2024 3:43 AM EDT  Workstation ID: [id number]        MDM  Number of Diagnoses or Management Options  Atrial fibrillation with rapid ventricular response  Diagnosis management comments: Patient was evaluated and found to have A-fib with RVR.  She was given 150 mg of amiodarone.  She had no significant complaints this morning and only knew that she was in A-flutter with a rate of 130 from a pulse oximeter from her finger.  Patient was then started on amiodarone drip.  She was given two 5 mg doses of metoprolol.  Temporarily her heart rate did improve however she remained in atrial flutter.  Ultimately the patient was transferred to [facility] for further evaluation and treatment       Amount and/or Complexity of Data Reviewed  Clinical lab tests: reviewed  Tests in the medicine section of CPT®: reviewed  Decide to obtain previous medical records or to obtain history from someone other than the patient: yes        Patients HR was maintaining in the 80s but started to creep back up and is now ranging from 100-104. Given patients age and comorbidities I do think shed be high risk for sedation/cardioversion lorie with her barely being RVR I think rate control would be more prudent.     1050: I  spoke with Dr. Francisco and he recommends that we take patient off her current atenolol and start her on toprol 50 mg daily.     Patient trialed on PO toprol here and her HR has come down beautifully to 75-80. Patient has remained completely asymptomatic. She is very well appearing. Comfortably using her phone in bed. I educated patient on importance of stopping her atenolol when she starts her toprol. She voices understanding. She agrees to call Dr. Alvarez for close fu care      Medications   sodium chloride 0.9 % flush 10 mL (has no administration in time range)   amiodarone 360 mg in 200 mL D5W infusion (0 mg/min Intravenous Stopped 9/19/24 1029)   amiodarone 150 mg in 100 mL D5W (loading dose) (0 mg Intravenous Stopped 9/19/24 0404)   metoprolol tartrate (LOPRESSOR) injection 5 mg (5 mg Intravenous Given 9/19/24 0518)   metoprolol tartrate (LOPRESSOR) injection 5 mg (5 mg Intravenous Given 9/19/24 0557)   metoprolol tartrate (LOPRESSOR) injection 5 mg (5 mg Intravenous Given 9/19/24 0636)   sodium chloride 0.9 % bolus 500 mL (0 mL Intravenous Stopped 9/19/24 1030)   metoprolol tartrate (LOPRESSOR) injection 5 mg (5 mg Intravenous Given 9/19/24 1111)   metoprolol succinate XL (TOPROL-XL) 24 hr tablet 50 mg (50 mg Oral Given 9/19/24 1255)   iopamidol (ISOVUE-370) 76 % injection 100 mL (85 mL Intravenous Given 9/19/24 1315)       Data interpreted: Nursing notes reviewed, vital signs reviewed.  Labs independently interpreted by me (CBC, CMP, lipase, UA, troponin, ABG, lactic acid, procalcitonin).  Imaging independently interpreted by me (x-ray, CT scan).  EKG independently interpreted by me.  O2 saturation:    Counseling: Discussed the results above with the patient regarding need for admission or discharge.  Patient understands and agrees plan of care.      -----  ED Disposition       ED Disposition   Discharge    Condition   Stable    Comment   --             Final diagnoses:   Atrial fibrillation with rapid  ventricular response      Your Follow-Up Providers       [facility] EMERGENCY DEPARTMENT [place].    Specialty: Emergency Medicine  Follow up details: As needed, If symptoms worsen  [address] [facility] [address]  [facility], located within [facility] [address] [phone]             Lizzette Multani MD In 1 day.    Specialty: Internal Medicine  [address]  [address]  [place] [address]  [phone]               Matilde Alvarez MD In 1 day.    Specialties: Cardiology, Interventional Cardiology  [address] [facility]  [address]  [place] [address]  [phone]                       Contact information for after-discharge care    Follow-up information has not been specified.                    Your medication list        START taking these medications        Instructions Last Dose Given Next Dose Due   metoprolol succinate XL 50 MG 24 hr tablet  Commonly known as: TOPROL-XL      Take 1 tablet by mouth Daily for 14 days.              CONTINUE taking these medications        Instructions Last Dose Given Next Dose Due   Accu-Chek Guide test strip  Generic drug: glucose blood      Check blood sugar daily dx e11.65       Accu-Chek Guide w/Device kit      1 Device See Admin Instructions. Use to check blood sugar once daily.  Dx E11.9       amLODIPine 2.5 MG tablet  Commonly known as: NORVASC      Take 1 tablet by mouth Daily.       atorvastatin 40 MG tablet  Commonly known as: LIPITOR      Take 1 tablet by mouth Daily.       Cinnamon 500 MG capsule      Take 1 capsule by mouth Daily.       fluocinonide 0.05 % ointment  Commonly known as: LIDEX      Apply 1 Application topically to the appropriate area as directed 2 (Two) Times a Day.       glipizide 2.5 MG 24 hr tablet  Commonly known as: GLUCOTROL XL      Take 2 tablets by mouth Daily. 2 times daily       hydroCHLOROthiazide 25 MG tablet      Take 1 tablet by mouth Daily.       Januvia 100 MG tablet  Generic drug: SITagliptin      Take 1 tablet by  mouth once daily       Lancets 33G misc      Use 1 each Daily. Dx e11.65       metFORMIN  MG 24 hr tablet  Commonly known as: GLUCOPHAGE-XR      TAKE 2 TABLETS BY MOUTH TWICE DAILY WITH MEALS       omega-3 acid ethyl esters 1 g capsule  Commonly known as: LOVAZA      Take 1 capsule by mouth Daily.       oxybutynin XL 5 MG 24 hr tablet  Commonly known as: DITROPAN-XL      Take 1 tablet by mouth Daily.       perindopril 2 MG tablet  Commonly known as: ACEON      Take 0.5 tablets by mouth Daily.       rivaroxaban 15 MG tablet  Commonly known as: XARELTO      Take 1 tablet by mouth Daily With Dinner. Indications: Atrial Fibrillation       rosuvastatin 20 MG tablet  Commonly known as: CRESTOR      Take 1 tablet by mouth Daily.       valsartan 320 MG tablet  Commonly known as: DIOVAN      Take 1 tablet by mouth Every Night.       VITAMIN D3 PO      Take  by mouth. 2000 IU daily              STOP taking these medications      atenolol 25 MG tablet  Commonly known as: TENORMIN                  Where to Get Your Medications        These medications were sent to [facility] [address] [place] [address] - [address] - [phone]  - [phone] [address] [address]      Phone: [phone]   metoprolol succinate XL 50 MG 24 hr tablet

## 2024-09-20 ENCOUNTER — APPOINTMENT (OUTPATIENT)
Dept: CARDIOLOGY | Facility: HOSPITAL | Age: 84
End: 2024-09-20
Payer: MEDICARE

## 2024-09-20 PROBLEM — I48.91 A-FIB: Status: ACTIVE | Noted: 2024-09-20

## 2024-09-20 LAB
ANION GAP SERPL CALCULATED.3IONS-SCNC: 11 MMOL/L (ref 5–15)
AORTIC DIMENSIONLESS INDEX: 0.33 (DI)
BACTERIA UR QL AUTO: ABNORMAL /HPF
BH CV ECHO MEAS - AO MAX PG: 26 MMHG
BH CV ECHO MEAS - AO MEAN PG: 14 MMHG
BH CV ECHO MEAS - AO ROOT DIAM: 2.2 CM
BH CV ECHO MEAS - AO V2 MAX: 239.8 CM/SEC
BH CV ECHO MEAS - AO V2 VTI: 46.1 CM
BH CV ECHO MEAS - AVA(I,D): 1.3 CM2
BH CV ECHO MEAS - EDV(CUBED): 68.9 ML
BH CV ECHO MEAS - EDV(MOD-SP2): 50.4 ML
BH CV ECHO MEAS - EDV(MOD-SP4): 33.5 ML
BH CV ECHO MEAS - EF(MOD-BP): 58.9 %
BH CV ECHO MEAS - EF(MOD-SP2): 58.7 %
BH CV ECHO MEAS - EF(MOD-SP4): 55.5 %
BH CV ECHO MEAS - ESV(CUBED): 12.2 ML
BH CV ECHO MEAS - ESV(MOD-SP2): 20.8 ML
BH CV ECHO MEAS - ESV(MOD-SP4): 14.9 ML
BH CV ECHO MEAS - FS: 43.9 %
BH CV ECHO MEAS - IVS/LVPW: 1 CM
BH CV ECHO MEAS - IVSD: 1.1 CM
BH CV ECHO MEAS - LA DIMENSION: 4 CM
BH CV ECHO MEAS - LAT PEAK E' VEL: 14.1 CM/SEC
BH CV ECHO MEAS - LV MASS(C)D: 151.3 GRAMS
BH CV ECHO MEAS - LV MAX PG: 3.1 MMHG
BH CV ECHO MEAS - LV MEAN PG: 1.5 MMHG
BH CV ECHO MEAS - LV V1 MAX: 87.6 CM/SEC
BH CV ECHO MEAS - LV V1 VTI: 18.2 CM
BH CV ECHO MEAS - LVIDD: 4.1 CM
BH CV ECHO MEAS - LVIDS: 2.3 CM
BH CV ECHO MEAS - LVOT AREA: 3.1 CM2
BH CV ECHO MEAS - LVOT DIAM: 2 CM
BH CV ECHO MEAS - LVPWD: 1.1 CM
BH CV ECHO MEAS - MED PEAK E' VEL: 8.8 CM/SEC
BH CV ECHO MEAS - MV A MAX VEL: 69.8 CM/SEC
BH CV ECHO MEAS - MV DEC SLOPE: 699 CM/SEC2
BH CV ECHO MEAS - MV DEC TIME: 0.29 SEC
BH CV ECHO MEAS - MV E MAX VEL: 166 CM/SEC
BH CV ECHO MEAS - MV E/A: 2.38
BH CV ECHO MEAS - MV MAX PG: 14.7 MMHG
BH CV ECHO MEAS - MV MEAN PG: 5 MMHG
BH CV ECHO MEAS - MV P1/2T: 75.8 MSEC
BH CV ECHO MEAS - MV V2 VTI: 42.6 CM
BH CV ECHO MEAS - MVA(P1/2T): 2.9 CM2
BH CV ECHO MEAS - MVA(VTI): 1.34 CM2
BH CV ECHO MEAS - PA ACC TIME: 0.08 SEC
BH CV ECHO MEAS - SV(LVOT): 57.2 ML
BH CV ECHO MEAS - SV(MOD-SP2): 29.6 ML
BH CV ECHO MEAS - SV(MOD-SP4): 18.6 ML
BH CV ECHO MEAS - TAPSE (>1.6): 1.97 CM
BH CV ECHO MEASUREMENTS AVERAGE E/E' RATIO: 14.5
BH CV XLRA - RV BASE: 4.5 CM
BH CV XLRA - RV LENGTH: 6.6 CM
BH CV XLRA - RV MID: 4 CM
BH CV XLRA - TDI S': 11.2 CM/SEC
BILIRUB UR QL STRIP: NEGATIVE
BUN SERPL-MCNC: 26 MG/DL (ref 8–23)
BUN/CREAT SERPL: 28.6 (ref 7–25)
CALCIUM SPEC-SCNC: 9.1 MG/DL (ref 8.6–10.5)
CHLORIDE SERPL-SCNC: 102 MMOL/L (ref 98–107)
CLARITY UR: CLEAR
CO2 SERPL-SCNC: 26 MMOL/L (ref 22–29)
COLOR UR: YELLOW
CREAT SERPL-MCNC: 0.91 MG/DL (ref 0.57–1)
D-LACTATE SERPL-SCNC: 1 MMOL/L (ref 0.5–2)
EGFRCR SERPLBLD CKD-EPI 2021: 61.6 ML/MIN/1.73
GLUCOSE BLDC GLUCOMTR-MCNC: 220 MG/DL (ref 70–130)
GLUCOSE BLDC GLUCOMTR-MCNC: 233 MG/DL (ref 70–130)
GLUCOSE BLDC GLUCOMTR-MCNC: 321 MG/DL (ref 70–130)
GLUCOSE BLDC GLUCOMTR-MCNC: 322 MG/DL (ref 70–130)
GLUCOSE SERPL-MCNC: 222 MG/DL (ref 65–99)
GLUCOSE UR STRIP-MCNC: NEGATIVE MG/DL
HGB UR QL STRIP.AUTO: NEGATIVE
HYALINE CASTS UR QL AUTO: ABNORMAL /LPF
KETONES UR QL STRIP: NEGATIVE
LEFT ATRIUM VOLUME INDEX: 35.5 ML/M2
LEUKOCYTE ESTERASE UR QL STRIP.AUTO: ABNORMAL
MAGNESIUM SERPL-MCNC: 2.1 MG/DL (ref 1.6–2.4)
NITRITE UR QL STRIP: NEGATIVE
PH UR STRIP.AUTO: 5.5 [PH] (ref 5–8)
POTASSIUM SERPL-SCNC: 3.7 MMOL/L (ref 3.5–5.2)
PROT UR QL STRIP: NEGATIVE
QT INTERVAL: 326 MS
QT INTERVAL: 344 MS
QT INTERVAL: 360 MS
QT INTERVAL: 368 MS
QT INTERVAL: 410 MS
QTC INTERVAL: 470 MS
QTC INTERVAL: 474 MS
QTC INTERVAL: 481 MS
QTC INTERVAL: 504 MS
QTC INTERVAL: 517 MS
RBC # UR STRIP: ABNORMAL /HPF
REF LAB TEST METHOD: ABNORMAL
SODIUM SERPL-SCNC: 139 MMOL/L (ref 136–145)
SP GR UR STRIP: 1.02 (ref 1–1.03)
SQUAMOUS #/AREA URNS HPF: ABNORMAL /HPF
T4 FREE SERPL-MCNC: 1.26 NG/DL (ref 0.92–1.68)
TRANS CELLS #/AREA URNS HPF: ABNORMAL /HPF
TROPONIN T SERPL HS-MCNC: 20 NG/L
UROBILINOGEN UR QL STRIP: ABNORMAL
WBC # UR STRIP: ABNORMAL /HPF

## 2024-09-20 PROCEDURE — 96366 THER/PROPH/DIAG IV INF ADDON: CPT

## 2024-09-20 PROCEDURE — G0378 HOSPITAL OBSERVATION PER HR: HCPCS

## 2024-09-20 PROCEDURE — 93306 TTE W/DOPPLER COMPLETE: CPT

## 2024-09-20 PROCEDURE — 93005 ELECTROCARDIOGRAM TRACING: CPT | Performed by: EMERGENCY MEDICINE

## 2024-09-20 PROCEDURE — 25810000003 SODIUM CHLORIDE 0.9 % SOLUTION: Performed by: EMERGENCY MEDICINE

## 2024-09-20 PROCEDURE — 93306 TTE W/DOPPLER COMPLETE: CPT | Performed by: INTERNAL MEDICINE

## 2024-09-20 PROCEDURE — 87086 URINE CULTURE/COLONY COUNT: CPT | Performed by: EMERGENCY MEDICINE

## 2024-09-20 PROCEDURE — 96365 THER/PROPH/DIAG IV INF INIT: CPT

## 2024-09-20 PROCEDURE — 96367 TX/PROPH/DG ADDL SEQ IV INF: CPT

## 2024-09-20 PROCEDURE — 63710000001 INSULIN REGULAR HUMAN PER 5 UNITS: Performed by: STUDENT IN AN ORGANIZED HEALTH CARE EDUCATION/TRAINING PROGRAM

## 2024-09-20 PROCEDURE — 83735 ASSAY OF MAGNESIUM: CPT | Performed by: STUDENT IN AN ORGANIZED HEALTH CARE EDUCATION/TRAINING PROGRAM

## 2024-09-20 PROCEDURE — 25010000002 AMIODARONE IN DEXTROSE 5% 360-4.14 MG/200ML-% SOLUTION: Performed by: INTERNAL MEDICINE

## 2024-09-20 PROCEDURE — 96361 HYDRATE IV INFUSION ADD-ON: CPT

## 2024-09-20 PROCEDURE — 25010000002 CEFTRIAXONE PER 250 MG: Performed by: EMERGENCY MEDICINE

## 2024-09-20 PROCEDURE — 83605 ASSAY OF LACTIC ACID: CPT | Performed by: EMERGENCY MEDICINE

## 2024-09-20 PROCEDURE — 99222 1ST HOSP IP/OBS MODERATE 55: CPT | Performed by: STUDENT IN AN ORGANIZED HEALTH CARE EDUCATION/TRAINING PROGRAM

## 2024-09-20 PROCEDURE — 25010000002 AMIODARONE IN DEXTROSE 5% 150-4.21 MG/100ML-% SOLUTION: Performed by: INTERNAL MEDICINE

## 2024-09-20 PROCEDURE — 80048 BASIC METABOLIC PNL TOTAL CA: CPT | Performed by: STUDENT IN AN ORGANIZED HEALTH CARE EDUCATION/TRAINING PROGRAM

## 2024-09-20 PROCEDURE — 82948 REAGENT STRIP/BLOOD GLUCOSE: CPT

## 2024-09-20 PROCEDURE — 81001 URINALYSIS AUTO W/SCOPE: CPT | Performed by: EMERGENCY MEDICINE

## 2024-09-20 PROCEDURE — 87040 BLOOD CULTURE FOR BACTERIA: CPT | Performed by: EMERGENCY MEDICINE

## 2024-09-20 PROCEDURE — 99222 1ST HOSP IP/OBS MODERATE 55: CPT | Performed by: INTERNAL MEDICINE

## 2024-09-20 RX ORDER — ACETAMINOPHEN 160 MG/5ML
650 SOLUTION ORAL EVERY 4 HOURS PRN
Status: DISCONTINUED | OUTPATIENT
Start: 2024-09-20 | End: 2024-09-22 | Stop reason: HOSPADM

## 2024-09-20 RX ORDER — NICOTINE POLACRILEX 4 MG
15 LOZENGE BUCCAL
Status: DISCONTINUED | OUTPATIENT
Start: 2024-09-20 | End: 2024-09-22 | Stop reason: HOSPADM

## 2024-09-20 RX ORDER — CHLORAL HYDRATE 500 MG
1000 CAPSULE ORAL
COMMUNITY

## 2024-09-20 RX ORDER — BISACODYL 5 MG/1
5 TABLET, DELAYED RELEASE ORAL DAILY PRN
Status: DISCONTINUED | OUTPATIENT
Start: 2024-09-20 | End: 2024-09-22 | Stop reason: HOSPADM

## 2024-09-20 RX ORDER — SODIUM CHLORIDE 9 MG/ML
125 INJECTION, SOLUTION INTRAVENOUS CONTINUOUS
Status: DISCONTINUED | OUTPATIENT
Start: 2024-09-20 | End: 2024-09-21

## 2024-09-20 RX ORDER — SODIUM CHLORIDE 9 MG/ML
40 INJECTION, SOLUTION INTRAVENOUS AS NEEDED
Status: DISCONTINUED | OUTPATIENT
Start: 2024-09-20 | End: 2024-09-22 | Stop reason: HOSPADM

## 2024-09-20 RX ORDER — IBUPROFEN 600 MG/1
1 TABLET ORAL
Status: DISCONTINUED | OUTPATIENT
Start: 2024-09-20 | End: 2024-09-22 | Stop reason: HOSPADM

## 2024-09-20 RX ORDER — ACETAMINOPHEN 325 MG/1
650 TABLET ORAL EVERY 4 HOURS PRN
Status: DISCONTINUED | OUTPATIENT
Start: 2024-09-20 | End: 2024-09-22 | Stop reason: HOSPADM

## 2024-09-20 RX ORDER — AMLODIPINE BESYLATE 2.5 MG/1
2.5 TABLET ORAL DAILY
Status: DISCONTINUED | OUTPATIENT
Start: 2024-09-20 | End: 2024-09-22 | Stop reason: HOSPADM

## 2024-09-20 RX ORDER — ATORVASTATIN CALCIUM 40 MG/1
40 TABLET, FILM COATED ORAL DAILY
Status: DISCONTINUED | OUTPATIENT
Start: 2024-09-20 | End: 2024-09-22 | Stop reason: HOSPADM

## 2024-09-20 RX ORDER — DILTIAZEM HCL/D5W 125 MG/125
5-15 PLASTIC BAG, INJECTION (ML) INTRAVENOUS
Status: DISCONTINUED | OUTPATIENT
Start: 2024-09-20 | End: 2024-09-22 | Stop reason: HOSPADM

## 2024-09-20 RX ORDER — SODIUM CHLORIDE 0.9 % (FLUSH) 0.9 %
10 SYRINGE (ML) INJECTION AS NEEDED
Status: DISCONTINUED | OUTPATIENT
Start: 2024-09-20 | End: 2024-09-22 | Stop reason: HOSPADM

## 2024-09-20 RX ORDER — AMOXICILLIN 250 MG
2 CAPSULE ORAL 2 TIMES DAILY PRN
Status: DISCONTINUED | OUTPATIENT
Start: 2024-09-20 | End: 2024-09-22 | Stop reason: HOSPADM

## 2024-09-20 RX ORDER — HYDROCHLOROTHIAZIDE 25 MG/1
25 TABLET ORAL DAILY
Status: DISCONTINUED | OUTPATIENT
Start: 2024-09-20 | End: 2024-09-22 | Stop reason: HOSPADM

## 2024-09-20 RX ORDER — VALSARTAN 160 MG/1
320 TABLET ORAL NIGHTLY
Status: DISCONTINUED | OUTPATIENT
Start: 2024-09-20 | End: 2024-09-22 | Stop reason: HOSPADM

## 2024-09-20 RX ORDER — ACETAMINOPHEN 650 MG/1
650 SUPPOSITORY RECTAL EVERY 4 HOURS PRN
Status: DISCONTINUED | OUTPATIENT
Start: 2024-09-20 | End: 2024-09-22 | Stop reason: HOSPADM

## 2024-09-20 RX ORDER — DEXTROSE MONOHYDRATE 25 G/50ML
25 INJECTION, SOLUTION INTRAVENOUS
Status: DISCONTINUED | OUTPATIENT
Start: 2024-09-20 | End: 2024-09-22 | Stop reason: HOSPADM

## 2024-09-20 RX ORDER — POLYETHYLENE GLYCOL 3350 17 G/17G
17 POWDER, FOR SOLUTION ORAL DAILY PRN
Status: DISCONTINUED | OUTPATIENT
Start: 2024-09-20 | End: 2024-09-22 | Stop reason: HOSPADM

## 2024-09-20 RX ORDER — BISACODYL 10 MG
10 SUPPOSITORY, RECTAL RECTAL DAILY PRN
Status: DISCONTINUED | OUTPATIENT
Start: 2024-09-20 | End: 2024-09-22 | Stop reason: HOSPADM

## 2024-09-20 RX ORDER — METOPROLOL SUCCINATE 50 MG/1
50 TABLET, EXTENDED RELEASE ORAL DAILY
Status: DISCONTINUED | OUTPATIENT
Start: 2024-09-20 | End: 2024-09-22 | Stop reason: HOSPADM

## 2024-09-20 RX ORDER — SODIUM CHLORIDE 0.9 % (FLUSH) 0.9 %
10 SYRINGE (ML) INJECTION EVERY 12 HOURS SCHEDULED
Status: DISCONTINUED | OUTPATIENT
Start: 2024-09-20 | End: 2024-09-22 | Stop reason: HOSPADM

## 2024-09-20 RX ADMIN — ATORVASTATIN CALCIUM 40 MG: 40 TABLET, FILM COATED ORAL at 11:00

## 2024-09-20 RX ADMIN — INSULIN HUMAN 3 UNITS: 100 INJECTION, SOLUTION PARENTERAL at 14:35

## 2024-09-20 RX ADMIN — RIVAROXABAN 15 MG: 15 TABLET, FILM COATED ORAL at 17:30

## 2024-09-20 RX ADMIN — Medication 10 ML: at 12:03

## 2024-09-20 RX ADMIN — SODIUM CHLORIDE 125 ML/HR: 9 INJECTION, SOLUTION INTRAVENOUS at 14:50

## 2024-09-20 RX ADMIN — AMIODARONE HYDROCHLORIDE 0.5 MG/MIN: 1.8 INJECTION, SOLUTION INTRAVENOUS at 20:49

## 2024-09-20 RX ADMIN — AMIODARONE HYDROCHLORIDE 1 MG/MIN: 1.8 INJECTION, SOLUTION INTRAVENOUS at 14:43

## 2024-09-20 RX ADMIN — SODIUM CHLORIDE 125 ML/HR: 9 INJECTION, SOLUTION INTRAVENOUS at 03:58

## 2024-09-20 RX ADMIN — SODIUM CHLORIDE 1000 MG: 900 INJECTION INTRAVENOUS at 02:42

## 2024-09-20 RX ADMIN — INSULIN HUMAN 5 UNITS: 100 INJECTION, SOLUTION PARENTERAL at 17:30

## 2024-09-20 RX ADMIN — AMIODARONE HYDROCHLORIDE 150 MG: 1.5 INJECTION, SOLUTION INTRAVENOUS at 14:15

## 2024-09-20 RX ADMIN — METOPROLOL SUCCINATE 50 MG: 50 TABLET, EXTENDED RELEASE ORAL at 12:02

## 2024-09-20 NOTE — PROGRESS NOTES
[facility]  PROGRESS NOTE    Patient Name: Kaylen Garrison  : 1940  MRN: [id number]    Date of Admission: 2024  Primary Care Physician: Lizzette Multani MD    Subjective   Subjective     CC:  Afib     HPI:  feels okay, no chest pain , busy knitting.  States she had dysuria a few days ago, none currently       Objective   Objective     Vital Signs:   Temp:  [97.8 °F (36.6 °C)-97.9 °F (36.6 °C)] 97.9 °F (36.6 °C)  Heart Rate:  [] 124  Resp:  [18] 18  BP: ()/(47-92) 111/82  Flow (L/min):  [2] 2     Physical Exam:  Gen:  WD/WN woman NAD, husb present   Neuro: alert and oriented, clear speech, follows commands, grossly nonfocal  HEENT:  NC/AT   Neck:  Supple, no LAD  Heart tachy RR.  (Transient Aflutter w variable conduction seen on tele, then settled at  )   Abd:  Soft, nontender, no rebound or guarding, pos BS  Extrem:  No c/c/e      Results Reviewed:  LAB RESULTS:      Lab 24  0231 24  0334   WBC  --  13.32* 12.07*   HEMOGLOBIN  --  14.9 13.5   HEMATOCRIT  --  45.1 41.3   PLATELETS  --  396 348   NEUTROS ABS  --  8.94* 8.32*   IMMATURE GRANS (ABS)  --  0.07* 0.03   LYMPHS ABS  --  2.87 2.49   MONOS ABS  --  1.13* 0.91*   EOS ABS  --  0.24 0.28   MCV  --  87.1 87.9   LACTATE 1.0 1.4  --          Lab 24  1151 24  0334   SODIUM 139 140 140   POTASSIUM 3.7 4.0 3.9   CHLORIDE 102 101 102   CO2 26.0 25.0 21.0*   ANION GAP 11.0 14.0 17.0*   BUN 26* 33* 32*   CREATININE 0.91 0.99 1.00   EGFR 61.6 55.6* 55.0*   GLUCOSE 222* 198* 256*   CALCIUM 9.1 9.7 9.6   MAGNESIUM 2.1 2.0 1.9   TSH  --  6.630*  --          Lab 24  0334   TOTAL PROTEIN 7.4 6.7   ALBUMIN 4.3 3.9   GLOBULIN 3.1 2.8   ALT (SGPT) 16 12   AST (SGOT) 16 22   BILIRUBIN 0.6 0.4   ALK PHOS 66 61   LIPASE  --  36         Lab 249 24  0541 24  0334   PROBNP  --  4,975.0*  --  629.0    HSTROP T 20* 25* 22* 22*                 Brief Urine Lab Results  (Last result in the past 365 days)        Color   Clarity   Blood   Leuk Est   Nitrite   Protein   CREAT   Urine HCG        09/20/24 0059 Yellow   Clear   Negative   Moderate (2+)   Negative   Negative                   Microbiology Results Abnormal       None            XR Chest 1 View    Result Date: 9/19/2024  XR CHEST 1 VW Date of Exam: 9/19/2024 9:21 PM EDT Indication: Dysrhythmia triage protocol Comparison: CTA chest 9/19/2024, AP chest x-ray 9/19/2024, two-view chest x-ray 12/20/2023 Findings: Chronic interstitial changes appear stable. Lungs are grossly clear. No pneumothorax or large pleural effusion is seen. Cardiomediastinal contours are unchanged.     Impression: Impression: No acute cardiopulmonary abnormality is identified. Electronically Signed: Savanna Medel  9/19/2024 9:54 PM EDT  Workstation ID: [id number]    CT Angiogram Chest Pulmonary Embolism    Result Date: 9/19/2024  CT ANGIOGRAM CHEST PULMONARY EMBOLISM Date of Exam: 9/19/2024 1:04 PM EDT Indication: Afib. Comparison: Chest x-ray 9/19/2024 and CT chest 1/16/2024 and 4/10/2023 Technique: Axial CT images were obtained of the chest after the uneventful intravenous administration of 85 mL Isovue-370 utilizing pulmonary embolism protocol.  Reconstructed coronal and sagittal images were also obtained. Automated exposure control and  iterative construction methods were used. Findings: PULMONARY VASCULATURE: Pulmonary arteries are widely patent without evidence of embolus.Main pulmonary artery is normal in size. No evidence of right heart strain. MEDIASTINUM:Unremarkable. Aortic and heart size are normal. No aortic dissection identified. No mass nor pericardial effusion. CORONARY ARTERIES: Mild to moderate calcified atherosclerotic disease. LUNGS: Lungs are clear. No consolidation. There is a 3 mm nodule in the left upper lung unchanged from prior studies.. PLEURAL SPACE: No effusion,  mass, nor pneumothorax. LYMPH NODES: There is essentially stable appearance to mediastinal and bilateral hilar lymph nodes. UPPER ABDOMEN: The gallbladder is surgically absent. There is a cyst in the medial cortex of the right kidney measuring 1.2 cm. There is a small sliding hiatal hernia. OSSEOUS STRUCTURES: Appropriate for age with no acute process identified.     Impression: Impression: 1. No evidence for pulmonary embolus. 2. Stable examination with stable appearance to a 3 mm left upper lung nodule. 3. Mediastinal and hilar lymphadenopathy unchanged. Electronically Signed: Maddi Velazquez MD  9/19/2024 1:35 PM EDT  Workstation ID: [id number]    XR Chest 1 View    Result Date: 9/19/2024  XR CHEST 1 VW Date of Exam: 9/19/2024 3:35 AM EDT Indication: Chest Pain Triage Protocol Comparison: 1/16/2024, 12/20/2023. Findings: Stable chronic interstitial changes are present. There are no airspace consolidations. No pleural fluid. No pneumothorax. The pulmonary vasculature appears within normal limits. The cardiac and mediastinal silhouette appear unremarkable. No acute osseous  abnormality identified.     Impression: Impression: No acute cardiopulmonary process. Stable chronic interstitial changes. Electronically Signed: Sharon Fan MD  9/19/2024 3:43 AM EDT  Workstation ID: [id number]     Results for orders placed during the hospital encounter of 04/07/23    Adult Transthoracic Echo Complete W/ Cont if Necessary Per Protocol    Interpretation Summary    Left ventricular systolic function is normal. Left ventricular ejection fraction appears to be 56 - 60%.    Left ventricular wall thickness is consistent with mild concentric hypertrophy.    Left ventricular diastolic function is consistent with (grade II w/high LAP) pseudonormalization.    Left atrial volume is mildly increased.    There is moderate calcification of the aortic valve.    Moderate aortic valve stenosis is present.    There is moderate calcification of the  mitral valve    Mild to moderate mitral valve stenosis is present.    Mild tricuspid valve regurgitation is present.    Estimated right ventricular systolic pressure from tricuspid regurgitation is moderately elevated (45-55 mmHg).      Current medications:  Scheduled Meds:amiodarone, 150 mg, Intravenous, Once  [Held by provider] amLODIPine, 2.5 mg, Oral, Daily  atorvastatin, 40 mg, Oral, Daily  [START ON 9/21/2024] cefTRIAXone, 1,000 mg, Intravenous, Q24H  [Held by provider] hydroCHLOROthiazide, 25 mg, Oral, Daily  insulin regular, 2-7 Units, Subcutaneous, Q6H  metoprolol succinate XL, 50 mg, Oral, Daily  rivaroxaban, 15 mg, Oral, Daily With Dinner  sodium chloride, 10 mL, Intravenous, Q12H  [Held by provider] valsartan, 320 mg, Oral, Nightly      Continuous Infusions:amiodarone, 1 mg/min   Followed by  amiodarone, 0.5 mg/min  [Held by provider] dilTIAZem, 5-15 mg/hr, Last Rate: Stopped (09/20/24 0358)  sodium chloride, 125 mL/hr, Last Rate: 125 mL/hr (09/20/24 0922)      PRN Meds:.  acetaminophen **OR** acetaminophen **OR** acetaminophen    senna-docusate sodium **AND** polyethylene glycol **AND** bisacodyl **AND** bisacodyl    Calcium Replacement - Follow Nurse / BPA Driven Protocol    dextrose    dextrose    glucagon (human recombinant)    Magnesium Cardiology Dose Replacement - Follow Nurse / BPA Driven Protocol    Phosphorus Replacement - Follow Nurse / BPA Driven Protocol    Potassium Replacement - Follow Nurse / BPA Driven Protocol    sodium chloride    sodium chloride    sodium chloride    Assessment & Plan   Assessment & Plan     Active Hospital Problems    Diagnosis  POA    **A-fib [I48.91]  Yes      Resolved Hospital Problems   No resolved problems to display.        Brief Hospital Course to date:  Kaylen Garrison is a 84 y.o. female w pmh Afib, HTN BERNARD/CPAP, AS, came in for tachycardia noted on pulse ox, though she had no symptoms.       Afib Aflutter with RVR  HTN  -continue xarelto  - cards  consuulted and added amio  - some low BPs     UTI  -dysuria, already on treatment. Will complete with 1 more dose ctx      Chronic hypoxia and BERNARD  -c/w home CPAP and 2L O2 PRN     T2DM  -on orals at home, cover w/ SSI for now  Expected Discharge Location and Transportation: home by car  Expected Discharge soon   Expected discharge date/ time has not been documented.     VTE Prophylaxis:  Pharmacologic VTE prophylaxis orders are present.         AM-PAC 6 Clicks Score (PT): 21 (09/20/24 1200)    CODE STATUS:   Code Status and Medical Interventions: CPR (Attempt to Resuscitate); Full Support   Ordered at: 09/20/24 0711     Code Status (Patient has no pulse and is not breathing):    CPR (Attempt to Resuscitate)     Medical Interventions (Patient has pulse or is breathing):    Full Support       Mira Mcpherson MD  09/20/24

## 2024-09-20 NOTE — H&P
[facility] Medicine Services  HISTORY AND PHYSICAL    Patient Name: Kaylen Garrison  : 1940  MRN: [id number]  Primary Care Physician: Lizzette Multani MD  Date of admission: 2024      Subjective   Subjective     Chief Complaint:  High heart rate    HPI:  Kaylen Garrison is a 84 y.o. female with PMH afib on xarelto who follows w/ Dr Alvarez, T2DM, HTN, BERNARD on CPAP, AS here w elevated HR at home. She keeps track of this w/ pulse ox. She went to [place] initially yesterday and improved on metoprolol. Given abx for UTI, dc'd home. Reports intermitted episodes of fast HR since then so she came back for eval. No CP, SOA, palpitation, dizziness, vomiting.       Personal History     Past Medical History:   Diagnosis Date    Aortic valve stenosis     Atrial fibrillation     Benign hypertension     History of echocardiogram 2012    Hypercalcemia     Hypercholesterolemia     Hypertension     Meniere's disease     Obesity     Overweight (BMI 25.0-29.9)     Primary hyperparathyroidism     Sleep apnea with use of continuous positive airway pressure (CPAP)     Syncope 2012    Type 2 diabetes mellitus     Vitamin D deficiency            Past Surgical History:   Procedure Laterality Date    BREAST SURGERY Left     benign tumor removal    CATARACT EXTRACTION Bilateral     CHOLECYSTECTOMY N/A 2023    Procedure: CHOLECYSTECTOMY LAPAROSCOPIC;  Surgeon: Abraham Holbrook MD;  Location: [facility] OR;  Service: General;  Laterality: N/A;    ERCP N/A 2023    Procedure: ENDOSCOPIC RETROGRADE CHOLANGIOPANCREATOGRAPHY WITH STENT PLACEMENT;  Surgeon: Brunner, Mark I, MD;  Location: [facility] ENDOSCOPY;  Service: Gastroenterology;  Laterality: N/A;  Sphincterotomy made to ampula of coomon bile duct. CBD swept with 9mm-12 mm balloon. Multiple stones and sludge removed. Stent placed in pancreatic duct    HYSTERECTOMY         Family History: family history includes Heart attack in her  mother; Lactose intolerance in her brother; No Known Problems in her father, sister, and sister.     Social History:  reports that she has never smoked. She has never been exposed to tobacco smoke. She has never used smokeless tobacco. She reports that she does not drink alcohol and does not use drugs.  Social History     Social History Narrative    Caffeine 2 ice teas       Medications:  Available home medication information reviewed.  Accu-Chek Guide, Cholecalciferol, Cinnamon, Lancets 33G, SITagliptin, amLODIPine, atorvastatin, fluocinonide, glipizide, glucose blood, hydroCHLOROthiazide, metFORMIN ER, metoprolol succinate XL, omega-3 acid ethyl esters, oxybutynin XL, perindopril, rivaroxaban, rosuvastatin, and valsartan    Allergies   Allergen Reactions    Adhesive Tape Rash    Latex Rash    Penicillins Rash       Objective   Objective     Vital Signs:   Temp:  [97.8 °F (36.6 °C)] 97.8 °F (36.6 °C)  Heart Rate:  [] 96  Resp:  [18] 18  BP: ()/(47-98) 113/78  Flow (L/min):  [2] 2       Physical Exam   AAO3  Comfortable   MMM  Normal WOB on 2L O2  Heart tachycardic, irregular. Grade 3 systolic murmur RUSB  Lungs CTAB  Abd nontender  Trace peripheral edema    Result Review:  I have personally reviewed the results from the time of this admission to 9/20/2024 08:14 EDT and agree with these findings:  [x]  Laboratory list / accordion  []  Microbiology  [x]  Radiology  [x]  EKG/Telemetry   []  Cardiology/Vascular   []  Pathology  [x]  Old records  []  Other:  Most notable findings include: See A+P      LAB RESULTS:      Lab 09/20/24  0231 09/19/24  2121 09/19/24  0334   WBC  --  13.32* 12.07*   HEMOGLOBIN  --  14.9 13.5   HEMATOCRIT  --  45.1 41.3   PLATELETS  --  396 348   NEUTROS ABS  --  8.94* 8.32*   IMMATURE GRANS (ABS)  --  0.07* 0.03   LYMPHS ABS  --  2.87 2.49   MONOS ABS  --  1.13* 0.91*   EOS ABS  --  0.24 0.28   MCV  --  87.1 87.9   LACTATE 1.0 1.4  --          Lab 09/19/24  2121 09/19/24  0334    SODIUM 140 140   POTASSIUM 4.0 3.9   CHLORIDE 101 102   CO2 25.0 21.0*   ANION GAP 14.0 17.0*   BUN 33* 32*   CREATININE 0.99 1.00   EGFR 55.6* 55.0*   GLUCOSE 198* 256*   CALCIUM 9.7 9.6   MAGNESIUM 2.0 1.9   TSH 6.630*  --          Lab 09/19/24 2121 09/19/24  0334   TOTAL PROTEIN 7.4 6.7   ALBUMIN 4.3 3.9   GLOBULIN 3.1 2.8   ALT (SGPT) 16 12   AST (SGOT) 16 22   BILIRUBIN 0.6 0.4   ALK PHOS 66 61   LIPASE  --  36         Lab 09/19/24 2329 09/19/24 2121 09/19/24  0541 09/19/24  0334   PROBNP  --  4,975.0*  --  629.0   HSTROP T 20* 25* 22* 22*                 UA          9/20/2024    00:59   Urinalysis   Squamous Epithelial Cells, UA 0-2    Specific Gravity, UA 1.016    Ketones, UA Negative    Blood, UA Negative    Leukocytes, UA Moderate (2+)    Nitrite, UA Negative    RBC, UA 0-2    WBC, UA 21-50    Bacteria, UA 2+        Microbiology Results (last 10 days)       ** No results found for the last 240 hours. **            XR Chest 1 View    Result Date: 9/19/2024  XR CHEST 1 VW Date of Exam: 9/19/2024 9:21 PM EDT Indication: Dysrhythmia triage protocol Comparison: CTA chest 9/19/2024, AP chest x-ray 9/19/2024, two-view chest x-ray 12/20/2023 Findings: Chronic interstitial changes appear stable. Lungs are grossly clear. No pneumothorax or large pleural effusion is seen. Cardiomediastinal contours are unchanged.     Impression: Impression: No acute cardiopulmonary abnormality is identified. Electronically Signed: Savanna Medel  9/19/2024 9:54 PM EDT  Workstation ID: [id number]    CT Angiogram Chest Pulmonary Embolism    Result Date: 9/19/2024  CT ANGIOGRAM CHEST PULMONARY EMBOLISM Date of Exam: 9/19/2024 1:04 PM EDT Indication: Afib. Comparison: Chest x-ray 9/19/2024 and CT chest 1/16/2024 and 4/10/2023 Technique: Axial CT images were obtained of the chest after the uneventful intravenous administration of 85 mL Isovue-370 utilizing pulmonary embolism protocol.  Reconstructed coronal and sagittal images were also  obtained. Automated exposure control and  iterative construction methods were used. Findings: PULMONARY VASCULATURE: Pulmonary arteries are widely patent without evidence of embolus.Main pulmonary artery is normal in size. No evidence of right heart strain. MEDIASTINUM:Unremarkable. Aortic and heart size are normal. No aortic dissection identified. No mass nor pericardial effusion. CORONARY ARTERIES: Mild to moderate calcified atherosclerotic disease. LUNGS: Lungs are clear. No consolidation. There is a 3 mm nodule in the left upper lung unchanged from prior studies.. PLEURAL SPACE: No effusion, mass, nor pneumothorax. LYMPH NODES: There is essentially stable appearance to mediastinal and bilateral hilar lymph nodes. UPPER ABDOMEN: The gallbladder is surgically absent. There is a cyst in the medial cortex of the right kidney measuring 1.2 cm. There is a small sliding hiatal hernia. OSSEOUS STRUCTURES: Appropriate for age with no acute process identified.     Impression: Impression: 1. No evidence for pulmonary embolus. 2. Stable examination with stable appearance to a 3 mm left upper lung nodule. 3. Mediastinal and hilar lymphadenopathy unchanged. Electronically Signed: Maddi Velazquez MD  9/19/2024 1:35 PM EDT  Workstation ID: [id number]    XR Chest 1 View    Result Date: 9/19/2024  XR CHEST 1 VW Date of Exam: 9/19/2024 3:35 AM EDT Indication: Chest Pain Triage Protocol Comparison: 1/16/2024, 12/20/2023. Findings: Stable chronic interstitial changes are present. There are no airspace consolidations. No pleural fluid. No pneumothorax. The pulmonary vasculature appears within normal limits. The cardiac and mediastinal silhouette appear unremarkable. No acute osseous  abnormality identified.     Impression: Impression: No acute cardiopulmonary process. Stable chronic interstitial changes. Electronically Signed: Sharon Fan MD  9/19/2024 3:43 AM EDT  Workstation ID: [id number]     Results for orders placed during the  hospital encounter of 04/07/23    Adult Transthoracic Echo Complete W/ Cont if Necessary Per Protocol    Interpretation Summary    Left ventricular systolic function is normal. Left ventricular ejection fraction appears to be 56 - 60%.    Left ventricular wall thickness is consistent with mild concentric hypertrophy.    Left ventricular diastolic function is consistent with (grade II w/high LAP) pseudonormalization.    Left atrial volume is mildly increased.    There is moderate calcification of the aortic valve.    Moderate aortic valve stenosis is present.    There is moderate calcification of the mitral valve    Mild to moderate mitral valve stenosis is present.    Mild tricuspid valve regurgitation is present.    Estimated right ventricular systolic pressure from tricuspid regurgitation is moderately elevated (45-55 mmHg).      Assessment & Plan   Assessment & Plan       A-fib    Afib Aflutter with RVR  HTN  -alternating between RVR 110s-120s and 3:1 aflutter with HR 60s. Asymptomatic. BP low on dilt gtt so holding, now improved. ED offered CVN but patient and family wanted to defer to cardiology, she follows with Dr Alvarez so will place consult and continue home meds for now, toprol and xarelto. Other antiHTN meds on hold at this time given borderline pressures.    UTI  -dysuria, already on treatment. Will complete with 1 more dose ctx tomorrow    Chronic hypoxia and BERNARD  -c/w home CPAP and 2L O2 PRN    T2DM  -on orals at home, cover w/ SSI for now    VTE Prophylaxis:  Pharmacologic VTE prophylaxis orders are signed & held.            CODE STATUS:    Code Status and Medical Interventions: CPR (Attempt to Resuscitate); Full Support   Ordered at: 09/20/24 0729     Code Status (Patient has no pulse and is not breathing):    CPR (Attempt to Resuscitate)     Medical Interventions (Patient has pulse or is breathing):    Full Support       Expected Discharge   Expected discharge date/ time has not been  documented.     Maxi Angel MD  09/20/24

## 2024-09-20 NOTE — ED NOTES
Kaylen Garrison    Nursing Report ED to Floor:  Mental status: A&Ox4  Ambulatory status: unknown  Oxygen Therapy:  2L/NC-4L while asleep  Cardiac Rhythm: a fib to sinus karon  Admitted from: home  Safety Concerns:  none  Social Issues: none  ED Room #:  20    ED Nurse Phone Extension - [phone] or may call [phone].      HPI:   Chief Complaint   Patient presents with    Rapid Heart Rate       Past Medical History:  Past Medical History:   Diagnosis Date    Aortic valve stenosis     Atrial fibrillation     Benign hypertension     History of echocardiogram 04/2012    Hypercalcemia     Hypercholesterolemia     Hypertension     Meniere's disease     Obesity     Overweight (BMI 25.0-29.9)     Primary hyperparathyroidism     Sleep apnea with use of continuous positive airway pressure (CPAP)     Syncope 04/2012    Type 2 diabetes mellitus     Vitamin D deficiency         Past Surgical History:  Past Surgical History:   Procedure Laterality Date    BREAST SURGERY Left     benign tumor removal    CATARACT EXTRACTION Bilateral     CHOLECYSTECTOMY N/A 4/11/2023    Procedure: CHOLECYSTECTOMY LAPAROSCOPIC;  Surgeon: Abraham Holbrook MD;  Location: [facility] OR;  Service: General;  Laterality: N/A;    ERCP N/A 4/9/2023    Procedure: ENDOSCOPIC RETROGRADE CHOLANGIOPANCREATOGRAPHY WITH STENT PLACEMENT;  Surgeon: Brunner, Mark I, MD;  Location: [facility] ENDOSCOPY;  Service: Gastroenterology;  Laterality: N/A;  Sphincterotomy made to ampula of coomon bile duct. CBD swept with 9mm-12 mm balloon. Multiple stones and sludge removed. Stent placed in pancreatic duct    HYSTERECTOMY          Admitting Doctor:   Mira Mcpherson MD    Consulting Provider(s):  Consults       Date and Time Order Name Status Description    9/20/2024  7:25 AM Inpatient Cardiology Consult               Admitting Diagnosis:   The primary encounter diagnosis was Atrial fibrillation with rapid ventricular response. Diagnoses of Heart failure, unspecified HF chronicity,  unspecified heart failure type, Acute cystitis without hematuria, and Sleep apnea, unspecified type were also pertinent to this visit.    Most Recent Vitals:   Vitals:    09/20/24 0752 09/20/24 0802 09/20/24 0832 09/20/24 0901   BP:  113/78 96/64 94/62   Pulse: 96  63 60   Resp:       Temp:       TempSrc:       SpO2: 98% 97% 97% 94%   Weight:       Height:           Active LDAs/IV Access:   Lines, Drains & Airways       Active LDAs       Name Placement date Placement time Site Days    Peripheral IV 09/19/24 2122 Right Antecubital 09/19/24 2122  Antecubital  less than 1                    Labs (abnormal labs have a star):   Labs Reviewed   COMPREHENSIVE METABOLIC PANEL - Abnormal; Notable for the following components:       Result Value    Glucose 198 (*)     BUN 33 (*)     BUN/Creatinine Ratio 33.3 (*)     eGFR 55.6 (*)     All other components within normal limits    Narrative:     GFR Normal >60  Chronic Kidney Disease <60  Kidney Failure <15    The GFR formula is only valid for adults with stable renal function between ages 18 and 70.   SINGLE HS TROPONIN T - Abnormal; Notable for the following components:    HS Troponin T 25 (*)     All other components within normal limits    Narrative:     High Sensitive Troponin T Reference Range:  <14.0 ng/L- Negative Female for AMI  <22.0 ng/L- Negative Male for AMI  >=14 - Abnormal Female indicating possible myocardial injury.  >=22 - Abnormal Male indicating possible myocardial injury.   Clinicians would have to utilize clinical acumen, EKG, Troponin, and serial changes to determine if it is an Acute Myocardial Infarction or myocardial injury due to an underlying chronic condition.        TSH - Abnormal; Notable for the following components:    TSH 6.630 (*)     All other components within normal limits    Narrative:     Due to abnormal TSH results, suggest ordering Free T4.   BNP (IN-HOUSE) - Abnormal; Notable for the following components:    proBNP 4,975.0 (*)     All  other components within normal limits    Narrative:     This assay is used as an aid in the diagnosis of individuals suspected of having heart failure. It can be used as an aid in the diagnosis of acute decompensated heart failure (ADHF) in patients presenting with signs and symptoms of ADHF to the emergency department (ED). In addition, NT-proBNP of <300 pg/mL indicates ADHF is not likely.    Age Range Result Interpretation  NT-proBNP Concentration (pg/mL:      <50             Positive            >450                   Gray                 300-450                    Negative             <300    50-75           Positive            >900                  Gray                300-900                  Negative            <300      >75             Positive            >1800                  Gray                300-1800                  Negative            <300   CBC WITH AUTO DIFFERENTIAL - Abnormal; Notable for the following components:    WBC 13.32 (*)     RDW 15.9 (*)     Neutrophils, Absolute 8.94 (*)     Monocytes, Absolute 1.13 (*)     Immature Grans, Absolute 0.07 (*)     All other components within normal limits   URINALYSIS W/ MICROSCOPIC IF INDICATED (NO CULTURE) - Abnormal; Notable for the following components:    Leuk Esterase, UA Moderate (2+) (*)     All other components within normal limits   SINGLE HS TROPONIN T - Abnormal; Notable for the following components:    HS Troponin T 20 (*)     All other components within normal limits    Narrative:     High Sensitive Troponin T Reference Range:  <14.0 ng/L- Negative Female for AMI  <22.0 ng/L- Negative Male for AMI  >=14 - Abnormal Female indicating possible myocardial injury.  >=22 - Abnormal Male indicating possible myocardial injury.   Clinicians would have to utilize clinical acumen, EKG, Troponin, and serial changes to determine if it is an Acute Myocardial Infarction or myocardial injury due to an underlying chronic condition.        URINALYSIS,  MICROSCOPIC ONLY - Abnormal; Notable for the following components:    WBC, UA 21-50 (*)     Bacteria, UA 2+ (*)     All other components within normal limits   MAGNESIUM - Normal   LACTIC ACID, PLASMA - Normal   LACTIC ACID, PLASMA - Normal   T4, FREE - Normal   BLOOD CULTURE   BLOOD CULTURE   URINE CULTURE   RAINBOW DRAW    Narrative:     The following orders were created for panel order [place] Draw.  Procedure                               Abnormality         Status                     ---------                               -----------         ------                     Green Top (Gel)[[id number]]                                  Final result               Lavender Top[[id number]]                                     Final result               Gold Top - SST[[id number]]                                   Final result               Gray Top[[id number]]                                         Final result               Light Blue Top[[id number]]                                   Final result                 Please view results for these tests on the individual orders.   BASIC METABOLIC PANEL   MAGNESIUM   POCT GLUCOSE FINGERSTICK   POCT GLUCOSE FINGERSTICK   POCT GLUCOSE FINGERSTICK   POCT GLUCOSE FINGERSTICK   CBC AND DIFFERENTIAL    Narrative:     The following orders were created for panel order CBC & Differential.  Procedure                               Abnormality         Status                     ---------                               -----------         ------                     CBC Auto Differential[[id number]]        Abnormal            Final result                 Please view results for these tests on the individual orders.   GREEN TOP   LAVENDER TOP   GOLD TOP - SST   GRAY TOP   LIGHT BLUE TOP       Meds Given in ED:   Medications   sodium chloride 0.9 % flush 10 mL (has no administration in time range)   dilTIAZem (CARDIZEM) 125 mg in 125 mL D5W infusion (0 mg/hr Intravenous Hold 9/20/24 0810)   sodium  chloride 0.9 % infusion (125 mL/hr Intravenous Rate/Dose Verify 9/20/24 0922)   amLODIPine (NORVASC) tablet 2.5 mg ( Oral Dose Auto Held 9/28/24 0900)   atorvastatin (LIPITOR) tablet 40 mg (has no administration in time range)   hydroCHLOROthiazide oral 25 mg ( Oral Dose Auto Held 9/28/24 0900)   metoprolol succinate XL (TOPROL-XL) 24 hr tablet 50 mg (has no administration in time range)   rivaroxaban (XARELTO) tablet 15 mg (has no administration in time range)   valsartan (DIOVAN) tablet 320 mg ( Oral Dose Auto Held 9/28/24 2100)   sodium chloride 0.9 % flush 10 mL (has no administration in time range)   sodium chloride 0.9 % flush 10 mL (has no administration in time range)   sodium chloride 0.9 % infusion 40 mL (has no administration in time range)   Potassium Replacement - Follow Nurse / BPA Driven Protocol (has no administration in time range)   Magnesium Cardiology Dose Replacement - Follow Nurse / BPA Driven Protocol (has no administration in time range)   Phosphorus Replacement - Follow Nurse / BPA Driven Protocol (has no administration in time range)   Calcium Replacement - Follow Nurse / BPA Driven Protocol (has no administration in time range)   sennosides-docusate (PERICOLACE) 8.6-50 MG per tablet 2 tablet (has no administration in time range)     And   polyethylene glycol (MIRALAX) packet 17 g (has no administration in time range)     And   bisacodyl (DULCOLAX) EC tablet 5 mg (has no administration in time range)     And   bisacodyl (DULCOLAX) suppository 10 mg (has no administration in time range)   acetaminophen (TYLENOL) tablet 650 mg (has no administration in time range)     Or   acetaminophen (TYLENOL) 160 MG/5ML oral solution 650 mg (has no administration in time range)     Or   acetaminophen (TYLENOL) suppository 650 mg (has no administration in time range)   cefTRIAXone (ROCEPHIN) 1,000 mg in sodium chloride 0.9 % 100 mL MBP (has no administration in time range)   dextrose (GLUTOSE) oral gel 15  g (has no administration in time range)   dextrose (D50W) (25 g/50 mL) IV injection 25 g (has no administration in time range)   glucagon (GLUCAGEN) injection 1 mg (has no administration in time range)   insulin regular (humuLIN R,novoLIN R) injection 2-7 Units ( Subcutaneous Not Given 9/20/24 0844)   sodium chloride 0.9 % bolus 500 mL (0 mL Intravenous Stopped 9/20/24 0112)   dilTIAZem (CARDIZEM) injection 5 mg (5 mg Intravenous Given 9/19/24 2343)   cefTRIAXone (ROCEPHIN) 1,000 mg in sodium chloride 0.9 % 100 mL MBP (0 mg Intravenous Stopped 9/20/24 0314)     [Held by provider] dilTIAZem, 5-15 mg/hr, Last Rate: Stopped (09/20/24 0358)  sodium chloride, 125 mL/hr, Last Rate: 125 mL/hr (09/20/24 0922)         Last NIH score:                                                          Dysphagia screening results:  Patient Factors Component (Dysphagia:Stroke or Rule-out)  Best Eye Response: 4-->(E4) spontaneous (09/19/24 2113)  Best Motor Response: 6-->(M6) obeys commands (09/19/24 2113)  Best Verbal Response: 5-->(V5) oriented (09/19/24 2113)  [place] Coma Scale Score: 15 (09/19/24 2113)     [place] Coma Scale:  No data recorded     CIWA:        Restraint Type:            Isolation Status:  No active isolations

## 2024-09-20 NOTE — ED PROVIDER NOTES
Subjective   History of Present Illness  84 year old female with history of sleep apnea and paroxysmal atrial fibrillation on Xarelto presents to the emergency department with concerns about an elevated heart rate when she checked it at home prior to arrival. She was seen at [facility] emergency department earlier today, and had atrial fibrillation with RVR at that time. She was treated with medications and rate was controlled with labetalol and she was discharged with outpatient follow up recommended.  She checked her heart rate again shortly prior to arrival and it was elevated again. She denies recent chest pain or shortness of breath.      Review of Systems   Constitutional:  Negative for diaphoresis and fever.   HENT:  Negative for facial swelling and nosebleeds.    Eyes:  Negative for photophobia and discharge.   Respiratory:  Negative for stridor.    Cardiovascular:  Negative for chest pain.   Gastrointestinal:  Negative for vomiting.   Neurological:  Negative for syncope.       Past Medical History:   Diagnosis Date    Aortic valve stenosis     Atrial fibrillation     Benign hypertension     History of echocardiogram 04/2012    Hypercalcemia     Hypercholesterolemia     Hypertension     Meniere's disease     Obesity     Overweight (BMI 25.0-29.9)     Primary hyperparathyroidism     Sleep apnea with use of continuous positive airway pressure (CPAP)     Syncope 04/2012    Type 2 diabetes mellitus     Vitamin D deficiency        Allergies   Allergen Reactions    Adhesive Tape Rash    Latex Rash    Penicillins Rash       Past Surgical History:   Procedure Laterality Date    BREAST SURGERY Left     benign tumor removal    CATARACT EXTRACTION Bilateral     CHOLECYSTECTOMY N/A 4/11/2023    Procedure: CHOLECYSTECTOMY LAPAROSCOPIC;  Surgeon: Abraham Holbrook MD;  Location: [facility];  Service: General;  Laterality: N/A;    ERCP N/A 4/9/2023    Procedure: ENDOSCOPIC RETROGRADE CHOLANGIOPANCREATOGRAPHY  WITH STENT PLACEMENT;  Surgeon: Brunner, Mark I, MD;  Location: [facility] ENDOSCOPY;  Service: Gastroenterology;  Laterality: N/A;  Sphincterotomy made to ampula of coomon bile duct. CBD swept with 9mm-12 mm balloon. Multiple stones and sludge removed. Stent placed in pancreatic duct    HYSTERECTOMY         Family History   Problem Relation Age of Onset    Heart attack Mother     No Known Problems Father     No Known Problems Sister     Lactose intolerance Brother     No Known Problems Sister        Social History     Socioeconomic History    Marital status:    Tobacco Use    Smoking status: Never     Passive exposure: Never    Smokeless tobacco: Never   Vaping Use    Vaping status: Never Used   Substance and Sexual Activity    Alcohol use: No    Drug use: No    Sexual activity: Defer           Objective   Physical Exam  Vitals and nursing note reviewed.   Constitutional:       General: She is not in acute distress.     Appearance: She is not diaphoretic.   HENT:      Mouth/Throat:      Mouth: Mucous membranes are moist.   Eyes:      General: No scleral icterus.     Comments: No photophobia.   Neck:      Comments: Trachea midline.  Cardiovascular:      Heart sounds: No murmur heard.     No friction rub. No gallop.      Comments: S1, S2, irregularly irriegular rhythm, normal rate.   Pulmonary:      Effort: Pulmonary effort is normal. No respiratory distress.      Breath sounds: Normal breath sounds. No stridor. No wheezing, rhonchi or rales.   Skin:     General: Skin is warm and dry.      Coloration: Skin is not jaundiced or pale.   Neurological:      Mental Status: She is alert.      Comments: Normal speech, no dysarthria. No facial droop.                    ED Course  ED Course as of 09/23/24 2337   Thu Sep 19, 2024   2137 WBC(!): 13.32 [LD]   2137 Hemoglobin: 14.9 [LD]   2202 proBNP(!): 4,975.0  Was 628 within the past 24 hours [LD]   2202 HS Troponin T(!): 25 [LD]   2203 WBC(!): 13.32  Increased from recent  prior value [LD]   2345 Heart Rate: 118  Awaiting UA collection and result of second troponin (in process). [LD]   Fri Sep 20, 2024   0150 Leukocytes, UA(!): Moderate (2+) [LD]   0150 Nitrite, UA: Negative [LD]   0150 Blood, UA: Negative  Awaiting microscopy for urine. Repeat HR 60's-70's. [LD]   0211 WBC, UA(!): 21-50 [LD]   0211 Bacteria, UA(!): 2+ [LD]   0315 Couple years ago, similar episode. Hx of sleep apnea, wears BIPAP and oxygen at night. [LD]   0321 I discussed the patient with her son Vimal Garrison, ENT, by phone. The patient defers to her son Maxi for decision making. Results and plan discussed with the patient and her son Maxi by phone. We discussed options of cardioversion under moderate sedation in the emergency department, versus hospitalization for formal cardiology consult and rate control. As it is her second ER visit recently, her son is in favor of rate control for now and consult cardiology this morning for further recommendations. I feel this is a reasonable approach. I will contact the hospitalist about the patient. [LD]      ED Course User Index  [LD] Caryl Al MD                                             Medical Decision Making  Differential diagnosis includes atrial fibrillation with rapid ventricular response, electrolyte abnormality, dehydration, UTI, sepsis, and others.    Problems Addressed:  Acute cystitis without hematuria: complicated acute illness or injury  Atrial fibrillation with rapid ventricular response: complicated acute illness or injury  Heart failure, unspecified HF chronicity, unspecified heart failure type: complicated acute illness or injury  Sleep apnea, unspecified type: complicated acute illness or injury    Amount and/or Complexity of Data Reviewed  Independent Historian:      Details: Son Maxi Garrison by phone (ENT Doctor)  External Data Reviewed: radiology and notes.     Details: [place] ER note and CTA chest PE protocol result reviewed.  Labs:  ordered. Decision-making details documented in ED Course.  Radiology: ordered. Decision-making details documented in ED Course.  ECG/medicine tests: ordered and independent interpretation performed. Decision-making details documented in ED Course.     Details: EKG at 0208 shows atrial fibrillation with rapid ventricular response at a rate of 119 beats per minute, wandering baseline, nonspecific ST/T wave changes.  Discussion of management or test interpretation with external provider(s): I discussed the patient with the hospitalist.    Risk  Prescription drug management.  Decision regarding hospitalization.      Recent Results (from the past 24 hour(s))   High Sensitivity Troponin T    Collection Time: 09/19/24  3:34 AM    Specimen: Blood   Result Value Ref Range    HS Troponin T 22 (H) <14 ng/L   Comprehensive Metabolic Panel    Collection Time: 09/19/24  3:34 AM    Specimen: Blood   Result Value Ref Range    Glucose 256 (H) 65 - 99 mg/dL    BUN 32 (H) 8 - 23 mg/dL    Creatinine 1.00 0.57 - 1.00 mg/dL    Sodium 140 136 - 145 mmol/L    Potassium 3.9 3.5 - 5.2 mmol/L    Chloride 102 98 - 107 mmol/L    CO2 21.0 (L) 22.0 - 29.0 mmol/L    Calcium 9.6 8.6 - 10.5 mg/dL    Total Protein 6.7 6.0 - 8.5 g/dL    Albumin 3.9 3.5 - 5.2 g/dL    ALT (SGPT) 12 1 - 33 U/L    AST (SGOT) 22 1 - 32 U/L    Alkaline Phosphatase 61 39 - 117 U/L    Total Bilirubin 0.4 0.0 - 1.2 mg/dL    Globulin 2.8 gm/dL    A/G Ratio 1.4 g/dL    BUN/Creatinine Ratio 32.0 (H) 7.0 - 25.0    Anion Gap 17.0 (H) 5.0 - 15.0 mmol/L    eGFR 55.0 (L) >60.0 mL/min/1.73   Lipase    Collection Time: 09/19/24  3:34 AM    Specimen: Blood   Result Value Ref Range    Lipase 36 13 - 60 U/L   BNP    Collection Time: 09/19/24  3:34 AM    Specimen: Blood   Result Value Ref Range    proBNP 629.0 0.0 - 1,800.0 pg/mL   Green Top (Gel)    Collection Time: 09/19/24  3:34 AM   Result Value Ref Range    Extra Tube Hold for add-ons.    Lavender Top    Collection Time: 09/19/24  3:34  AM   Result Value Ref Range    Extra Tube hold for add-on    Gold Top - SST    Collection Time: 09/19/24  3:34 AM   Result Value Ref Range    Extra Tube Hold for add-ons.    Gray Top    Collection Time: 09/19/24  3:34 AM   Result Value Ref Range    Extra Tube Hold for add-ons.    Light Blue Top    Collection Time: 09/19/24  3:34 AM   Result Value Ref Range    Extra Tube Hold for add-ons.    CBC Auto Differential    Collection Time: 09/19/24  3:34 AM    Specimen: Blood   Result Value Ref Range    WBC 12.07 (H) 3.40 - 10.80 10*3/mm3    RBC 4.70 3.77 - 5.28 10*6/mm3    Hemoglobin 13.5 12.0 - 15.9 g/dL    Hematocrit 41.3 34.0 - 46.6 %    MCV 87.9 79.0 - 97.0 fL    MCH 28.7 26.6 - 33.0 pg    MCHC 32.7 31.5 - 35.7 g/dL    RDW 15.7 (H) 12.3 - 15.4 %    RDW-SD 51.4 37.0 - 54.0 fl    MPV 9.6 6.0 - 12.0 fL    Platelets 348 140 - 450 10*3/mm3    Neutrophil % 69.1 42.7 - 76.0 %    Lymphocyte % 20.6 19.6 - 45.3 %    Monocyte % 7.5 5.0 - 12.0 %    Eosinophil % 2.3 0.3 - 6.2 %    Basophil % 0.3 0.0 - 1.5 %    Immature Grans % 0.2 0.0 - 0.5 %    Neutrophils, Absolute 8.32 (H) 1.70 - 7.00 10*3/mm3    Lymphocytes, Absolute 2.49 0.70 - 3.10 10*3/mm3    Monocytes, Absolute 0.91 (H) 0.10 - 0.90 10*3/mm3    Eosinophils, Absolute 0.28 0.00 - 0.40 10*3/mm3    Basophils, Absolute 0.04 0.00 - 0.20 10*3/mm3    Immature Grans, Absolute 0.03 0.00 - 0.05 10*3/mm3   Magnesium    Collection Time: 09/19/24  3:34 AM    Specimen: Blood   Result Value Ref Range    Magnesium 1.9 1.6 - 2.4 mg/dL   ECG 12 Lead ED Triage Standing Order; Chest Pain    Collection Time: 09/19/24  5:39 AM   Result Value Ref Range    QT Interval 344 ms    QTC Interval 474 ms   High Sensitivity Troponin T 2Hr    Collection Time: 09/19/24  5:41 AM    Specimen: Blood   Result Value Ref Range    HS Troponin T 22 (H) <14 ng/L    Troponin T Delta 0 >=-4 - <+4 ng/L   ECG 12 Lead Rhythm Change    Collection Time: 09/19/24  9:07 AM   Result Value Ref Range    QT Interval 360 ms    QTC  Interval 459 ms   ECG 12 Lead Rhythm Change    Collection Time: 09/19/24  1:44 PM   Result Value Ref Range    QT Interval 410 ms    QTC Interval 470 ms   Comprehensive Metabolic Panel    Collection Time: 09/19/24  9:21 PM    Specimen: Blood   Result Value Ref Range    Glucose 198 (H) 65 - 99 mg/dL    BUN 33 (H) 8 - 23 mg/dL    Creatinine 0.99 0.57 - 1.00 mg/dL    Sodium 140 136 - 145 mmol/L    Potassium 4.0 3.5 - 5.2 mmol/L    Chloride 101 98 - 107 mmol/L    CO2 25.0 22.0 - 29.0 mmol/L    Calcium 9.7 8.6 - 10.5 mg/dL    Total Protein 7.4 6.0 - 8.5 g/dL    Albumin 4.3 3.5 - 5.2 g/dL    ALT (SGPT) 16 1 - 33 U/L    AST (SGOT) 16 1 - 32 U/L    Alkaline Phosphatase 66 39 - 117 U/L    Total Bilirubin 0.6 0.0 - 1.2 mg/dL    Globulin 3.1 gm/dL    A/G Ratio 1.4 g/dL    BUN/Creatinine Ratio 33.3 (H) 7.0 - 25.0    Anion Gap 14.0 5.0 - 15.0 mmol/L    eGFR 55.6 (L) >60.0 mL/min/1.73   Magnesium    Collection Time: 09/19/24  9:21 PM    Specimen: Blood   Result Value Ref Range    Magnesium 2.0 1.6 - 2.4 mg/dL   Single High Sensitivity Troponin T    Collection Time: 09/19/24  9:21 PM    Specimen: Blood   Result Value Ref Range    HS Troponin T 25 (H) <14 ng/L   TSH    Collection Time: 09/19/24  9:21 PM    Specimen: Blood   Result Value Ref Range    TSH 6.630 (H) 0.270 - 4.200 uIU/mL   BNP    Collection Time: 09/19/24  9:21 PM    Specimen: Blood   Result Value Ref Range    proBNP 4,975.0 (H) 0.0 - 1,800.0 pg/mL   Green Top (Gel)    Collection Time: 09/19/24  9:21 PM   Result Value Ref Range    Extra Tube Hold for add-ons.    Lavender Top    Collection Time: 09/19/24  9:21 PM   Result Value Ref Range    Extra Tube hold for add-on    Gold Top - SST    Collection Time: 09/19/24  9:21 PM   Result Value Ref Range    Extra Tube Hold for add-ons.    Gray Top    Collection Time: 09/19/24  9:21 PM   Result Value Ref Range    Extra Tube Hold for add-ons.    Light Blue Top    Collection Time: 09/19/24  9:21 PM   Result Value Ref Range    Extra  Tube Hold for add-ons.    CBC Auto Differential    Collection Time: 09/19/24  9:21 PM    Specimen: Blood   Result Value Ref Range    WBC 13.32 (H) 3.40 - 10.80 10*3/mm3    RBC 5.18 3.77 - 5.28 10*6/mm3    Hemoglobin 14.9 12.0 - 15.9 g/dL    Hematocrit 45.1 34.0 - 46.6 %    MCV 87.1 79.0 - 97.0 fL    MCH 28.8 26.6 - 33.0 pg    MCHC 33.0 31.5 - 35.7 g/dL    RDW 15.9 (H) 12.3 - 15.4 %    RDW-SD 50.9 37.0 - 54.0 fl    MPV 9.6 6.0 - 12.0 fL    Platelets 396 140 - 450 10*3/mm3    Neutrophil % 67.2 42.7 - 76.0 %    Lymphocyte % 21.5 19.6 - 45.3 %    Monocyte % 8.5 5.0 - 12.0 %    Eosinophil % 1.8 0.3 - 6.2 %    Basophil % 0.5 0.0 - 1.5 %    Immature Grans % 0.5 0.0 - 0.5 %    Neutrophils, Absolute 8.94 (H) 1.70 - 7.00 10*3/mm3    Lymphocytes, Absolute 2.87 0.70 - 3.10 10*3/mm3    Monocytes, Absolute 1.13 (H) 0.10 - 0.90 10*3/mm3    Eosinophils, Absolute 0.24 0.00 - 0.40 10*3/mm3    Basophils, Absolute 0.07 0.00 - 0.20 10*3/mm3    Immature Grans, Absolute 0.07 (H) 0.00 - 0.05 10*3/mm3    nRBC 0.0 0.0 - 0.2 /100 WBC   Lactic Acid, Plasma    Collection Time: 09/19/24  9:21 PM    Specimen: Blood   Result Value Ref Range    Lactate 1.4 0.5 - 2.0 mmol/L   ECG 12 Lead ED Triage Standing Order; Dysrhythmia    Collection Time: 09/19/24  9:26 PM   Result Value Ref Range    QT Interval 360 ms    QTC Interval 504 ms   Single High Sensitivity Troponin T    Collection Time: 09/19/24 11:29 PM    Specimen: Blood   Result Value Ref Range    HS Troponin T 20 (H) <14 ng/L   Urinalysis With Microscopic If Indicated (No Culture) - Urine, Clean Catch    Collection Time: 09/20/24 12:59 AM    Specimen: Urine, Clean Catch   Result Value Ref Range    Color, UA Yellow Yellow, Straw    Appearance, UA Clear Clear    pH, UA 5.5 5.0 - 8.0    Specific Gravity, UA 1.016 1.001 - 1.030    Glucose, UA Negative Negative    Ketones, UA Negative Negative    Bilirubin, UA Negative Negative    Blood, UA Negative Negative    Protein, UA Negative Negative    Leuk  Esterase, UA Moderate (2+) (A) Negative    Nitrite, UA Negative Negative    Urobilinogen, UA 0.2 E.U./dL 0.2 - 1.0 E.U./dL   Urinalysis, Microscopic Only - Urine, Clean Catch    Collection Time: 09/20/24 12:59 AM    Specimen: Urine, Clean Catch   Result Value Ref Range    RBC, UA 0-2 None Seen, 0-2 /HPF    WBC, UA 21-50 (A) None Seen, 0-2 /HPF    Bacteria, UA 2+ (A) None Seen, Trace /HPF    Squamous Epithelial Cells, UA 0-2 None Seen, 0-2 /HPF    Transitional Epithelial Cells, UA 0-2 0 - 2 /HPF    Hyaline Casts, UA None Seen 0 - 6 /LPF    Methodology Automated Microscopy    ECG 12 Lead Rhythm Change    Collection Time: 09/20/24  2:08 AM   Result Value Ref Range    QT Interval 368 ms    QTC Interval 517 ms   Lactic Acid, Plasma    Collection Time: 09/20/24  2:31 AM    Specimen: Blood   Result Value Ref Range    Lactate 1.0 0.5 - 2.0 mmol/L     Note: In addition to lab results from this visit, the labs listed above may include labs taken at another facility or during a different encounter within the last 24 hours. Please correlate lab times with ED admission and discharge times for further clarification of the services performed during this visit.    XR Chest 1 View   Final Result   Impression:   No acute cardiopulmonary abnormality is identified.         Electronically Signed: Savanna Medel     9/19/2024 9:54 PM EDT     Workstation ID: [id number]        Vitals:    09/20/24 0015 09/20/24 0057 09/20/24 0200 09/20/24 0245   BP: (!) 85/59 112/92 104/50 101/81   Pulse: 63  81 120   Resp:       Temp:       TempSrc:       SpO2: 95%  92% 94%   Weight:       Height:         Medications   sodium chloride 0.9 % flush 10 mL (has no administration in time range)   dilTIAZem (CARDIZEM) 125 mg in 125 mL D5W infusion (has no administration in time range)   sodium chloride 0.9 % infusion (has no administration in time range)   sodium chloride 0.9 % bolus 500 mL (0 mL Intravenous Stopped 9/20/24 0112)   dilTIAZem (CARDIZEM) injection 5  mg (5 mg Intravenous Given 9/19/24 0708)   cefTRIAXone (ROCEPHIN) 1,000 mg in sodium chloride 0.9 % 100 mL MBP (0 mg Intravenous Stopped 9/20/24 0314)     ECG/EMG Results (last 24 hours)       Procedure Component Value Units Date/Time    ECG 12 Lead ED Triage Standing Order; Dysrhythmia [[id number]] Collected: 09/19/24 2126     Updated: 09/19/24 2125     QT Interval 360 ms      QTC Interval 504 ms     Narrative:      Test Reason : ED Triage Standing Order~  Blood Pressure :   */*   mmHG  Vent. Rate : 118 BPM     Atrial Rate : 118 BPM     P-R Int : 168 ms          QRS Dur : 106 ms      QT Int : 360 ms       P-R-T Axes :   * -19 171 degrees     QTc Int : 504 ms    Sinus tachycardia  Septal infarct , age undetermined  Marked ST abnormality, possible inferior subendocardial injury  Abnormal ECG  When compared with ECG of 19-SEP-2024 13:44, (Unconfirmed)  Sinus rhythm has replaced Atrial fibrillation  Vent. rate has increased BY  39 BPM  Septal infarct is now present  ST now depressed in Inferior leads  ST now depressed in Anterolateral leads    Referred By:            Confirmed By:           ECG 12 Lead Rhythm Change   Preliminary Result   Test Reason : Rhythm Change   Blood Pressure :   */*   mmHG   Vent. Rate : 119 BPM     Atrial Rate : 163 BPM      P-R Int :   * ms          QRS Dur : 114 ms       QT Int : 368 ms       P-R-T Axes :   *   4 186 degrees      QTc Int : 517 ms      Accelerated Junctional rhythm with occasional premature ventricular    complexes   ST & T wave abnormality, consider inferior ischemia   Abnormal ECG   When compared with ECG of 19-SEP-2024 21:26, (Unconfirmed)   Junctional rhythm has replaced Sinus rhythm   ST no longer depressed in Inferior leads   ST no longer depressed in Anterolateral leads   T wave inversion now evident in Inferior leads      Referred By:            Confirmed By:       ECG 12 Lead ED Triage Standing Order; Dysrhythmia   Preliminary Result   Test Reason : ED Triage Standing  Order~   Blood Pressure :   */*   mmHG   Vent. Rate : 118 BPM     Atrial Rate : 118 BPM      P-R Int : 168 ms          QRS Dur : 106 ms       QT Int : 360 ms       P-R-T Axes :   * -19 171 degrees      QTc Int : 504 ms      Sinus tachycardia   Septal infarct , age undetermined   Marked ST abnormality, possible inferior subendocardial injury   Abnormal ECG   When compared with ECG of 19-SEP-2024 13:44, (Unconfirmed)   Sinus rhythm has replaced Atrial fibrillation   Vent. rate has increased BY  39 BPM   Septal infarct is now present   ST now depressed in Inferior leads   ST now depressed in Anterolateral leads      Referred By:            Confirmed By:               Final diagnoses:   Atrial fibrillation with rapid ventricular response   Heart failure, unspecified HF chronicity, unspecified heart failure type   Acute cystitis without hematuria   Sleep apnea, unspecified type       ED Disposition  ED Disposition       ED Disposition   Decision to Admit    Condition   --    Comment   --               No follow-up provider specified.       Medication List      No changes were made to your prescriptions during this visit.            Caryl Al MD  09/23/24 0015

## 2024-09-20 NOTE — CONSULTS
Date of Hospital Visit: 24  Encounter Provider: Konstantin Sams MD  Place of Service: [facility]  Patient Name: Kaylen Garrison  :1940  Referral Provider: Maxi Angel MD  Primary Care Provider: Lizzette Multani MD    Chief complaint/Reason for Consultation: Atrial fibrillation with rapid ventricular response      History of Present Illness:  Patient with a history of paroxysmal atrial fibrillation has been in sinus rhythm for almost 2 to 3 years suddenly starting having some shortness of breath and was found to have rapid heart rate.  She was seen at [place] and was sent home after being treated for UTI.  Patient starting having more shortness of breath and then finally she came to the hospital for further treatment.  She denies any chest pain any lower extremity edema any dizziness.      Problem List:  Paroxysmal atrial fibrillation  New onset atrial flutter with RVR 11/10/2021 in setting of nocturnal hypoxia  [id number] = 5 on Xarelto   SATISH, 2021: EF 50%. Mild LAE. No evidence of intracardiac thrombus or mass, clear JENNA, Trace MR and TR.   Successful ECV, 2021  Started on Amiodarone 2021  Aortic stenosis/Mitral stenosis/Valvular heart disease  Echo (2023): EF 56 to 60%.  LV wall thickness consistent with mild concentric hypertrophy.  LV diastolic function consistent with (grade 2 with high lap) pseudonormalization.  LA volume mildly increased.  Moderate calcification of aortic valve.  Moderate AS. Moderate calcification of mitral valve.  Mild to moderate MS.  Mild TR.  RVSP is moderately elevated at 53.9 mmHg.  Hypertension  Dyslipidemia  RBBB  Type 2 diabetes mellitus  Obstructive sleep apnea, on BiPAP  Acute cholecystitis  S/p ERCP with extraction of 3 stones 2023.  Syncope, 2012  Echocardiogram, 2012: EF 55 to 60% with no valvular abnormalities.  Limited studies, 2012: 0-49% bilateral carotid artery stenosis.  No acute findings per  CT scan.  MRI of the brain, 4/2012: Cortical atrophy, chronic ischemic changes noted.  Abnormal MPS, 5/2012: EF 79% with apical hypoperfusion suspicious for ischemia, Dr. Arnaud Augustine.  MPS 9/2016: EF greater than 70%.  No evidence of inducible ischemia.  Borderline inferior ST segment changes.  Dyspnea  Echo, 04/08/2023: EF 56-60%.  Left ventricular diastolic function is consistent with (grade II w/high LAP) pseudonormalization. Left atrial volume is mildly increased. There is moderate calcification of the aortic valve. Moderate aortic valve stenosis. There is moderate calcification of the mitral valve. Mild to moderate mitral valve stenosis. Mild TR is present. RVSP is moderately elevated at 53.9 mmHg.  Obesity  Ménière's disease  Surgical history  Hysterectomy  Benign tumor removal from left breast      Past Surgical History:   Procedure Laterality Date    BREAST SURGERY Left     benign tumor removal    CATARACT EXTRACTION Bilateral     CHOLECYSTECTOMY N/A 4/11/2023    Procedure: CHOLECYSTECTOMY LAPAROSCOPIC;  Surgeon: Abraham Holbrook MD;  Location:  [facility];  Service: General;  Laterality: N/A;    ERCP N/A 4/9/2023    Procedure: ENDOSCOPIC RETROGRADE CHOLANGIOPANCREATOGRAPHY WITH STENT PLACEMENT;  Surgeon: Brunner, Mark I, MD;  Location:  [facility];  Service: Gastroenterology;  Laterality: N/A;  Sphincterotomy made to ampula of coomon bile duct. CBD swept with 9mm-12 mm balloon. Multiple stones and sludge removed. Stent placed in pancreatic duct    HYSTERECTOMY         (Not in a hospital admission)      Social History     Socioeconomic History    Marital status:    Tobacco Use    Smoking status: Never     Passive exposure: Never    Smokeless tobacco: Never   Vaping Use    Vaping status: Never Used   Substance and Sexual Activity    Alcohol use: No    Drug use: No    Sexual activity: Defer       Family History   Problem Relation Age of Onset    Heart attack Mother     No Known Problems Father   "   No Known Problems Sister     Lactose intolerance Brother     No Known Problems Sister        REVIEW OF SYSTEMS:   Review of Systems   Respiratory:  Positive for shortness of breath.              Objective:     Vitals:    09/20/24 0752 09/20/24 0802 09/20/24 0832 09/20/24 0901   BP:  113/78 96/64 94/62   Pulse: 96  63 60   Resp:       Temp:       TempSrc:       SpO2: 98% 97% 97% 94%   Weight:       Height:           Body mass index is 26.16 kg/m².  Flowsheet Rows      Flowsheet Row First Filed Value   Admission Height 157.5 cm (62\") Documented at 09/19/2024 2110   Admission Weight 64.9 kg (143 lb) Documented at 09/19/2024 2110            Physical Exam     Constitutional:       General: Not in acute distress.     Appearance: Healthy appearance. Not in distress.     Neck:     JVP:Not elevated     Carotid artery: Normal    Pulmonary:      Effort: Pulmonary effort is normal.      Breath sounds: Normal breath sounds. No wheezing. No rhonchi. No rales.     Cardiovascular:      fast rate. irregular rhythm. Normal S1. Normal S2.      Murmurs: There is 3/6 systolic murmur.      No gallop. No click. No rub.     Abdominal:      General: Bowel sounds are normal.      Palpations: Abdomen is soft.      Tenderness: There is no abdominal tenderness.    Extremities:     Pulses:Normal radial and pedal pulses     Edema:no edema        Lab Review:                Results from last 7 days   Lab Units 09/19/24 2121   SODIUM mmol/L 140   POTASSIUM mmol/L 4.0   CHLORIDE mmol/L 101   CO2 mmol/L 25.0   BUN mg/dL 33*   CREATININE mg/dL 0.99   GLUCOSE mg/dL 198*   CALCIUM mg/dL 9.7     Results from last 7 days   Lab Units 09/19/24 2329 09/19/24 2121 09/19/24  0541   HSTROP T ng/L 20* 25* 22*     Results from last 7 days   Lab Units 09/19/24 2121   WBC 10*3/mm3 13.32*   HEMOGLOBIN g/dL 14.9   HEMATOCRIT % 45.1   PLATELETS 10*3/mm3 396         Results from last 7 days   Lab Units 09/19/24 2121   MAGNESIUM mg/dL 2.0               EKG: " Atrial fibrillation with rapid ventricular response    CXR: No acute changes           Assessment:   Atrial fibrillation with rapid ventricular response  Mild to moderate aortic stenosis  Heart failure with preserved ejection fraction  Hypertension  Hyperlipidemia  Diabetes      Plan:   Patient has been adequately anticoagulated.  We will start her on amiodarone and hopefully she will convert otherwise we will do cardioversion on Monday.  Will give a little dose of diuresis.  Most likely her BNP is elevated due to atrial fibrillation.  We will go ahead and get echocardiogram for valvular evaluation.  We will adjust her further doses accordingly for treatment options of beta-blocker or ACE.

## 2024-09-20 NOTE — ED NOTES
Kaylen Garrison    Nursing Report ED to Floor:  Mental status: A & O x 4   Ambulatory status: x2 assist (uses Rolator at home)  Oxygen Therapy:  2L NC  Cardiac Rhythm: Afib ( In and out of RVR )   Admitted from: Ed/Home  Safety Concerns:  None  Social Issues: None  ED Room #:  22    ED Nurse Phone Extension - [phone] or may call [phone].      HPI:   Chief Complaint   Patient presents with    Rapid Heart Rate       Past Medical History:  Past Medical History:   Diagnosis Date    Aortic valve stenosis     Atrial fibrillation     Benign hypertension     History of echocardiogram 04/2012    Hypercalcemia     Hypercholesterolemia     Hypertension     Meniere's disease     Obesity     Overweight (BMI 25.0-29.9)     Primary hyperparathyroidism     Sleep apnea with use of continuous positive airway pressure (CPAP)     Syncope 04/2012    Type 2 diabetes mellitus     Vitamin D deficiency         Past Surgical History:  Past Surgical History:   Procedure Laterality Date    BREAST SURGERY Left     benign tumor removal    CATARACT EXTRACTION Bilateral     CHOLECYSTECTOMY N/A 4/11/2023    Procedure: CHOLECYSTECTOMY LAPAROSCOPIC;  Surgeon: Abraham Holbrook MD;  Location: [facility];  Service: General;  Laterality: N/A;    ERCP N/A 4/9/2023    Procedure: ENDOSCOPIC RETROGRADE CHOLANGIOPANCREATOGRAPHY WITH STENT PLACEMENT;  Surgeon: Brunner, Mark I, MD;  Location: [facility];  Service: Gastroenterology;  Laterality: N/A;  Sphincterotomy made to ampula of coomon bile duct. CBD swept with 9mm-12 mm balloon. Multiple stones and sludge removed. Stent placed in pancreatic duct    HYSTERECTOMY          Admitting Doctor:   Maxi Angel MD    Consulting Provider(s):  Consults       No orders found for last 30 day(s).             Admitting Diagnosis:   The primary encounter diagnosis was Atrial fibrillation with rapid ventricular response. Diagnoses of Heart failure, unspecified HF chronicity, unspecified heart failure type,  Acute cystitis without hematuria, and Sleep apnea, unspecified type were also pertinent to this visit.    Most Recent Vitals:   Vitals:    09/20/24 0430 09/20/24 0530 09/20/24 0540 09/20/24 0600   BP: 95/79 90/51 (!) 87/67 111/89   Pulse: 61 79 (!) 124 (!) 124   Resp:       Temp:       TempSrc:       SpO2: 99% 94% 94% 99%   Weight:       Height:           Active LDAs/IV Access:   Lines, Drains & Airways       Active LDAs       Name Placement date Placement time Site Days    Peripheral IV 09/19/24 2122 Right Antecubital 09/19/24 2122  Antecubital  less than 1                    Labs (abnormal labs have a star):   Labs Reviewed   COMPREHENSIVE METABOLIC PANEL - Abnormal; Notable for the following components:       Result Value    Glucose 198 (*)     BUN 33 (*)     BUN/Creatinine Ratio 33.3 (*)     eGFR 55.6 (*)     All other components within normal limits    Narrative:     GFR Normal >60  Chronic Kidney Disease <60  Kidney Failure <15    The GFR formula is only valid for adults with stable renal function between ages 18 and 70.   SINGLE HS TROPONIN T - Abnormal; Notable for the following components:    HS Troponin T 25 (*)     All other components within normal limits    Narrative:     High Sensitive Troponin T Reference Range:  <14.0 ng/L- Negative Female for AMI  <22.0 ng/L- Negative Male for AMI  >=14 - Abnormal Female indicating possible myocardial injury.  >=22 - Abnormal Male indicating possible myocardial injury.   Clinicians would have to utilize clinical acumen, EKG, Troponin, and serial changes to determine if it is an Acute Myocardial Infarction or myocardial injury due to an underlying chronic condition.        TSH - Abnormal; Notable for the following components:    TSH 6.630 (*)     All other components within normal limits    Narrative:     Due to abnormal TSH results, suggest ordering Free T4.   BNP (IN-HOUSE) - Abnormal; Notable for the following components:    proBNP 4,975.0 (*)     All other  components within normal limits    Narrative:     This assay is used as an aid in the diagnosis of individuals suspected of having heart failure. It can be used as an aid in the diagnosis of acute decompensated heart failure (ADHF) in patients presenting with signs and symptoms of ADHF to the emergency department (ED). In addition, NT-proBNP of <300 pg/mL indicates ADHF is not likely.    Age Range Result Interpretation  NT-proBNP Concentration (pg/mL:      <50             Positive            >450                   Gray                 300-450                    Negative             <300    50-75           Positive            >900                  Gray                300-900                  Negative            <300      >75             Positive            >1800                  Gray                300-1800                  Negative            <300   CBC WITH AUTO DIFFERENTIAL - Abnormal; Notable for the following components:    WBC 13.32 (*)     RDW 15.9 (*)     Neutrophils, Absolute 8.94 (*)     Monocytes, Absolute 1.13 (*)     Immature Grans, Absolute 0.07 (*)     All other components within normal limits   URINALYSIS W/ MICROSCOPIC IF INDICATED (NO CULTURE) - Abnormal; Notable for the following components:    Leuk Esterase, UA Moderate (2+) (*)     All other components within normal limits   SINGLE HS TROPONIN T - Abnormal; Notable for the following components:    HS Troponin T 20 (*)     All other components within normal limits    Narrative:     High Sensitive Troponin T Reference Range:  <14.0 ng/L- Negative Female for AMI  <22.0 ng/L- Negative Male for AMI  >=14 - Abnormal Female indicating possible myocardial injury.  >=22 - Abnormal Male indicating possible myocardial injury.   Clinicians would have to utilize clinical acumen, EKG, Troponin, and serial changes to determine if it is an Acute Myocardial Infarction or myocardial injury due to an underlying chronic condition.        URINALYSIS, MICROSCOPIC ONLY  - Abnormal; Notable for the following components:    WBC, UA 21-50 (*)     Bacteria, UA 2+ (*)     All other components within normal limits   MAGNESIUM - Normal   LACTIC ACID, PLASMA - Normal   LACTIC ACID, PLASMA - Normal   BLOOD CULTURE   BLOOD CULTURE   URINE CULTURE   RAINBOW DRAW    Narrative:     The following orders were created for panel order Platinum Draw.  Procedure                               Abnormality         Status                     ---------                               -----------         ------                     Green Top (Gel)[[id number]]                                  Final result               Lavender Top[[id number]]                                     Final result               Gold Top - SST[[id number]]                                   Final result               Gray Top[[id number]]                                         Final result               Light Blue Top[[id number]]                                   Final result                 Please view results for these tests on the individual orders.   CBC AND DIFFERENTIAL    Narrative:     The following orders were created for panel order CBC & Differential.  Procedure                               Abnormality         Status                     ---------                               -----------         ------                     CBC Auto Differential[[id number]]        Abnormal            Final result                 Please view results for these tests on the individual orders.   GREEN TOP   LAVENDER TOP   GOLD TOP - SST   GRAY TOP   LIGHT BLUE TOP       Meds Given in ED:   Medications   sodium chloride 0.9 % flush 10 mL (has no administration in time range)   dilTIAZem (CARDIZEM) 125 mg in 125 mL D5W infusion ( Intravenous Held by provider 9/20/24 6407)   sodium chloride 0.9 % infusion (125 mL/hr Intravenous New Bag 9/20/24 0258)   sodium chloride 0.9 % bolus 500 mL (0 mL Intravenous Stopped 9/20/24 0112)   dilTIAZem (CARDIZEM) injection  5 mg (5 mg Intravenous Given 9/19/24 2343)   cefTRIAXone (ROCEPHIN) 1,000 mg in sodium chloride 0.9 % 100 mL MBP (0 mg Intravenous Stopped 9/20/24 0314)     [Held by provider] dilTIAZem, 5-15 mg/hr, Last Rate: Stopped (09/20/24 0358)  sodium chloride, 125 mL/hr, Last Rate: 125 mL/hr (09/20/24 0358)         Last NIH score:                                                          Dysphagia screening results:  Patient Factors Component (Dysphagia:Stroke or Rule-out)  Best Eye Response: 4-->(E4) spontaneous (09/19/24 2113)  Best Motor Response: 6-->(M6) obeys commands (09/19/24 2113)  Best Verbal Response: 5-->(V5) oriented (09/19/24 2113)  Powell Coma Scale Score: 15 (09/19/24 2113)     Powell Coma Scale:  No data recorded     CIWA:        Restraint Type:            Isolation Status:  No active isolations

## 2024-09-21 LAB
ANION GAP SERPL CALCULATED.3IONS-SCNC: 11 MMOL/L (ref 5–15)
BACTERIA SPEC AEROBE CULT: NORMAL
BUN SERPL-MCNC: 21 MG/DL (ref 8–23)
BUN/CREAT SERPL: 26.9 (ref 7–25)
CALCIUM SPEC-SCNC: 8.3 MG/DL (ref 8.6–10.5)
CHLORIDE SERPL-SCNC: 106 MMOL/L (ref 98–107)
CO2 SERPL-SCNC: 21 MMOL/L (ref 22–29)
CREAT SERPL-MCNC: 0.78 MG/DL (ref 0.57–1)
DEPRECATED RDW RBC AUTO: 52.7 FL (ref 37–54)
EGFRCR SERPLBLD CKD-EPI 2021: 74.1 ML/MIN/1.73
ERYTHROCYTE [DISTWIDTH] IN BLOOD BY AUTOMATED COUNT: 16.2 % (ref 12.3–15.4)
GLUCOSE BLDC GLUCOMTR-MCNC: 139 MG/DL (ref 70–130)
GLUCOSE BLDC GLUCOMTR-MCNC: 170 MG/DL (ref 70–130)
GLUCOSE BLDC GLUCOMTR-MCNC: 197 MG/DL (ref 70–130)
GLUCOSE BLDC GLUCOMTR-MCNC: 213 MG/DL (ref 70–130)
GLUCOSE BLDC GLUCOMTR-MCNC: 262 MG/DL (ref 70–130)
GLUCOSE BLDC GLUCOMTR-MCNC: 291 MG/DL (ref 70–130)
GLUCOSE SERPL-MCNC: 154 MG/DL (ref 65–99)
HCT VFR BLD AUTO: 38.2 % (ref 34–46.6)
HGB BLD-MCNC: 12.2 G/DL (ref 12–15.9)
MAGNESIUM SERPL-MCNC: 2.3 MG/DL (ref 1.6–2.4)
MCH RBC QN AUTO: 28.6 PG (ref 26.6–33)
MCHC RBC AUTO-ENTMCNC: 31.9 G/DL (ref 31.5–35.7)
MCV RBC AUTO: 89.5 FL (ref 79–97)
PLATELET # BLD AUTO: 308 10*3/MM3 (ref 140–450)
PMV BLD AUTO: 10.4 FL (ref 6–12)
POTASSIUM SERPL-SCNC: 3.8 MMOL/L (ref 3.5–5.2)
RBC # BLD AUTO: 4.27 10*6/MM3 (ref 3.77–5.28)
SODIUM SERPL-SCNC: 138 MMOL/L (ref 136–145)
WBC NRBC COR # BLD AUTO: 10.46 10*3/MM3 (ref 3.4–10.8)

## 2024-09-21 PROCEDURE — G0378 HOSPITAL OBSERVATION PER HR: HCPCS

## 2024-09-21 PROCEDURE — 97116 GAIT TRAINING THERAPY: CPT

## 2024-09-21 PROCEDURE — 97162 PT EVAL MOD COMPLEX 30 MIN: CPT

## 2024-09-21 PROCEDURE — 97166 OT EVAL MOD COMPLEX 45 MIN: CPT

## 2024-09-21 PROCEDURE — 82948 REAGENT STRIP/BLOOD GLUCOSE: CPT

## 2024-09-21 PROCEDURE — 25010000002 CEFTRIAXONE PER 250 MG: Performed by: STUDENT IN AN ORGANIZED HEALTH CARE EDUCATION/TRAINING PROGRAM

## 2024-09-21 PROCEDURE — 63710000001 INSULIN REGULAR HUMAN PER 5 UNITS: Performed by: STUDENT IN AN ORGANIZED HEALTH CARE EDUCATION/TRAINING PROGRAM

## 2024-09-21 PROCEDURE — 25810000003 SODIUM CHLORIDE 0.9 % SOLUTION: Performed by: EMERGENCY MEDICINE

## 2024-09-21 PROCEDURE — 97535 SELF CARE MNGMENT TRAINING: CPT

## 2024-09-21 PROCEDURE — 25010000002 AMIODARONE IN DEXTROSE 5% 360-4.14 MG/200ML-% SOLUTION: Performed by: INTERNAL MEDICINE

## 2024-09-21 PROCEDURE — 99232 SBSQ HOSP IP/OBS MODERATE 35: CPT | Performed by: INTERNAL MEDICINE

## 2024-09-21 PROCEDURE — 80048 BASIC METABOLIC PNL TOTAL CA: CPT | Performed by: STUDENT IN AN ORGANIZED HEALTH CARE EDUCATION/TRAINING PROGRAM

## 2024-09-21 PROCEDURE — 63710000001 INSULIN LISPRO (HUMAN) PER 5 UNITS: Performed by: INTERNAL MEDICINE

## 2024-09-21 PROCEDURE — 85027 COMPLETE CBC AUTOMATED: CPT | Performed by: STUDENT IN AN ORGANIZED HEALTH CARE EDUCATION/TRAINING PROGRAM

## 2024-09-21 PROCEDURE — 83735 ASSAY OF MAGNESIUM: CPT | Performed by: STUDENT IN AN ORGANIZED HEALTH CARE EDUCATION/TRAINING PROGRAM

## 2024-09-21 RX ORDER — IPRATROPIUM BROMIDE AND ALBUTEROL SULFATE 2.5; .5 MG/3ML; MG/3ML
3 SOLUTION RESPIRATORY (INHALATION) EVERY 4 HOURS PRN
Status: DISCONTINUED | OUTPATIENT
Start: 2024-09-21 | End: 2024-09-22 | Stop reason: HOSPADM

## 2024-09-21 RX ORDER — AMIODARONE HYDROCHLORIDE 200 MG/1
200 TABLET ORAL 2 TIMES DAILY WITH MEALS
Status: DISCONTINUED | OUTPATIENT
Start: 2024-09-21 | End: 2024-09-22 | Stop reason: HOSPADM

## 2024-09-21 RX ORDER — INSULIN LISPRO 100 [IU]/ML
2-7 INJECTION, SOLUTION INTRAVENOUS; SUBCUTANEOUS
Status: DISCONTINUED | OUTPATIENT
Start: 2024-09-21 | End: 2024-09-22 | Stop reason: HOSPADM

## 2024-09-21 RX ADMIN — ATORVASTATIN CALCIUM 40 MG: 40 TABLET, FILM COATED ORAL at 08:05

## 2024-09-21 RX ADMIN — METOPROLOL SUCCINATE 50 MG: 50 TABLET, EXTENDED RELEASE ORAL at 08:05

## 2024-09-21 RX ADMIN — AMIODARONE HYDROCHLORIDE 200 MG: 200 TABLET ORAL at 10:17

## 2024-09-21 RX ADMIN — INSULIN HUMAN 2 UNITS: 100 INJECTION, SOLUTION PARENTERAL at 05:48

## 2024-09-21 RX ADMIN — SODIUM CHLORIDE 125 ML/HR: 9 INJECTION, SOLUTION INTRAVENOUS at 00:45

## 2024-09-21 RX ADMIN — INSULIN LISPRO 6 UNITS: 100 INJECTION, SOLUTION INTRAVENOUS; SUBCUTANEOUS at 11:57

## 2024-09-21 RX ADMIN — AMIODARONE HYDROCHLORIDE 0.5 MG/MIN: 1.8 INJECTION, SOLUTION INTRAVENOUS at 08:04

## 2024-09-21 RX ADMIN — Medication 10 ML: at 20:37

## 2024-09-21 RX ADMIN — AMIODARONE HYDROCHLORIDE 200 MG: 200 TABLET ORAL at 17:02

## 2024-09-21 RX ADMIN — SODIUM CHLORIDE 1000 MG: 900 INJECTION INTRAVENOUS at 02:26

## 2024-09-21 RX ADMIN — RIVAROXABAN 15 MG: 15 TABLET, FILM COATED ORAL at 17:02

## 2024-09-21 RX ADMIN — INSULIN LISPRO 4 UNITS: 100 INJECTION, SOLUTION INTRAVENOUS; SUBCUTANEOUS at 20:34

## 2024-09-21 RX ADMIN — INSULIN LISPRO 4 UNITS: 100 INJECTION, SOLUTION INTRAVENOUS; SUBCUTANEOUS at 17:02

## 2024-09-21 RX ADMIN — Medication 10 ML: at 08:05

## 2024-09-21 NOTE — PLAN OF CARE
Goal Outcome Evaluation:  Plan of Care Reviewed With: patient           Outcome Evaluation: OT initial eval and expanded chart review completed. Pt presents with multiple comorbidities and decreased strength, balance and activity tolerance, as well as SOA limiting independence with ADL's and mobility from baseline status. Recommend continued skilled OT services and d/c home with return to OPPT ensure safe transition to home.      Anticipated Discharge Disposition (OT): home with assist, home with home health

## 2024-09-21 NOTE — PROGRESS NOTES
[facility] Medicine Services  PROGRESS NOTE    Patient Name: Kaylen Garrison  : 1940  MRN: [id number]    Date of Admission: 2024  Primary Care Physician: Lizzette Multani MD    Subjective   Subjective     CC:  Afib     HPI:  Oxygen increased to 4L overnight.  Feels fine, no chest pain       Objective   Objective     Vital Signs:   Temp:  [97.6 °F (36.4 °C)-97.9 °F (36.6 °C)] 97.6 °F (36.4 °C)  Heart Rate:  [] 63  Resp:  [18] 18  BP: ()/(52-82) 135/80  Flow (L/min):  [2-4] 4     Physical Exam:  Gen:  WD/WN woman up in chair   Neuro: alert and oriented, clear speech, follows commands, grossly nonfocal  HEENT:  NC/AT   Neck:  Supple, no LAD  Heart  rate 60, afib   Abd:  Soft, nontender, no rebound or guarding, pos BS  Extrem:  No c/c/e      Results Reviewed:  LAB RESULTS:      Lab 24  0436 24  0231 24  0334   WBC 10.46  --  13.32* 12.07*   HEMOGLOBIN 12.2  --  14.9 13.5   HEMATOCRIT 38.2  --  45.1 41.3   PLATELETS 308  --  396 348   NEUTROS ABS  --   --  8.94* 8.32*   IMMATURE GRANS (ABS)  --   --  0.07* 0.03   LYMPHS ABS  --   --  2.87 2.49   MONOS ABS  --   --  1.13* 0.91*   EOS ABS  --   --  0.24 0.28   MCV 89.5  --  87.1 87.9   LACTATE  --  1.0 1.4  --          Lab 24  0436 24  1151 24  0334   SODIUM 138 139 140 140   POTASSIUM 3.8 3.7 4.0 3.9   CHLORIDE 106 102 101 102   CO2 21.0* 26.0 25.0 21.0*   ANION GAP 11.0 11.0 14.0 17.0*   BUN 21 26* 33* 32*   CREATININE 0.78 0.91 0.99 1.00   EGFR 74.1 61.6 55.6* 55.0*   GLUCOSE 154* 222* 198* 256*   CALCIUM 8.3* 9.1 9.7 9.6   MAGNESIUM 2.3 2.1 2.0 1.9   TSH  --   --  6.630*  --          Lab 24  0334   TOTAL PROTEIN 7.4 6.7   ALBUMIN 4.3 3.9   GLOBULIN 3.1 2.8   ALT (SGPT) 16 12   AST (SGOT) 16 22   BILIRUBIN 0.6 0.4   ALK PHOS 66 61   LIPASE  --  36         Lab 249 24  0541 24  0334   PROBNP  --   4,975.0*  --  629.0   HSTROP T 20* 25* 22* 22*                 Brief Urine Lab Results  (Last result in the past 365 days)        Color   Clarity   Blood   Leuk Est   Nitrite   Protein   CREAT   Urine HCG        09/20/24 0059 Yellow   Clear   Negative   Moderate (2+)   Negative   Negative                   Microbiology Results Abnormal       Procedure Component Value - Date/Time    Blood Culture - Blood, Arm, Left [[id number]]  (Normal) Collected: 09/20/24 0231    Lab Status: Preliminary result Specimen: Blood from Arm, Left Updated: 09/21/24 0700     Blood Culture No growth at 24 hours    Narrative:      Less than seven (7) mL's of blood was collected.  Insufficient quantity may yield false negative results.    Blood Culture - Blood, Arm, Right [[id number]]  (Normal) Collected: 09/20/24 0231    Lab Status: Preliminary result Specimen: Blood from Arm, Right Updated: 09/21/24 0700     Blood Culture No growth at 24 hours    Narrative:      Less than seven (7) mL's of blood was collected.  Insufficient quantity may yield false negative results.            Adult Transthoracic Echo Complete W/ Cont if Necessary Per Protocol    Result Date: 9/20/2024    Left ventricular systolic function is normal. Calculated left ventricular EF = 58.9% Left ventricular ejection fraction appears to be 56 - 60%.   The right ventricular cavity is mildly dilated.   Mild aortic valve stenosis is present. Aortic valve area is 1.3 cm2.   Peak velocity of the flow distal to the aortic valve is 239.8 cm/s. Aortic valve maximum pressure gradient is 26 mmHg. Aortic valve mean pressure gradient is 14 mmHg. Aortic valve dimensionless index is 0.33 .   Mild mitral valve stenosis is present. The mitral valve peak gradient is 15 mmHg. The mitral valve mean gradient is 5 mmHg. The mitral valve area (PHT) is 2.9 cm2.   Estimated right ventricular systolic pressure from tricuspid regurgitation is normal (<35 mmHg).     XR Chest 1 View    Result Date:  9/19/2024  XR CHEST 1 VW Date of Exam: 9/19/2024 9:21 PM EDT Indication: Dysrhythmia triage protocol Comparison: CTA chest 9/19/2024, AP chest x-ray 9/19/2024, two-view chest x-ray 12/20/2023 Findings: Chronic interstitial changes appear stable. Lungs are grossly clear. No pneumothorax or large pleural effusion is seen. Cardiomediastinal contours are unchanged.     Impression: Impression: No acute cardiopulmonary abnormality is identified. Electronically Signed: Savanna Medel  9/19/2024 9:54 PM EDT  Workstation ID: [id number]    CT Angiogram Chest Pulmonary Embolism    Result Date: 9/19/2024  CT ANGIOGRAM CHEST PULMONARY EMBOLISM Date of Exam: 9/19/2024 1:04 PM EDT Indication: Afib. Comparison: Chest x-ray 9/19/2024 and CT chest 1/16/2024 and 4/10/2023 Technique: Axial CT images were obtained of the chest after the uneventful intravenous administration of 85 mL Isovue-370 utilizing pulmonary embolism protocol.  Reconstructed coronal and sagittal images were also obtained. Automated exposure control and  iterative construction methods were used. Findings: PULMONARY VASCULATURE: Pulmonary arteries are widely patent without evidence of embolus.Main pulmonary artery is normal in size. No evidence of right heart strain. MEDIASTINUM:Unremarkable. Aortic and heart size are normal. No aortic dissection identified. No mass nor pericardial effusion. CORONARY ARTERIES: Mild to moderate calcified atherosclerotic disease. LUNGS: Lungs are clear. No consolidation. There is a 3 mm nodule in the left upper lung unchanged from prior studies.. PLEURAL SPACE: No effusion, mass, nor pneumothorax. LYMPH NODES: There is essentially stable appearance to mediastinal and bilateral hilar lymph nodes. UPPER ABDOMEN: The gallbladder is surgically absent. There is a cyst in the medial cortex of the right kidney measuring 1.2 cm. There is a small sliding hiatal hernia. OSSEOUS STRUCTURES: Appropriate for age with no acute process identified.      Impression: Impression: 1. No evidence for pulmonary embolus. 2. Stable examination with stable appearance to a 3 mm left upper lung nodule. 3. Mediastinal and hilar lymphadenopathy unchanged. Electronically Signed: Maddi Velazquez MD  9/19/2024 1:35 PM EDT  Workstation ID: [id number]     Results for orders placed during the hospital encounter of 09/19/24    Adult Transthoracic Echo Complete W/ Cont if Necessary Per Protocol    Interpretation Summary    Left ventricular systolic function is normal. Calculated left ventricular EF = 58.9% Left ventricular ejection fraction appears to be 56 - 60%.    The right ventricular cavity is mildly dilated.    Mild aortic valve stenosis is present. Aortic valve area is 1.3 cm2.    Peak velocity of the flow distal to the aortic valve is 239.8 cm/s. Aortic valve maximum pressure gradient is 26 mmHg. Aortic valve mean pressure gradient is 14 mmHg. Aortic valve dimensionless index is 0.33 .    Mild mitral valve stenosis is present. The mitral valve peak gradient is 15 mmHg. The mitral valve mean gradient is 5 mmHg. The mitral valve area (PHT) is 2.9 cm2.    Estimated right ventricular systolic pressure from tricuspid regurgitation is normal (<35 mmHg).      Current medications:  Scheduled Meds:[Held by provider] amLODIPine, 2.5 mg, Oral, Daily  atorvastatin, 40 mg, Oral, Daily  [Held by provider] hydroCHLOROthiazide, 25 mg, Oral, Daily  insulin regular, 2-7 Units, Subcutaneous, Q6H  metoprolol succinate XL, 50 mg, Oral, Daily  rivaroxaban, 15 mg, Oral, Daily With Dinner  sodium chloride, 10 mL, Intravenous, Q12H  [Held by provider] valsartan, 320 mg, Oral, Nightly      Continuous Infusions:amiodarone, 0.5 mg/min, Last Rate: 0.5 mg/min (09/21/24 0804)  [Held by provider] dilTIAZem, 5-15 mg/hr, Last Rate: Stopped (09/20/24 0358)  sodium chloride, 125 mL/hr, Last Rate: 125 mL/hr (09/21/24 0608)      PRN Meds:.  acetaminophen **OR** acetaminophen **OR** acetaminophen     senna-docusate sodium **AND** polyethylene glycol **AND** bisacodyl **AND** bisacodyl    Calcium Replacement - Follow Nurse / BPA Driven Protocol    dextrose    dextrose    glucagon (human recombinant)    hyaluronidase 150 units in NS 10 ml syringe    ipratropium-albuterol    Magnesium Cardiology Dose Replacement - Follow Nurse / BPA Driven Protocol    Phosphorus Replacement - Follow Nurse / BPA Driven Protocol    Potassium Replacement - Follow Nurse / BPA Driven Protocol    sodium chloride    sodium chloride    sodium chloride    Assessment & Plan   Assessment & Plan     Active Hospital Problems    Diagnosis  POA    **A-fib [I48.91]  Yes      Resolved Hospital Problems   No resolved problems to display.        Brief Hospital Course to date:  Kaylen Garrison is a 84 y.o. female w pmh Afib, HTN BERNARD/CPAP, AS, came in for tachycardia noted on pulse ox, though she had no symptoms.       Afib Aflutter with RVR  HTN  -continue xarelto  - cards consulted and added amio  - some low BPs - resolved   - monitor     UTI  -dysuria, resolved.  Abx complete      Chronic hypoxia and BERNARD  -c/w home CPAP and 2L O2 PRN     T2DM  -on orals at home, cover w/ SSI for now; fair control     Expected Discharge Location and Transportation: home by car  Expected Discharge soon   Expected discharge date/ time has not been documented.     VTE Prophylaxis:  Pharmacologic VTE prophylaxis orders are present.         AM-PAC 6 Clicks Score (PT): 21 (09/20/24 2039)    CODE STATUS:   Code Status and Medical Interventions: CPR (Attempt to Resuscitate); Full Support   Ordered at: 09/20/24 0540     Code Status (Patient has no pulse and is not breathing):    CPR (Attempt to Resuscitate)     Medical Interventions (Patient has pulse or is breathing):    Full Support       Mira Mcpherson MD  09/21/24

## 2024-09-21 NOTE — PLAN OF CARE
Goal Outcome Evaluation:    Neuro intact. SB/NSR per tele. Transitioned to PO amiodarone per cardiology. Sats > 90% on RA. Ambulating 300ft with one assist and walker. Adequate UOP. Progressing per POC.

## 2024-09-21 NOTE — THERAPY EVALUATION
Patient Name: Kaylen Garrison  : 1940    MRN: [id number]                              Today's Date: 2024       Admit Date: 2024    Visit Dx:     ICD-10-CM ICD-9-CM   1. Atrial fibrillation with rapid ventricular response  I48.91 427.31   2. Heart failure, unspecified HF chronicity, unspecified heart failure type  I50.9 428.9   3. Acute cystitis without hematuria  N30.00 595.0   4. Sleep apnea, unspecified type  G47.30 780.57     Patient Active Problem List   Diagnosis    Syncope    Primary hypertension    Hyperlipidemia LDL goal <100    Overweight (BMI 25.0-29.9)    Meniere's disease    Dyspnea on exertion    BERNARD treated with BiPAP    Primary hyperparathyroidism    Osteopenia    Type 2 diabetes mellitus with hyperglycemia, without long-term current use of insulin    Exercise hypoxemia    Paroxysmal atrial fibrillation    Right bundle branch block    Valvular heart disease    Adrenal nodule    Multiple thyroid nodules    A-fib     Past Medical History:   Diagnosis Date    Aortic valve stenosis     Atrial fibrillation     Benign hypertension     History of echocardiogram 2012    Hypercalcemia     Hypercholesterolemia     Hypertension     Meniere's disease     Obesity     Overweight (BMI 25.0-29.9)     Primary hyperparathyroidism     Sleep apnea with use of continuous positive airway pressure (CPAP)     Syncope 2012    Type 2 diabetes mellitus     Vitamin D deficiency      Past Surgical History:   Procedure Laterality Date    BREAST SURGERY Left     benign tumor removal    CATARACT EXTRACTION Bilateral     CHOLECYSTECTOMY N/A 2023    Procedure: CHOLECYSTECTOMY LAPAROSCOPIC;  Surgeon: Abraham Holbrook MD;  Location: [facility] OR;  Service: General;  Laterality: N/A;    ERCP N/A 2023    Procedure: ENDOSCOPIC RETROGRADE CHOLANGIOPANCREATOGRAPHY WITH STENT PLACEMENT;  Surgeon: Brunner, Mark I, MD;  Location: [facility] ENDOSCOPY;  Service: Gastroenterology;  Laterality: N/A;   Sphincterotomy made to ampula of coomon bile duct. CBD swept with 9mm-12 mm balloon. Multiple stones and sludge removed. Stent placed in pancreatic duct    HYSTERECTOMY        General Information       Row Name 09/21/24 1234          OT Time and Intention    Document Type evaluation  -     Mode of Treatment occupational therapy  -       Row Name 09/21/24 1234          General Information    Patient Profile Reviewed yes  -     Prior Level of Function independent:;gait;transfer;bed mobility;ADL's;home management;driving  -     Existing Precautions/Restrictions fall;oxygen therapy device and L/min  -     Barriers to Rehab medically complex  -       Row Name 09/21/24 1234          Living Environment    People in Home spouse  -[facility] Name 09/21/24 1234          Home Main Entrance    Number of Stairs, Main Entrance one  -     Stair Railings, Main Entrance railings on both sides of stairs  -       Row Name 09/21/24 1234          Stairs Within Home, Primary    Number of Stairs, Within Home, Primary none  -[facility] Name 09/21/24 1234          Cognition    Orientation Status (Cognition) oriented x 3  Pt very pleasant, however requiring multiple cues for redirection to stay on task  -       Row Name 09/21/24 1234          Safety Issues, Functional Mobility    Safety Issues Affecting Function (Mobility) insight into deficits/self-awareness;safety precaution awareness;safety precautions follow-through/compliance;awareness of need for assistance;judgment;problem-solving  -     Impairments Affecting Function (Mobility) balance;endurance/activity tolerance;shortness of breath;strength  -               User Key  (r) = Recorded By, (t) = Taken By, (c) = Cosigned By      Initials Name Provider Type    JR Joan Hearn OT Occupational Therapist                     Mobility/ADL's       Row Name 09/21/24 1236          Bed Mobility    Comment, (Bed Mobility) pt up in chair upon arrival  -[facility]  Name 09/21/24 1236          Transfers    Transfers sit-stand transfer  -       Row Name 09/21/24 1236          Sit-Stand Transfer    Sit-Stand [place] (Transfers) contact guard;verbal cues  -     Assistive Device (Sit-Stand Transfers) other (see comments)  B UE support  -       Row Name 09/21/24 1236          Functional Mobility    Functional Mobility- Ind. Level contact guard assist;2 person assist required;verbal cues required  -     Functional Mobility- Device other (see comments)  B UE support  -     Functional Mobility-Distance (Feet) --  > household distance  -     Functional Mobility- Safety Issues supplemental O2  -     Functional Mobility- Comment Pt required 4 standing rest periods due to SOA with verbal cues for PLB. Pt able to demonstrate PLB, but required multiple verbal cues execute. Pt would benefit from RWx for energy conservation.  -       Row Name 09/21/24 1236          Activities of Daily Living    BADL Assessment/Intervention grooming;upper body dressing  -       Row Name 09/21/24 1236          Grooming Assessment/Training    [place] Level (Grooming) wash face, hands;set up  -     Position (Grooming) supported sitting  -     Comment, (Grooming) Pt provided with all necessary supplies to brush teeth this date  -       Row Name 09/21/24 1236          Upper Body Dressing Assessment/Training    [place] Level (Upper Body Dressing) front opening garment;moderate assist (50% patient effort);verbal cues  -     Position (Upper Body Dressing) supported sitting  -               User Key  (r) = Recorded By, (t) = Taken By, (c) = Cosigned By      Initials Name Provider Type     Joan Hearn OT Occupational Therapist                   Obj/Interventions       Row Name 09/21/24 1238          Sensory Assessment (Somatosensory)    Sensory Assessment (Somatosensory) UE sensation intact  -       Row Name 09/21/24 1238          Vision Assessment/Intervention     Visual Impairment/Limitations corrective lenses full-time  -JR       Row Name 09/21/24 1238          Range of Motion Comprehensive    General Range of Motion bilateral upper extremity ROM WFL  -JR       Row Name 09/21/24 1238          Strength Comprehensive (MMT)    Comment, General Manual Muscle Testing (MMT) Assessment B UE functionally 4/5  -JR       Row Name 09/21/24 1238          Balance    Balance Assessment sitting static balance;standing dynamic balance  -JR     Static Sitting Balance standby assist  -JR     Dynamic Standing Balance minimal assist;2-person assist  -JR     Position/Device Used, Standing Balance supported  -JR               User Key  (r) = Recorded By, (t) = Taken By, (c) = Cosigned By      Initials Name Provider Type    JR Joan Hearn OT Occupational Therapist                   Goals/Plan       Row Name 09/21/24 1244          Bed Mobility Goal 1 (OT)    Activity/Assistive Device (Bed Mobility Goal 1, OT) bed mobility activities, all  -JR     [place] Level/Cues Needed (Bed Mobility Goal 1, OT) standby assist  -JR     Time Frame (Bed Mobility Goal 1, OT) short term goal (STG);5 days  -JR     Progress/Outcomes (Bed Mobility Goal 1, OT) new goal  -JR       Row Name 09/21/24 1244          Grooming Goal 1 (OT)    Activity/Device (Grooming Goal 1, OT) grooming skills, all  -JR     [place] (Grooming Goal 1, OT) standby assist;verbal cues required  -JR     Time Frame (Grooming Goal 1, OT) long term goal (LTG);by discharge  -JR     Strategies/Barriers (Grooming Goal 1, OT) sinkside  -JR     Progress/Outcome (Grooming Goal 1, OT) new goal  -JR       Row Name 09/21/24 1244          Problem Specific Goal 1 (OT)    Problem Specific Goal 1 (OT) Pt to verbalize understanding of WS/EC and PLB to support ADL independence.  -JR     Time Frame (Problem Specific Goal 1, OT) long term goal (LTG);by discharge  -JR     Progress/Outcome (Problem Specific Goal 1, OT) new goal  -JR               User  Key  (r) = Recorded By, (t) = Taken By, (c) = Cosigned By      Initials Name Provider Type    JR Joan Hearn, OT Occupational Therapist                   Clinical Impression       Row Name 09/21/24 1239          Pain Assessment    Pretreatment Pain Rating 0/10 - no pain  -JR     Posttreatment Pain Rating 0/10 - no pain  -JR     Additional Documentation Pain Scale: Word Pre/Post-Treatment (Group)  -       Row Name 09/21/24 1239          Plan of Care Review    Plan of Care Reviewed With patient  -JR     Outcome Evaluation OT initial eval and expanded chart review completed. Pt presents with multiple comorbidities and decreased strength, balance and activity tolerance, as well as SOA limiting independence with ADL's and mobility from baseline status. Recommend continued skilled OT services and d/c home with return to OPPT ensure safe transition to home.  -       Row Name 09/21/24 1234          Therapy Assessment/Plan (OT)    Patient/Family Therapy Goal Statement (OT) go home  -     Rehab Potential (OT) good, to achieve stated therapy goals  -     Criteria for Skilled Therapeutic Interventions Met (OT) meets criteria;skilled treatment is necessary;yes  -     Therapy Frequency (OT) daily  -JR     Predicted Duration of Therapy Intervention (OT) 10 days  -       Row Name 09/21/24 1239          Therapy Plan Review/Discharge Plan (OT)    Anticipated Discharge Disposition (OT) home with assist;home with home health  -       Row Name 09/21/24 123          Vital Signs    Pre Systolic BP Rehab 164  -JR     Pre Treatment Diastolic BP 72  -JR     Post Systolic BP Rehab 155  -JR     Post Treatment Diastolic BP 75  -JR     Pretreatment Heart Rate (beats/min) 70  -JR     Posttreatment Heart Rate (beats/min) 73  -JR     Pre SpO2 (%) 90  -JR     O2 Delivery Pre Treatment nasal cannula  -JR     Post SpO2 (%) 92  -JR     O2 Delivery Post Treatment nasal cannula  -JR     Pre Patient Position Sitting  -JR     Intra  Patient Position Standing  -JR     Post Patient Position Sitting  -JR       Row Name 09/21/24 1239          Positioning and Restraints    Pre-Treatment Position sitting in chair/recliner  -JR     Post Treatment Position chair  -JR     In Chair notified nsg;reclined;call light within reach;encouraged to call for assist;exit alarm on;waffle cushion  -JR               User Key  (r) = Recorded By, (t) = Taken By, (c) = Cosigned By      Initials Name Provider Type    Joan Mendoza, OT Occupational Therapist                   Outcome Measures       Row Name 09/21/24 1246          How much help from another is currently needed...    Putting on and taking off regular lower body clothing? 3  -JR     Bathing (including washing, rinsing, and drying) 2  -JR     Toileting (which includes using toilet bed pan or urinal) 3  -JR     Putting on and taking off regular upper body clothing 3  -JR     Taking care of personal grooming (such as brushing teeth) 4  -JR     Eating meals 4  -JR     AM-PAC 6 Clicks Score (OT) 19  -JR       Row Name 09/21/24 0800          How much help from another person do you currently need...    Turning from your back to your side while in flat bed without using bedrails? 4  -KT     Moving from lying on back to sitting on the side of a flat bed without bedrails? 4  -KT     Moving to and from a bed to a chair (including a wheelchair)? 4  -KT     Standing up from a chair using your arms (e.g., wheelchair, bedside chair)? 4  -KT     Climbing 3-5 steps with a railing? 3  -KT     To walk in hospital room? 3  -KT     AM-PAC 6 Clicks Score (PT) 22  -KT     Highest Level of Mobility Goal 7 --> Walk 25 feet or more  -KT       Row Name 09/21/24 1246          Functional Assessment    Outcome Measure Options AM-PAC 6 Clicks Daily Activity (OT)  -JR               User Key  (r) = Recorded By, (t) = Taken By, (c) = Cosigned By      Initials Name Provider Type    Joan Mendoza, OT Occupational Therapist    KT  Smita Pemberton, RN Registered Nurse                    Occupational Therapy Education       Title: PT OT SLP Therapies (In Progress)       Topic: Occupational Therapy (In Progress)       Point: ADL training (Done)       Description:   Instruct learner(s) on proper safety adaptation and remediation techniques during self care or transfers.   Instruct in proper use of assistive devices.                  Learning Progress Summary             Patient Acceptance, E, VU,NR by  at 9/21/2024 0915    Comment: role of therapy, PLB                         Point: Home exercise program (Not Started)       Description:   Instruct learner(s) on appropriate technique for monitoring, assisting and/or progressing therapeutic exercises/activities.                  Learner Progress:  Not documented in this visit.              Point: Precautions (Done)       Description:   Instruct learner(s) on prescribed precautions during self-care and functional transfers.                  Learning Progress Summary             Patient Acceptance, E, VU,NR by  at 9/21/2024 0915    Comment: role of therapy, PLB                         Point: Body mechanics (Not Started)       Description:   Instruct learner(s) on proper positioning and spine alignment during self-care, functional mobility activities and/or exercises.                  Learner Progress:  Not documented in this visit.                              User Key       Initials Effective Dates Name Provider Type Discipline     02/03/23 -  Joan Hearn OT Occupational Therapist OT                  OT Recommendation and Plan  Therapy Frequency (OT): daily  Plan of Care Review  Plan of Care Reviewed With: patient  Outcome Evaluation: OT initial eval and expanded chart review completed. Pt presents with multiple comorbidities and decreased strength, balance and activity tolerance, as well as SOA limiting independence with ADL's and mobility from baseline status. Recommend continued  skilled OT services and d/c home with return to OPPT ensure safe transition to home.     Time Calculation:   Evaluation Complexity (OT)  Review Occupational Profile/Medical/Therapy History Complexity: expanded/moderate complexity  Assessment, Occupational Performance/Identification of Deficit Complexity: 3-5 performance deficits  Clinical Decision Making Complexity (OT): detailed assessment/moderate complexity  Overall Complexity of Evaluation (OT): moderate complexity     Time Calculation- OT       Row Name 09/21/24 1248             Time Calculation- OT    OT Start Time 0915  -JR      OT Received On 09/21/24  -JR      OT Goal Re-Cert Due Date 10/01/24  -JR         Timed Charges    06013 - OT Self Care/Mgmt Minutes 8  -JR         Untimed Charges    OT Eval/Re-eval Minutes 46  -JR         Total Minutes    Timed Charges Total Minutes 8  -JR      Untimed Charges Total Minutes 46  -JR       Total Minutes 54  -JR                User Key  (r) = Recorded By, (t) = Taken By, (c) = Cosigned By      Initials Name Provider Type    JR Joan Hearn, OT Occupational Therapist                  Therapy Charges for Today       Code Description Service Date Service Provider Modifiers Qty    18186880999 HC OT SELF CARE/MGMT/TRAIN EA 15 MIN 9/21/2024 Joan Hearn OT GO 1    60533101895 HC OT EVAL MOD COMPLEXITY 4 9/21/2024 Joan Hearn OT GO 1                 Joan Hearn, OT  9/21/2024

## 2024-09-21 NOTE — PLAN OF CARE
Goal Outcome Evaluation:  Plan of Care Reviewed With: patient        Progress: no change  Outcome Evaluation: PT initial evaluation complete. Pt presents with decreased activity tolerance, decreased functional endurance, impaired balance, and generalized weakness this date. Pt was able to ambulate 40 ft 3x w/ minAx2, FWW, and extended rest breaks due to poor endurance and energy conservation. Pt required frequent cues for breathing tehcniques due to shortness of breath as well as frequent redirection to task. Pt would benefit from continued skilled therapy while hospitalized to maximize functional independence and decrease risk of falls. D/c rec is home w/ home health.      Anticipated Discharge Disposition (PT): home with home health

## 2024-09-21 NOTE — PROGRESS NOTES
"[facility] Progress Note     LOS: 0 days   Patient Care Team:  Lizzette Multani MD as PCP - General (Internal Medicine)  Harley Rose MD as Consulting Physician (Endocrinology)  Matilde Alvarez MD as Consulting Physician (Cardiology)  Leigh Tijerina APRN as Nurse Practitioner (Pulmonary Disease)  Irish Sousa PA as Physician Assistant (Endocrinology)  PCP:  Lizzette Multani MD    Chief Complaint: Atrial fibrillation    SUBJECTIVE: Converted to sinus rhythm.  Currently resting comfortably in bed.  Denies chest pain, palpitations or dyspnea.      Review of Systems:   All systems have been reviewed and are negative with the exception of those mentioned above.      OBJECTIVE:    Vital Sign Min/Max for last 24 hours  Temp  Min: 97.6 °F (36.4 °C)  Max: 97.9 °F (36.6 °C)   BP  Min: 92/64  Max: 143/81   Pulse  Min: 59  Max: 131   Resp  Min: 18  Max: 18   SpO2  Min: 87 %  Max: 96 %   No data recorded   No data recorded     Flowsheet Rows      Flowsheet Row First Filed Value   Admission Height 157.5 cm (62\") Documented at 09/19/2024 2110   Admission Weight 64.9 kg (143 lb) Documented at 09/19/2024 2110            Telemetry: Sinus rhythm      Intake/Output Summary (Last 24 hours) at 9/21/2024 0926  Last data filed at 9/21/2024 0347  Gross per 24 hour   Intake 950 ml   Output 500 ml   Net 450 ml     Intake & Output (last 3 days)         09/18 0701  09/19 0700 09/19 0701  09/20 0700 09/20 0701  09/21 0700 09/21 0701  09/22 0700    P.O.   950     IV Piggyback  600      Total Intake(mL/kg)  600 (9.2) 950 (14.6)     Urine (mL/kg/hr)   500 (0.3)     Total Output   500     Net  +600 +450             Urine Unmeasured Occurrence   1 x     Stool Unmeasured Occurrence   1 x              Physical Exam:    General Appearance:    Alert, cooperative, no distress, appears stated age   Neck:   Supple, symmetrical, trachea midline.   Lungs:     Clear to " auscultation bilaterally, respirations unlabored   Chest Wall:    No tenderness or deformity    Heart:    Regular rate and rhythm, S1 and S2 normal, 1/6 early peaking systolic murmur right upper sternal border, no rub   or gallop, normal carotid impulse bilaterally without bruit.   Extremities:   Extremities normal, atraumatic, no cyanosis or edema   Pulses:   2+ and symmetric all extremities   Skin:   Skin color, texture, turgor normal, no rashes or lesions      LABS/DIAGNOSTIC DATA:  Results from last 7 days   Lab Units 09/21/24  0436 09/19/24 2121 09/19/24  0334   WBC 10*3/mm3 10.46 13.32* 12.07*   HEMOGLOBIN g/dL 12.2 14.9 13.5   HEMATOCRIT % 38.2 45.1 41.3   PLATELETS 10*3/mm3 308 396 348     Lab Results   Lab Value Date/Time    TROPONINT 20 (H) 09/19/2024 2329    TROPONINT 25 (H) 09/19/2024 2121    TROPONINT 22 (H) 09/19/2024 0541    TROPONINT 22 (H) 09/19/2024 0334    TROPONINT 13 (H) 04/08/2023 0150    TROPONINT 14 (H) 04/07/2023 2321    TROPONINT 14 (H) 04/07/2023 2321    TROPONINT <0.010 11/10/2021 1009         Results from last 7 days   Lab Units 09/21/24  0436 09/20/24  1151 09/19/24 2121 09/19/24  0334   SODIUM mmol/L 138 139 140 140   POTASSIUM mmol/L 3.8 3.7 4.0 3.9   CHLORIDE mmol/L 106 102 101 102   CO2 mmol/L 21.0* 26.0 25.0 21.0*   BUN mg/dL 21 26* 33* 32*   CREATININE mg/dL 0.78 0.91 0.99 1.00   CALCIUM mg/dL 8.3* 9.1 9.7 9.6   BILIRUBIN mg/dL  --   --  0.6 0.4   ALK PHOS U/L  --   --  66 61   ALT (SGPT) U/L  --   --  16 12   AST (SGOT) U/L  --   --  16 22   GLUCOSE mg/dL 154* 222* 198* 256*             Results from last 7 days   Lab Units 09/19/24 2329 09/19/24  2121   TSH uIU/mL  --  6.630*   FREE T4 ng/dL 1.26  --            Medication Review:   amiodarone, 200 mg, Oral, BID With Meals  [Held by provider] amLODIPine, 2.5 mg, Oral, Daily  atorvastatin, 40 mg, Oral, Daily  [Held by provider] hydroCHLOROthiazide, 25 mg, Oral, Daily  insulin regular, 2-7 Units, Subcutaneous, Q6H  metoprolol  succinate XL, 50 mg, Oral, Daily  rivaroxaban, 15 mg, Oral, Daily With Dinner  sodium chloride, 10 mL, Intravenous, Q12H  [Held by provider] valsartan, 320 mg, Oral, Nightly       [Held by provider] dilTIAZem, 5-15 mg/hr, Last Rate: Stopped (09/20/24 0358)  sodium chloride, 125 mL/hr, Last Rate: 125 mL/hr (09/21/24 0608)         ASSESSMENT/PLAN:    A-fib        Atrial fibrillation, persistent: Converted to sinus rhythm on amiodarone infusion.  Transitioning to oral regimen.  Observe overnight on telemetry, if sinus rhythm is maintained without significant QT prolongation, patient will be stable for discharge.  Continue Xarelto 15 mg p.o. daily for stroke prophylaxis.          Maxi Toro III, MD   09/21/24  09:26 EDT

## 2024-09-21 NOTE — THERAPY EVALUATION
Patient Name: Kaylen Garrison  : 1940    MRN: [id number]                              Today's Date: 2024       Admit Date: 2024    Visit Dx:     ICD-10-CM ICD-9-CM   1. Atrial fibrillation with rapid ventricular response  I48.91 427.31   2. Heart failure, unspecified HF chronicity, unspecified heart failure type  I50.9 428.9   3. Acute cystitis without hematuria  N30.00 595.0   4. Sleep apnea, unspecified type  G47.30 780.57     Patient Active Problem List   Diagnosis    Syncope    Primary hypertension    Hyperlipidemia LDL goal <100    Overweight (BMI 25.0-29.9)    Meniere's disease    Dyspnea on exertion    BERNARD treated with BiPAP    Primary hyperparathyroidism    Osteopenia    Type 2 diabetes mellitus with hyperglycemia, without long-term current use of insulin    Exercise hypoxemia    Paroxysmal atrial fibrillation    Right bundle branch block    Valvular heart disease    Adrenal nodule    Multiple thyroid nodules    A-fib     Past Medical History:   Diagnosis Date    Aortic valve stenosis     Atrial fibrillation     Benign hypertension     History of echocardiogram 2012    Hypercalcemia     Hypercholesterolemia     Hypertension     Meniere's disease     Obesity     Overweight (BMI 25.0-29.9)     Primary hyperparathyroidism     Sleep apnea with use of continuous positive airway pressure (CPAP)     Syncope 2012    Type 2 diabetes mellitus     Vitamin D deficiency      Past Surgical History:   Procedure Laterality Date    BREAST SURGERY Left     benign tumor removal    CATARACT EXTRACTION Bilateral     CHOLECYSTECTOMY N/A 2023    Procedure: CHOLECYSTECTOMY LAPAROSCOPIC;  Surgeon: Abraham Holbrook MD;  Location: [facility] OR;  Service: General;  Laterality: N/A;    ERCP N/A 2023    Procedure: ENDOSCOPIC RETROGRADE CHOLANGIOPANCREATOGRAPHY WITH STENT PLACEMENT;  Surgeon: Brunner, Mark I, MD;  Location: [facility] ENDOSCOPY;  Service: Gastroenterology;  Laterality: N/A;   Sphincterotomy made to ampula of coomon bile duct. CBD swept with 9mm-12 mm balloon. Multiple stones and sludge removed. Stent placed in pancreatic duct    HYSTERECTOMY        General Information       Row Name 09/21/24 1150          Physical Therapy Time and Intention    Document Type evaluation  -     Mode of Treatment physical therapy  -       Row Name 09/21/24 1150          General Information    Patient Profile Reviewed yes  -     Prior Level of Function independent:;all household mobility;community mobility;gait;transfer;bed mobility;ADL's  -     Existing Precautions/Restrictions fall;oxygen therapy device and L/min  -     Barriers to Rehab medically complex  -       Row Name 09/21/24 1150          Living Environment    People in Home spouse  -[facility] Name 09/21/24 1150          Home Main Entrance    Number of Stairs, Main Entrance one  -     Stair Railings, Main Entrance railings on both sides of stairs  -[facility] Name 09/21/24 1150          Stairs Within Home, Primary    Number of Stairs, Within Home, Primary none  -[facility] Name 09/21/24 1150          Cognition    Orientation Status (Cognition) oriented x 3  -       Row Name 09/21/24 1150          Safety Issues, Functional Mobility    Safety Issues Affecting Function (Mobility) insight into deficits/self-awareness;judgment;safety precaution awareness;safety precautions follow-through/compliance  -     Impairments Affecting Function (Mobility) balance;endurance/activity tolerance;shortness of breath;strength  -               User Key  (r) = Recorded By, (t) = Taken By, (c) = Cosigned By      Initials Name Provider Type     Ange Gutierrez, PT Physical Therapist                   Mobility       Row Name 09/21/24 1504          Bed Mobility    Comment, (Bed Mobility) Pt UIC upon arrival  -       Row Name 09/21/24 1504          Sit-Stand Transfer    Sit-Stand [place] (Transfers) contact guard;verbal cues  -     Assistive  Device (Sit-Stand Transfers) other (see comments)  FWW  -     Comment, (Sit-Stand Transfer) Pt required CGAx1 for balance in order to transition to standing w/ FWW for static standing balance  -       Row Name 09/21/24 1504          Gait/Stairs (Locomotion)    [place] Level (Gait) minimum assist (75% patient effort);2 person assist;verbal cues;nonverbal cues (demo/gesture)  -     Assistive Device (Gait) walker, front-wheeled  -     Distance in Feet (Gait) 40  x3  -     Deviations/Abnormal Patterns (Gait) jacob decreased;festinating/shuffling;gait speed decreased;base of support, narrow  -AC     Bilateral Gait Deviations forward flexed posture  -     Comment, (Gait/Stairs) Pt ambulated OOR w/ minAx2, flexed trunk posture, downward gaze, and decreased foot clearance bilaterally. Pt required frequent rest breaks due to shortness of breath in addition to frequent cues for breathing techniques. In addition pt demonstrated decreased safety awarenes and poor energy conservation techniques this date  -               User Key  (r) = Recorded By, (t) = Taken By, (c) = Cosigned By      Initials Name Provider Type     Ange Gutierrez, PT Physical Therapist                   Obj/Interventions       Row Name 09/21/24 1506          Range of Motion Comprehensive    General Range of Motion bilateral lower extremity ROM WNL  -       Row Name 09/21/24 1506          Strength Comprehensive (MMT)    General Manual Muscle Testing (MMT) Assessment lower extremity strength deficits identified  -     Comment, General Manual Muscle Testing (MMT) Assessment BLE's grossly  -       Row Name 09/21/24 1506          Balance    Balance Assessment sitting static balance;sitting dynamic balance;sit to stand dynamic balance;standing static balance;standing dynamic balance  -     Static Sitting Balance standby assist  -     Dynamic Sitting Balance contact guard  -AC     Position, Sitting Balance sitting in chair;unsupported   -AC     Sit to Stand Dynamic Balance contact guard;1-person assist  -AC     Static Standing Balance contact guard;1-person assist  -AC     Dynamic Standing Balance minimal assist;2-person assist  -AC     Position/Device Used, Standing Balance supported;walker, rolling  -AC     Balance Interventions sitting;standing;sit to stand;supported;static;dynamic  -AC       Row Name 09/21/24 1506          Sensory Assessment (Somatosensory)    Sensory Assessment (Somatosensory) LE sensation intact  -               User Key  (r) = Recorded By, (t) = Taken By, (c) = Cosigned By      Initials Name Provider Type    AC Ange Gutierrez, PT Physical Therapist                   Goals/Plan       Row Name 09/21/24 1512          Bed Mobility Goal 1 (PT)    Activity/Assistive Device (Bed Mobility Goal 1, PT) sit to supine/supine to sit  -AC     [place] Level/Cues Needed (Bed Mobility Goal 1, PT) standby assist  -AC     Time Frame (Bed Mobility Goal 1, PT) short term goal (STG);5 days  -       Row Name 09/21/24 1512          Transfer Goal 1 (PT)    Activity/Assistive Device (Transfer Goal 1, PT) sit-to-stand/stand-to-sit  -AC     [place] Level/Cues Needed (Transfer Goal 1, PT) standby assist  -AC     Time Frame (Transfer Goal 1, PT) long term goal (LTG);10 days  -       Row Name 09/21/24 1512          Gait Training Goal 1 (PT)    Activity/Assistive Device (Gait Training Goal 1, PT) gait (walking locomotion);improve balance and speed;increase endurance/gait distance;decrease fall risk;increase energy conservation  -AC     [place] Level (Gait Training Goal 1, PT) standby assist  -AC     Distance (Gait Training Goal 1, PT) 150  -AC     Time Frame (Gait Training Goal 1, PT) long term goal (LTG);10 days  -       Row Name 09/21/24 1512          Therapy Assessment/Plan (PT)    Planned Therapy Interventions (PT) balance training;bed mobility training;gait training;patient/family education;strengthening;stair training;transfer  training  -AC               User Key  (r) = Recorded By, (t) = Taken By, (c) = Cosigned By      Initials Name Provider Type    AC Ange Gutierrez, PT Physical Therapist                   Clinical Impression       Row Name 09/21/24 1507          Pain    Pretreatment Pain Rating 0/10 - no pain  -AC     Posttreatment Pain Rating 0/10 - no pain  -AC       Row Name 09/21/24 1507          Plan of Care Review    Plan of Care Reviewed With patient  -AC     Progress no change  -AC     Outcome Evaluation PT initial evaluation complete. Pt presents with decreased activity tolerance, decreased functional endurance, impaired balance, and generalized weakness this date. Pt was able to ambulate 40 ft 3x w/ minAx2, FWW, and extended rest breaks due to poor endurance and energy conservation. Pt required frequent cues for breathing tehcniques due to shortness of breath as well as frequent redirection to task. Pt would benefit from continued skilled therapy while hospitalized to maximize functional independence and decrease risk of falls. D/c rec is home w/ home health.  -AC       Row Name 09/21/24 1507          Therapy Assessment/Plan (PT)    Rehab Potential (PT) good, to achieve stated therapy goals  -AC     Criteria for Skilled Interventions Met (PT) yes  -AC     Therapy Frequency (PT) daily  -AC     Predicted Duration of Therapy Intervention (PT) 10 days  -AC       Row Name 09/21/24 1507          Vital Signs    Pre Systolic BP Rehab 164  -AC     Pre Treatment Diastolic BP 72  -AC     Post Systolic BP Rehab 155  -AC     Post Treatment Diastolic BP 75  -AC     Pretreatment Heart Rate (beats/min) 70  -AC     Posttreatment Heart Rate (beats/min) 73  -AC     Pre SpO2 (%) 90  -AC     O2 Delivery Pre Treatment nasal cannula  2L  -AC     Post SpO2 (%) 92  -AC     O2 Delivery Post Treatment nasal cannula  2L  -AC     Pre Patient Position Sitting  -AC     Intra Patient Position Standing  -AC     Post Patient Position Sitting  -AC       Row Name  09/21/24 1507          Positioning and Restraints    Pre-Treatment Position sitting in chair/recliner  -     Post Treatment Position chair  -AC     In Chair notified nsg;reclined;sitting;call light within reach;encouraged to call for assist;exit alarm on;waffle cushion;legs elevated  -               User Key  (r) = Recorded By, (t) = Taken By, (c) = Cosigned By      Initials Name Provider Type    Ange Talamantes, PT Physical Therapist                   Outcome Measures       Row Name 09/21/24 1513 09/21/24 0800       How much help from another person do you currently need...    Turning from your back to your side while in flat bed without using bedrails? 3  -AC 4  -KT    Moving from lying on back to sitting on the side of a flat bed without bedrails? 3  -AC 4  -KT    Moving to and from a bed to a chair (including a wheelchair)? 3  -AC 4  -KT    Standing up from a chair using your arms (e.g., wheelchair, bedside chair)? 3  -AC 4  -KT    Climbing 3-5 steps with a railing? 2  -AC 3  -KT    To walk in hospital room? 2  -AC 3  -KT    AM-PAC 6 Clicks Score (PT) 16  -AC 22  -KT    Highest Level of Mobility Goal 5 --> Static standing  -AC 7 --> Walk 25 feet or more  -KT      Row Name 09/21/24 1513 09/21/24 1246       Functional Assessment    Outcome Measure Options AM-PAC 6 Clicks Basic Mobility (PT)  -AC AM-PAC 6 Clicks Daily Activity (OT)  -              User Key  (r) = Recorded By, (t) = Taken By, (c) = Cosigned By      Initials Name Provider Type    Joan Mendoza OT Occupational Therapist    Smita Moser, RN Registered Nurse    Ange Talamantes, PT Physical Therapist                                 Physical Therapy Education       Title: PT OT SLP Therapies (In Progress)       Topic: Physical Therapy (In Progress)       Point: Mobility training (In Progress)       Learning Progress Summary             Patient Acceptance, E, NR by  at 9/21/2024 1513                         Point: Home exercise  program (In Progress)       Learning Progress Summary             Patient Acceptance, E, NR by  at 9/21/2024 1513                         Point: Body mechanics (In Progress)       Learning Progress Summary             Patient Acceptance, E, NR by  at 9/21/2024 1513                         Point: Precautions (In Progress)       Learning Progress Summary             Patient Acceptance, E, NR by  at 9/21/2024 1513                                         User Key       Initials Effective Dates Name Provider Type Discipline     07/11/24 -  Ange Gutierrez, PT Physical Therapist PT                  PT Recommendation and Plan  Planned Therapy Interventions (PT): balance training, bed mobility training, gait training, patient/family education, strengthening, stair training, transfer training  Plan of Care Reviewed With: patient  Progress: no change  Outcome Evaluation: PT initial evaluation complete. Pt presents with decreased activity tolerance, decreased functional endurance, impaired balance, and generalized weakness this date. Pt was able to ambulate 40 ft 3x w/ minAx2, FWW, and extended rest breaks due to poor endurance and energy conservation. Pt required frequent cues for breathing tehcniques due to shortness of breath as well as frequent redirection to task. Pt would benefit from continued skilled therapy while hospitalized to maximize functional independence and decrease risk of falls. D/c rec is home w/ home health.     Time Calculation:   PT Evaluation Complexity  History, PT Evaluation Complexity: 1-2 personal factors and/or comorbidities  Examination of Body Systems (PT Eval Complexity): total of 3 or more elements  Clinical Presentation (PT Evaluation Complexity): evolving  Clinical Decision Making (PT Evaluation Complexity): moderate complexity  Overall Complexity (PT Evaluation Complexity): moderate complexity     PT Charges       Row Name 09/21/24 1514             Time Calculation    Start Time 0920   -AC      PT Received On 09/21/24  -AC      PT Goal Re-Cert Due Date 10/01/24  -AC         Time Calculation- PT    Total Timed Code Minutes- PT 10 minute(s)  -AC         Timed Charges    62787 - Gait Training Minutes  10  -AC         Untimed Charges    PT Eval/Re-eval Minutes 35  -AC         Total Minutes    Timed Charges Total Minutes 10  -AC      Untimed Charges Total Minutes 35  -AC       Total Minutes 45  -AC                User Key  (r) = Recorded By, (t) = Taken By, (c) = Cosigned By      Initials Name Provider Type    AC Ange Gutierrez, PT Physical Therapist                  Therapy Charges for Today       Code Description Service Date Service Provider Modifiers Qty    58101487750 HC GAIT TRAINING EA 15 MIN 9/21/2024 Ange Gutierrez, PT GP 1    96216034212 HC PT EVAL MOD COMPLEXITY 3 9/21/2024 Ange Gutierrez, PT GP 1            PT G-Codes  Outcome Measure Options: AM-PAC 6 Clicks Basic Mobility (PT)  AM-PAC 6 Clicks Score (PT): 16  AM-PAC 6 Clicks Score (OT): 19  PT Discharge Summary  Anticipated Discharge Disposition (PT): home with home health    Agne Gutierrez PT  9/21/2024

## 2024-09-22 VITALS
SYSTOLIC BLOOD PRESSURE: 160 MMHG | HEART RATE: 64 BPM | RESPIRATION RATE: 18 BRPM | OXYGEN SATURATION: 90 % | WEIGHT: 143 LBS | BODY MASS INDEX: 26.31 KG/M2 | HEIGHT: 62 IN | DIASTOLIC BLOOD PRESSURE: 73 MMHG | TEMPERATURE: 98.2 F

## 2024-09-22 LAB
ANION GAP SERPL CALCULATED.3IONS-SCNC: 13 MMOL/L (ref 5–15)
BUN SERPL-MCNC: 21 MG/DL (ref 8–23)
BUN/CREAT SERPL: 28.4 (ref 7–25)
CALCIUM SPEC-SCNC: 8.6 MG/DL (ref 8.6–10.5)
CHLORIDE SERPL-SCNC: 107 MMOL/L (ref 98–107)
CO2 SERPL-SCNC: 21 MMOL/L (ref 22–29)
CREAT SERPL-MCNC: 0.74 MG/DL (ref 0.57–1)
EGFRCR SERPLBLD CKD-EPI 2021: 78.9 ML/MIN/1.73
GLUCOSE BLDC GLUCOMTR-MCNC: 211 MG/DL (ref 70–130)
GLUCOSE BLDC GLUCOMTR-MCNC: 284 MG/DL (ref 70–130)
GLUCOSE SERPL-MCNC: 194 MG/DL (ref 65–99)
MAGNESIUM SERPL-MCNC: 2.2 MG/DL (ref 1.6–2.4)
POTASSIUM SERPL-SCNC: 4.3 MMOL/L (ref 3.5–5.2)
QT INTERVAL: 446 MS
QTC INTERVAL: 460 MS
SODIUM SERPL-SCNC: 141 MMOL/L (ref 136–145)

## 2024-09-22 PROCEDURE — 93010 ELECTROCARDIOGRAM REPORT: CPT | Performed by: INTERNAL MEDICINE

## 2024-09-22 PROCEDURE — 99232 SBSQ HOSP IP/OBS MODERATE 35: CPT | Performed by: INTERNAL MEDICINE

## 2024-09-22 PROCEDURE — 99239 HOSP IP/OBS DSCHRG MGMT >30: CPT | Performed by: NURSE PRACTITIONER

## 2024-09-22 PROCEDURE — 80048 BASIC METABOLIC PNL TOTAL CA: CPT | Performed by: STUDENT IN AN ORGANIZED HEALTH CARE EDUCATION/TRAINING PROGRAM

## 2024-09-22 PROCEDURE — 63710000001 INSULIN LISPRO (HUMAN) PER 5 UNITS: Performed by: INTERNAL MEDICINE

## 2024-09-22 PROCEDURE — 93005 ELECTROCARDIOGRAM TRACING: CPT | Performed by: INTERNAL MEDICINE

## 2024-09-22 PROCEDURE — 83735 ASSAY OF MAGNESIUM: CPT | Performed by: STUDENT IN AN ORGANIZED HEALTH CARE EDUCATION/TRAINING PROGRAM

## 2024-09-22 PROCEDURE — G0378 HOSPITAL OBSERVATION PER HR: HCPCS

## 2024-09-22 PROCEDURE — 82948 REAGENT STRIP/BLOOD GLUCOSE: CPT

## 2024-09-22 RX ORDER — AMIODARONE HYDROCHLORIDE 200 MG/1
200 TABLET ORAL DAILY
Qty: 30 TABLET | Refills: 2 | Status: SHIPPED | OUTPATIENT
Start: 2024-09-22

## 2024-09-22 RX ADMIN — INSULIN LISPRO 3 UNITS: 100 INJECTION, SOLUTION INTRAVENOUS; SUBCUTANEOUS at 09:14

## 2024-09-22 RX ADMIN — ATORVASTATIN CALCIUM 40 MG: 40 TABLET, FILM COATED ORAL at 08:57

## 2024-09-22 RX ADMIN — AMIODARONE HYDROCHLORIDE 200 MG: 200 TABLET ORAL at 08:57

## 2024-09-22 RX ADMIN — METOPROLOL SUCCINATE 50 MG: 50 TABLET, EXTENDED RELEASE ORAL at 08:57

## 2024-09-22 RX ADMIN — INSULIN LISPRO 4 UNITS: 100 INJECTION, SOLUTION INTRAVENOUS; SUBCUTANEOUS at 12:19

## 2024-09-22 RX ADMIN — Medication 10 ML: at 08:57

## 2024-09-22 NOTE — DISCHARGE SUMMARY
[facility]  DISCHARGE SUMMARY    Patient Name: Kaylen Garrison  : 1940  MRN: [id number]    Date of Admission: 2024  9:06 PM  Date of Discharge:  24    Primary Care Physician: Lizzette Multani MD    Consults       Date and Time Order Name Status Description    2024  7:25 AM Inpatient Cardiology Consult Completed             Hospital Course     Presenting Problem: Afib     Active Hospital Problems    Diagnosis  POA    **A-fib [I48.91]  Yes      Resolved Hospital Problems   No resolved problems to display.        Hospital Course:  Kaylen Garrison is a 84 y.o. female with PMH significant for afib on xarelto, T2DM, HTN, BERNARD on CPAP, and AS who presented on 2024 due to elevated HR at home .  EKG in the ED revealed atrial fibrillation/A-flutter with rapid ventricular response.  Cardiology was consulted and she was started on amiodarone with conversion to NSR.  She was recommended to continue Xarelto for stroke prophylaxis.  She will follow-up with cardiology in 4 to 6 weeks.  PT/OT evaluated and recommended home with assist.  She will be discharged home today in stable condition.    Afib Aflutter with RVR  HTN  -continue xarelto  - cards follwing  -started on Amio, continue   -holding norvasc, diovan, and hctz for now   -Follow up cardiology in 4-6 weeks.      UTI  -dysuria, resolved.  Abx complete      Chronic hypoxia and BERNARD  -c/w home CPAP and 2L O2 PRN     T2DM  -Resume home regimen    Discharge Follow Up Recommendations for outpatient labs/diagnostics:   Follow up with PCP in 1 week.  Follow up with Cardiology in 4-6 weeks.     Day of Discharge     HPI:   Patient was seen resting in bed no apparent distress.  No acute events overnight per nursing.  Remains in normal sinus rhythm per telemetry.  She denies any further palpitations.  Notes that she felt well when working with PT/OT yesterday and feels ready to discharge home today.  We  discussed the importance of taking all medications as prescribed and keeping all recommended follow-up appointments.  She expressed no additional concerns this time.    Vital Signs:   Temp:  [97.3 °F (36.3 °C)-98.5 °F (36.9 °C)] 98.5 °F (36.9 °C)  Heart Rate:  [60-64] 64  Resp:  [18-22] 18  BP: (139-151)/(63-83) 139/76  Flow (L/min):  [2] 2    Physical Exam:  Constitutional: No acute distress, awake, alert, chronically ill appearing, frail   HENT: NCAT, mucous membranes moist  Respiratory: Clear to auscultation bilaterally, respiratory effort normal   Cardiovascular: RRR, no murmurs, palpable pedal pulses bilaterally, cap refill brisk   Gastrointestinal: Positive bowel sounds, soft, nontender, nondistended  Musculoskeletal: No BLE edema   Psychiatric: Appropriate affect, cooperative  Neurologic: Oriented x 3, moves all extremities, speech clear  Skin: warm, dry, no visible rash     Pertinent  and/or Most Recent Results     LAB RESULTS:      Lab 09/21/24  0436 09/20/24  0231 09/19/24 2121 09/19/24  0334   WBC 10.46  --  13.32* 12.07*   HEMOGLOBIN 12.2  --  14.9 13.5   HEMATOCRIT 38.2  --  45.1 41.3   PLATELETS 308  --  396 348   NEUTROS ABS  --   --  8.94* 8.32*   IMMATURE GRANS (ABS)  --   --  0.07* 0.03   LYMPHS ABS  --   --  2.87 2.49   MONOS ABS  --   --  1.13* 0.91*   EOS ABS  --   --  0.24 0.28   MCV 89.5  --  87.1 87.9   LACTATE  --  1.0 1.4  --          Lab 09/22/24  0420 09/21/24  0436 09/20/24  1151 09/19/24 2121 09/19/24  0334   SODIUM 141 138 139 140 140   POTASSIUM 4.3 3.8 3.7 4.0 3.9   CHLORIDE 107 106 102 101 102   CO2 21.0* 21.0* 26.0 25.0 21.0*   ANION GAP 13.0 11.0 11.0 14.0 17.0*   BUN 21 21 26* 33* 32*   CREATININE 0.74 0.78 0.91 0.99 1.00   EGFR 78.9 74.1 61.6 55.6* 55.0*   GLUCOSE 194* 154* 222* 198* 256*   CALCIUM 8.6 8.3* 9.1 9.7 9.6   MAGNESIUM 2.2 2.3 2.1 2.0 1.9   TSH  --   --   --  6.630*  --          Lab 09/19/24  2121 09/19/24  0334   TOTAL PROTEIN 7.4 6.7   ALBUMIN 4.3 3.9   GLOBULIN  3.1 2.8   ALT (SGPT) 16 12   AST (SGOT) 16 22   BILIRUBIN 0.6 0.4   ALK PHOS 66 61   LIPASE  --  36         Lab 09/19/24  2329 09/19/24  2121 09/19/24  0541 09/19/24  0334   PROBNP  --  4,975.0*  --  629.0   HSTROP T 20* 25* 22* 22*                 Brief Urine Lab Results  (Last result in the past 365 days)        Color   Clarity   Blood   Leuk Est   Nitrite   Protein   CREAT   Urine HCG        09/20/24 0059 Yellow   Clear   Negative   Moderate (2+)   Negative   Negative                 Microbiology Results (last 10 days)       Procedure Component Value - Date/Time    Blood Culture - Blood, Arm, Left [[id number]]  (Normal) Collected: 09/20/24 0231    Lab Status: Preliminary result Specimen: Blood from Arm, Left Updated: 09/22/24 0700     Blood Culture No growth at 2 days    Narrative:      Less than seven (7) mL's of blood was collected.  Insufficient quantity may yield false negative results.    Blood Culture - Blood, Arm, Right [[id number]]  (Normal) Collected: 09/20/24 0231    Lab Status: Preliminary result Specimen: Blood from Arm, Right Updated: 09/22/24 0700     Blood Culture No growth at 2 days    Narrative:      Less than seven (7) mL's of blood was collected.  Insufficient quantity may yield false negative results.    Urine Culture - Urine, Urine, Clean Catch [[id number]] Collected: 09/20/24 0059    Lab Status: Final result Specimen: Urine, Clean Catch Updated: 09/21/24 1241     Urine Culture 50,000 CFU/mL Mixed Emilee Isolated    Narrative:      Specimen contains mixed organisms of questionable pathogenicity suggestive of contamination. If symptoms persist, suggest recollection.  Colonization of the urinary tract without infection is common. Treatment is discouraged unless the patient is symptomatic, pregnant, or undergoing an invasive urologic procedure.            Adult Transthoracic Echo Complete W/ Cont if Necessary Per Protocol    Result Date: 9/20/2024    Left ventricular systolic function is normal.  Calculated left ventricular EF = 58.9% Left ventricular ejection fraction appears to be 56 - 60%.   The right ventricular cavity is mildly dilated.   Mild aortic valve stenosis is present. Aortic valve area is 1.3 cm2.   Peak velocity of the flow distal to the aortic valve is 239.8 cm/s. Aortic valve maximum pressure gradient is 26 mmHg. Aortic valve mean pressure gradient is 14 mmHg. Aortic valve dimensionless index is 0.33 .   Mild mitral valve stenosis is present. The mitral valve peak gradient is 15 mmHg. The mitral valve mean gradient is 5 mmHg. The mitral valve area (PHT) is 2.9 cm2.   Estimated right ventricular systolic pressure from tricuspid regurgitation is normal (<35 mmHg).     XR Chest 1 View    Result Date: 9/19/2024  XR CHEST 1 VW Date of Exam: 9/19/2024 9:21 PM EDT Indication: Dysrhythmia triage protocol Comparison: CTA chest 9/19/2024, AP chest x-ray 9/19/2024, two-view chest x-ray 12/20/2023 Findings: Chronic interstitial changes appear stable. Lungs are grossly clear. No pneumothorax or large pleural effusion is seen. Cardiomediastinal contours are unchanged.     Impression: No acute cardiopulmonary abnormality is identified. Electronically Signed: Savanna Medel  9/19/2024 9:54 PM EDT  Workstation ID: [id number]    CT Angiogram Chest Pulmonary Embolism    Result Date: 9/19/2024  CT ANGIOGRAM CHEST PULMONARY EMBOLISM Date of Exam: 9/19/2024 1:04 PM EDT Indication: Afib. Comparison: Chest x-ray 9/19/2024 and CT chest 1/16/2024 and 4/10/2023 Technique: Axial CT images were obtained of the chest after the uneventful intravenous administration of 85 mL Isovue-370 utilizing pulmonary embolism protocol.  Reconstructed coronal and sagittal images were also obtained. Automated exposure control and  iterative construction methods were used. Findings: PULMONARY VASCULATURE: Pulmonary arteries are widely patent without evidence of embolus.Main pulmonary artery is normal in size. No evidence of right heart  strain. MEDIASTINUM:Unremarkable. Aortic and heart size are normal. No aortic dissection identified. No mass nor pericardial effusion. CORONARY ARTERIES: Mild to moderate calcified atherosclerotic disease. LUNGS: Lungs are clear. No consolidation. There is a 3 mm nodule in the left upper lung unchanged from prior studies.. PLEURAL SPACE: No effusion, mass, nor pneumothorax. LYMPH NODES: There is essentially stable appearance to mediastinal and bilateral hilar lymph nodes. UPPER ABDOMEN: The gallbladder is surgically absent. There is a cyst in the medial cortex of the right kidney measuring 1.2 cm. There is a small sliding hiatal hernia. OSSEOUS STRUCTURES: Appropriate for age with no acute process identified.     Impression: 1. No evidence for pulmonary embolus. 2. Stable examination with stable appearance to a 3 mm left upper lung nodule. 3. Mediastinal and hilar lymphadenopathy unchanged. Electronically Signed: Maddi Velazquez MD  9/19/2024 1:35 PM EDT  Workstation ID: [id number]    XR Chest 1 View    Result Date: 9/19/2024  XR CHEST 1 VW Date of Exam: 9/19/2024 3:35 AM EDT Indication: Chest Pain Triage Protocol Comparison: 1/16/2024, 12/20/2023. Findings: Stable chronic interstitial changes are present. There are no airspace consolidations. No pleural fluid. No pneumothorax. The pulmonary vasculature appears within normal limits. The cardiac and mediastinal silhouette appear unremarkable. No acute osseous  abnormality identified.     Impression: No acute cardiopulmonary process. Stable chronic interstitial changes. Electronically Signed: Sharon Fan MD  9/19/2024 3:43 AM EDT  Workstation ID: [id number]             Results for orders placed during the hospital encounter of 09/19/24    Adult Transthoracic Echo Complete W/ Cont if Necessary Per Protocol    Interpretation Summary    Left ventricular systolic function is normal. Calculated left ventricular EF = 58.9% Left ventricular ejection fraction appears to be 56 -  60%.    The right ventricular cavity is mildly dilated.    Mild aortic valve stenosis is present. Aortic valve area is 1.3 cm2.    Peak velocity of the flow distal to the aortic valve is 239.8 cm/s. Aortic valve maximum pressure gradient is 26 mmHg. Aortic valve mean pressure gradient is 14 mmHg. Aortic valve dimensionless index is 0.33 .    Mild mitral valve stenosis is present. The mitral valve peak gradient is 15 mmHg. The mitral valve mean gradient is 5 mmHg. The mitral valve area (PHT) is 2.9 cm2.    Estimated right ventricular systolic pressure from tricuspid regurgitation is normal (<35 mmHg).      Plan for Follow-up of Pending Labs/Results: Follow up as directed.   Pending Labs       Order Current Status    Blood Culture - Blood, Arm, Left Preliminary result    Blood Culture - Blood, Arm, Right Preliminary result          Discharge Details        Discharge Medications        New Medications        Instructions Start Date   amiodarone 200 MG tablet  Commonly known as: PACERONE   200 mg, Oral, Daily             Changes to Medications        Instructions Start Date   Januvia 100 MG tablet  Generic drug: SITagliptin  What changed: how much to take   Take 1 tablet by mouth once daily             Continue These Medications        Instructions Start Date   atorvastatin 40 MG tablet  Commonly known as: LIPITOR   40 mg, Oral, Nightly      Cinnamon 500 MG capsule   500 mg, Oral, Daily, OTC      fish oil 1000 MG capsule capsule   1,000 mg, Oral, Daily With Breakfast, OTC      glipizide 2.5 MG 24 hr tablet  Commonly known as: GLUCOTROL XL   5 mg, Oral, Daily      metFORMIN  MG 24 hr tablet  Commonly known as: GLUCOPHAGE-XR   1,000 mg, Oral, 2 Times Daily With Meals      metoprolol succinate XL 50 MG 24 hr tablet  Commonly known as: TOPROL-XL   50 mg, Oral, Daily      oxybutynin XL 5 MG 24 hr tablet  Commonly known as: DITROPAN-XL   5 mg, Oral, Daily      rivaroxaban 15 MG tablet  Commonly known as: XARELTO   15  mg, Oral, Daily With Dinner      Vitamin D3 50 MCG (2000 UT) tablet   2,000 Units, Oral, Daily, OTC             Stop These Medications      amLODIPine 2.5 MG tablet  Commonly known as: NORVASC     hydroCHLOROthiazide 25 MG tablet     valsartan 320 MG tablet  Commonly known as: DIOVAN              Allergies   Allergen Reactions    Adhesive Tape Rash    Latex Rash    Penicillins Rash         Discharge Disposition: Home with   Home or Self Care    Diet:  Hospital:  Diet Order   Procedures    Diet: Cardiac; Healthy Heart (2-3 Na+); Fluid Consistency: Thin (IDDSI 0)       Diet Instructions       Diet: Regular/House Diet, Cardiac Diets; Healthy Heart (2-3 Na+); Regular (IDDSI 7); Thin (IDDSI 0)      Discharge Diet:  Regular/House Diet  Cardiac Diets       Cardiac Diet: Healthy Heart (2-3 Na+)    Texture: Regular (IDDSI 7)    Fluid Consistency: Thin (IDDSI 0)             Activity: As tolerated   Activity Instructions       Activity as Tolerated              CODE STATUS:    Code Status and Medical Interventions: CPR (Attempt to Resuscitate); Full Support   Ordered at: 09/20/24 0729     Code Status (Patient has no pulse and is not breathing):    CPR (Attempt to Resuscitate)     Medical Interventions (Patient has pulse or is breathing):    Full Support       Future Appointments   Date Time Provider Department Center   11/6/2024  9:00 AM Harley Rose MD [facility]   1/30/2025  1:30 PM Matilde Alvarez MD [facility]   6/12/2025  8:00 AM Donita Cat APRN [facility]       Additional Instructions for the Follow-ups that You Need to Schedule       Discharge Follow-up with PCP   As directed       Currently Documented PCP:    Lizzette Multani MD    PCP Phone Number:    [phone]     Follow Up Details: Follow up with PCP in 1 week.        Discharge Follow-up with Specified Provider: Follow up with cardiology in 4-6 weeks.   As directed      To: Follow up with cardiology in 4-6 weeks.                 Kenny Hagan, APRN  09/22/24      Time Spent on Discharge:  I spent  43  minutes on this discharge activity which included: face-to-face encounter with the patient, reviewing the data in the system, coordination of the care with the nursing staff as well as consultants, documentation, and entering orders.

## 2024-09-22 NOTE — PROGRESS NOTES
"[facility] Progress Note     LOS: 0 days   Patient Care Team:  Lizzette Multani MD as PCP - General (Internal Medicine)  Harley Rose MD as Consulting Physician (Endocrinology)  Matilde Alvarez MD as Consulting Physician (Cardiology)  Leigh Tijerina APRN as Nurse Practitioner (Pulmonary Disease)  Irish Sousa PA as Physician Assistant (Endocrinology)  PCP:  Lizzette Multani MD    Chief Complaint: Atrial fibrillation    SUBJECTIVE: Converted to sinus rhythm.  Denies chest pain, palpitations or dyspnea.      Review of Systems:   All systems have been reviewed and are negative with the exception of those mentioned above.      OBJECTIVE:    Vital Sign Min/Max for last 24 hours  Temp  Min: 97.3 °F (36.3 °C)  Max: 98.5 °F (36.9 °C)   BP  Min: 127/102  Max: 151/63   Pulse  Min: 60  Max: 64   Resp  Min: 18  Max: 22   SpO2  Min: 89 %  Max: 95 %   No data recorded   No data recorded     Flowsheet Rows      Flowsheet Row First Filed Value   Admission Height 157.5 cm (62\") Documented at 09/19/2024 2110   Admission Weight 64.9 kg (143 lb) Documented at 09/19/2024 2110            Telemetry: Sinus rhythm      Intake/Output Summary (Last 24 hours) at 9/22/2024 1125  Last data filed at 9/22/2024 0800  Gross per 24 hour   Intake 610 ml   Output --   Net 610 ml     Intake & Output (last 3 days)         09/18 0701 09/19 0700 09/19 0701  09/20 0700 09/20 0701  09/21 0700 09/21 0701  09/22 0700    P.O.   950     IV Piggyback  600      Total Intake(mL/kg)  600 (9.2) 950 (14.6)     Urine (mL/kg/hr)   500 (0.3)     Total Output   500     Net  +600 +450             Urine Unmeasured Occurrence   1 x     Stool Unmeasured Occurrence   1 x              Physical Exam:    General Appearance:    Alert, cooperative, no distress, appears stated age   Neck:   Supple, symmetrical, trachea midline.   Lungs:     Clear to auscultation bilaterally, respirations unlabored "   Chest Wall:    No tenderness or deformity    Heart:    Regular rate and rhythm, S1 and S2 normal, 1/6 early peaking systolic murmur right upper sternal border, no rub   or gallop, normal carotid impulse bilaterally without bruit.   Extremities:   Extremities normal, atraumatic, no cyanosis or edema   Pulses:   2+ and symmetric all extremities   Skin:   Skin color, texture, turgor normal, no rashes or lesions      LABS/DIAGNOSTIC DATA:  Results from last 7 days   Lab Units 09/21/24  0436 09/19/24 2121 09/19/24  0334   WBC 10*3/mm3 10.46 13.32* 12.07*   HEMOGLOBIN g/dL 12.2 14.9 13.5   HEMATOCRIT % 38.2 45.1 41.3   PLATELETS 10*3/mm3 308 396 348     Lab Results   Lab Value Date/Time    TROPONINT 20 (H) 09/19/2024 2329    TROPONINT 25 (H) 09/19/2024 2121    TROPONINT 22 (H) 09/19/2024 0541    TROPONINT 22 (H) 09/19/2024 0334    TROPONINT 13 (H) 04/08/2023 0150    TROPONINT 14 (H) 04/07/2023 2321    TROPONINT 14 (H) 04/07/2023 2321    TROPONINT <0.010 11/10/2021 1009         Results from last 7 days   Lab Units 09/22/24  0420 09/21/24  0436 09/20/24  1151 09/19/24 2121 09/19/24  0334   SODIUM mmol/L 141 138 139 140 140   POTASSIUM mmol/L 4.3 3.8 3.7 4.0 3.9   CHLORIDE mmol/L 107 106 102 101 102   CO2 mmol/L 21.0* 21.0* 26.0 25.0 21.0*   BUN mg/dL 21 21 26* 33* 32*   CREATININE mg/dL 0.74 0.78 0.91 0.99 1.00   CALCIUM mg/dL 8.6 8.3* 9.1 9.7 9.6   BILIRUBIN mg/dL  --   --   --  0.6 0.4   ALK PHOS U/L  --   --   --  66 61   ALT (SGPT) U/L  --   --   --  16 12   AST (SGOT) U/L  --   --   --  16 22   GLUCOSE mg/dL 194* 154* 222* 198* 256*             Results from last 7 days   Lab Units 09/19/24  2329 09/19/24  2121   TSH uIU/mL  --  6.630*   FREE T4 ng/dL 1.26  --            Medication Review:   amiodarone, 200 mg, Oral, BID With Meals  [Held by provider] amLODIPine, 2.5 mg, Oral, Daily  atorvastatin, 40 mg, Oral, Daily  [Held by provider] hydroCHLOROthiazide, 25 mg, Oral, Daily  Insulin Lispro, 2-7 Units, Subcutaneous,  4x Daily AC & at Bedtime  metoprolol succinate XL, 50 mg, Oral, Daily  rivaroxaban, 15 mg, Oral, Daily With Dinner  sodium chloride, 10 mL, Intravenous, Q12H  [Held by provider] valsartan, 320 mg, Oral, Nightly       [Held by provider] dilTIAZem, 5-15 mg/hr, Last Rate: Stopped (09/20/24 0358)         ASSESSMENT/PLAN:    A-fib        Atrial fibrillation, persistent: Converted to sinus rhythm on amiodarone infusion.  Continue Xarelto 15 mg p.o. daily for stroke prophylaxis.    Stable for discharge on oral amiodarone from cardiac standpoint, will arrange for f/u with Dr. Alvarez in 4-6 weeks to determine if she is a candidate for continued therapy given questionable history of potential pulmonary toxicity.      Maxi Toro III, MD   09/22/24  11:25 EDT

## 2024-09-23 ENCOUNTER — READMISSION MANAGEMENT (OUTPATIENT)
Dept: CALL CENTER | Facility: HOSPITAL | Age: 84
End: 2024-09-23
Payer: MEDICARE

## 2024-09-23 ENCOUNTER — TELEPHONE (OUTPATIENT)
Dept: CARDIOLOGY | Facility: CLINIC | Age: 84
End: 2024-09-23
Payer: MEDICARE

## 2024-09-25 LAB
BACTERIA SPEC AEROBE CULT: NORMAL
BACTERIA SPEC AEROBE CULT: NORMAL

## 2024-09-26 LAB
QT INTERVAL: 360 MS
QTC INTERVAL: 459 MS

## 2024-09-27 ENCOUNTER — READMISSION MANAGEMENT (OUTPATIENT)
Dept: CALL CENTER | Facility: HOSPITAL | Age: 84
End: 2024-09-27
Payer: MEDICARE

## 2024-10-01 ENCOUNTER — READMISSION MANAGEMENT (OUTPATIENT)
Dept: CALL CENTER | Facility: HOSPITAL | Age: 84
End: 2024-10-01
Payer: MEDICARE

## 2024-10-01 RX ORDER — BLOOD SUGAR DIAGNOSTIC
STRIP MISCELLANEOUS
Qty: 100 EACH | Refills: 0 | Status: SHIPPED | OUTPATIENT
Start: 2024-10-01 | End: 2024-10-04 | Stop reason: SDUPTHER

## 2024-10-01 NOTE — TELEPHONE ENCOUNTER
Rx Refill Note  Requested Prescriptions     Pending Prescriptions Disp Refills    Accu-Chek Guide test strip [Pharmacy Med Name: Accu-Chek Guide In Vitro Strip] 100 each 0     Sig: USE 1 STRIP TO CHECK BLOOD SUGAR ONCE DAILY      Last office visit with prescribing clinician: 4/16/2024     Next office visit with prescribing clinician: 11/6/2024     Naomy Jaime MA  10/01/24, 13:07 EDT

## 2024-10-01 NOTE — OUTREACH NOTE
Medical Week 1 Survey      Flowsheet Row Responses   [facility] patient discharged from? [facility]   Does the patient have one of the following disease processes/diagnoses(primary or secondary)? Other   Week 1 attempt successful? No   Unsuccessful attempts Attempt 2            Yasmin SAMANO - Licensed Nurse

## 2024-10-02 ENCOUNTER — READMISSION MANAGEMENT (OUTPATIENT)
Dept: CALL CENTER | Facility: HOSPITAL | Age: 84
End: 2024-10-02
Payer: MEDICARE

## 2024-10-02 NOTE — OUTREACH NOTE
Medical Week 1 Survey      Flowsheet Row Responses   [facility] patient discharged from? [facility]   Does the patient have one of the following disease processes/diagnoses(primary or secondary)? Other   Week 1 attempt successful? No   Unsuccessful attempts Attempt 3   Revoke Decline to participate            Lyric ROSE - Licensed Nurse

## 2024-10-04 ENCOUNTER — OFFICE VISIT (OUTPATIENT)
Age: 84
End: 2024-10-04
Payer: MEDICARE

## 2024-10-04 VITALS
WEIGHT: 148 LBS | TEMPERATURE: 98.2 F | BODY MASS INDEX: 27.23 KG/M2 | HEART RATE: 68 BPM | HEIGHT: 62 IN | DIASTOLIC BLOOD PRESSURE: 64 MMHG | SYSTOLIC BLOOD PRESSURE: 124 MMHG | OXYGEN SATURATION: 97 %

## 2024-10-04 DIAGNOSIS — R06.09 DYSPNEA ON EXERTION: Primary | ICD-10-CM

## 2024-10-04 DIAGNOSIS — G47.33 OSA TREATED WITH BIPAP: ICD-10-CM

## 2024-10-04 DIAGNOSIS — R09.02 EXERCISE HYPOXEMIA: ICD-10-CM

## 2024-10-04 RX ORDER — BLOOD SUGAR DIAGNOSTIC
STRIP MISCELLANEOUS
Qty: 100 EACH | Refills: 3 | Status: SHIPPED | OUTPATIENT
Start: 2024-10-04

## 2024-10-04 RX ORDER — BLOOD SUGAR DIAGNOSTIC
STRIP MISCELLANEOUS
Qty: 100 EACH | Refills: 3 | Status: SHIPPED | OUTPATIENT
Start: 2024-10-04 | End: 2024-10-04 | Stop reason: SDUPTHER

## 2024-10-04 NOTE — PROGRESS NOTES
[facility] Pulmonary Follow up    CHIEF COMPLAINT    Dyspnea with exertion    HISTORY OF PRESENT ILLNESS    Kaylen Garrison is a 84 y.o.female here today for follow-up.  She was last seen in the office by me in March.  She denies any respiratory illnesses since her last appointment.    She was admitted in September 2 [facility] for A-fib with RVR.  She was medicated and converted to sinus rhythm without cardioversion.    She states that she feels very well currently.  She is able to do her regular activities without any difficulties.    She has not needed her oxygen that often.  She does continue to use it but that the BiPAP at nighttime.    She denies any sleeping difficulties and does use her BiPAP nightly.  She sees Dr. Tyson for her BiPAP supplies.    She denies any sputum production or hemoptysis.  She denies any chest pain or palpitations.  Denies any lower extremity edema or calf tenderness.    She denies any swallowing difficulties or reflux symptoms.    She is a lifetime non-smoker.  Patient Active Problem List   Diagnosis    Syncope    Primary hypertension    Hyperlipidemia LDL goal <100    Overweight (BMI 25.0-29.9)    Meniere's disease    Dyspnea on exertion    BERNARD treated with BiPAP    Primary hyperparathyroidism    Osteopenia    Type 2 diabetes mellitus with hyperglycemia, without long-term current use of insulin    Exercise hypoxemia    Paroxysmal atrial fibrillation    Right bundle branch block    Valvular heart disease    Adrenal nodule    Multiple thyroid nodules    A-fib       Allergies   Allergen Reactions    Adhesive Tape Rash    Latex Rash    Penicillins Rash       Current Outpatient Medications:     amiodarone (PACERONE) 200 MG tablet, Take 1 tablet by mouth Daily., Disp: 30 tablet, Rfl: 2    atorvastatin (LIPITOR) 40 MG tablet, Take 1 tablet by mouth Every Night., Disp: , Rfl:     Cholecalciferol (Vitamin D3) 50 MCG (2000 UT) tablet, Take 1 tablet by mouth Daily. OTC, Disp:  , Rfl:     Cinnamon 500 MG capsule, Take 1 capsule by mouth Daily. OTC, Disp: , Rfl:     glipizide (GLUCOTROL XL) 2.5 MG 24 hr tablet, Take 2 tablets by mouth Daily., Disp: , Rfl:     Januvia 100 MG tablet, Take 1 tablet by mouth once daily (Patient taking differently: Take 1 tablet by mouth Daily.), Disp: 90 tablet, Rfl: 0    metFORMIN ER (GLUCOPHAGE-XR) 500 MG 24 hr tablet, TAKE 2 TABLETS BY MOUTH TWICE DAILY WITH MEALS, Disp: 360 tablet, Rfl: 3    Omega-3 Fatty Acids (fish oil) 1000 MG capsule capsule, Take 1 capsule by mouth Daily With Breakfast. OTC, Disp: , Rfl:     oxybutynin XL (DITROPAN-XL) 5 MG 24 hr tablet, Take 1 tablet by mouth Daily., Disp: , Rfl:     rivaroxaban (XARELTO) 15 MG tablet, Take 1 tablet by mouth Daily With Dinner. Indications: Atrial Fibrillation, Disp: 30 tablet, Rfl: 3    glucose blood (Accu-Chek Guide) test strip, USE 1 STRIP TO CHECK BLOOD SUGAR ONCE DAILY dx e11.65, Disp: 100 each, Rfl: 3    metoprolol succinate XL (TOPROL-XL) 50 MG 24 hr tablet, Take 1 tablet by mouth Daily for 14 days., Disp: 14 tablet, Rfl: 0  MEDICATION LIST AND ALLERGIES REVIEWED.    Social History     Tobacco Use    Smoking status: Never     Passive exposure: Never    Smokeless tobacco: Never   Vaping Use    Vaping status: Never Used   Substance Use Topics    Alcohol use: No    Drug use: No       FAMILY AND SOCIAL HISTORY REVIEWED.    Review of Systems   Constitutional:  Negative for activity change, appetite change, fatigue, fever and unexpected weight change.   HENT:  Negative for congestion, postnasal drip, rhinorrhea, sinus pressure, sore throat and voice change.    Eyes:  Negative for visual disturbance.   Respiratory:  Negative for cough, chest tightness, shortness of breath and wheezing.    Cardiovascular:  Negative for chest pain, palpitations and leg swelling.   Gastrointestinal:  Negative for abdominal distention, abdominal pain, nausea and vomiting.   Endocrine: Negative for cold intolerance and heat  "intolerance.   Genitourinary:  Negative for difficulty urinating and urgency.   Musculoskeletal:  Negative for arthralgias, back pain and neck pain.   Skin:  Negative for color change and pallor.   Allergic/Immunologic: Negative for environmental allergies and food allergies.   Neurological:  Negative for dizziness, syncope, weakness and light-headedness.   Hematological:  Negative for adenopathy. Does not bruise/bleed easily.   Psychiatric/Behavioral:  Negative for agitation and behavioral problems.    .    /64   Pulse 68   Temp 98.2 °F (36.8 °C)   Ht 157.5 cm (62\")   Wt 67.1 kg (148 lb)   SpO2 97% Comment: Room air at rest  BMI 27.07 kg/m²     Immunization History   Administered Date(s) Administered    COVID-19 (MODERNA) 12YRS+ (SPIKEVAX) 10/17/2023    COVID-19 (PFIZER) Purple Cap Monovalent 09/27/2021    Fluzone (or Fluarix & Flulaval for VFC) >6mos 09/29/2018    Fluzone High-Dose 65+YRS 09/19/2017, 09/30/2019, 09/20/2023    Fluzone High-Dose 65+yrs 09/10/2020, 09/24/2021    Hepatitis A 10/15/2018, 09/23/2019    Hepatitis B Adult/Adolescent IM 10/11/2019, 11/05/2019, 06/24/2020    Influenza, Unspecified 09/10/2020, 09/29/2020       Physical Exam  Vitals and nursing note reviewed.   Constitutional:       Appearance: She is well-developed. She is not diaphoretic.   HENT:      Head: Normocephalic and atraumatic.   Eyes:      Pupils: Pupils are equal, round, and reactive to light.   Neck:      Thyroid: No thyromegaly.   Cardiovascular:      Rate and Rhythm: Normal rate and regular rhythm.      Heart sounds: Normal heart sounds. No murmur heard.     No friction rub. No gallop.   Pulmonary:      Effort: Pulmonary effort is normal. No respiratory distress.      Breath sounds: Normal breath sounds. No wheezing or rales.   Chest:      Chest wall: No tenderness.   Abdominal:      General: Bowel sounds are normal.      Palpations: Abdomen is soft.      Tenderness: There is no abdominal tenderness. "   Musculoskeletal:         General: No swelling. Normal range of motion.      Cervical back: Normal range of motion and neck supple.   Lymphadenopathy:      Cervical: No cervical adenopathy.   Skin:     General: Skin is warm and dry.      Capillary Refill: Capillary refill takes less than 2 seconds.   Neurological:      Mental Status: She is alert and oriented to person, place, and time.   Psychiatric:         Behavior: Behavior normal.           RESULTS    PROBLEM LIST    Problem List Items Addressed This Visit          Cardiac and Vasculature    Dyspnea on exertion - Primary       Pulmonary and Pneumonias    Exercise hypoxemia       Sleep    BERNARD treated with BiPAP    Overview     Auto BIPAP              DISCUSSION    Ms. Garrison was here for follow-up.  She has been doing okay from a pulmonary standpoint.  She is currently not using any inhalers.      I did encourage her to use the oxygen as needed to maintain saturation above 88%.    She will continue to use her BiPAP nightly for BERNARD.  She will use 2 L bled into the machine as well.    She will follow-up with me in 6 months    I personally spent a total of 32 minutes on patient visit today including chart review, face to face with the patient obtaining the history and physical exam, review of pertinent images and tests, counseling and discussion and/or coordination of care as described above, and documentation.  Total time excludes time spent on other separate services such as performing procedures or test interpretation, if applicable.        Leigh Tijerina, APRN  10/04/001786:39 EDT  Electronically signed     Please note that portions of this note were completed with a voice recognition program.        CC: Lizzette Multani MD

## 2024-10-04 NOTE — TELEPHONE ENCOUNTER
Rx Refill Note  Requested Prescriptions     Pending Prescriptions Disp Refills    glucose blood (Accu-Chek Guide) test strip 100 each 3     Sig: USE 1 STRIP TO CHECK BLOOD SUGAR ONCE DAILY dx e11.65          Last office visit with prescribing clinician: 4/16/2024     Next office visit with prescribing clinician: 11/6/2024         Ely Fiore MA  10/04/24, 13:03 EDT

## 2024-10-07 LAB
QT INTERVAL: 360 MS
QT INTERVAL: 368 MS
QTC INTERVAL: 504 MS
QTC INTERVAL: 517 MS

## 2024-10-14 ENCOUNTER — TELEPHONE (OUTPATIENT)
Dept: CARDIOLOGY | Facility: CLINIC | Age: 84
End: 2024-10-14
Payer: MEDICARE

## 2024-10-14 NOTE — TELEPHONE ENCOUNTER
Voice mail message from Dr. Ivan delarosa/Britney.  She states she has a medication question about a mutual patient.  Spoke with Britney at Dr. Love office.  She is questioning if patient should be on metoprolol and amiodarone.  Discharge summary from 9/22/24 reviewed.  Patient to continue both medications until FU visit on 10/31/24.  She verbalizes understanding.

## 2024-10-31 ENCOUNTER — OFFICE VISIT (OUTPATIENT)
Dept: CARDIOLOGY | Facility: CLINIC | Age: 84
End: 2024-10-31
Payer: MEDICARE

## 2024-10-31 VITALS
DIASTOLIC BLOOD PRESSURE: 50 MMHG | SYSTOLIC BLOOD PRESSURE: 102 MMHG | BODY MASS INDEX: 27.23 KG/M2 | HEIGHT: 62 IN | WEIGHT: 148 LBS | HEART RATE: 62 BPM | OXYGEN SATURATION: 93 %

## 2024-10-31 DIAGNOSIS — E78.5 DYSLIPIDEMIA: ICD-10-CM

## 2024-10-31 DIAGNOSIS — G47.33 OSA TREATED WITH BIPAP: ICD-10-CM

## 2024-10-31 DIAGNOSIS — I38 VALVULAR HEART DISEASE: ICD-10-CM

## 2024-10-31 DIAGNOSIS — I48.0 PAROXYSMAL ATRIAL FIBRILLATION: Primary | ICD-10-CM

## 2024-10-31 DIAGNOSIS — I10 ESSENTIAL HYPERTENSION: ICD-10-CM

## 2024-10-31 PROCEDURE — 99214 OFFICE O/P EST MOD 30 MIN: CPT | Performed by: PHYSICIAN ASSISTANT

## 2024-10-31 RX ORDER — AMLODIPINE BESYLATE 2.5 MG/1
1 TABLET ORAL EVERY 12 HOURS SCHEDULED
COMMUNITY
Start: 2024-10-07

## 2024-10-31 NOTE — PROGRESS NOTES
[facility] Cardiology    Patient ID: Kaylen Garrison is a 84 y.o. female.  : 1940   Contact: [phone]    Encounter date: 10/31/2024    PCP: Lizzette Multani MD      Chief complaint:   Chief Complaint   Patient presents with    Paroxysmal atrial fibrillation       Problem List:  Paroxysmal atrial fibrillation  New onset atrial flutter with RVR 11/10/2021 in setting of nocturnal hypoxia  [id number] = 5 on Xarelto   SATISH, 2021: EF 50%. Mild LAE. No evidence of intracardiac thrombus or mass, clear JENNA, Trace MR and TR.   Successful ECV, 2021  Started on Amiodarone 2021  Aortic stenosis/Mitral stenosis/Valvular heart disease  Echo (2023): EF 56 to 60%.  LV wall thickness consistent with mild concentric hypertrophy.  LV diastolic function consistent with (grade 2 with high lap) pseudonormalization.  LA volume mildly increased.  Moderate calcification of aortic valve.  Moderate AS. Moderate calcification of mitral valve.  Mild to moderate MS.  Mild TR.  RVSP is moderately elevated at 53.9 mmHg.  Echo, 2024: EF 58.9%. Mild AV stenosis. JOHNNY is 1.3 cm2. Peak velocity of the flow distal to the aortic valve is 239.8 cm/s. Aortic valve maximum pressure gradient is 26 mmHg. Aortic valve mean pressure gradient is 14 mmHg. Aortic valve dimensionless index is 0.33 Mild MV stenosis. The mitral valve peak gradient is 15 mmHg. The mitral valve mean gradient is 5 mmHg. The mitral valve area (PHT) is 2.9 cm2. Normal RVSP.   Hypertension  Dyslipidemia  RBBB  Type 2 diabetes mellitus  Obstructive sleep apnea, on BiPAP  Acute cholecystitis  S/p ERCP with extraction of 3 stones 2023.  Syncope, 2012  Echocardiogram, 2012: EF 55 to 60% with no valvular abnormalities.  Limited studies, 2012: 0-49% bilateral carotid artery stenosis.  No acute findings per CT scan.  MRI of the brain, 2012: Cortical atrophy, chronic ischemic changes noted.  Abnormal MPS,  5/2012: EF 79% with apical hypoperfusion suspicious for ischemia, Dr. Arnaud Augustine.  MPS 9/2016: EF greater than 70%.  No evidence of inducible ischemia.  Borderline inferior ST segment changes.  Dyspnea  Echo, 04/08/2023: EF 56-60%.  Left ventricular diastolic function is consistent with (grade II w/high LAP) pseudonormalization. Left atrial volume is mildly increased. There is moderate calcification of the aortic valve. Moderate aortic valve stenosis. There is moderate calcification of the mitral valve. Mild to moderate mitral valve stenosis. Mild TR is present. RVSP is moderately elevated at 53.9 mmHg.  Obesity  Ménière's disease  Surgical history  Hysterectomy  Benign tumor removal from left breast    Allergies   Allergen Reactions    Adhesive Tape Rash    Latex Rash    Penicillins Rash       Current Medications:    Current Outpatient Medications:     amLODIPine (NORVASC) 2.5 MG tablet, Take 1 tablet by mouth Every 12 (Twelve) Hours., Disp: , Rfl:     atorvastatin (LIPITOR) 40 MG tablet, Take 1 tablet by mouth Every Night., Disp: , Rfl:     Cholecalciferol (Vitamin D3) 50 MCG (2000 UT) tablet, Take 1 tablet by mouth Daily. OTC, Disp: , Rfl:     Cinnamon 500 MG capsule, Take 1 capsule by mouth Daily. OTC, Disp: , Rfl:     glipizide (GLUCOTROL XL) 2.5 MG 24 hr tablet, Take 2 tablets by mouth Daily., Disp: , Rfl:     glucose blood (Accu-Chek Guide) test strip, USE 1 STRIP TO CHECK BLOOD SUGAR ONCE DAILY dx e11.65, Disp: 100 each, Rfl: 3    Januvia 100 MG tablet, Take 1 tablet by mouth once daily (Patient taking differently: Take 1 tablet by mouth Daily.), Disp: 90 tablet, Rfl: 0    metFORMIN ER (GLUCOPHAGE-XR) 500 MG 24 hr tablet, TAKE 2 TABLETS BY MOUTH TWICE DAILY WITH MEALS, Disp: 360 tablet, Rfl: 3    Omega-3 Fatty Acids (fish oil) 1000 MG capsule capsule, Take 1 capsule by mouth Daily With Breakfast. OTC, Disp: , Rfl:     oxybutynin XL (DITROPAN-XL) 5 MG 24 hr tablet, Take 1 tablet by mouth Daily., Disp: , Rfl:  "    rivaroxaban (XARELTO) 15 MG tablet, Take 1 tablet by mouth Daily With Dinner. Indications: Atrial Fibrillation, Disp: 30 tablet, Rfl: 3    metoprolol succinate XL (TOPROL-XL) 50 MG 24 hr tablet, Take 1 tablet by mouth Daily for 14 days., Disp: 14 tablet, Rfl: 0    HPI    Kaylen Garrison is a 84 y.o. female who presents today for a hospital follow up of PAF, VHD, and cardiac risk factors.  Since last outpatient visit patient was hospitalized back in September for A-fib with RVR in the setting of a UTI.  Patient was placed on an amiodarone drip and converted to normal sinus rhythm.  She continued on amiodarone as an outpatient and states that she is feeling well.  She also was discharged home on metoprolol from the hospital.  She was only given a 2-week prescription and then followed up with her primary care provider a couple of weeks ago and had a new prescription at a lower dose started.  Patient states that her blood pressure had been running high but now it has been in the 130s consistently.  Patient did recently get a new blood pressure cuff and it has been recording her blood pressure in the 160s and 170s.  Patient is currently getting physical therapy and that seems to be helping with her overall weakness.      The following portions of the patient's history were reviewed and updated as appropriate: allergies, current medications and problem list.    Pertinent positives as listed in the HPI.  All other systems reviewed are negative.         Vitals:    10/31/24 1436   BP: 102/50   BP Location: Left arm   Patient Position: Sitting   Cuff Size: Adult   Pulse: 62   SpO2: 93%   Weight: 67.1 kg (148 lb)   Height: 157.5 cm (62.01\")       Physical Exam:  General: Alert and oriented.  No acute distress  Neck: Jugular venous pressure is within normal limits. Carotids have normal upstrokes without bruits.   Cardiovascular: Regular rate and rhythm. No murmur, gallop or rub.  Lungs: Clear, no rales or wheezes. Equal " expansion is noted.   Extremities: Show no edema.  Skin: Warm and dry.  Neurologic: Nonfocal.     Diagnostic Data (reviewed with patient):  Lab Results   Component Value Date    GLUCOSE 194 (H) 09/22/2024    BUN 21 09/22/2024    CREATININE 0.74 09/22/2024    BCR 28.4 (H) 09/22/2024     09/22/2024    K 4.3 09/22/2024     09/22/2024    CO2 21.0 (L) 09/22/2024    CALCIUM 8.6 09/22/2024    ALBUMIN 4.3 09/19/2024    ALKPHOS 66 09/19/2024    AST 16 09/19/2024    ALT 16 09/19/2024     Lab Results   Component Value Date    WBC 10.46 09/21/2024    RBC 4.27 09/21/2024    HGB 12.2 09/21/2024    HCT 38.2 09/21/2024    MCV 89.5 09/21/2024     09/21/2024      Lab Results   Component Value Date    TSH 6.630 (H) 09/19/2024        Advance Care Planning   ACP discussion was declined by the patient. Patient does not have an advance directive, declines further assistance.             Assessment:    ICD-10-CM ICD-9-CM   1. Paroxysmal atrial fibrillation  I48.0 427.31   2. Valvular heart disease  I38 424.90   3. Essential hypertension  I10 401.9   4. Dyslipidemia  E78.5 272.4   5. BERNARD treated with BiPAP  G47.33 327.23         Plan:  Stop amiodarone as patient is feeling well and has had possible lung toxicity in the past.  I did check the patient's blood pressure cuff here in the office which was about 20 points higher than our blood pressure cuff.  I discussed with the patient I think she needs to get a new blood pressure cuff as I do not believe that is accurate.  If she gets another blood pressure cuff and it is still running high she can contact us for further adjustments to her medications.  Continue amlodipine 2.5 mg twice daily and metoprolol succinate 25 mg daily  Continue on atorvastatin 40 mg daily for hyperlipidemia.   Continue on Omega-3 fatty acids for hyperlipidemia.  Continue on Xarelto 15 mg daily for stroke prophylaxis.   Continue all other current medications.  F/up in 6 months, sooner if  needed.    Cassie Hurd PA-C

## 2024-11-06 ENCOUNTER — OFFICE VISIT (OUTPATIENT)
Dept: ENDOCRINOLOGY | Facility: CLINIC | Age: 84
End: 2024-11-06
Payer: MEDICARE

## 2024-11-06 VITALS
SYSTOLIC BLOOD PRESSURE: 108 MMHG | WEIGHT: 142 LBS | DIASTOLIC BLOOD PRESSURE: 68 MMHG | BODY MASS INDEX: 26.13 KG/M2 | OXYGEN SATURATION: 98 % | HEART RATE: 85 BPM | HEIGHT: 62 IN

## 2024-11-06 DIAGNOSIS — E21.0 PRIMARY HYPERPARATHYROIDISM: ICD-10-CM

## 2024-11-06 DIAGNOSIS — E78.5 DYSLIPIDEMIA: ICD-10-CM

## 2024-11-06 DIAGNOSIS — E27.9 ADRENAL NODULE: ICD-10-CM

## 2024-11-06 DIAGNOSIS — E11.65 TYPE 2 DIABETES MELLITUS WITH HYPERGLYCEMIA, WITHOUT LONG-TERM CURRENT USE OF INSULIN: Primary | ICD-10-CM

## 2024-11-06 DIAGNOSIS — I10 ESSENTIAL HYPERTENSION: ICD-10-CM

## 2024-11-06 DIAGNOSIS — E04.2 MULTIPLE THYROID NODULES: ICD-10-CM

## 2024-11-06 LAB
ALBUMIN SERPL-MCNC: 3.9 G/DL (ref 3.5–5.2)
ALBUMIN/GLOB SERPL: 1.2 G/DL
ALP SERPL-CCNC: 59 U/L (ref 39–117)
ALT SERPL W P-5'-P-CCNC: 13 U/L (ref 1–33)
ANION GAP SERPL CALCULATED.3IONS-SCNC: 13 MMOL/L (ref 5–15)
AST SERPL-CCNC: 18 U/L (ref 1–32)
BILIRUB SERPL-MCNC: 0.5 MG/DL (ref 0–1.2)
BUN SERPL-MCNC: 27 MG/DL (ref 8–23)
BUN/CREAT SERPL: 26.7 (ref 7–25)
CALCIUM SPEC-SCNC: 9.7 MG/DL (ref 8.6–10.5)
CHLORIDE SERPL-SCNC: 104 MMOL/L (ref 98–107)
CO2 SERPL-SCNC: 25 MMOL/L (ref 22–29)
CREAT SERPL-MCNC: 1.01 MG/DL (ref 0.57–1)
EGFRCR SERPLBLD CKD-EPI 2021: 54.3 ML/MIN/1.73
EXPIRATION DATE: ABNORMAL
GLOBULIN UR ELPH-MCNC: 3.3 GM/DL
GLUCOSE BLDC GLUCOMTR-MCNC: 180 MG/DL (ref 70–130)
GLUCOSE SERPL-MCNC: 138 MG/DL (ref 65–99)
HBA1C MFR BLD: 8 % (ref 4.5–5.7)
Lab: ABNORMAL
POTASSIUM SERPL-SCNC: 4 MMOL/L (ref 3.5–5.2)
PROT SERPL-MCNC: 7.2 G/DL (ref 6–8.5)
SODIUM SERPL-SCNC: 142 MMOL/L (ref 136–145)
TSH SERPL DL<=0.05 MIU/L-ACNC: 2.44 UIU/ML (ref 0.27–4.2)

## 2024-11-06 PROCEDURE — 99214 OFFICE O/P EST MOD 30 MIN: CPT | Performed by: INTERNAL MEDICINE

## 2024-11-06 PROCEDURE — 1160F RVW MEDS BY RX/DR IN RCRD: CPT | Performed by: INTERNAL MEDICINE

## 2024-11-06 PROCEDURE — 82947 ASSAY GLUCOSE BLOOD QUANT: CPT | Performed by: INTERNAL MEDICINE

## 2024-11-06 PROCEDURE — 36415 COLL VENOUS BLD VENIPUNCTURE: CPT | Performed by: INTERNAL MEDICINE

## 2024-11-06 PROCEDURE — 1159F MED LIST DOCD IN RCRD: CPT | Performed by: INTERNAL MEDICINE

## 2024-11-06 PROCEDURE — 80053 COMPREHEN METABOLIC PANEL: CPT | Performed by: INTERNAL MEDICINE

## 2024-11-06 PROCEDURE — 84443 ASSAY THYROID STIM HORMONE: CPT | Performed by: INTERNAL MEDICINE

## 2024-11-06 RX ORDER — LANCETS
EACH MISCELLANEOUS
COMMUNITY
Start: 2024-11-05

## 2024-11-06 RX ORDER — FUROSEMIDE 20 MG/1
TABLET ORAL
COMMUNITY
Start: 2024-11-02

## 2024-11-06 RX ORDER — METOPROLOL SUCCINATE 50 MG/1
25 TABLET, EXTENDED RELEASE ORAL DAILY
Start: 2024-11-06 | End: 2024-11-20

## 2024-11-06 NOTE — PROGRESS NOTES
"     Office Note      Date: 2024  Patient Name: Kaylen Garrison  MRN: [id number]  : 1940    Chief Complaint   Patient presents with    Diabetes       History of Present Illness:   Kaylen Garrison is a 84 y.o. female who presents for Diabetes type 2. Diagnosed in: . Treated in past with oral agents. Current treatments: metformin, januvia and glipizide. Number of insulin shots per day: none. Checks blood sugar 1 time a day. Has low blood sugar: no. Aspirin use: No - on xarelto . Statin use: Yes. ACE-I/ARB use: Yes. Changes in health since last visit: hospital stay for a.fib, hypoxia. Last eye exam 2024.     Subjective      Diabetic Complications:  Eyes: No  Kidneys: No  Feet: No  Heart: No    Diet and Exercise:  Meals per day: 3  Minutes of exercise per week: 0 mins.    Review of Systems:   Review of Systems   Constitutional: Negative.    Cardiovascular: Negative.    Gastrointestinal: Negative.    Endocrine: Negative.        The following portions of the patient's history were reviewed and updated as appropriate: allergies, current medications, past family history, past medical history, past social history, past surgical history, and problem list.    Objective     Visit Vitals  /68   Pulse 85   Ht 157.5 cm (62\")   Wt 64.4 kg (142 lb)   SpO2 98%   BMI 25.97 kg/m²       Physical Exam:  Physical Exam  Constitutional:       Appearance: Normal appearance.   Neurological:      Mental Status: She is alert.         Labs:    HbA1c  Lab Results   Component Value Date    HGBA1C 8.0 (A) 2024       CMP  Lab Results   Component Value Date    GLUCOSE 194 (H) 2024    BUN 21 2024    CREATININE 0.74 2024    EGFRIFNONA 55 (L) 02/10/2022    BCR 28.4 (H) 2024    K 4.3 2024    CO2 21.0 (L) 2024    CALCIUM 8.6 2024    AST 16 2024    ALT 16 2024        Lipid Panel  Lab Results   Component Value Date    HDL Cholesterol 42 2021    LDL Cholesterol  " "40 11/11/2021    LDL/HDL Ratio 1.00 11/11/2021    Triglycerides 66 11/11/2021        TSH  Lab Results   Component Value Date    TSH 6.630 (H) 09/19/2024    FREET4 1.26 09/19/2024        Hemoglobin A1C  No components found for: \"HGBA1C\"     Microalbumin/Creatinine  Lab Results   Component Value Date    LOVE 15.6 04/16/2024    MICROALBUR 1.3 04/16/2024           Assessment / Plan      Assessment & Plan:  Diagnoses and all orders for this visit:    1. Type 2 diabetes mellitus with hyperglycemia, without long-term current use of insulin (Primary)  Assessment & Plan:  Diabetes is worsening.  Last A1c increased but this was during hospital stay.  Continue current treatment regimen.  Monitor BP at home.  Diabetes will be reassessed in 6 months.    Orders:  -     POC Glucose, Blood  -     Cancel: POC Glycosylated Hemoglobin (Hb A1C)    2. Essential hypertension  Assessment & Plan:  Hypertension is stable and controlled  Continue current treatment regimen.  Blood pressure will be reassessed in 6 months.      3. Dyslipidemia  Assessment & Plan:  Continue statin.      4. Primary hyperparathyroidism  Assessment & Plan:  Check calcium today.    Orders:  -     Comprehensive Metabolic Panel; Future    5. Adrenal nodule  Assessment & Plan:  Nonfunctioning adenoma.      6. Multiple thyroid nodules  Assessment & Plan:  Check TSH.  Continue with periodic neck exams.    Orders:  -     TSH; Future    Other orders  -     metoprolol succinate XL (TOPROL-XL) 50 MG 24 hr tablet; Take 0.5 tablets by mouth Daily for 14 days.      Current Outpatient Medications   Medication Instructions    Accu-Chek Softclix Lancets lancets     amLODIPine (NORVASC) 2.5 MG tablet 1 tablet, Every 12 Hours Scheduled    atorvastatin (LIPITOR) 40 mg, Nightly    Cinnamon 500 mg, Daily    fish oil 1,000 mg, Daily With Breakfast    furosemide (LASIX) 20 MG tablet     glipizide (GLUCOTROL XL) 5 mg, Daily    glucose blood (Accu-Chek Guide) test strip USE 1 STRIP " TO CHECK BLOOD SUGAR ONCE DAILY dx e11.65    Januvia 100 MG tablet Take 1 tablet by mouth once daily    metFORMIN ER (GLUCOPHAGE-XR) 1,000 mg, Oral, 2 Times Daily With Meals    metoprolol succinate XL (TOPROL-XL) 25 mg, Oral, Daily    oxybutynin XL (DITROPAN-XL) 5 mg, Daily    rivaroxaban (XARELTO) 15 mg, Oral, Daily With Dinner    Vitamin D3 2,000 Units, Daily      Return in about 6 months (around 5/6/2025) for Recheck with A1c.    Electronically signed by: Harley Rose MD  11/06/2024

## 2024-11-06 NOTE — ASSESSMENT & PLAN NOTE
Diabetes is worsening.  Last A1c increased but this was during hospital stay.  Continue current treatment regimen.  Monitor BP at home.  Diabetes will be reassessed in 6 months.

## 2024-12-09 NOTE — PROGRESS NOTES
[facility] Cardiology    Patient ID: Kaylen Garrison is a 84 y.o. female.  : 1940   Contact: [phone]    Encounter date: 2024    PCP: Lizzette Multani MD      Chief complaint:   Chief Complaint   Patient presents with    Paroxysmal atrial fibrillation       Problem List:  Paroxysmal atrial fibrillation  New onset atrial flutter with RVR 11/10/2021 in setting of nocturnal hypoxia  [id number] = 5 on Xarelto   SATISH, 2021: EF 50%. Mild LAE. No evidence of intracardiac thrombus or mass, clear JENNA, Trace MR and TR.   Successful ECV, 2021  Started on Amiodarone 2021  Aortic stenosis/Mitral stenosis/Valvular heart disease  Echo (2023): EF 56 to 60%.  LV wall thickness consistent with mild concentric hypertrophy.  LV diastolic function consistent with (grade 2 with high lap) pseudonormalization.  LA volume mildly increased.  Moderate calcification of aortic valve.  Moderate AS. Moderate calcification of mitral valve.  Mild to moderate MS.  Mild TR.  RVSP is moderately elevated at 53.9 mmHg.  Echo, 2024: EF 58.9%. Mild AV stenosis. JOHNNY is 1.3 cm2. Peak velocity of the flow distal to the aortic valve is 239.8 cm/s. Aortic valve maximum pressure gradient is 26 mmHg. Aortic valve mean pressure gradient is 14 mmHg. Aortic valve dimensionless index is 0.33 Mild MV stenosis. The mitral valve peak gradient is 15 mmHg. The mitral valve mean gradient is 5 mmHg. The mitral valve area (PHT) is 2.9 cm2. Normal RVSP.   Hypertension  Dyslipidemia  RBBB  Type 2 diabetes mellitus  Obstructive sleep apnea, on BiPAP  Acute cholecystitis  S/p ERCP with extraction of 3 stones 2023.  Syncope, 2012  Echocardiogram, 2012: EF 55 to 60% with no valvular abnormalities.  Limited studies, 2012: 0-49% bilateral carotid artery stenosis.  No acute findings per CT scan.  MRI of the brain, 2012: Cortical atrophy, chronic ischemic changes noted.  Abnormal MPS,  5/2012: EF 79% with apical hypoperfusion suspicious for ischemia, Dr. Arnaud Augustine.  MPS 9/2016: EF greater than 70%.  No evidence of inducible ischemia.  Borderline inferior ST segment changes.  Dyspnea  Echo, 04/08/2023: EF 56-60%.  Left ventricular diastolic function is consistent with (grade II w/high LAP) pseudonormalization. Left atrial volume is mildly increased. There is moderate calcification of the aortic valve. Moderate aortic valve stenosis. There is moderate calcification of the mitral valve. Mild to moderate mitral valve stenosis. Mild TR is present. RVSP is moderately elevated at 53.9 mmHg.  Obesity  Ménière's disease  Surgical history  Hysterectomy  Benign tumor removal from left breast    Allergies   Allergen Reactions    Adhesive Tape Rash    Latex Rash    Penicillins Rash       Current Medications:    Current Outpatient Medications:     Accu-Chek Softclix Lancets lancets, , Disp: , Rfl:     amLODIPine (NORVASC) 2.5 MG tablet, Take 1 tablet by mouth Every 12 (Twelve) Hours., Disp: , Rfl:     atorvastatin (LIPITOR) 40 MG tablet, Take 1 tablet by mouth Every Night., Disp: , Rfl:     Cholecalciferol (Vitamin D3) 50 MCG (2000 UT) tablet, Take 1 tablet by mouth Daily. OTC, Disp: , Rfl:     Cinnamon 500 MG capsule, Take 1 capsule by mouth Daily. OTC, Disp: , Rfl:     furosemide (LASIX) 20 MG tablet, , Disp: , Rfl:     glipizide (GLUCOTROL XL) 2.5 MG 24 hr tablet, Take 2 tablets by mouth Daily., Disp: , Rfl:     glucose blood (Accu-Chek Guide) test strip, USE 1 STRIP TO CHECK BLOOD SUGAR ONCE DAILY dx e11.65, Disp: 100 each, Rfl: 3    Januvia 100 MG tablet, Take 1 tablet by mouth once daily (Patient taking differently: Take 1 tablet by mouth Daily.), Disp: 90 tablet, Rfl: 0    metFORMIN ER (GLUCOPHAGE-XR) 500 MG 24 hr tablet, TAKE 2 TABLETS BY MOUTH TWICE DAILY WITH MEALS, Disp: 360 tablet, Rfl: 3    metoprolol succinate XL (TOPROL-XL) 50 MG 24 hr tablet, Take 0.5 tablets by mouth Daily for 14 days., Disp:  ", Rfl:     Omega-3 Fatty Acids (fish oil) 1000 MG capsule capsule, Take 1 capsule by mouth Daily With Breakfast. OTC, Disp: , Rfl:     oxybutynin XL (DITROPAN-XL) 5 MG 24 hr tablet, Take 1 tablet by mouth Daily., Disp: , Rfl:     rivaroxaban (XARELTO) 15 MG tablet, Take 1 tablet by mouth Daily With Dinner. Indications: Atrial Fibrillation, Disp: 30 tablet, Rfl: 3    HPI    Kaylen Garrison is a 84 y.o. female who presents today for a follow up of paroxysmal atrial fibrillation, valvular heart disease and cardiac risk factors. Since last visit, Ms. Garrison has done relatively well.  She has been taking 2 of the 2.5 mg amlodipine's twice a day and her blood pressures been under good control.  Overall she feels good.  She is doing physical therapy twice a week.  She is otherwise not doing much in the way of routine exercise.      The following portions of the patient's history were reviewed and updated as appropriate: allergies, current medications and problem list.    Pertinent positives as listed in the HPI.  All other systems reviewed are negative.         Vitals:    12/12/24 0958   BP: 132/68   BP Location: Left arm   Patient Position: Sitting   Cuff Size: Adult   Pulse: 86   SpO2: 97%   Weight: 65.8 kg (145 lb)   Height: 157.5 cm (62\")       Physical Exam:  General: Alert and oriented.  Neck: Jugular venous pressure is within normal limits. Carotids have normal upstrokes without bruits.   Cardiovascular: Heart has a nondisplaced focal PMI. Regular rate and rhythm. No murmur, gallop or rub.  Lungs: Clear, no rales or wheezes. Equal expansion is noted.   Extremities: Show no edema.  Skin: Warm and dry.  Neurologic: Nonfocal.     Diagnostic Data (reviewed with patient):  Lab Results   Component Value Date    GLUCOSE 138 (H) 11/06/2024    BUN 27 (H) 11/06/2024    CREATININE 1.01 (H) 11/06/2024    EGFR 54.3 (L) 11/06/2024    BCR 26.7 (H) 11/06/2024     11/06/2024    K 4.0 11/06/2024     11/06/2024    CO2 " 25.0 11/06/2024    CALCIUM 9.7 11/06/2024    ALBUMIN 3.9 11/06/2024    ALKPHOS 59 11/06/2024    AST 18 11/06/2024    ALT 13 11/06/2024     Lab Results   Component Value Date    WBC 10.46 09/21/2024    RBC 4.27 09/21/2024    HGB 12.2 09/21/2024    HCT 38.2 09/21/2024    MCV 89.5 09/21/2024     09/21/2024      Lab Results   Component Value Date    TSH 2.440 11/06/2024        Advance Care Planning   ACP discussion was held with the patient during this visit. Patient has an advance directive (not in EMR), copy requested.       Procedures      Assessment:    ICD-10-CM ICD-9-CM   1. Paroxysmal atrial fibrillation  I48.0 427.31   2. Valvular heart disease  I38 424.90   3. Essential hypertension  I10 401.9   4. Dyslipidemia  E78.5 272.4   5. BERNARD treated with BiPAP  G47.33 327.23         Plan:  Change amlodipine to 5 mg every 12 hours.  A new prescription was sent.  Continue on atorvastatin 40 mg nightly for hyperlipidemia.    Continue on Lasix 20 mg as directed for fluid retention.    Continue on metoprolol 25 mg daily for rate control and hypertension.    Continue on Xarelto 15 mg daily for stroke prophylaxis.    Continue all other current medications.  F/up in 12 months with Dr. Kraus, sooner if needed.      Matilde Alvarez MD, [facility]

## 2024-12-12 ENCOUNTER — OFFICE VISIT (OUTPATIENT)
Dept: CARDIOLOGY | Facility: CLINIC | Age: 84
End: 2024-12-12
Payer: MEDICARE

## 2024-12-12 VITALS
HEIGHT: 62 IN | HEART RATE: 86 BPM | SYSTOLIC BLOOD PRESSURE: 132 MMHG | WEIGHT: 145 LBS | DIASTOLIC BLOOD PRESSURE: 68 MMHG | BODY MASS INDEX: 26.68 KG/M2 | OXYGEN SATURATION: 97 %

## 2024-12-12 DIAGNOSIS — I48.0 PAROXYSMAL ATRIAL FIBRILLATION: Primary | ICD-10-CM

## 2024-12-12 DIAGNOSIS — I38 VALVULAR HEART DISEASE: ICD-10-CM

## 2024-12-12 DIAGNOSIS — G47.33 OSA TREATED WITH BIPAP: ICD-10-CM

## 2024-12-12 DIAGNOSIS — E78.5 DYSLIPIDEMIA: ICD-10-CM

## 2024-12-12 DIAGNOSIS — I10 ESSENTIAL HYPERTENSION: ICD-10-CM

## 2024-12-12 PROCEDURE — 99214 OFFICE O/P EST MOD 30 MIN: CPT | Performed by: INTERNAL MEDICINE

## 2024-12-12 PROCEDURE — 1160F RVW MEDS BY RX/DR IN RCRD: CPT | Performed by: INTERNAL MEDICINE

## 2024-12-12 PROCEDURE — 1159F MED LIST DOCD IN RCRD: CPT | Performed by: INTERNAL MEDICINE

## 2024-12-12 RX ORDER — AMLODIPINE BESYLATE 5 MG/1
5 TABLET ORAL EVERY 12 HOURS SCHEDULED
Qty: 180 TABLET | Refills: 3 | Status: SHIPPED | OUTPATIENT
Start: 2024-12-12

## 2024-12-13 RX ORDER — METFORMIN HYDROCHLORIDE 500 MG/1
1000 TABLET, EXTENDED RELEASE ORAL 2 TIMES DAILY WITH MEALS
Qty: 360 TABLET | Refills: 3 | Status: SHIPPED | OUTPATIENT
Start: 2024-12-13

## 2024-12-13 NOTE — TELEPHONE ENCOUNTER
Rx Refill Note  Requested Prescriptions     Pending Prescriptions Disp Refills    metFORMIN ER (GLUCOPHAGE-XR) 500 MG 24 hr tablet [Pharmacy Med Name: metFORMIN HCl  MG Oral Tablet Extended Release 24 Hour] 360 tablet 0     Sig: TAKE 2 TABLETS BY MOUTH TWICE DAILY WITH MEALS          Last office visit with prescribing clinician: 11/6/2024     Next office visit with prescribing clinician: 5/21/2025         Ely Fiore MA  12/13/24, 09:28 EST

## 2024-12-20 ENCOUNTER — APPOINTMENT (OUTPATIENT)
Dept: CT IMAGING | Facility: HOSPITAL | Age: 84
End: 2024-12-20
Payer: MEDICARE

## 2024-12-20 ENCOUNTER — HOSPITAL ENCOUNTER (OUTPATIENT)
Facility: HOSPITAL | Age: 84
Setting detail: OBSERVATION
Discharge: HOME | End: 2024-12-23
Attending: EMERGENCY MEDICINE | Admitting: NURSE PRACTITIONER
Payer: MEDICARE

## 2024-12-20 ENCOUNTER — APPOINTMENT (OUTPATIENT)
Dept: GENERAL RADIOLOGY | Facility: HOSPITAL | Age: 84
End: 2024-12-20
Payer: MEDICARE

## 2024-12-20 DIAGNOSIS — E78.5 DYSLIPIDEMIA: ICD-10-CM

## 2024-12-20 DIAGNOSIS — I10 ESSENTIAL HYPERTENSION: ICD-10-CM

## 2024-12-20 DIAGNOSIS — I48.91 ATRIAL FIBRILLATION WITH RAPID VENTRICULAR RESPONSE: Primary | ICD-10-CM

## 2024-12-20 DIAGNOSIS — N30.00 ACUTE CYSTITIS WITHOUT HEMATURIA: ICD-10-CM

## 2024-12-20 DIAGNOSIS — E11.65 TYPE 2 DIABETES MELLITUS WITH HYPERGLYCEMIA, WITHOUT LONG-TERM CURRENT USE OF INSULIN: Chronic | ICD-10-CM

## 2024-12-20 DIAGNOSIS — J96.01 ACUTE RESPIRATORY FAILURE WITH HYPOXIA: ICD-10-CM

## 2024-12-20 DIAGNOSIS — R79.1 ELEVATED INR: ICD-10-CM

## 2024-12-20 DIAGNOSIS — J81.0 ACUTE PULMONARY EDEMA: ICD-10-CM

## 2024-12-20 LAB
ALBUMIN SERPL-MCNC: 4 G/DL (ref 3.5–5.2)
ALBUMIN/GLOB SERPL: 1.6 G/DL
ALP SERPL-CCNC: 74 U/L (ref 39–117)
ALT SERPL W P-5'-P-CCNC: 15 U/L (ref 1–33)
ANION GAP SERPL CALCULATED.3IONS-SCNC: 15 MMOL/L (ref 5–15)
ANION GAP SERPL CALCULATED.3IONS-SCNC: 15 MMOL/L (ref 5–15)
APTT PPP: 41.1 SECONDS (ref 60–90)
AST SERPL-CCNC: 19 U/L (ref 1–32)
BACTERIA UR QL AUTO: ABNORMAL /HPF
BASOPHILS # BLD AUTO: 0.06 10*3/MM3 (ref 0–0.2)
BASOPHILS NFR BLD AUTO: 0.7 % (ref 0–1.5)
BILIRUB SERPL-MCNC: 0.2 MG/DL (ref 0–1.2)
BILIRUB UR QL STRIP: NEGATIVE
BUN SERPL-MCNC: 34 MG/DL (ref 8–23)
BUN SERPL-MCNC: 37 MG/DL (ref 8–23)
BUN/CREAT SERPL: 33.3 (ref 7–25)
BUN/CREAT SERPL: 36.6 (ref 7–25)
CALCIUM SPEC-SCNC: 9.4 MG/DL (ref 8.6–10.5)
CALCIUM SPEC-SCNC: 9.8 MG/DL (ref 8.6–10.5)
CHLORIDE SERPL-SCNC: 101 MMOL/L (ref 98–107)
CHLORIDE SERPL-SCNC: 98 MMOL/L (ref 98–107)
CLARITY UR: CLEAR
CO2 SERPL-SCNC: 24 MMOL/L (ref 22–29)
CO2 SERPL-SCNC: 25 MMOL/L (ref 22–29)
COLOR UR: YELLOW
CREAT SERPL-MCNC: 0.93 MG/DL (ref 0.57–1)
CREAT SERPL-MCNC: 1.11 MG/DL (ref 0.57–1)
D-LACTATE SERPL-SCNC: 2.3 MMOL/L (ref 0.5–2)
D-LACTATE SERPL-SCNC: 2.5 MMOL/L (ref 0.5–2)
D-LACTATE SERPL-SCNC: 2.6 MMOL/L (ref 0.5–2)
D-LACTATE SERPL-SCNC: 2.7 MMOL/L (ref 0.5–2)
D-LACTATE SERPL-SCNC: 3.7 MMOL/L (ref 0.5–2)
DEPRECATED RDW RBC AUTO: 52.2 FL (ref 37–54)
DEPRECATED RDW RBC AUTO: 52.7 FL (ref 37–54)
EGFRCR SERPLBLD CKD-EPI 2021: 48.5 ML/MIN/1.73
EGFRCR SERPLBLD CKD-EPI 2021: 60 ML/MIN/1.73
EOSINOPHIL # BLD AUTO: 0.44 10*3/MM3 (ref 0–0.4)
EOSINOPHIL NFR BLD AUTO: 5.3 % (ref 0.3–6.2)
ERYTHROCYTE [DISTWIDTH] IN BLOOD BY AUTOMATED COUNT: 16.7 % (ref 12.3–15.4)
ERYTHROCYTE [DISTWIDTH] IN BLOOD BY AUTOMATED COUNT: 16.7 % (ref 12.3–15.4)
GEN 5 1HR TROPONIN T REFLEX: 21 NG/L
GLOBULIN UR ELPH-MCNC: 2.5 GM/DL
GLUCOSE BLDC GLUCOMTR-MCNC: 285 MG/DL (ref 70–130)
GLUCOSE SERPL-MCNC: 229 MG/DL (ref 65–99)
GLUCOSE SERPL-MCNC: 315 MG/DL (ref 65–99)
GLUCOSE UR STRIP-MCNC: NEGATIVE MG/DL
HCT VFR BLD AUTO: 41.7 % (ref 34–46.6)
HCT VFR BLD AUTO: 44.9 % (ref 34–46.6)
HGB BLD-MCNC: 13.6 G/DL (ref 12–15.9)
HGB BLD-MCNC: 14.6 G/DL (ref 12–15.9)
HGB UR QL STRIP.AUTO: NEGATIVE
HYALINE CASTS UR QL AUTO: ABNORMAL /LPF
IMM GRANULOCYTES # BLD AUTO: 0.02 10*3/MM3 (ref 0–0.05)
IMM GRANULOCYTES NFR BLD AUTO: 0.2 % (ref 0–0.5)
INR PPP: 2.24 (ref 0.89–1.12)
KETONES UR QL STRIP: NEGATIVE
LEUKOCYTE ESTERASE UR QL STRIP.AUTO: ABNORMAL
LYMPHOCYTES # BLD AUTO: 2.29 10*3/MM3 (ref 0.7–3.1)
LYMPHOCYTES NFR BLD AUTO: 27.4 % (ref 19.6–45.3)
MAGNESIUM SERPL-MCNC: 1.9 MG/DL (ref 1.6–2.4)
MAGNESIUM SERPL-MCNC: 2.3 MG/DL (ref 1.6–2.4)
MCH RBC QN AUTO: 27.9 PG (ref 26.6–33)
MCH RBC QN AUTO: 28.1 PG (ref 26.6–33)
MCHC RBC AUTO-ENTMCNC: 32.5 G/DL (ref 31.5–35.7)
MCHC RBC AUTO-ENTMCNC: 32.6 G/DL (ref 31.5–35.7)
MCV RBC AUTO: 85.6 FL (ref 79–97)
MCV RBC AUTO: 86.3 FL (ref 79–97)
MONOCYTES # BLD AUTO: 0.82 10*3/MM3 (ref 0.1–0.9)
MONOCYTES NFR BLD AUTO: 9.8 % (ref 5–12)
NEUTROPHILS NFR BLD AUTO: 4.72 10*3/MM3 (ref 1.7–7)
NEUTROPHILS NFR BLD AUTO: 56.6 % (ref 42.7–76)
NITRITE UR QL STRIP: POSITIVE
NRBC BLD AUTO-RTO: 0 /100 WBC (ref 0–0.2)
NT-PROBNP SERPL-MCNC: 1338 PG/ML (ref 0–1800)
PH UR STRIP.AUTO: 5.5 [PH] (ref 5–8)
PLATELET # BLD AUTO: 389 10*3/MM3 (ref 140–450)
PLATELET # BLD AUTO: 399 10*3/MM3 (ref 140–450)
PMV BLD AUTO: 10 FL (ref 6–12)
PMV BLD AUTO: 9.8 FL (ref 6–12)
POTASSIUM SERPL-SCNC: 4.4 MMOL/L (ref 3.5–5.2)
POTASSIUM SERPL-SCNC: 4.7 MMOL/L (ref 3.5–5.2)
PROCALCITONIN SERPL-MCNC: 0.07 NG/ML (ref 0–0.25)
PROT SERPL-MCNC: 6.5 G/DL (ref 6–8.5)
PROT UR QL STRIP: NEGATIVE
PROTHROMBIN TIME: 24.8 SECONDS (ref 12.2–14.5)
QT INTERVAL: 342 MS
QTC INTERVAL: 458 MS
RBC # BLD AUTO: 4.87 10*6/MM3 (ref 3.77–5.28)
RBC # BLD AUTO: 5.2 10*6/MM3 (ref 3.77–5.28)
RBC # UR STRIP: ABNORMAL /HPF
REF LAB TEST METHOD: ABNORMAL
SODIUM SERPL-SCNC: 137 MMOL/L (ref 136–145)
SODIUM SERPL-SCNC: 141 MMOL/L (ref 136–145)
SP GR UR STRIP: 1.01 (ref 1–1.03)
SQUAMOUS #/AREA URNS HPF: ABNORMAL /HPF
T4 FREE SERPL-MCNC: 1.42 NG/DL (ref 0.92–1.68)
TROPONIN T % DELTA: -5 %
TROPONIN T NUMERIC DELTA: -1 NG/L
TROPONIN T SERPL HS-MCNC: 22 NG/L
TSH SERPL DL<=0.05 MIU/L-ACNC: 6.91 UIU/ML (ref 0.27–4.2)
UROBILINOGEN UR QL STRIP: ABNORMAL
WBC # UR STRIP: ABNORMAL /HPF
WBC NRBC COR # BLD AUTO: 8.35 10*3/MM3 (ref 3.4–10.8)
WBC NRBC COR # BLD AUTO: 9.93 10*3/MM3 (ref 3.4–10.8)

## 2024-12-20 PROCEDURE — 83735 ASSAY OF MAGNESIUM: CPT | Performed by: EMERGENCY MEDICINE

## 2024-12-20 PROCEDURE — 94660 CPAP INITIATION&MGMT: CPT

## 2024-12-20 PROCEDURE — 87088 URINE BACTERIA CULTURE: CPT | Performed by: EMERGENCY MEDICINE

## 2024-12-20 PROCEDURE — 25810000003 SODIUM CHLORIDE 0.9 % SOLUTION: Performed by: EMERGENCY MEDICINE

## 2024-12-20 PROCEDURE — 25010000002 CEFTRIAXONE PER 250 MG: Performed by: EMERGENCY MEDICINE

## 2024-12-20 PROCEDURE — 96366 THER/PROPH/DIAG IV INF ADDON: CPT

## 2024-12-20 PROCEDURE — 71045 X-RAY EXAM CHEST 1 VIEW: CPT

## 2024-12-20 PROCEDURE — 84145 PROCALCITONIN (PCT): CPT | Performed by: EMERGENCY MEDICINE

## 2024-12-20 PROCEDURE — 83735 ASSAY OF MAGNESIUM: CPT | Performed by: FAMILY MEDICINE

## 2024-12-20 PROCEDURE — 97161 PT EVAL LOW COMPLEX 20 MIN: CPT

## 2024-12-20 PROCEDURE — G0378 HOSPITAL OBSERVATION PER HR: HCPCS

## 2024-12-20 PROCEDURE — 84439 ASSAY OF FREE THYROXINE: CPT | Performed by: EMERGENCY MEDICINE

## 2024-12-20 PROCEDURE — 87086 URINE CULTURE/COLONY COUNT: CPT | Performed by: EMERGENCY MEDICINE

## 2024-12-20 PROCEDURE — 85610 PROTHROMBIN TIME: CPT | Performed by: EMERGENCY MEDICINE

## 2024-12-20 PROCEDURE — 82948 REAGENT STRIP/BLOOD GLUCOSE: CPT

## 2024-12-20 PROCEDURE — 25510000001 IOPAMIDOL PER 1 ML: Performed by: EMERGENCY MEDICINE

## 2024-12-20 PROCEDURE — 85025 COMPLETE CBC W/AUTO DIFF WBC: CPT | Performed by: EMERGENCY MEDICINE

## 2024-12-20 PROCEDURE — 96375 TX/PRO/DX INJ NEW DRUG ADDON: CPT

## 2024-12-20 PROCEDURE — 93005 ELECTROCARDIOGRAM TRACING: CPT | Performed by: INTERNAL MEDICINE

## 2024-12-20 PROCEDURE — 25010000002 AMIODARONE IN DEXTROSE 5% 150-4.21 MG/100ML-% SOLUTION: Performed by: INTERNAL MEDICINE

## 2024-12-20 PROCEDURE — 97165 OT EVAL LOW COMPLEX 30 MIN: CPT

## 2024-12-20 PROCEDURE — 71275 CT ANGIOGRAPHY CHEST: CPT

## 2024-12-20 PROCEDURE — 93005 ELECTROCARDIOGRAM TRACING: CPT | Performed by: EMERGENCY MEDICINE

## 2024-12-20 PROCEDURE — 96365 THER/PROPH/DIAG IV INF INIT: CPT

## 2024-12-20 PROCEDURE — 63710000001 INSULIN LISPRO (HUMAN) PER 5 UNITS: Performed by: FAMILY MEDICINE

## 2024-12-20 PROCEDURE — 96376 TX/PRO/DX INJ SAME DRUG ADON: CPT

## 2024-12-20 PROCEDURE — 84443 ASSAY THYROID STIM HORMONE: CPT | Performed by: EMERGENCY MEDICINE

## 2024-12-20 PROCEDURE — 25010000002 AMIODARONE IN DEXTROSE 5% 360-4.14 MG/200ML-% SOLUTION: Performed by: INTERNAL MEDICINE

## 2024-12-20 PROCEDURE — 25010000002 FUROSEMIDE PER 20 MG: Performed by: INTERNAL MEDICINE

## 2024-12-20 PROCEDURE — 80053 COMPREHEN METABOLIC PANEL: CPT | Performed by: EMERGENCY MEDICINE

## 2024-12-20 PROCEDURE — 87186 SC STD MICRODIL/AGAR DIL: CPT | Performed by: EMERGENCY MEDICINE

## 2024-12-20 PROCEDURE — 97116 GAIT TRAINING THERAPY: CPT

## 2024-12-20 PROCEDURE — 84484 ASSAY OF TROPONIN QUANT: CPT | Performed by: EMERGENCY MEDICINE

## 2024-12-20 PROCEDURE — 97535 SELF CARE MNGMENT TRAINING: CPT

## 2024-12-20 PROCEDURE — 81001 URINALYSIS AUTO W/SCOPE: CPT | Performed by: EMERGENCY MEDICINE

## 2024-12-20 PROCEDURE — 83605 ASSAY OF LACTIC ACID: CPT | Performed by: EMERGENCY MEDICINE

## 2024-12-20 PROCEDURE — 85027 COMPLETE CBC AUTOMATED: CPT | Performed by: FAMILY MEDICINE

## 2024-12-20 PROCEDURE — 96367 TX/PROPH/DG ADDL SEQ IV INF: CPT

## 2024-12-20 PROCEDURE — 99291 CRITICAL CARE FIRST HOUR: CPT

## 2024-12-20 PROCEDURE — 25810000003 SODIUM CHLORIDE 0.9 % SOLUTION: Performed by: STUDENT IN AN ORGANIZED HEALTH CARE EDUCATION/TRAINING PROGRAM

## 2024-12-20 PROCEDURE — 94799 UNLISTED PULMONARY SVC/PX: CPT

## 2024-12-20 PROCEDURE — 83880 ASSAY OF NATRIURETIC PEPTIDE: CPT | Performed by: EMERGENCY MEDICINE

## 2024-12-20 PROCEDURE — 85730 THROMBOPLASTIN TIME PARTIAL: CPT | Performed by: EMERGENCY MEDICINE

## 2024-12-20 PROCEDURE — 36415 COLL VENOUS BLD VENIPUNCTURE: CPT

## 2024-12-20 PROCEDURE — 25010000002 BUMETANIDE PER 0.5 MG: Performed by: FAMILY MEDICINE

## 2024-12-20 RX ORDER — ACETAMINOPHEN 325 MG/1
650 TABLET ORAL EVERY 4 HOURS PRN
Status: DISCONTINUED | OUTPATIENT
Start: 2024-12-20 | End: 2024-12-23 | Stop reason: HOSPADM

## 2024-12-20 RX ORDER — BISACODYL 10 MG
10 SUPPOSITORY, RECTAL RECTAL DAILY PRN
Status: DISCONTINUED | OUTPATIENT
Start: 2024-12-20 | End: 2024-12-23 | Stop reason: HOSPADM

## 2024-12-20 RX ORDER — ONDANSETRON 4 MG/1
4 TABLET, ORALLY DISINTEGRATING ORAL EVERY 6 HOURS PRN
Status: DISCONTINUED | OUTPATIENT
Start: 2024-12-20 | End: 2024-12-23 | Stop reason: HOSPADM

## 2024-12-20 RX ORDER — OXYBUTYNIN CHLORIDE 5 MG/1
5 TABLET, EXTENDED RELEASE ORAL DAILY
Status: DISCONTINUED | OUTPATIENT
Start: 2024-12-20 | End: 2024-12-23 | Stop reason: HOSPADM

## 2024-12-20 RX ORDER — SODIUM CHLORIDE 0.9 % (FLUSH) 0.9 %
10 SYRINGE (ML) INJECTION EVERY 12 HOURS SCHEDULED
Status: DISCONTINUED | OUTPATIENT
Start: 2024-12-20 | End: 2024-12-23 | Stop reason: HOSPADM

## 2024-12-20 RX ORDER — DEXTROSE MONOHYDRATE 25 G/50ML
25 INJECTION, SOLUTION INTRAVENOUS
Status: DISCONTINUED | OUTPATIENT
Start: 2024-12-20 | End: 2024-12-23 | Stop reason: HOSPADM

## 2024-12-20 RX ORDER — DILTIAZEM HYDROCHLORIDE 5 MG/ML
10 INJECTION INTRAVENOUS ONCE
Status: COMPLETED | OUTPATIENT
Start: 2024-12-20 | End: 2024-12-20

## 2024-12-20 RX ORDER — ACETAMINOPHEN 160 MG/5ML
650 SOLUTION ORAL EVERY 4 HOURS PRN
Status: DISCONTINUED | OUTPATIENT
Start: 2024-12-20 | End: 2024-12-23 | Stop reason: HOSPADM

## 2024-12-20 RX ORDER — NITROGLYCERIN 0.4 MG/1
0.4 TABLET SUBLINGUAL
Status: DISCONTINUED | OUTPATIENT
Start: 2024-12-20 | End: 2024-12-23 | Stop reason: HOSPADM

## 2024-12-20 RX ORDER — METOPROLOL SUCCINATE 25 MG/1
25 TABLET, EXTENDED RELEASE ORAL DAILY
Status: DISCONTINUED | OUTPATIENT
Start: 2024-12-20 | End: 2024-12-23 | Stop reason: HOSPADM

## 2024-12-20 RX ORDER — AMIODARONE HYDROCHLORIDE 200 MG/1
200 TABLET ORAL EVERY 8 HOURS
Status: DISCONTINUED | OUTPATIENT
Start: 2024-12-21 | End: 2024-12-22

## 2024-12-20 RX ORDER — AMLODIPINE BESYLATE 5 MG/1
5 TABLET ORAL EVERY 12 HOURS SCHEDULED
Status: DISCONTINUED | OUTPATIENT
Start: 2024-12-20 | End: 2024-12-21

## 2024-12-20 RX ORDER — AMIODARONE HYDROCHLORIDE 200 MG/1
200 TABLET ORAL ONCE
Status: COMPLETED | OUTPATIENT
Start: 2024-12-21 | End: 2024-12-21

## 2024-12-20 RX ORDER — IOPAMIDOL 755 MG/ML
80 INJECTION, SOLUTION INTRAVASCULAR
Status: COMPLETED | OUTPATIENT
Start: 2024-12-20 | End: 2024-12-20

## 2024-12-20 RX ORDER — NICOTINE POLACRILEX 4 MG
15 LOZENGE BUCCAL
Status: DISCONTINUED | OUTPATIENT
Start: 2024-12-20 | End: 2024-12-23 | Stop reason: HOSPADM

## 2024-12-20 RX ORDER — ACETAMINOPHEN 650 MG/1
650 SUPPOSITORY RECTAL EVERY 4 HOURS PRN
Status: DISCONTINUED | OUTPATIENT
Start: 2024-12-20 | End: 2024-12-23 | Stop reason: HOSPADM

## 2024-12-20 RX ORDER — SODIUM CHLORIDE 0.9 % (FLUSH) 0.9 %
10 SYRINGE (ML) INJECTION AS NEEDED
Status: DISCONTINUED | OUTPATIENT
Start: 2024-12-20 | End: 2024-12-23 | Stop reason: HOSPADM

## 2024-12-20 RX ORDER — AMIODARONE HYDROCHLORIDE 200 MG/1
200 TABLET ORAL DAILY
Status: DISCONTINUED | OUTPATIENT
Start: 2025-01-11 | End: 2024-12-22

## 2024-12-20 RX ORDER — FUROSEMIDE 10 MG/ML
60 INJECTION INTRAMUSCULAR; INTRAVENOUS EVERY 12 HOURS
Status: DISCONTINUED | OUTPATIENT
Start: 2024-12-20 | End: 2024-12-23 | Stop reason: HOSPADM

## 2024-12-20 RX ORDER — BUMETANIDE 0.25 MG/ML
2 INJECTION, SOLUTION INTRAMUSCULAR; INTRAVENOUS ONCE
Status: COMPLETED | OUTPATIENT
Start: 2024-12-20 | End: 2024-12-20

## 2024-12-20 RX ORDER — POLYETHYLENE GLYCOL 3350 17 G/17G
17 POWDER, FOR SOLUTION ORAL DAILY PRN
Status: DISCONTINUED | OUTPATIENT
Start: 2024-12-20 | End: 2024-12-23 | Stop reason: HOSPADM

## 2024-12-20 RX ORDER — DILTIAZEM HCL/D5W 125 MG/125
5-15 PLASTIC BAG, INJECTION (ML) INTRAVENOUS
Status: DISCONTINUED | OUTPATIENT
Start: 2024-12-20 | End: 2024-12-20

## 2024-12-20 RX ORDER — BISACODYL 5 MG/1
5 TABLET, DELAYED RELEASE ORAL DAILY PRN
Status: DISCONTINUED | OUTPATIENT
Start: 2024-12-20 | End: 2024-12-23 | Stop reason: HOSPADM

## 2024-12-20 RX ORDER — AMOXICILLIN 250 MG
2 CAPSULE ORAL 2 TIMES DAILY PRN
Status: DISCONTINUED | OUTPATIENT
Start: 2024-12-20 | End: 2024-12-23 | Stop reason: HOSPADM

## 2024-12-20 RX ORDER — ATORVASTATIN CALCIUM 40 MG/1
40 TABLET, FILM COATED ORAL NIGHTLY
Status: DISCONTINUED | OUTPATIENT
Start: 2024-12-20 | End: 2024-12-23 | Stop reason: HOSPADM

## 2024-12-20 RX ORDER — IBUPROFEN 600 MG/1
1 TABLET ORAL
Status: DISCONTINUED | OUTPATIENT
Start: 2024-12-20 | End: 2024-12-23 | Stop reason: HOSPADM

## 2024-12-20 RX ORDER — INSULIN LISPRO 100 [IU]/ML
2-7 INJECTION, SOLUTION INTRAVENOUS; SUBCUTANEOUS
Status: DISCONTINUED | OUTPATIENT
Start: 2024-12-20 | End: 2024-12-23 | Stop reason: HOSPADM

## 2024-12-20 RX ORDER — ONDANSETRON 2 MG/ML
4 INJECTION INTRAMUSCULAR; INTRAVENOUS EVERY 6 HOURS PRN
Status: DISCONTINUED | OUTPATIENT
Start: 2024-12-20 | End: 2024-12-23 | Stop reason: HOSPADM

## 2024-12-20 RX ORDER — SODIUM CHLORIDE 9 MG/ML
40 INJECTION, SOLUTION INTRAVENOUS AS NEEDED
Status: DISCONTINUED | OUTPATIENT
Start: 2024-12-20 | End: 2024-12-23 | Stop reason: HOSPADM

## 2024-12-20 RX ORDER — ETOMIDATE 2 MG/ML
10 INJECTION INTRAVENOUS ONCE
Status: DISCONTINUED | OUTPATIENT
Start: 2024-12-20 | End: 2024-12-20

## 2024-12-20 RX ORDER — AMIODARONE HYDROCHLORIDE 200 MG/1
200 TABLET ORAL EVERY 12 HOURS
Status: DISCONTINUED | OUTPATIENT
Start: 2024-12-28 | End: 2024-12-22

## 2024-12-20 RX ADMIN — RIVAROXABAN 15 MG: 15 TABLET, FILM COATED ORAL at 17:51

## 2024-12-20 RX ADMIN — AMIODARONE HYDROCHLORIDE 0.5 MG/MIN: 1.8 INJECTION, SOLUTION INTRAVENOUS at 21:12

## 2024-12-20 RX ADMIN — METOPROLOL SUCCINATE 25 MG: 25 TABLET, EXTENDED RELEASE ORAL at 15:13

## 2024-12-20 RX ADMIN — AMIODARONE HYDROCHLORIDE 150 MG: 1.5 INJECTION, SOLUTION INTRAVENOUS at 14:07

## 2024-12-20 RX ADMIN — FUROSEMIDE 60 MG: 10 INJECTION, SOLUTION INTRAMUSCULAR; INTRAVENOUS at 21:41

## 2024-12-20 RX ADMIN — IOPAMIDOL 80 ML: 755 INJECTION, SOLUTION INTRAVENOUS at 05:07

## 2024-12-20 RX ADMIN — Medication 10 ML: at 21:42

## 2024-12-20 RX ADMIN — SODIUM CHLORIDE 500 ML: 9 INJECTION, SOLUTION INTRAVENOUS at 15:09

## 2024-12-20 RX ADMIN — INSULIN LISPRO 4 UNITS: 100 INJECTION, SOLUTION INTRAVENOUS; SUBCUTANEOUS at 21:04

## 2024-12-20 RX ADMIN — INSULIN LISPRO 5 UNITS: 100 INJECTION, SOLUTION INTRAVENOUS; SUBCUTANEOUS at 17:51

## 2024-12-20 RX ADMIN — DILTIAZEM HYDROCHLORIDE 10 MG: 5 INJECTION, SOLUTION INTRAVENOUS at 04:06

## 2024-12-20 RX ADMIN — ATORVASTATIN CALCIUM 40 MG: 40 TABLET, FILM COATED ORAL at 21:03

## 2024-12-20 RX ADMIN — AMIODARONE HYDROCHLORIDE 1 MG/MIN: 1.8 INJECTION, SOLUTION INTRAVENOUS at 15:12

## 2024-12-20 RX ADMIN — SODIUM CHLORIDE 1000 MG: 900 INJECTION INTRAVENOUS at 06:12

## 2024-12-20 RX ADMIN — SODIUM CHLORIDE 1000 ML: 9 INJECTION, SOLUTION INTRAVENOUS at 02:35

## 2024-12-20 RX ADMIN — Medication 10 ML: at 13:41

## 2024-12-20 RX ADMIN — BUMETANIDE 2 MG: 0.25 INJECTION INTRAMUSCULAR; INTRAVENOUS at 06:12

## 2024-12-20 RX ADMIN — Medication 5 MG/HR: at 04:06

## 2024-12-20 NOTE — CASE MANAGEMENT/SOCIAL WORK
Discharge Planning Assessment  [facility]     Patient Name: Kaylen Garrison  MRN: [id number]  Today's Date: 12/20/2024    Admit Date: 12/20/2024    Plan: discharge plan   Discharge Needs Assessment       Row Name 12/20/24 1403       Living Environment    People in Home spouse    Name(s) of People in Home Elton Garrison(spouse)    Current Living Arrangements home    Primary Care Provided by self    Provides Primary Care For no one, unable/limited ability to care for self    Family Caregiver if Needed spouse    Family Caregiver Names Elton(spouse)    Quality of Family Relationships helpful;involved;supportive    Able to Return to Prior Arrangements yes    Living Arrangement Comments I met with pt and pt;s spouse in room with permission regarding discharge plan. Pt resides in [facility] with spouse.       Transition Planning    Patient/Family Anticipates Transition to home with family    Patient/Family Anticipated Services at Transition        Discharge Needs Assessment    Readmission Within the Last 30 Days no previous admission in last 30 days    Equipment Currently Used at Home shower chair;oxygen;respiratory supplies  O2 2-3 L at hs provided by [employer] [facility]    Concerns to be Addressed discharge planning    Equipment Needed After Discharge respiratory supplies;oxygen;shower chair    Discharge Coordination/Progress Pt confirms that she has [employer] Medicare with prescription coverage. Pt uses [facility] on [facility] [address] [facility] for precriptions. Pt reports she has several DME but only uses a shower care. Pt is independent with ADLs as she still drives, cooks manages meds, bathes dresses self, and does house keeping.  Pt reports she wears home O2 at 2-3 L hs provided by [employer]. Pt is her with Atrial fibrillation and cardiology has been consulted. Pt states she goes to [facility] for PT and plans to continue at discharge. Discharge plan is home with spouse and pt does not anticipate  discharge needs. CM will cont to follow.                    Discharge Plan       Row Name 12/20/24 3074       Plan    Plan discharge plan    Plan Comments Discharge plan is home with spouse  Pt currently denies discharge needs. Spouse will provide transportation home. CM will cont to follow    Final Discharge Disposition Code 01 - home or self-care                  Continued Care and Services - Admitted Since 12/20/2024    No active coordination exists for this encounter.       Expected Discharge Date and Time       Expected Discharge Date Expected Discharge Time    Dec 22, 2024            Demographic Summary       Row Name 12/20/24 1401       General Information    General Information Comments PCP is EULA COOK       Contact Information    Permission Granted to Share Info With     Contact Information Obtained for     Contact Information Comments Elton Garrison(spouse) [phone]                   Functional Status       Row Name 12/20/24 1402       Functional Status    Functional Status Comments Pt reports he is independent with ADLs                   Psychosocial    No documentation.                  Abuse/Neglect    No documentation.                  Legal    No documentation.                  Substance Abuse    No documentation.                  Patient Forms    No documentation.                     Haleigh Cottrell RN

## 2024-12-20 NOTE — Clinical Note
Level of Care: Telemetry [5]   Diagnosis: Atrial fibrillation with RVR [[id number]]   Admitting Physician: RAYSA CASTRO [[id number]]   Attending Physician: RAYSA CASTRO [[id number]]   Bed Request Comments: patient requests bed on 6A or 6B if available

## 2024-12-20 NOTE — ED PROVIDER NOTES
Subjective   History of Present Illness  Is a 84-year-old female with past medical history of paroxysmal atrial fibrillation and aortic valve stenosis presenting to the emergency department with some palpitations and shortness of breath.  The patient states that she woke up to the bathroom about an hour ago and started having some fluttering in her chest.  She was very short of breath.  She checked her pulse rate was found to be elevated.  Patient has had episodes like this numerous times before in the past.  She was recently discontinued off her amiodarone secondary to some pulmonary issues.  She denies any fevers or chills.  No headache or change in vision.  No focal weakness.  No abdominal pain or vomiting    History provided by:  Patient and EMS personnel   used: No        Review of Systems   Constitutional:  Negative for chills and fever.   HENT:  Negative for congestion, ear pain and sore throat.    Eyes:  Negative for visual disturbance.   Respiratory:  Positive for shortness of breath.    Cardiovascular:  Positive for chest pain and palpitations.   Gastrointestinal:  Negative for abdominal pain.   Genitourinary:  Negative for difficulty urinating.   Musculoskeletal:  Negative for arthralgias.   Skin:  Negative for rash.   Neurological:  Negative for dizziness, weakness and numbness.   Psychiatric/Behavioral:  Negative for agitation.        Past Medical History:   Diagnosis Date    Aortic valve stenosis     Atrial fibrillation     Benign hypertension     History of echocardiogram 04/2012    Hypercalcemia     Hypercholesterolemia     Hypertension     Meniere's disease     Obesity     Overweight (BMI 25.0-29.9)     Primary hyperparathyroidism     Sleep apnea with use of continuous positive airway pressure (CPAP)     Syncope 04/2012    Type 2 diabetes mellitus     Vitamin D deficiency        Allergies   Allergen Reactions    Adhesive Tape Rash    Latex Rash    Penicillins Rash       Past  Surgical History:   Procedure Laterality Date    BREAST SURGERY Left     benign tumor removal    CATARACT EXTRACTION Bilateral     CHOLECYSTECTOMY N/A 4/11/2023    Procedure: CHOLECYSTECTOMY LAPAROSCOPIC;  Surgeon: Abraham Holbrook MD;  Location: [facility] OR;  Service: General;  Laterality: N/A;    ERCP N/A 4/9/2023    Procedure: ENDOSCOPIC RETROGRADE CHOLANGIOPANCREATOGRAPHY WITH STENT PLACEMENT;  Surgeon: Brunner, Mark I, MD;  Location: [facility] ENDOSCOPY;  Service: Gastroenterology;  Laterality: N/A;  Sphincterotomy made to ampula of coomon bile duct. CBD swept with 9mm-12 mm balloon. Multiple stones and sludge removed. Stent placed in pancreatic duct    HYSTERECTOMY         Family History   Problem Relation Age of Onset    Heart attack Mother     No Known Problems Father     No Known Problems Sister     Lactose intolerance Brother     No Known Problems Sister        Social History     Socioeconomic History    Marital status:    Tobacco Use    Smoking status: Never     Passive exposure: Never    Smokeless tobacco: Never   Vaping Use    Vaping status: Never Used   Substance and Sexual Activity    Alcohol use: No    Drug use: No    Sexual activity: Defer           Objective   Physical Exam  Vitals and nursing note reviewed.   Constitutional:       General: She is in acute distress.      Appearance: She is ill-appearing. She is not toxic-appearing.   Eyes:      Conjunctiva/sclera: Conjunctivae normal.      Pupils: Pupils are equal, round, and reactive to light.   Cardiovascular:      Rate and Rhythm: Tachycardia present. Rhythm irregular.   Pulmonary:      Effort: Pulmonary effort is normal. No respiratory distress.   Abdominal:      General: Abdomen is flat. There is no distension.      Palpations: There is no mass.      Tenderness: There is no abdominal tenderness. There is no guarding or rebound.   Musculoskeletal:         General: No deformity. Normal range of motion.   Skin:     General: Skin is warm.       Findings: No rash.   Neurological:      General: No focal deficit present.      Mental Status: She is alert and oriented to person, place, and time.      Motor: No weakness.         ECG 12 Lead      Date/Time: 12/20/2024 3:31 AM    Performed by: Jose Antonio Carrera MD  Authorized by: Jose Antonio Carrera MD  Interpreted by ED physician  Comparison: compared with previous ECG   Similar to previous ECG  Rhythm: atrial fibrillation  Rate: tachycardic  BPM: 108  QRS axis: normal  Other findings comments: Nonspecific ST changes  Clinical impression: non-specific ECG and dysrhythmia - atrial  Comments: Interpretation:  EKG was directly visualized by myself, interpretations as documented in hospital course.               ED Course  ED Course as of 12/20/24 0540   Fri Dec 20, 2024   0302 Patient does have some mild hypoxia as well as A-fib with RVR.  Given these findings I do feel she would benefit from cardioversion.  It looks like she had a successful cardioversion in 2021. [JK]   0302 Patient was concerned about receiving procedure.  Would like me to speak with her son who is a physician.  We did discuss the case.  They do not want to proceed with electrical cardioversion at this time.  They would like medical management. [JK]   0332 BP: 154/76 [JK]   0333 Heart Rate(!): 121 [JK]   0333 Resp: 18 [JK]   0333 SpO2: 90 %  Interpretation:  Patient's repeat vitals, telemetry tracing, and pulse oximetry tracing were directly viewed and interpreted by myself.   O2 sat 90% on room air, interpreted as hypoxia  Telemetry rhythm strip revealed a rate of 121 bpm, interpreted as atrial fibrillation with rapid ventricular rate [JK]   0333 CBC & Differential(!) [JK]   0333 aPTT(!) [JK]   0333 Magnesium [JK]   0333 TSH Rfx On Abnormal To Free T4(!) [JK]   0333 High Sensitivity Troponin T(!) [JK]   0333 Procalcitonin [JK]   0333 Comprehensive Metabolic Panel(!) [JK]   0333 BNP [JK]   0333 Protime-INR(!) [JK]   0333 Lactic Acid,  Plasma(!!)  Interpretation:  Laboratory studies were reviewed and interpreted directly by myself.  CBC unremarkable, PTT was low, CMP showed some acute kidney injury with a BUN 37 creatinine 1.11, lactic elevated 2.3, INR elevated 2.24, troponin marginal at 22 [JK]   0333 XR Chest 1 View  Interpretation:  Imaging was directly visualized by myself, per my interpretations, chest x-ray shows some pulmonary congestion. [JK]   0419 Patient tolerating Cardizem well at this point.  Current heart rate is 81. [JK]   0531 CT Angiogram Chest  Interpretation:  Imaging was directly visualized by myself, per my interpretations, CT angiogram showed pulmonary edema. [JK]   0538 Patient continues to be rate controlled on Cardizem drip.  Patient will be admitted the hospital for further evaluation and treatment. [JK]   0538 Based on the patient's presentation, history and diffuse work-up in the emergency department, the patient is deemed appropriate for admission to the hospital for further evaluation and treatment.  This was discussed with the patient at bedside.  They are in agreement with the current medical management.    Admitting physician: Dr. Mcintosh    Discussion was had with admitting physician regarding the laboratory and imaging findings.  We did discuss current therapeutics in the emergency department and progression of the patient.  Working diagnosis was conveyed to the admitting physician, as well as current status and prognosis for the patient.  They are in agreement with these findings and have accepted admission.    Shared decision making:   After full review of the patient's clinical presentation, review of any work-up including but not limited to laboratory studies and radiology obtained, I had a discussion with the patient.  Treatment options were discussed as well as the risks, benefits and consequences.  I discussed all findings with the patient and family members if available.  During the discussion,  treatment goals were understood by all as well as any misconceptions which were addressed with the patient.  Ample time was given for any questions they may have had.  They are in agreement with the treatment plan as well as final disposition. [JK]      ED Course User Index  [JK] Jose Antonio Carrera MD                                                       Medical Decision Making  This is a 84-year-old female with a history of paroxysmal atrial fibrillation presenting to the emergency department with some palpitations and shortness of breath.  The patient appears to be in atrial fibrillation with rapid ventricular rate.  She is also hypoxic.  Patient was immediately transferred to resuscitation bay.   IV access was established and the patient.  Placed on continuous telemetry monitoring.  Given the patient's presentation, differential is broad and will require further evaluation.  Workup initiated.      Differential diagnosis: CAD, ACS, peptic ulcer disease, dyspepsia, pneumonia, bronchitis, atypical chest pain, angina, musculoskeletal pain, atrial fibrillation with rapid ventricular rate, dysrhythmia, CHF      Amount and/or Complexity of Data Reviewed  Independent Historian: EMS  External Data Reviewed: labs, radiology, ECG and notes.     Details: External laboratories, imaging as well as notes were reviewed personally by myself.  All relevant studies were used to guide decision making.     Date of previous record: 12/12/2024    Source of note: Cardiology    Summary:  Patient was seen and evaluated for routine visit.  I did review basic laboratory studies on file as well as a previous chest x-ray and EKG.  Records reviewed    Labs: ordered. Decision-making details documented in ED Course.  Radiology: ordered and independent interpretation performed. Decision-making details documented in ED Course.  ECG/medicine tests: ordered and independent interpretation performed. Decision-making details documented in ED  Course.    Risk  Prescription drug management.    Critical Care  Total time providing critical care: 40 minutes (Authorized and performed by: Jose Antonio Carrera MD  I personally spent a total of 40 minutes of critical care time with the patient.  Due to the high probability of clinically significant, life-threatening deterioration, the patient required my highest level of care to intervene emergently.  These interventions, including, but not limited to, establishing IV access, continuous pulse oximeter and telemetry monitoring, frequent monitoring and reevaluations, management the patient's airway and cardiovascular system, discussion with other consultants as needed, which bear directly on the management the patient.  This also includes obtaining history, examining the patient, frequent reevaluations and coordinating high level of care.  Failure to emergently initiate these interventions would carry a high probability of resulting in sudden, clinically significant or life threatening deterioration in the patient's condition.  This does not include time spent on separately reported billable procedures.)        Final diagnoses:   Atrial fibrillation with rapid ventricular response   Acute respiratory failure with hypoxia   Acute pulmonary edema   Acute cystitis without hematuria   Elevated INR   Essential hypertension   Dyslipidemia       ED Disposition  ED Disposition       ED Disposition   Decision to Admit    Condition   --    Comment   Level of Care: Telemetry [5]   Diagnosis: Atrial fibrillation with RVR [[id number]]   Admitting Physician: RAYSA CASTRO [[id number]]   Attending Physician: RAYSA CASTRO [[id number]]   Is patient appropriate for Inpatient Observation Unit?: Yes [1]                 No follow-up provider specified.       Medication List      No changes were made to your prescriptions during this visit.            Jose Antonio Carrera MD  12/20/24 6618

## 2024-12-20 NOTE — PLAN OF CARE
Goal Outcome Evaluation:  Plan of Care Reviewed With: patient        Progress: no change  Outcome Evaluation: Patient presents with decreased functional endurance, mild balance deficits, and decreased safety awareness affecting functional mobility. Skilled IP PT services warranted to support return to independence. Recommend home with family assist and OP PT at discharge.    Anticipated Discharge Disposition (PT): home with assist, home with outpatient therapy services

## 2024-12-20 NOTE — ED NOTES
Kaylen Garrison    Nursing Report ED to Floor:  Mental status: A&OX4  Ambulatory status: NON AMBULATORY IN ED  Oxygen Therapy:  4L NC  Cardiac Rhythm: AFIB RVR  Admitted from: HOME  Safety Concerns:  NA   Social Issues: NA  ED Room #:  17    ED Nurse Phone Extension - [phone] or may call [phone].      HPI:   Chief Complaint   Patient presents with    Rapid Heart Rate       Past Medical History:  Past Medical History:   Diagnosis Date    Aortic valve stenosis     Atrial fibrillation     Benign hypertension     History of echocardiogram 04/2012    Hypercalcemia     Hypercholesterolemia     Hypertension     Meniere's disease     Obesity     Overweight (BMI 25.0-29.9)     Primary hyperparathyroidism     Sleep apnea with use of continuous positive airway pressure (CPAP)     Syncope 04/2012    Type 2 diabetes mellitus     Vitamin D deficiency         Past Surgical History:  Past Surgical History:   Procedure Laterality Date    BREAST SURGERY Left     benign tumor removal    CATARACT EXTRACTION Bilateral     CHOLECYSTECTOMY N/A 4/11/2023    Procedure: CHOLECYSTECTOMY LAPAROSCOPIC;  Surgeon: Abraham Holbrook MD;  Location: [facility] OR;  Service: General;  Laterality: N/A;    ERCP N/A 4/9/2023    Procedure: ENDOSCOPIC RETROGRADE CHOLANGIOPANCREATOGRAPHY WITH STENT PLACEMENT;  Surgeon: Brunner, Mark I, MD;  Location:  [facility];  Service: Gastroenterology;  Laterality: N/A;  Sphincterotomy made to ampula of coomon bile duct. CBD swept with 9mm-12 mm balloon. Multiple stones and sludge removed. Stent placed in pancreatic duct    HYSTERECTOMY          Admitting Doctor:   Kathrine Mcintosh DO    Consulting Provider(s):  Consults       No orders found from 11/21/2024 to 12/21/2024.             Admitting Diagnosis:   The primary encounter diagnosis was Atrial fibrillation with rapid ventricular response. Diagnoses of Acute respiratory failure with hypoxia, Acute pulmonary edema, Acute cystitis without hematuria, Elevated  INR, Essential hypertension, and Dyslipidemia were also pertinent to this visit.    Most Recent Vitals:   Vitals:    12/20/24 0430 12/20/24 0436 12/20/24 0516 12/20/24 0536   BP: 104/64  120/80 142/86   Pulse: 90 117 (!) 126 (!) 130   Resp:  18     Temp:  98.4 °F (36.9 °C)     SpO2: 96% 94%     Weight:       Height:           Active LDAs/IV Access:   Lines, Drains & Airways       Active LDAs       Name Placement date Placement time Site Days    Peripheral IV Left Antecubital --  --  Antecubital  --    Peripheral IV Right Antecubital --  --  Antecubital  --    External Urinary Catheter 09/20/24  1430  --  90                    Labs (abnormal labs have a star):   Labs Reviewed   COMPREHENSIVE METABOLIC PANEL - Abnormal; Notable for the following components:       Result Value    Glucose 229 (*)     BUN 37 (*)     Creatinine 1.11 (*)     BUN/Creatinine Ratio 33.3 (*)     eGFR 48.5 (*)     All other components within normal limits    Narrative:     GFR Categories in Chronic Kidney Disease (CKD)      GFR Category          GFR (mL/min/1.73)    Interpretation  G1                     90 or greater         Normal or high (1)  G2                      60-89                Mild decrease (1)  G3a                   45-59                Mild to moderate decrease  G3b                   30-44                Moderate to severe decrease  G4                    15-29                Severe decrease  G5                    14 or less           Kidney failure          (1)In the absence of evidence of kidney disease, neither GFR category G1 or G2 fulfill the criteria for CKD.    eGFR calculation 2021 CKD-EPI creatinine equation, which does not include race as a factor   APTT - Abnormal; Notable for the following components:    PTT 41.1 (*)     All other components within normal limits    Narrative:     PTT = The equivalent PTT values for the therapeutic range of heparin levels at 0.3 to 0.5 U/ml are 60 to 70 seconds.   PROTIME-INR -  Abnormal; Notable for the following components:    Protime 24.8 (*)     INR 2.24 (*)     All other components within normal limits   TROPONIN - Abnormal; Notable for the following components:    HS Troponin T 22 (*)     All other components within normal limits    Narrative:     High Sensitive Troponin T Reference Range:  <14.0 ng/L- Negative Female for AMI  <22.0 ng/L- Negative Male for AMI  >=14 - Abnormal Female indicating possible myocardial injury.  >=22 - Abnormal Male indicating possible myocardial injury.   Clinicians would have to utilize clinical acumen, EKG, Troponin, and serial changes to determine if it is an Acute Myocardial Infarction or myocardial injury due to an underlying chronic condition.        LACTIC ACID, PLASMA - Abnormal; Notable for the following components:    Lactate 2.3 (*)     All other components within normal limits   TSH RFX ON ABNORMAL TO FREE T4 - Abnormal; Notable for the following components:    TSH 6.910 (*)     All other components within normal limits   CBC WITH AUTO DIFFERENTIAL - Abnormal; Notable for the following components:    RDW 16.7 (*)     Eosinophils, Absolute 0.44 (*)     All other components within normal limits   URINALYSIS W/ CULTURE IF INDICATED - Abnormal; Notable for the following components:    Leuk Esterase, UA Small (1+) (*)     Nitrite, UA Positive (*)     All other components within normal limits    Narrative:     In absence of clinical symptoms, the presence of pyuria, bacteria, and/or nitrites on the urinalysis result does not correlate with infection.   URINALYSIS, MICROSCOPIC ONLY - Abnormal; Notable for the following components:    WBC, UA 3-5 (*)     Bacteria, UA 4+ (*)     All other components within normal limits   BNP (IN-HOUSE) - Normal    Narrative:     This assay is used as an aid in the diagnosis of individuals suspected of having heart failure. It can be used as an aid in the diagnosis of acute decompensated heart failure (ADHF) in patients  "presenting with signs and symptoms of ADHF to the emergency department (ED). In addition, NT-proBNP of <300 pg/mL indicates ADHF is not likely.    Age Range Result Interpretation  NT-proBNP Concentration (pg/mL:      <50             Positive            >450                   Gray                 300-450                    Negative             <300    50-75           Positive            >900                  Gray                300-900                  Negative            <300      >75             Positive            >1800                  Gray                300-1800                  Negative            <300   PROCALCITONIN - Normal    Narrative:     As a Marker for Sepsis (Non-Neonates):    1. <0.5 ng/mL represents a low risk of severe sepsis and/or septic shock.  2. >2 ng/mL represents a high risk of severe sepsis and/or septic shock.    As a Marker for Lower Respiratory Tract Infections that require antibiotic therapy:    PCT on Admission    Antibiotic Therapy       6-12 Hrs later    >0.5                Strongly Recommended  >0.25 - <0.5        Recommended   0.1 - 0.25          Discouraged              Remeasure/reassess PCT  <0.1                Strongly Discouraged     Remeasure/reassess PCT    As 28 day mortality risk marker: \"Change in Procalcitonin Result\" (>80% or <=80%) if Day 0 (or Day 1) and Day 4 values are available. Refer to http://www.[employer]-pct-calculator.com    Change in PCT <=80%  A decrease of PCT levels below or equal to 80% defines a positive change in PCT test result representing a higher risk for 28-day all-cause mortality of patients diagnosed with severe sepsis for septic shock.    Change in PCT >80%  A decrease of PCT levels of more than 80% defines a negative change in PCT result representing a lower risk for 28-day all-cause mortality of patients diagnosed with severe sepsis or septic shock.      MAGNESIUM - Normal   T4, FREE - Normal   LACTIC ACID, REFLEX   HIGH SENSITIVITIY TROPONIN T " 1HR   CBC AND DIFFERENTIAL    Narrative:     The following orders were created for panel order CBC & Differential.  Procedure                               Abnormality         Status                     ---------                               -----------         ------                     CBC Auto Differential[[id number]]        Abnormal            Final result                 Please view results for these tests on the individual orders.       Meds Given in ED:   Medications   sodium chloride 0.9 % flush 10 mL (has no administration in time range)   dilTIAZem (CARDIZEM) 125 mg in 125 mL D5W infusion (10 mg/hr Intravenous Rate/Dose Change 12/20/24 0536)   cefTRIAXone (ROCEPHIN) 1,000 mg in sodium chloride 0.9 % 100 mL MBP (has no administration in time range)   sodium chloride 0.9 % bolus 1,000 mL (0 mL Intravenous Stopped 12/20/24 0541)   dilTIAZem (CARDIZEM) injection 10 mg (10 mg Intravenous Given 12/20/24 0406)   iopamidol (ISOVUE-370) 76 % injection 80 mL (80 mL Intravenous Given 12/20/24 0507)     dilTIAZem, 5-15 mg/hr, Last Rate: 10 mg/hr (12/20/24 0536)         Last NIH score:                                                          Dysphagia screening results:        [place] Coma Scale:  No data recorded     CIWA:        Restraint Type:            Isolation Status:  No active isolations

## 2024-12-20 NOTE — H&P
[facility] Medicine Services  HISTORY AND PHYSICAL    Patient Name: Kaylen Garrison  : 1940  MRN: [id number]  Primary Care Physician: Lizzette Multani MD  Date of admission: 2024      Subjective   Subjective     Chief Complaint:  Dyspnea     HPI:  Kaylen Garrison is a 84 y.o. female with PMHx PAF on Xarelto, aortic valve stenosis/mitral stenosis, HTN, HLD, RBBB, DM2, BERNARD, obesity who presents to ED due to dyspnea and palpitations that she noticed when she got up to go to the bathroom earlier this morning. She checked her pulse and it was noted to be high so she presented to ED via EMS. ED provider discussed cardioversion with patient and her son and they declined, preferred medical management. Patient received 10mg IV Diltiazem followed by bolus and HR improving. Patient denies any LE swelling. She wears oxygen at night, 2.5L NC. States at baseline she is very active. Patient follows with Dr Alvarze and recently saw her in clinic.   [facility] Medicine asked to admit.     Personal History     Past Medical History:   Diagnosis Date    Aortic valve stenosis     Atrial fibrillation     Benign hypertension     History of echocardiogram 2012    Hypercalcemia     Hypercholesterolemia     Hypertension     Meniere's disease     Obesity     Overweight (BMI 25.0-29.9)     Primary hyperparathyroidism     Sleep apnea with use of continuous positive airway pressure (CPAP)     Syncope 2012    Type 2 diabetes mellitus     Vitamin D deficiency            Past Surgical History:   Procedure Laterality Date    BREAST SURGERY Left     benign tumor removal    CATARACT EXTRACTION Bilateral     CHOLECYSTECTOMY N/A 2023    Procedure: CHOLECYSTECTOMY LAPAROSCOPIC;  Surgeon: Abraham Holbrook MD;  Location: [facility];  Service: General;  Laterality: N/A;    ERCP N/A 2023    Procedure: ENDOSCOPIC RETROGRADE CHOLANGIOPANCREATOGRAPHY WITH STENT PLACEMENT;  Surgeon: Brunner,  Celso CARLOS MD;  Location: [facility];  Service: Gastroenterology;  Laterality: N/A;  Sphincterotomy made to ampula of coomon bile duct. CBD swept with 9mm-12 mm balloon. Multiple stones and sludge removed. Stent placed in pancreatic duct    HYSTERECTOMY         Family History: family history includes Heart attack in her mother; Lactose intolerance in her brother; No Known Problems in her father, sister, and sister.     Social History:  reports that she has never smoked. She has never been exposed to tobacco smoke. She has never used smokeless tobacco. She reports that she does not drink alcohol and does not use drugs.  Social History     Social History Narrative    Caffeine 2 ice teas       Medications:  Available home medication information reviewed.  Accu-Chek Softclix Lancets, Cinnamon, SITagliptin, Vitamin D3, amLODIPine, atorvastatin, fish oil, furosemide, glipizide, glucose blood, metFORMIN ER, metoprolol succinate XL, oxybutynin XL, and rivaroxaban    Allergies   Allergen Reactions    Adhesive Tape Rash    Latex Rash    Penicillins Rash       Objective   Objective     Vital Signs:   Temp:  [98.4 °F (36.9 °C)] 98.4 °F (36.9 °C)  Heart Rate:  [] 130  Resp:  [18] 18  BP: (104-154)/(57-97) 142/86  Flow (L/min) (Oxygen Therapy):  [4] 4       Physical Exam   Constitutional: Awake, alert, NAD, non-toxic, pleasant, elderly female   Eyes: PERRLA, sclerae anicteric, no conjunctival injection  HENT: NCAT, mucous membranes dry/lips crusted   Neck: Supple, no thyromegaly, no lymphadenopathy, trachea midline  Respiratory: Decreased in bases bilaterally, nonlabored respirations 4L NC  Cardiovascular: Irregularly irregular, no murmurs, rubs, or gallops, palpable pedal pulses bilaterally  Gastrointestinal: Positive bowel sounds, soft, nontender, nondistended  Musculoskeletal: No bilateral ankle edema, no clubbing or cyanosis to extremities, varicose veins noted   Psychiatric: Appropriate affect,  cooperative  Neurologic: Oriented x 3, MCKEON, speech clear  Skin: No rashes     Result Review:  I have personally reviewed the results from the time of this admission to 12/20/2024 06:02 EST and agree with these findings:  [x]  Laboratory list / accordion  []  Microbiology  [x]  Radiology  [x]  EKG/Telemetry   [x]  Cardiology/Vascular   []  Pathology  [x]  Old records  []  Other:      LAB RESULTS:      Lab 12/20/24  0520 12/20/24 0229   WBC  --  8.35   HEMOGLOBIN  --  14.6   HEMATOCRIT  --  44.9   PLATELETS  --  399   NEUTROS ABS  --  4.72   IMMATURE GRANS (ABS)  --  0.02   LYMPHS ABS  --  2.29   MONOS ABS  --  0.82   EOS ABS  --  0.44*   MCV  --  86.3   PROCALCITONIN  --  0.07   LACTATE 2.7* 2.3*   PROTIME  --  24.8*   INR  --  2.24*   APTT  --  41.1*         Lab 12/20/24 0229   SODIUM 141   POTASSIUM 4.7   CHLORIDE 101   CO2 25.0   ANION GAP 15.0   BUN 37*   CREATININE 1.11*   EGFR 48.5*   GLUCOSE 229*   CALCIUM 9.8   MAGNESIUM 1.9   TSH 6.910*         Lab 12/20/24 0229   TOTAL PROTEIN 6.5   ALBUMIN 4.0   GLOBULIN 2.5   ALT (SGPT) 15   AST (SGOT) 19   BILIRUBIN 0.2   ALK PHOS 74         Lab 12/20/24  0517 12/20/24  0305 12/20/24 0229   PROBNP  --   --  1,338.0   HSTROP T 21* 22*  --                  UA          9/20/2024    00:59 12/20/2024    04:58   Urinalysis   Squamous Epithelial Cells, UA 0-2  0-2    Specific Gravity, UA 1.016  1.011    Ketones, UA Negative  Negative    Blood, UA Negative  Negative    Leukocytes, UA Moderate (2+)  Small (1+)    Nitrite, UA Negative  Positive    RBC, UA 0-2  0-2    WBC, UA 21-50  3-5    Bacteria, UA 2+  4+        Microbiology Results (last 10 days)       ** No results found for the last 240 hours. **            CT Angiogram Chest    Result Date: 12/20/2024  CT ANGIOGRAM CHEST Date of Exam: 12/20/2024 5:06 AM EST Indication: shortness of breath. Comparison: CT scan of the chest 9/19/2024; 6/29/2023 Technique: CTA of the chest was performed after the uneventful intravenous  administration of 80 mL Isovue-370. Reconstructed coronal and sagittal images were also obtained. In addition, a 3-D volume rendered image was created for interpretation. Automated exposure control and iterative reconstruction methods were used. Findings: Hilum and Mediastinum: There are stable pretracheal, bilateral hilar, subcarinal and as ago esophageal recess nodes. These enlarged lymph nodes could be related to the presence of the pulmonary vascular congestion, lymphoproliferative disorder or metastatic disease felt less likely considering the long-term stability. Normal heart size.   No pericardial effusion.  Unremarkable thoracic aorta and pulmonary arteries. There is moderate coronary artery calcific atherosclerosis. Excellent pulmonary arterial opacification with no evidence of filling defect to suggest pulmonary embolic disease. Lung Parenchyma and Pleura: There are diffuse bilateral groundglass attenuation changes throughout both lungs with mild interlobular septal thickening consistent with pulmonary edema. There are no suspicious pulmonary nodules. There are no focal consolidations to indicate lobar pneumonia. Upper Abdomen: Surgically absent gallbladder. There is a small hiatal hernia with fluid in the distal esophagus consistent with reflux. Soft tissues: Unremarkable. Osseous structures: No aggressive focal lytic or sclerotic osseous lesions.     Impression: Impression: 1.No evidence of pulmonary embolic disease. 2.Diffuse bilateral groundglass attenuation changes with interlobular septal thickening consistent with pulmonary edema. 3.Stable mediastinal and hilar adenopathy. Electronically Signed: Ari An MD  12/20/2024 5:22 AM EST  Workstation ID: [id number]    XR Chest 1 View    Result Date: 12/20/2024  XR CHEST 1 VW Date of Exam: 12/20/2024 2:29 AM EST Indication: palpitations Comparison: Chest x-ray 9/19/2024 Findings: Heart size and pulmonary vascular markings are normal. There is mild  prominence of pulm interstitium. There are no focal consolidations indicate pneumonia. The osseous structures are normal.     Impression: Impression: Mild prominence of the pulm interstitium. No active disease. Electronically Signed: Ari An MD  12/20/2024 3:10 AM EST  Workstation ID: [id number]     Results for orders placed during the hospital encounter of 09/19/24    Adult Transthoracic Echo Complete W/ Cont if Necessary Per Protocol    Interpretation Summary    Left ventricular systolic function is normal. Calculated left ventricular EF = 58.9% Left ventricular ejection fraction appears to be 56 - 60%.    The right ventricular cavity is mildly dilated.    Mild aortic valve stenosis is present. Aortic valve area is 1.3 cm2.    Peak velocity of the flow distal to the aortic valve is 239.8 cm/s. Aortic valve maximum pressure gradient is 26 mmHg. Aortic valve mean pressure gradient is 14 mmHg. Aortic valve dimensionless index is 0.33 .    Mild mitral valve stenosis is present. The mitral valve peak gradient is 15 mmHg. The mitral valve mean gradient is 5 mmHg. The mitral valve area (PHT) is 2.9 cm2.    Estimated right ventricular systolic pressure from tricuspid regurgitation is normal (<35 mmHg).      Assessment & Plan   Assessment & Plan       Atrial fibrillation with RVR    Essential hypertension    Dyslipidemia    Dyspnea on exertion    Type 2 diabetes mellitus with hyperglycemia, without long-term current use of insulin    Paroxysmal atrial fibrillation    Right bundle branch block    Valvular heart disease    A fib RVR, Hx PAF  -ED provider offered Cardioversion but patient/son declined  -Continue Cardizem gtt   -Continue Xarelto  -Consult to Dr Alvarez     CHpEF  Dyspnea, Hypoxia   Aortic Stenosis/Mitral Stenosis  -Last Echo 9/20/2024, EF normal   -Pulmonary edema noted on CTA chest   -proBNP normal   -Bumex 2mg IV x 1   -Currently on 4L NC, wears 2.5L NC nightly at baseline, wean as tolerated      Lactic Acidosis   -worsened with IVF, patient appears volume overloaded on exam/imaging   -Bumex 2mg IV x1   -Reflex lactic pending   -procal negative     Abnormal UA  -add on culture, discussed with micro lab   -Received Rocephin in ED, continue for now     DM2  -Hold Metformin, Januvia, Glipizide   -LDSSI     VTE Prophylaxis:  Mechanical VTE prophylaxis orders are signed & held.            CODE STATUS:    There are no questions and answers to display.       Expected Discharge   Expected Discharge Date: 12/22/2024; Expected Discharge Time:      Kathrine Mcintosh DO  12/20/24

## 2024-12-20 NOTE — PROGRESS NOTES
[facility] Medicine Services  ADMISSION FOLLOW-UP NOTE          Patient admitted after midnight, H&P by my partner performed earlier on today's date reviewed.  Interim findings, labs, and charting also reviewed.        The [facility] Hospital Problem List has been managed and updated to include any new diagnoses:  Active Hospital Problems    Diagnosis  POA    **Atrial fibrillation with RVR [I48.91]  Yes    Right bundle branch block [I45.10]  Yes    Valvular heart disease [I38]  Yes    Paroxysmal atrial fibrillation [I48.0]  Yes    Type 2 diabetes mellitus with hyperglycemia, without long-term current use of insulin [E11.65]  Yes    Dyspnea on exertion [R06.09]  Yes    Essential hypertension [I10]  Yes    Dyslipidemia [E78.5]  Yes      Resolved Hospital Problems   No resolved problems to display.         ADDITIONAL PLAN:  - detailed assessment and plan from admission reviewed  -     Kaylen Garrison is a 84 y.o. female with PMHx PAF on Xarelto, aortic valve stenosis/mitral stenosis, HTN, HLD, RBBB, DM2, BERNARD, obesity who presents to ED due to dyspnea and palpitations.  She was found to be in A-fib RVR, cardiology consulted    A fib RVR, Hx PAF  -Initially placed on Cardizem drip.  Now discontinued by cardiology and placed on amiodarone  -cont xarelto     CHpEF  Dyspnea, Hypoxia   Aortic Stenosis/Mitral Stenosis  -Last Echo 9/20/2024, EF normal   -Pulmonary edema noted on CTA chest   -proBNP normal   -Currently on 4L NC, wears 2.5L NC nightly at baseline, wean as tolerated  - Diuresis per cardiology.  On discharge she will likely come off of HCTZ and be put on Lasix instead  - Echo in process     Lactic Acidosis   -worsened with IVF, patient appears volume overloaded on exam/imaging   -Monitor with diuresis  - Procalcitonin negative, does not appear septic     Abnormal UA  -Continue ceftriaxone while awaiting culture sensitivities     DM2  -Hold Metformin, Januvia, Glipizide   -LDSSI         Expected Discharge   Expected Discharge Date: 12/22/2024; Expected Discharge Time:      Renetta Burnette MD  12/20/24

## 2024-12-20 NOTE — PROGRESS NOTES
Referring Provider: Hospitalist  Reason for Consultation: Dyspnea A-fib    Patient Care Team:  Lizzette Multani MD as PCP - General (Internal Medicine)  Harley Rose MD as Consulting Physician (Endocrinology)  Matilde Alvarez MD as Consulting Physician (Cardiology)  Leigh Tijerina APRN as Nurse Practitioner (Pulmonary Disease)    Chief complaint dyspnea    Subjective .     History of present illness: This 84-year-old female      Chest x-ray and CTA showed pulmonary edema.  EKG has shown atrial fibrillation.  Patient has underlying right bundle branch block.  Telemetry is shown atrial fibrillation also.  Patient had just follow-up with her primary cardiology team on the 12th of this month.  Up in normal state health.  Patient got a shower and felt her heart race.  Denied any changes in her medications recently.  Discussion with her son over the phone patient had had diuretics added in the form of hydrochlorothiazide by primary care physician.  Patient has had chronic shortness of breath that was present before and on amiodarone without significant change.  Patient's had orthopnea or lower extremity swelling.  No chest pain.  Weight has been stable.  Patient's blood pressure and heart rate has been controlled at home.    Paroxysmal atrial fibrillation  New onset atrial flutter with RVR 11/10/2021 in setting of nocturnal hypoxia  [id number] = 5 on Xarelto   SATISH, 11/12/2021: EF 50%. Mild LAE. No evidence of intracardiac thrombus or mass, clear JENNA, Trace MR and TR.   Successful ECV, 11/12/2021  Started on Amiodarone 11/2021  Aortic stenosis/Mitral stenosis/Valvular heart disease  Echo (04/08/2023): EF 56 to 60%.  LV wall thickness consistent with mild concentric hypertrophy.  LV diastolic function consistent with (grade 2 with high lap) pseudonormalization.  LA volume mildly increased.  Moderate calcification of aortic valve.  Moderate AS. Moderate calcification of mitral valve.  Mild to  moderate MS.  Mild TR.  RVSP is moderately elevated at 53.9 mmHg.  Echo, 09/20/2024: EF 58.9%. Mild AV stenosis. JOHNNY is 1.3 cm2. Peak velocity of the flow distal to the aortic valve is 239.8 cm/s. Aortic valve maximum pressure gradient is 26 mmHg. Aortic valve mean pressure gradient is 14 mmHg. Aortic valve dimensionless index is 0.33 Mild MV stenosis. The mitral valve peak gradient is 15 mmHg. The mitral valve mean gradient is 5 mmHg. The mitral valve area (PHT) is 2.9 cm2. Normal RVSP.   Hypertension  Dyslipidemia  RBBB  Type 2 diabetes mellitus  Obstructive sleep apnea, on BiPAP  Acute cholecystitis  S/p ERCP with extraction of 3 stones 4/7/2023.  Syncope, 04/2012  Echocardiogram, April 2012: EF 55 to 60% with no valvular abnormalities.  Limited studies, 4/2012: 0-49% bilateral carotid artery stenosis.  No acute findings per CT scan.  MRI of the brain, 4/2012: Cortical atrophy, chronic ischemic changes noted.  Abnormal MPS, 5/2012: EF 79% with apical hypoperfusion suspicious for ischemia, Dr. Arnaud Augustine.  MPS 9/2016: EF greater than 70%.  No evidence of inducible ischemia.  Borderline inferior ST segment changes.      Review of Systems   Pertinent items are noted in HPI, all other systems reviewed and negative    History  Past Medical History:   Diagnosis Date    Aortic valve stenosis     Atrial fibrillation     Benign hypertension     History of echocardiogram 04/2012    Hypercalcemia     Hypercholesterolemia     Hypertension     Meniere's disease     Obesity     Overweight (BMI 25.0-29.9)     Primary hyperparathyroidism     Sleep apnea with use of continuous positive airway pressure (CPAP)     Syncope 04/2012    Type 2 diabetes mellitus     Vitamin D deficiency    ,   Past Surgical History:   Procedure Laterality Date    BREAST SURGERY Left     benign tumor removal    CATARACT EXTRACTION Bilateral     CHOLECYSTECTOMY N/A 4/11/2023    Procedure: CHOLECYSTECTOMY LAPAROSCOPIC;  Surgeon: Abraham Holbrook MD;   Location: [facility] OR;  Service: General;  Laterality: N/A;    ERCP N/A 4/9/2023    Procedure: ENDOSCOPIC RETROGRADE CHOLANGIOPANCREATOGRAPHY WITH STENT PLACEMENT;  Surgeon: Brunner, Mark I, MD;  Location: [facility] ENDOSCOPY;  Service: Gastroenterology;  Laterality: N/A;  Sphincterotomy made to ampula of coomon bile duct. CBD swept with 9mm-12 mm balloon. Multiple stones and sludge removed. Stent placed in pancreatic duct    HYSTERECTOMY     ,   Family History   Problem Relation Age of Onset    Heart attack Mother     No Known Problems Father     No Known Problems Sister     Lactose intolerance Brother     No Known Problems Sister    ,   Social History     Socioeconomic History    Marital status:    Tobacco Use    Smoking status: Never     Passive exposure: Never    Smokeless tobacco: Never   Vaping Use    Vaping status: Never Used   Substance and Sexual Activity    Alcohol use: No    Drug use: No    Sexual activity: Defer     E-cigarette/Vaping    E-cigarette/Vaping Use Never User     Passive Exposure No     Counseling Given No      E-cigarette/Vaping Substances    Nicotine No     THC No     CBD No     Flavoring No      E-cigarette/Vaping Devices    Disposable No     Pre-filled or Refillable Cartridge No     Refillable Tank No     Pre-filled Pod No          ,   Medications Prior to Admission   Medication Sig Dispense Refill Last Dose/Taking    Accu-Chek Softclix Lancets lancets        amLODIPine (NORVASC) 5 MG tablet Take 1 tablet by mouth Every 12 (Twelve) Hours. 180 tablet 3     atorvastatin (LIPITOR) 40 MG tablet Take 1 tablet by mouth Every Night.       Cholecalciferol (Vitamin D3) 50 MCG (2000 UT) tablet Take 1 tablet by mouth Daily. OTC       Cinnamon 500 MG capsule Take 1 capsule by mouth Daily. OTC       furosemide (LASIX) 20 MG tablet        glipizide (GLUCOTROL XL) 2.5 MG 24 hr tablet Take 2 tablets by mouth Daily.       glucose blood (Accu-Chek Guide) test strip USE 1 STRIP TO CHECK BLOOD SUGAR ONCE  DAILY dx e11.65 100 each 3     Januvia 100 MG tablet Take 1 tablet by mouth once daily (Patient taking differently: Take 1 tablet by mouth Daily.) 90 tablet 0     metFORMIN ER (GLUCOPHAGE-XR) 500 MG 24 hr tablet TAKE 2 TABLETS BY MOUTH TWICE DAILY WITH MEALS 360 tablet 3     metoprolol succinate XL (TOPROL-XL) 50 MG 24 hr tablet Take 0.5 tablets by mouth Daily for 14 days.       Omega-3 Fatty Acids (fish oil) 1000 MG capsule capsule Take 1 capsule by mouth Daily With Breakfast. OTC       oxybutynin XL (DITROPAN-XL) 5 MG 24 hr tablet Take 1 tablet by mouth Daily.       rivaroxaban (XARELTO) 15 MG tablet Take 1 tablet by mouth Daily With Dinner. Indications: Atrial Fibrillation 30 tablet 3    , Scheduled Meds:  rivaroxaban, 15 mg, Oral, Daily With Dinner  sodium chloride, 10 mL, Intravenous, Q12H   , Continuous Infusions:  dilTIAZem, 5-15 mg/hr, Last Rate: 10 mg/hr (12/20/24 0536)   , PRN Meds:    acetaminophen **OR** acetaminophen **OR** acetaminophen    senna-docusate sodium **AND** polyethylene glycol **AND** bisacodyl **AND** bisacodyl    Calcium Replacement - Follow Nurse / BPA Driven Protocol    Magnesium Standard Dose Replacement - Follow Nurse / BPA Driven Protocol    nitroglycerin    ondansetron ODT **OR** ondansetron    Phosphorus Replacement - Follow Nurse / BPA Driven Protocol    Potassium Replacement - Follow Nurse / BPA Driven Protocol    [COMPLETED] Insert Peripheral IV **AND** sodium chloride    sodium chloride    sodium chloride, and Allergies:  Adhesive tape, Latex, and Penicillins    Objective     Vital Signs  Temp:  [97.9 °F (36.6 °C)-98.4 °F (36.9 °C)] 98 °F (36.7 °C)  Heart Rate:  [] 100  Resp:  [18] 18  BP: (103-166)/() 103/87  Body mass index is 26.34 kg/m².    Intake/Output Summary (Last 24 hours) at 12/20/2024 1210  Last data filed at 12/20/2024 1100  Gross per 24 hour   Intake --   Output 1800 ml   Net -1800 ml     I/O this shift:  In: -   Out: 1800 [Urine:1800]    Physical  "Exam:      General Appearance:  Alert, cooperative, in no acute distress   Head:  Normocephalic, without obvious abnormality, atraumatic   Eyes:  Lids and lashes normal, conjunctivae and sclerae normal, no icterus, no pallor, corneas clear, PERRLA   Ears:  Ears appear intact with no abnormalities noted   Throat:  No oral lesions, no thrush, oral mucosa moist   Neck:  No adenopathy, supple, trachea midline, no thyromegaly, no carotid bruit, no JVD   Back:  No kyphosis present, no scoliosis present, no skin lesions, erythema or scars, no tenderness to percussion, or palpation, range of motion normal   Lungs:  Scattered wheezes and rales    Heart:  Irregularly irregular.  No significant murmurs rubs gallops   Breast Exam:  Deferred   Abdomen:  Normal bowel sounds, no masses, no organomegaly, soft non-tender, non-distended, no guarding, no rebound tenderness   Genitalia:  Deferred   Extremities:  Moves all extremities well, no edema, no cyanosis, no redness   Pulses:  Pulses palpable and equal bilaterally   Skin:  No bleeding, bruising or rash   Lymph nodes:  No palpable adenopathy   Neurologic:  Cranial nerves 2 - 12 grossly intact, sensation intact, DTR present and equal bilaterally       Results Review:    I reviewed the patient's new clinical results.  I reviewed the patient's new imaging results and agree with the interpretation.  I reviewed the patient's other test results and agree with the interpretation  I personally viewed and interpreted the patient's EKG/Telemetry data       WBC WBC   Date Value Ref Range Status   12/20/2024 8.35 3.40 - 10.80 10*3/mm3 Final      HGB Hemoglobin   Date Value Ref Range Status   12/20/2024 14.6 12.0 - 15.9 g/dL Final      HCT Hematocrit   Date Value Ref Range Status   12/20/2024 44.9 34.0 - 46.6 % Final      Platlets No results found for: \"LABPLAT\"     PT/INR:      Protime   Date Value Ref Range Status   12/20/2024 24.8 (H) 12.2 - 14.5 Seconds Final   /  INR   Date Value Ref " "Range Status   12/20/2024 2.24 (H) 0.89 - 1.12 Final       Sodium Sodium   Date Value Ref Range Status   12/20/2024 141 136 - 145 mmol/L Final      Potassium Potassium   Date Value Ref Range Status   12/20/2024 4.7 3.5 - 5.2 mmol/L Final     Comment:     Slight hemolysis detected by analyzer. Result may be falsely elevated.      Chloride Chloride   Date Value Ref Range Status   12/20/2024 101 98 - 107 mmol/L Final      Bicarbonate No results found for: \"PLASMABICARB\"   BUN BUN   Date Value Ref Range Status   12/20/2024 37 (H) 8 - 23 mg/dL Final      Creatinine Creatinine   Date Value Ref Range Status   12/20/2024 1.11 (H) 0.57 - 1.00 mg/dL Final      Calcium Calcium   Date Value Ref Range Status   12/20/2024 9.8 8.6 - 10.5 mg/dL Final      Magnesium Magnesium   Date Value Ref Range Status   12/20/2024 1.9 1.6 - 2.4 mg/dL Final        pH No results found for: \"PHART\"   pO2 No results found for: \"PO2ART\"   pCO2 No results found for: \"HXB3YNR\"   HCO3 No results found for: \"XFB5BEG\"     Assessment & Plan     Patient Active Problem List   Diagnosis Code    Syncope R55    Essential hypertension I10    Dyslipidemia E78.5    Overweight (BMI 25.0-29.9) E66.3    Meniere's disease H81.09    Dyspnea on exertion R06.09    BERNARD treated with BiPAP G47.33    Primary hyperparathyroidism E21.0    Osteopenia M85.80    Type 2 diabetes mellitus with hyperglycemia, without long-term current use of insulin E11.65    Exercise hypoxemia R09.02    Paroxysmal atrial fibrillation I48.0    Right bundle branch block I45.10    Valvular heart disease I38    Adrenal nodule E27.9    Multiple thyroid nodules E04.2    A-fib I48.91    Atrial fibrillation with RVR I48.91       Persistent atrial fibrillation  Acute on chronic diastolic heart failure  Hypertension  Chronic UTI    Plan: Will stop Cardizem placed back on amiodarone.  Discussed with patient and family voiced desire to be back on such.  Patient has had issues with dyspnea prior to being on " amiodarone.  If not need to not be on such by pulmonary could consider other antiarrhythmic such as Tikosyn or catheter ablation.  For her heart failure will continue diuretic therapy.  Would discharge home off of hydrochlorothiazide and on Lasix.  Can determine dose prior to discharge.  Otherwise key potassium above 4 magnesium above 2.  Continue anticoagulation currently.  Patient prefers no further procedures if possible.  Will undergo limited echocardiogram to evaluate left ventricular function given heart failure here.  Discussed at length with patient, , patient's son.  Pending blood pressure trend can start Entresto as inpatient.      Dick Kraus MD  12/20/24  12:10 EST

## 2024-12-20 NOTE — THERAPY EVALUATION
Patient Name: Kaylen Garrison  : 1940    MRN: [id number]                              Today's Date: 2024       Admit Date: 2024    Visit Dx:     ICD-10-CM ICD-9-CM   1. Atrial fibrillation with rapid ventricular response  I48.91 427.31   2. Acute respiratory failure with hypoxia  J96.01 518.81   3. Acute pulmonary edema  J81.0 518.4   4. Acute cystitis without hematuria  N30.00 595.0   5. Elevated INR  R79.1 790.92   6. Essential hypertension  I10 401.9   7. Dyslipidemia  E78.5 272.4     Patient Active Problem List   Diagnosis    Syncope    Essential hypertension    Dyslipidemia    Overweight (BMI 25.0-29.9)    Meniere's disease    Dyspnea on exertion    BERNARD treated with BiPAP    Primary hyperparathyroidism    Osteopenia    Type 2 diabetes mellitus with hyperglycemia, without long-term current use of insulin    Exercise hypoxemia    Paroxysmal atrial fibrillation    Right bundle branch block    Valvular heart disease    Adrenal nodule    Multiple thyroid nodules    A-fib    Atrial fibrillation with RVR     Past Medical History:   Diagnosis Date    Aortic valve stenosis     Atrial fibrillation     Benign hypertension     History of echocardiogram 2012    Hypercalcemia     Hypercholesterolemia     Hypertension     Meniere's disease     Obesity     Overweight (BMI 25.0-29.9)     Primary hyperparathyroidism     Sleep apnea with use of continuous positive airway pressure (CPAP)     Syncope 2012    Type 2 diabetes mellitus     Vitamin D deficiency      Past Surgical History:   Procedure Laterality Date    BREAST SURGERY Left     benign tumor removal    CATARACT EXTRACTION Bilateral     CHOLECYSTECTOMY N/A 2023    Procedure: CHOLECYSTECTOMY LAPAROSCOPIC;  Surgeon: Abraham Holbrook MD;  Location: [facility];  Service: General;  Laterality: N/A;    ERCP N/A 2023    Procedure: ENDOSCOPIC RETROGRADE CHOLANGIOPANCREATOGRAPHY WITH STENT PLACEMENT;  Surgeon: Brunner, Mark I, MD;   Location: [facility] ENDOSCOPY;  Service: Gastroenterology;  Laterality: N/A;  Sphincterotomy made to ampula of coomon bile duct. CBD swept with 9mm-12 mm balloon. Multiple stones and sludge removed. Stent placed in pancreatic duct    HYSTERECTOMY        General Information       Row Name 12/20/24 1527          Physical Therapy Time and Intention    Document Type evaluation  -NS     Mode of Treatment physical therapy  -NS       Row Name 12/20/24 1527          General Information    Patient Profile Reviewed yes  -NS     Prior Level of Function independent:;all household mobility;community mobility;gait;transfer;bed mobility;ADL's  does not use AD at baseline but has a RW and a rollator  -NS     Existing Precautions/Restrictions fall;other (see comments)  A-fib  -NS     Barriers to Rehab none identified  -NS       Row Name 12/20/24 1527          Living Environment    People in Home spouse  -NS       Row Name 12/20/24 1527          Home Main Entrance    Number of Stairs, Main Entrance one  -NS     Stair Railings, Main Entrance none  -NS       Row Name 12/20/24 1527          Stairs Within Home, Primary    Number of Stairs, Within Home, Primary other (see comments)  -NS     Stairs Comment, Within Home, Primary Pt has a flight of stairs to her basement but does not use.  -NS       Row Name 12/20/24 1527          Cognition    Orientation Status (Cognition) oriented x 4  -NS       Row Name 12/20/24 1527          Safety Issues/Impairments Affecting Functional Mobility    Safety Issues Affecting Function (Mobility) safety precaution awareness;safety precautions follow-through/compliance;insight into deficits/self-awareness;impulsivity  -NS     Impairments Affecting Function (Mobility) balance;endurance/activity tolerance;shortness of breath;strength  -NS               User Key  (r) = Recorded By, (t) = Taken By, (c) = Cosigned By      Initials Name Provider Type    Mayi Bolden PT Physical Therapist                    Mobility       Row Name 12/20/24 1325          Bed Mobility    Bed Mobility supine-sit  -NS     Supine-Sit Hubbard (Bed Mobility) contact guard  -NS     Assistive Device (Bed Mobility) head of bed elevated  -NS     Comment, (Bed Mobility) CGA at trunk  -NS       Row Name 12/20/24 1325          Transfers    Comment, (Transfers) Cues for safety awareness with lines  -NS       Row Name 12/20/24 1325          Sit-Stand Transfer    Sit-Stand Hubbard (Transfers) contact guard;verbal cues  -NS     Assistive Device (Sit-Stand Transfers) walker, front-wheeled  -NS       Row Name 12/20/24 1325          Gait/Stairs (Locomotion)    Hubbard Level (Gait) contact guard  -NS     Assistive Device (Gait) walker, front-wheeled  -NS     Patient was able to Ambulate yes  -NS     Distance in Feet (Gait) 170  -NS     Deviations/Abnormal Patterns (Gait) jacob decreased;gait speed decreased;stride length decreased  -NS     Bilateral Gait Deviations forward flexed posture  -NS     Comment, (Gait/Stairs) Patient ambulated at slow pace, demonstrating flexed posture and increased WBing through UEs. A few standing rest breaks needed to monitor HR. Deferred further ambulation after 170ft due to pt's HR increasing to 130's in A-fib. Chair follow for safety and pt rolled back to room with A-fib reaching 130s bpm.  -NS               User Key  (r) = Recorded By, (t) = Taken By, (c) = Cosigned By      Initials Name Provider Type    Mayi Bolden PT Physical Therapist                   Obj/Interventions       Row Name 12/20/24 1328          Range of Motion Comprehensive    General Range of Motion bilateral lower extremity ROM WFL  -NS       Row Name 12/20/24 1328          Strength Comprehensive (MMT)    General Manual Muscle Testing (MMT) Assessment lower extremity strength deficits identified  -NS     Comment, General Manual Muscle Testing (MMT) Assessment BLEs: grossly 4+/5  -NS       Row Name 12/20/24 1328          Balance     Balance Assessment sitting static balance;sitting dynamic balance;standing static balance;standing dynamic balance  -NS     Static Sitting Balance standby assist  -NS     Dynamic Sitting Balance standby assist  -NS     Position, Sitting Balance unsupported;sitting edge of bed  -NS     Static Standing Balance contact guard  -NS     Dynamic Standing Balance contact guard  -NS     Position/Device Used, Standing Balance supported;walker, front-wheeled  -NS       Row Name 12/20/24 1328          Sensory Assessment (Somatosensory)    Sensory Assessment (Somatosensory) LE sensation intact  -NS               User Key  (r) = Recorded By, (t) = Taken By, (c) = Cosigned By      Initials Name Provider Type    Mayi Bolden, PT Physical Therapist                   Goals/Plan       Row Name 12/20/24 1328          Bed Mobility Goal 1 (PT)    Activity/Assistive Device (Bed Mobility Goal 1, PT) supine to sit  -NS     [place] Level/Cues Needed (Bed Mobility Goal 1, PT) independent  -NS     Time Frame (Bed Mobility Goal 1, PT) short term goal (STG);1 week  -NS       Row Name 12/20/24 1322          Transfer Goal 1 (PT)    Activity/Assistive Device (Transfer Goal 1, PT) sit-to-stand/stand-to-sit;bed-to-chair/chair-to-bed  -NS     [place] Level/Cues Needed (Transfer Goal 1, PT) modified independence  -NS     Time Frame (Transfer Goal 1, PT) long term goal (LTG);10 days  -NS       Row Name 12/20/24 1326          Gait Training Goal 1 (PT)    Activity/Assistive Device (Gait Training Goal 1, PT) gait (walking locomotion);assistive device use  -NS     [place] Level (Gait Training Goal 1, PT) standby assist  -NS     Distance (Gait Training Goal 1, PT) 400  -NS     Time Frame (Gait Training Goal 1, PT) long term goal (LTG);10 days  -NS       Row Name 12/20/24 1328          Therapy Assessment/Plan (PT)    Planned Therapy Interventions (PT) balance training;bed mobility training;gait training;home exercise program;patient/family  education;neuromuscular re-education;strengthening;transfer training  -NS               User Key  (r) = Recorded By, (t) = Taken By, (c) = Cosigned By      Initials Name Provider Type    Mayi Bolden, PT Physical Therapist                   Clinical Impression       Row Name 12/20/24 1328          Pain    Pretreatment Pain Rating 0/10 - no pain  -NS     Posttreatment Pain Rating 0/10 - no pain  -NS       Row Name 12/20/24 1328          Plan of Care Review    Plan of Care Reviewed With patient  -NS     Progress no change  -NS     Outcome Evaluation Patient presents with decreased functional endurance, mild balance deficits, and decreased safety awareness affecting functional mobility. Skilled IP PT services warranted to support return to independence. Recommend home with family assist and OP PT at discharge.  -NS       Row Name 12/20/24 1328          Therapy Assessment/Plan (PT)    Patient/Family Therapy Goals Statement (PT) return to PLOF  -NS     Rehab Potential (PT) good  -NS     Criteria for Skilled Interventions Met (PT) yes;meets criteria;skilled treatment is necessary  -NS     Therapy Frequency (PT) daily  -NS     Predicted Duration of Therapy Intervention (PT) 10 days  -NS       Row Name 12/20/24 1328          Vital Signs    Pre Systolic BP Rehab 103  -NS     Pre Treatment Diastolic BP 87  -NS     Post Systolic BP Rehab 102  -NS     Post Treatment Diastolic BP 86  -NS     Pretreatment Heart Rate (beats/min) 83  -NS     Intratreatment Heart Rate (beats/min) 141   -NS     Posttreatment Heart Rate (beats/min) 86  -NS     Pre SpO2 (%) 97  -NS     O2 Delivery Pre Treatment nasal cannula  -NS     Intra SpO2 (%) 91  -NS     O2 Delivery Intra Treatment nasal cannula  -NS     Post SpO2 (%) 94  -NS     O2 Delivery Post Treatment nasal cannula  -NS     Pre Patient Position Supine  -NS     Intra Patient Position Standing  -NS     Post Patient Position Sitting  -NS       Row Name 12/20/24 1328          Positioning and  Restraints    Pre-Treatment Position in bed  -NS     Post Treatment Position chair  -NS     In Chair notified nsg;reclined;call light within reach;encouraged to call for assist;exit alarm on;with nsg;waffle cushion;heels elevated;legs elevated  -NS               User Key  (r) = Recorded By, (t) = Taken By, (c) = Cosigned By      Initials Name Provider Type    Mayi Bolden, LELA Physical Therapist                   Outcome Measures       Row Name 12/20/24 1328          How much help from another person do you currently need...    Turning from your back to your side while in flat bed without using bedrails? 4  -NS     Moving from lying on back to sitting on the side of a flat bed without bedrails? 3  -NS     Moving to and from a bed to a chair (including a wheelchair)? 3  -NS     Standing up from a chair using your arms (e.g., wheelchair, bedside chair)? 3  -NS     Climbing 3-5 steps with a railing? 3  -NS     To walk in hospital room? 3  -NS     AM-PAC 6 Clicks Score (PT) 19  -NS     Highest Level of Mobility Goal 6 --> Walk 10 steps or more  -NS       Row Name 12/20/24 1428 12/20/24 1328       Functional Assessment    Outcome Measure Options AM-PAC 6 Clicks Daily Activity (OT)  -LISA AM-PAC 6 Clicks Basic Mobility (PT)  -NS              User Key  (r) = Recorded By, (t) = Taken By, (c) = Cosigned By      Initials Name Provider Type    Vernell Bourne, OT Occupational Therapist    Mayi Bolden, LELA Physical Therapist                                 Physical Therapy Education       Title: PT OT SLP Therapies (In Progress)       Topic: Physical Therapy (In Progress)       Point: Mobility training (Done)       Learning Progress Summary            Patient Acceptance, E, VU,NR by NS at 12/20/2024 [id number]    Comment: line management, use of RW upon discharge home, importance of monitoring vitals while in the hospital                      Point: Home exercise program (Not Started)       Learner Progress:  Not documented  in this visit.              Point: Body mechanics (Done)       Learning Progress Summary            Patient Acceptance, E, VU,NR by NS at 12/20/2024 1625    Comment: line management, use of RW upon discharge home, importance of monitoring vitals while in the hospital                      Point: Precautions (Done)       Learning Progress Summary            Patient Acceptance, E, VU,NR by NS at 12/20/2024 1625    Comment: line management, use of RW upon discharge home, importance of monitoring vitals while in the hospital                                      User Key       Initials Effective Dates Name Provider Type Discipline    NS 06/16/21 -  Mayi Agrawal, PT Physical Therapist PT                  PT Recommendation and Plan  Planned Therapy Interventions (PT): balance training, bed mobility training, gait training, home exercise program, patient/family education, neuromuscular re-education, strengthening, transfer training  Progress: no change  Outcome Evaluation: Patient presents with decreased functional endurance, mild balance deficits, and decreased safety awareness affecting functional mobility. Skilled IP PT services warranted to support return to independence. Recommend home with family assist and OP PT at discharge.     Time Calculation:   PT Evaluation Complexity  History, PT Evaluation Complexity: 3 or more personal factors and/or comorbidities  Examination of Body Systems (PT Eval Complexity): total of 4 or more elements  Clinical Presentation (PT Evaluation Complexity): stable  Clinical Decision Making (PT Evaluation Complexity): low complexity  Overall Complexity (PT Evaluation Complexity): low complexity     PT Charges       Row Name 12/20/24 1328             Time Calculation    Start Time 1328  -NS      PT Received On 12/20/24  -NS      PT Goal Re-Cert Due Date 12/30/24  -NS         Timed Charges    50668 - Gait Training Minutes  9  -NS         Untimed Charges    PT Eval/Re-eval Minutes 46  -NS          Total Minutes    Timed Charges Total Minutes 9  -NS      Untimed Charges Total Minutes 46  -NS       Total Minutes 55  -NS                User Key  (r) = Recorded By, (t) = Taken By, (c) = Cosigned By      Initials Name Provider Type    Mayi Bolden, PT Physical Therapist                  Therapy Charges for Today       Code Description Service Date Service Provider Modifiers Qty    32030075530 HC GAIT TRAINING EA 15 MIN 12/20/2024 Mayi Agrawal, PT GP 1    98612606183 HC PT EVAL LOW COMPLEXITY 4 12/20/2024 Mayi Agrawal, PT GP 1            PT G-Codes  Outcome Measure Options: AM-PAC 6 Clicks Daily Activity (OT)  AM-PAC 6 Clicks Score (PT): 19  AM-PAC 6 Clicks Score (OT): 19  PT Discharge Summary  Anticipated Discharge Disposition (PT): home with assist, home with outpatient therapy services    Mayi Agrawal PT  12/20/2024

## 2024-12-20 NOTE — PLAN OF CARE
Goal Outcome Evaluation:  Plan of Care Reviewed With: patient, spouse        Progress: no change  Outcome Evaluation: Patient present with impaired safety awareness, balance and activity tolerance/endurance impacting PLOF BADLs and IADLs warranting continued skilled IP OT services to address deficit areas and promote return to PLOF.  Pt. on new 4L NC 02 use compared to only 2.5L at night.  With activity HR to 110' to 140's.  Recommend home with family support and continued OP PT services when medically ready for home.    Anticipated Discharge Disposition (OT): home with assist, home with outpatient therapy services

## 2024-12-20 NOTE — THERAPY EVALUATION
Patient Name: Kaylen Garrison  : 1940    MRN: [id number]                              Today's Date: 2024       Admit Date: 2024    Visit Dx:     ICD-10-CM ICD-9-CM   1. Atrial fibrillation with rapid ventricular response  I48.91 427.31   2. Acute respiratory failure with hypoxia  J96.01 518.81   3. Acute pulmonary edema  J81.0 518.4   4. Acute cystitis without hematuria  N30.00 595.0   5. Elevated INR  R79.1 790.92   6. Essential hypertension  I10 401.9   7. Dyslipidemia  E78.5 272.4     Patient Active Problem List   Diagnosis    Syncope    Essential hypertension    Dyslipidemia    Overweight (BMI 25.0-29.9)    Meniere's disease    Dyspnea on exertion    BERNARD treated with BiPAP    Primary hyperparathyroidism    Osteopenia    Type 2 diabetes mellitus with hyperglycemia, without long-term current use of insulin    Exercise hypoxemia    Paroxysmal atrial fibrillation    Right bundle branch block    Valvular heart disease    Adrenal nodule    Multiple thyroid nodules    A-fib    Atrial fibrillation with RVR     Past Medical History:   Diagnosis Date    Aortic valve stenosis     Atrial fibrillation     Benign hypertension     History of echocardiogram 2012    Hypercalcemia     Hypercholesterolemia     Hypertension     Meniere's disease     Obesity     Overweight (BMI 25.0-29.9)     Primary hyperparathyroidism     Sleep apnea with use of continuous positive airway pressure (CPAP)     Syncope 2012    Type 2 diabetes mellitus     Vitamin D deficiency      Past Surgical History:   Procedure Laterality Date    BREAST SURGERY Left     benign tumor removal    CATARACT EXTRACTION Bilateral     CHOLECYSTECTOMY N/A 2023    Procedure: CHOLECYSTECTOMY LAPAROSCOPIC;  Surgeon: Abraham Hlobrook MD;  Location: [facility];  Service: General;  Laterality: N/A;    ERCP N/A 2023    Procedure: ENDOSCOPIC RETROGRADE CHOLANGIOPANCREATOGRAPHY WITH STENT PLACEMENT;  Surgeon: Brunner, Mark I, MD;   Location: [facility];  Service: Gastroenterology;  Laterality: N/A;  Sphincterotomy made to ampula of coomon bile duct. CBD swept with 9mm-12 mm balloon. Multiple stones and sludge removed. Stent placed in pancreatic duct    HYSTERECTOMY        General Information       Row Name 12/20/24 1410          OT Time and Intention    Subjective Information complains of  Being constipated.  -LISA     Document Type evaluation  -LISA     Mode of Treatment occupational therapy  -LISA     Patient Effort excellent  -LISA     Symptoms Noted During/After Treatment significant change in vital signs  -LISA     Comment HR increase to 110's to 140's with activity  -LISA       Row Name 12/20/24 1410          General Information    Patient Profile Reviewed yes  -LISA     Prior Level of Function independent:;all household mobility;community mobility;gait;transfer;bed mobility;feeding;grooming;dressing;bathing;cooking;cleaning  per pt. at times she has to use the electric cart to shop  -LISA     Existing Precautions/Restrictions fall;other (see comments)  watch HR, a-fib  -LISA     Barriers to Rehab none identified  -LISA       Row Name 12/20/24 1410          Occupational Profile    Environmental Supports and Barriers (Occupational Profile) wx in shower with seat and grab bars, high toilet, rollotor, rx wx, 2.5 L NC use at night  -LISA       Row Name 12/20/24 1410          Living Environment    People in Home spouse  -LISA       Row Name 12/20/24 1410          Home Main Entrance    Number of Stairs, Main Entrance one  -LISA       Row Name 12/20/24 1410          Stairs Within Home, Primary    Stairs, Within Home, Primary has a basement, but can avoid, needs met on first level  -LISA       Row Name 12/20/24 1410          Cognition    Orientation Status (Cognition) oriented x 4  -LISA       Row Name 12/20/24 1410          Safety Issues/Impairments Affecting Functional Mobility    Safety Issues Affecting Function (Mobility) positioning of assistive device;safety  precaution awareness;safety precautions follow-through/compliance;insight into deficits/self-awareness;impulsivity  -     Impairments Affecting Function (Mobility) balance;endurance/activity tolerance  -               User Key  (r) = Recorded By, (t) = Taken By, (c) = Cosigned By      Initials Name Provider Type    LISA Vernell Miller, OT Occupational Therapist                     Mobility/ADL's       Row Name 12/20/24 1414          Bed Mobility    Bed Mobility supine-sit  -LISA     Supine-Sit [place] (Bed Mobility) contact guard  -     Assistive Device (Bed Mobility) head of bed elevated  -       Row Name 12/20/24 1414          Transfers    Transfers sit-stand transfer;stand-sit transfer;toilet transfer  -     Comment, (Transfers) pt. often wanting to set walker aside as approaches destination and step around it instead of turning and bringing it with her, cues needed throughout session for safety, pt. somewhat impulsive, cues for line safety needed  -       Row Name 12/20/24 1414          Sit-Stand Transfer    Sit-Stand [place] (Transfers) contact guard;verbal cues  -     Assistive Device (Sit-Stand Transfers) walker, front-wheeled  -LISA       Row Name 12/20/24 1414          Stand-Sit Transfer    Stand-Sit [place] (Transfers) contact guard;verbal cues  -     Assistive Device (Stand-Sit Transfers) walker, front-wheeled  -       Row Name 12/20/24 1414          Toilet Transfer    Type (Toilet Transfer) sit-stand;stand-sit  -LISA     [place] Level (Toilet Transfer) contact guard;verbal cues  -     Assistive Device (Toilet Transfer) walker, front-wheeled  -       Row Name 12/20/24 1414          Functional Mobility    Functional Mobility- Ind. Level contact guard assist;1 person + 1 person to manage equipment;verbal cues required  -     Functional Mobility- Device walker, front-wheeled  -LISA     Functional Mobility-Distance (Feet) --  greater than household distance  -LISA      Functional Mobility- Safety Issues supplemental O2  -     Functional Mobility- Comment 4L NC, IV pole with chair follow, pt. tends to mobilize quickly with flexed posture, cues to take rest periods and cues when turning for line safety, pt. returned to room in recliner when HR up to 140's  -       Row Name 12/20/24 1414          Activities of Daily Living    BADL Assessment/Intervention lower body dressing;grooming;toileting  -       Row Name 12/20/24 1414          Lower Body Dressing Assessment/Training    Position (Lower Body Dressing) supported sitting  -LISA     Comment, (Lower Body Dressing) Pt. showed she could easily cross up her foot over knee to reach feet for dressing.  Actual dressing skill not demonstrated.  -       Row Name 12/20/24 1414          Grooming Assessment/Training    [place] Level (Grooming) wash face, hands;contact guard assist  -LISA     Position (Grooming) sink side;unsupported standing  -LISA     Comment, (Grooming) pt. washed hands post toileting, pt. declined further wanting to wait and use her items  -       Row Name 12/20/24 1414          Toileting Assessment/Training    [place] Level (Toileting) adjust/manage clothing;perform perineal hygiene;standby assist  -LISA     Assistive Devices (Toileting) commode;grab bar/safety frame  -LISA     Position (Toileting) unsupported sitting;supported standing  -LISA               User Key  (r) = Recorded By, (t) = Taken By, (c) = Cosigned By      Initials Name Provider Type    Vernell Bourne, OT Occupational Therapist                   Obj/Interventions       Row Name 12/20/24 1419          Sensory Assessment (Somatosensory)    Sensory Assessment (Somatosensory) UE sensation intact  -[facility] Name 12/20/24 1419          Vision Assessment/Intervention    Visual Impairment/Limitations corrective lenses full-time  -       Row Name 12/20/24 1419          Range of Motion Comprehensive    General Range of Motion bilateral upper  extremity ROM WFL  -LISA       Row Name 12/20/24 1419          Strength Comprehensive (MMT)    Comment, General Manual Muscle Testing (MMT) Assessment BUE strength WFL for BADL indendence, see PT note for LE strength, extra effort needed to stand from toilet  -LISA       Row Name 12/20/24 1419          Motor Skills    Motor Skills functional endurance  -LISA     Functional Endurance at home pt. uses 2.5L only at night, pt. currently on 4L NC  -LISA       Row Name 12/20/24 1419          Balance    Balance Assessment sitting static balance;sitting dynamic balance;sit to stand dynamic balance;standing static balance;standing dynamic balance  -LISA     Static Sitting Balance supervision  -LISA     Dynamic Sitting Balance supervision  -LISA     Position, Sitting Balance unsupported;sitting edge of bed;other (see comments)  toilet  -LISA     Sit to Stand Dynamic Balance contact guard;verbal cues  -LISA     Static Standing Balance contact guard  -LISA     Dynamic Standing Balance contact guard  -LISA     Position/Device Used, Standing Balance supported;walker, front-wheeled  -LISA     Balance Interventions sit to stand;occupation based/functional task  -LISA     Comment, Balance toileting and grooming  -LISA               User Key  (r) = Recorded By, (t) = Taken By, (c) = Cosigned By      Initials Name Provider Type    LISA Vernell Miller, OT Occupational Therapist                   Goals/Plan       Row Name 12/20/24 1426          Bed Mobility Goal 1 (OT)    Activity/Assistive Device (Bed Mobility Goal 1, OT) bed mobility activities, all  -LISA     [place] Level/Cues Needed (Bed Mobility Goal 1, OT) independent  -LISA     Time Frame (Bed Mobility Goal 1, OT) short term goal (STG);3 days  -LISA     Strategies/Barriers (Bed Mobility Goal 1, OT) bed flat with no rails up to simulate home setup  -LISA     Progress/Outcomes (Bed Mobility Goal 1, OT) new goal  -LISA       Row Name 12/20/24 1426          Transfer Goal 1 (OT)    Activity/Assistive Device (Transfer  Goal 1, OT) sit-to-stand/stand-to-sit;toilet;commode;walker, rolling  -LISA     [place] Level/Cues Needed (Transfer Goal 1, OT) standby assist  -LISA     Time Frame (Transfer Goal 1, OT) short term goal (STG);3 days  -LISA     Strategies/Barriers (Transfers Goal 1, OT) good safety demonstrated  -LISA     Progress/Outcome (Transfer Goal 1, OT) new goal  -LISA       Row Name 12/20/24 1426          Dressing Goal 1 (OT)    Activity/Device (Dressing Goal 1, OT) upper body dressing;lower body dressing  -LISA     [place]/Cues Needed (Dressing Goal 1, OT) standby assist;set-up required  -LISA     Time Frame (Dressing Goal 1, OT) long term goal (LTG);5 days  -LISA     Strategies/Barriers (Dressing Goal 1, OT) robe/undergarment/socks  -LISA     Progress/Outcome (Dressing Goal 1, OT) new goal  -LISA       Row Name 12/20/24 1426          Problem Specific Goal 1 (OT)    Problem Specific Goal 1 (OT) Pt. will complete item retrieval high and low with SBA and good safety demonstrated to support I ADL independence.  -LISA     Time Frame (Problem Specific Goal 1, OT) long term goal (LTG);5 days  -LISA     Progress/Outcome (Problem Specific Goal 1, OT) new goal  -LISA       Row Name 12/20/24 1426          Therapy Assessment/Plan (OT)    Planned Therapy Interventions (OT) activity tolerance training;BADL retraining;adaptive equipment training;functional balance retraining;occupation/activity based interventions;ROM/therapeutic exercise;IADL retraining;transfer/mobility retraining;patient/caregiver education/training  -LISA               User Key  (r) = Recorded By, (t) = Taken By, (c) = Cosigned By      Initials Name Provider Type    Vernell Bourne, OT Occupational Therapist                   Clinical Impression       Row Name 12/20/24 1422          Pain Assessment    Pretreatment Pain Rating 0/10 - no pain  -LISA     Posttreatment Pain Rating 0/10 - no pain  -LISA       Row Name 12/20/24 1422          Plan of Care Review    Plan of Care Reviewed With  patient;spouse  -     Progress no change  -     Outcome Evaluation Patient present with impaired safety awareness, balance and activity tolerance/endurance impacting PLOF BADLs and IADLs warranting continued skilled IP OT services to address deficit areas and promote return to PLOF.  Pt. on new 4L NC 02 use compared to only 2.5L at night.  With activity HR to 110' to 140's.  Recommend home with family support and continued OP PT services when medically ready for home.  -       Row Name 12/20/24 1422          Therapy Assessment/Plan (OT)    Patient/Family Therapy Goal Statement (OT) return to PLOF  -     Rehab Potential (OT) good  -LISA     Criteria for Skilled Therapeutic Interventions Met (OT) yes;meets criteria;skilled treatment is necessary  -     Therapy Frequency (OT) daily  -LISA     Predicted Duration of Therapy Intervention (OT) 5 days  -       Row Name 12/20/24 1422          Therapy Plan Review/Discharge Plan (OT)    Anticipated Discharge Disposition (OT) home with assist;home with outpatient therapy services  -       Row Name 12/20/24 1422          Vital Signs    Pre Systolic BP Rehab 103  -LISA     Pre Treatment Diastolic BP 87  -LISA     Post Systolic BP Rehab 102  -LISA     Post Treatment Diastolic BP 86  -LISA     Pretreatment Heart Rate (beats/min) 90  -LISA     Posttreatment Heart Rate (beats/min) 80  -LISA     Pre SpO2 (%) 96  -LISA     O2 Delivery Pre Treatment nasal cannula  -LISA     Intra SpO2 (%) --  90's with spot checks  -LISA     O2 Delivery Intra Treatment nasal cannula  -LISA     Post SpO2 (%) 95  -LISA     O2 Delivery Post Treatment nasal cannula  -LISA     Pre Patient Position Supine  -     Intra Patient Position Standing  -LISA     Post Patient Position Sitting  -       Row Name 12/20/24 1422          Positioning and Restraints    Pre-Treatment Position in bed  -LISA     Post Treatment Position chair  -LISA     In Chair reclined;call light within reach;encouraged to call for assist;exit alarm on;waffle  cushion;legs elevated;heels elevated;with nsg  -LISA               User Key  (r) = Recorded By, (t) = Taken By, (c) = Cosigned By      Initials Name Provider Type    Vernell Bourne OT Occupational Therapist                   Outcome Measures       Row Name 12/20/24 1428          How much help from another is currently needed...    Putting on and taking off regular lower body clothing? 3  -LISA     Bathing (including washing, rinsing, and drying) 3  -LISA     Toileting (which includes using toilet bed pan or urinal) 3  -LISA     Putting on and taking off regular upper body clothing 3  -LISA     Taking care of personal grooming (such as brushing teeth) 3  -LISA     Eating meals 4  -LISA     AM-PAC 6 Clicks Score (OT) 19  -LISA       Row Name 12/20/24 1428          Functional Assessment    Outcome Measure Options AM-PAC 6 Clicks Daily Activity (OT)  -LISA               User Key  (r) = Recorded By, (t) = Taken By, (c) = Cosigned By      Initials Name Provider Type    Vernell Bourne OT Occupational Therapist                    Occupational Therapy Education       Title: PT OT SLP Therapies (In Progress)       Topic: Occupational Therapy (In Progress)       Point: ADL training (Done)       Description:   Instruct learner(s) on proper safety adaptation and remediation techniques during self care or transfers.   Instruct in proper use of assistive devices.                  Learning Progress Summary            Patient Acceptance, E, VU,NR by LISA at 12/20/2024 1428    Comment: reason for consult, evaluation results, vital results with activity, walker/transfer/ADL safety   Family Acceptance, E, VU,NR by LISA at 12/20/2024 1428    Comment: reason for consult, evaluation results, vital results with activity, walker/transfer/ADL safety                      Point: Home exercise program (Not Started)       Description:   Instruct learner(s) on appropriate technique for monitoring, assisting and/or progressing therapeutic  exercises/activities.                  Learner Progress:  Not documented in this visit.              Point: Precautions (Done)       Description:   Instruct learner(s) on prescribed precautions during self-care and functional transfers.                  Learning Progress Summary            Patient Acceptance, E, VU,NR by LISA at 12/20/2024 1428    Comment: reason for consult, evaluation results, vital results with activity, walker/transfer/ADL safety   Family Acceptance, E, VU,NR by LISA at 12/20/2024 1428    Comment: reason for consult, evaluation results, vital results with activity, walker/transfer/ADL safety                      Point: Body mechanics (Not Started)       Description:   Instruct learner(s) on proper positioning and spine alignment during self-care, functional mobility activities and/or exercises.                  Learner Progress:  Not documented in this visit.                              User Key       Initials Effective Dates Name Provider Type Discipline    LISA 07/11/23 -  Vernell Miller, OT Occupational Therapist OT                  OT Recommendation and Plan  Planned Therapy Interventions (OT): activity tolerance training, BADL retraining, adaptive equipment training, functional balance retraining, occupation/activity based interventions, ROM/therapeutic exercise, IADL retraining, transfer/mobility retraining, patient/caregiver education/training  Therapy Frequency (OT): daily  Plan of Care Review  Plan of Care Reviewed With: patient, spouse  Progress: no change  Outcome Evaluation: Patient present with impaired safety awareness, balance and activity tolerance/endurance impacting PLOF BADLs and IADLs warranting continued skilled IP OT services to address deficit areas and promote return to PLOF.  Pt. on new 4L NC 02 use compared to only 2.5L at night.  With activity HR to 110' to 140's.  Recommend home with family support and continued OP PT services when medically ready for home.     Time  Calculation:   Evaluation Complexity (OT)  Review Occupational Profile/Medical/Therapy History Complexity: brief/low complexity  Assessment, Occupational Performance/Identification of Deficit Complexity: 1-3 performance deficits  Clinical Decision Making Complexity (OT): problem focused assessment/low complexity  Overall Complexity of Evaluation (OT): low complexity     Time Calculation- OT       Row Name 12/20/24 1430             Time Calculation- OT    OT Start Time 1325  -LISA      OT Received On 12/20/24  -LISA      OT Goal Re-Cert Due Date 12/30/24  -LISA         Timed Charges    33323 - OT Self Care/Mgmt Minutes 10  -LISA         Untimed Charges    OT Eval/Re-eval Minutes 47  -LISA         Total Minutes    Timed Charges Total Minutes 10  -LISA      Untimed Charges Total Minutes 47  -LISA       Total Minutes 57  -LISA                User Key  (r) = Recorded By, (t) = Taken By, (c) = Cosigned By      Initials Name Provider Type    Vernell Bourne OT Occupational Therapist                  Therapy Charges for Today       Code Description Service Date Service Provider Modifiers Qty    99783861184 HC OT SELF CARE/MGMT/TRAIN EA 15 MIN 12/20/2024 Vernell Miller OT GO 1    86465037784 HC OT EVAL LOW COMPLEXITY 4 12/20/2024 Vernell Miller OT GO 1                 Vernell Miller OT  12/20/2024

## 2024-12-20 NOTE — CONSULTS
Referring Provider: Hospitalist  Reason for Consultation: Dyspnea A-fib     Patient Care Team:  Lizzette Multani MD as PCP - General (Internal Medicine)  Harley Rose MD as Consulting Physician (Endocrinology)  Matilde Alvarez MD as Consulting Physician (Cardiology)  Leigh Tijerina APRN as Nurse Practitioner (Pulmonary Disease)     Chief complaint dyspnea        Subjective[]Expand by Default  .      History of present illness: This 84-year-old female        Chest x-ray and CTA showed pulmonary edema.  EKG has shown atrial fibrillation.  Patient has underlying right bundle branch block.  Telemetry is shown atrial fibrillation also.  Patient had just follow-up with her primary cardiology team on the 12th of this month.  Up in normal state health.  Patient got a shower and felt her heart race.  Denied any changes in her medications recently.  Discussion with her son over the phone patient had had diuretics added in the form of hydrochlorothiazide by primary care physician.  Patient has had chronic shortness of breath that was present before and on amiodarone without significant change.  Patient's had orthopnea or lower extremity swelling.  No chest pain.  Weight has been stable.  Patient's blood pressure and heart rate has been controlled at home.     Paroxysmal atrial fibrillation  New onset atrial flutter with RVR 11/10/2021 in setting of nocturnal hypoxia  [id number] = 5 on Xarelto   SATISH, 11/12/2021: EF 50%. Mild LAE. No evidence of intracardiac thrombus or mass, clear JENNA, Trace MR and TR.   Successful ECV, 11/12/2021  Started on Amiodarone 11/2021  Aortic stenosis/Mitral stenosis/Valvular heart disease  Echo (04/08/2023): EF 56 to 60%.  LV wall thickness consistent with mild concentric hypertrophy.  LV diastolic function consistent with (grade 2 with high lap) pseudonormalization.  LA volume mildly increased.  Moderate calcification of aortic valve.  Moderate AS. Moderate calcification of  mitral valve.  Mild to moderate MS.  Mild TR.  RVSP is moderately elevated at 53.9 mmHg.  Echo, 09/20/2024: EF 58.9%. Mild AV stenosis. JOHNNY is 1.3 cm2. Peak velocity of the flow distal to the aortic valve is 239.8 cm/s. Aortic valve maximum pressure gradient is 26 mmHg. Aortic valve mean pressure gradient is 14 mmHg. Aortic valve dimensionless index is 0.33 Mild MV stenosis. The mitral valve peak gradient is 15 mmHg. The mitral valve mean gradient is 5 mmHg. The mitral valve area (PHT) is 2.9 cm2. Normal RVSP.   Hypertension  Dyslipidemia  RBBB  Type 2 diabetes mellitus  Obstructive sleep apnea, on BiPAP  Acute cholecystitis  S/p ERCP with extraction of 3 stones 4/7/2023.  Syncope, 04/2012  Echocardiogram, April 2012: EF 55 to 60% with no valvular abnormalities.  Limited studies, 4/2012: 0-49% bilateral carotid artery stenosis.  No acute findings per CT scan.  MRI of the brain, 4/2012: Cortical atrophy, chronic ischemic changes noted.  Abnormal MPS, 5/2012: EF 79% with apical hypoperfusion suspicious for ischemia, Dr. Arnaud Augustine.  MPS 9/2016: EF greater than 70%.  No evidence of inducible ischemia.  Borderline inferior ST segment changes.        Review of Systems              Pertinent items are noted in HPI, all other systems reviewed and negative     History  Medical History        Past Medical History:   Diagnosis Date    Aortic valve stenosis      Atrial fibrillation      Benign hypertension      History of echocardiogram 04/2012    Hypercalcemia      Hypercholesterolemia      Hypertension      Meniere's disease      Obesity      Overweight (BMI 25.0-29.9)      Primary hyperparathyroidism      Sleep apnea with use of continuous positive airway pressure (CPAP)      Syncope 04/2012    Type 2 diabetes mellitus      Vitamin D deficiency        ,   Surgical History         Past Surgical History:   Procedure Laterality Date    BREAST SURGERY Left       benign tumor removal    CATARACT EXTRACTION Bilateral       CHOLECYSTECTOMY N/A 4/11/2023     Procedure: CHOLECYSTECTOMY LAPAROSCOPIC;  Surgeon: Abraham Holbrook MD;  Location: [facility] OR;  Service: General;  Laterality: N/A;    ERCP N/A 4/9/2023     Procedure: ENDOSCOPIC RETROGRADE CHOLANGIOPANCREATOGRAPHY WITH STENT PLACEMENT;  Surgeon: Brunner, Mark I, MD;  Location: [facility] ENDOSCOPY;  Service: Gastroenterology;  Laterality: N/A;  Sphincterotomy made to ampula of coomon bile duct. CBD swept with 9mm-12 mm balloon. Multiple stones and sludge removed. Stent placed in pancreatic duct    HYSTERECTOMY          ,         Family History   Problem Relation Age of Onset    Heart attack Mother      No Known Problems Father      No Known Problems Sister      Lactose intolerance Brother      No Known Problems Sister     ,   Social History   Social History            Socioeconomic History    Marital status:    Tobacco Use    Smoking status: Never       Passive exposure: Never    Smokeless tobacco: Never   Vaping Use    Vaping status: Never Used   Substance and Sexual Activity    Alcohol use: No    Drug use: No    Sexual activity: Defer               E-cigarette/Vaping    E-cigarette/Vaping Use Never User      Passive Exposure No      Counseling Given No              E-cigarette/Vaping Substances    Nicotine No      THC No      CBD No      Flavoring No              E-cigarette/Vaping Devices    Disposable No      Pre-filled or Refillable Cartridge No      Refillable Tank No      Pre-filled Pod No              ,   Prescriptions Prior to Admission           Medications Prior to Admission   Medication Sig Dispense Refill Last Dose/Taking    Accu-Chek Softclix Lancets lancets            amLODIPine (NORVASC) 5 MG tablet Take 1 tablet by mouth Every 12 (Twelve) Hours. 180 tablet 3      atorvastatin (LIPITOR) 40 MG tablet Take 1 tablet by mouth Every Night.          Cholecalciferol (Vitamin D3) 50 MCG (2000 UT) tablet Take 1 tablet by mouth Daily. OTC          Cinnamon 500 MG  capsule Take 1 capsule by mouth Daily. OTC          furosemide (LASIX) 20 MG tablet            glipizide (GLUCOTROL XL) 2.5 MG 24 hr tablet Take 2 tablets by mouth Daily.          glucose blood (Accu-Chek Guide) test strip USE 1 STRIP TO CHECK BLOOD SUGAR ONCE DAILY dx e11.65 100 each 3      Januvia 100 MG tablet Take 1 tablet by mouth once daily (Patient taking differently: Take 1 tablet by mouth Daily.) 90 tablet 0      metFORMIN ER (GLUCOPHAGE-XR) 500 MG 24 hr tablet TAKE 2 TABLETS BY MOUTH TWICE DAILY WITH MEALS 360 tablet 3      metoprolol succinate XL (TOPROL-XL) 50 MG 24 hr tablet Take 0.5 tablets by mouth Daily for 14 days.          Omega-3 Fatty Acids (fish oil) 1000 MG capsule capsule Take 1 capsule by mouth Daily With Breakfast. OTC          oxybutynin XL (DITROPAN-XL) 5 MG 24 hr tablet Take 1 tablet by mouth Daily.          rivaroxaban (XARELTO) 15 MG tablet Take 1 tablet by mouth Daily With Dinner. Indications: Atrial Fibrillation 30 tablet 3        , Scheduled Meds:  rivaroxaban, 15 mg, Oral, Daily With Dinner  sodium chloride, 10 mL, Intravenous, Q12H   , Continuous Infusions:  dilTIAZem, 5-15 mg/hr, Last Rate: 10 mg/hr (12/20/24 0536)   , PRN Meds:    acetaminophen **OR** acetaminophen **OR** acetaminophen    senna-docusate sodium **AND** polyethylene glycol **AND** bisacodyl **AND** bisacodyl    Calcium Replacement - Follow Nurse / BPA Driven Protocol    Magnesium Standard Dose Replacement - Follow Nurse / BPA Driven Protocol    nitroglycerin    ondansetron ODT **OR** ondansetron    Phosphorus Replacement - Follow Nurse / BPA Driven Protocol    Potassium Replacement - Follow Nurse / BPA Driven Protocol    [COMPLETED] Insert Peripheral IV **AND** sodium chloride    sodium chloride    sodium chloride, and Allergies:  Adhesive tape, Latex, and Penicillins        Objective  Vital Signs  Temp:  [97.9 °F (36.6 °C)-98.4 °F (36.9 °C)] 98 °F (36.7 °C)  Heart Rate:  [] 100  Resp:  [18] 18  BP:  (103-166)/() 103/87  Body mass index is 26.34 kg/m².     Intake/Output Summary (Last 24 hours) at 12/20/2024 1210  Last data filed at 12/20/2024 1100      Gross per 24 hour   Intake --   Output 1800 ml   Net -1800 ml      I/O this shift:  In: -   Out: 1800 [Urine:1800]     Physical Exam:                 General Appearance:   Alert, cooperative, in no acute distress   Head:   Normocephalic, without obvious abnormality, atraumatic   Eyes:   Lids and lashes normal, conjunctivae and sclerae normal, no icterus, no pallor, corneas clear, PERRLA   Ears:   Ears appear intact with no abnormalities noted   Throat:   No oral lesions, no thrush, oral mucosa moist   Neck:   No adenopathy, supple, trachea midline, no thyromegaly, no carotid bruit, no JVD   Back:   No kyphosis present, no scoliosis present, no skin lesions, erythema or scars, no tenderness to percussion, or palpation, range of motion normal   Lungs:   Scattered wheezes and rales    Heart:   Irregularly irregular.  No significant murmurs rubs gallops   Breast Exam:   Deferred   Abdomen:   Normal bowel sounds, no masses, no organomegaly, soft non-tender, non-distended, no guarding, no rebound tenderness   Genitalia:   Deferred   Extremities:   Moves all extremities well, no edema, no cyanosis, no redness   Pulses:   Pulses palpable and equal bilaterally   Skin:   No bleeding, bruising or rash   Lymph nodes:   No palpable adenopathy   Neurologic:   Cranial nerves 2 - 12 grossly intact, sensation intact, DTR present and equal bilaterally         Results Review:               I reviewed the patient's new clinical results.  I reviewed the patient's new imaging results and agree with the interpretation.  I reviewed the patient's other test results and agree with the interpretation  I personally viewed and interpreted the patient's EKG/Telemetry data         WBC       WBC   Date Value Ref Range Status   12/20/2024 8.35 3.40 - 10.80 10*3/mm3 Final      HGB      "  Hemoglobin   Date Value Ref Range Status   12/20/2024 14.6 12.0 - 15.9 g/dL Final      HCT       Hematocrit   Date Value Ref Range Status   12/20/2024 44.9 34.0 - 46.6 % Final      Platlets No results found for: \"LABPLAT\"      PT/INR:            Protime   Date Value Ref Range Status   12/20/2024 24.8 (H) 12.2 - 14.5 Seconds Final   /        INR   Date Value Ref Range Status   12/20/2024 2.24 (H) 0.89 - 1.12 Final         Sodium       Sodium   Date Value Ref Range Status   12/20/2024 141 136 - 145 mmol/L Final      Potassium         Potassium   Date Value Ref Range Status   12/20/2024 4.7 3.5 - 5.2 mmol/L Final       Comment:       Slight hemolysis detected by analyzer. Result may be falsely elevated.      Chloride       Chloride   Date Value Ref Range Status   12/20/2024 101 98 - 107 mmol/L Final      Bicarbonate No results found for: \"PLASMABICARB\"   BUN       BUN   Date Value Ref Range Status   12/20/2024 37 (H) 8 - 23 mg/dL Final      Creatinine       Creatinine   Date Value Ref Range Status   12/20/2024 1.11 (H) 0.57 - 1.00 mg/dL Final      Calcium       Calcium   Date Value Ref Range Status   12/20/2024 9.8 8.6 - 10.5 mg/dL Final      Magnesium       Magnesium   Date Value Ref Range Status   12/20/2024 1.9 1.6 - 2.4 mg/dL Final         pH No results found for: \"PHART\"   pO2 No results found for: \"PO2ART\"   pCO2 No results found for: \"PHQ1IHP\"   HCO3 No results found for: \"ABU8IVZ\"         Assessment & Plan  Active Problem List        Patient Active Problem List   Diagnosis Code    Syncope R55    Essential hypertension I10    Dyslipidemia E78.5    Overweight (BMI 25.0-29.9) E66.3    Meniere's disease H81.09    Dyspnea on exertion R06.09    BERNARD treated with BiPAP G47.33    Primary hyperparathyroidism E21.0    Osteopenia M85.80    Type 2 diabetes mellitus with hyperglycemia, without long-term current use of insulin E11.65    Exercise hypoxemia R09.02    Paroxysmal atrial fibrillation I48.0    Right bundle branch " block I45.10    Valvular heart disease I38    Adrenal nodule E27.9    Multiple thyroid nodules E04.2    A-fib I48.91    Atrial fibrillation with RVR I48.91            Persistent atrial fibrillation  Acute on chronic diastolic heart failure  Hypertension  Chronic UTI     Plan: Will stop Cardizem placed back on amiodarone.  Discussed with patient and family voiced desire to be back on such.  Patient has had issues with dyspnea prior to being on amiodarone.  If not need to not be on such by pulmonary could consider other antiarrhythmic such as Tikosyn or catheter ablation.  For her heart failure will continue diuretic therapy.  Would discharge home off of hydrochlorothiazide and on Lasix.  Can determine dose prior to discharge.  Otherwise key potassium above 4 magnesium above 2.  Continue anticoagulation currently.  Patient prefers no further procedures if possible.  Will undergo limited echocardiogram to evaluate left ventricular function given heart failure here.  Discussed at length with patient, , patient's son.  Pending blood pressure trend can start Entresto as inpatient.        Dick Kraus MD  12/20/24  12:10 EST

## 2024-12-21 ENCOUNTER — APPOINTMENT (OUTPATIENT)
Dept: CARDIOLOGY | Facility: HOSPITAL | Age: 84
End: 2024-12-21
Payer: MEDICARE

## 2024-12-21 LAB
ANION GAP SERPL CALCULATED.3IONS-SCNC: 14 MMOL/L (ref 5–15)
BUN SERPL-MCNC: 37 MG/DL (ref 8–23)
BUN/CREAT SERPL: 35.6 (ref 7–25)
CALCIUM SPEC-SCNC: 8.8 MG/DL (ref 8.6–10.5)
CHLORIDE SERPL-SCNC: 100 MMOL/L (ref 98–107)
CO2 SERPL-SCNC: 25 MMOL/L (ref 22–29)
CREAT SERPL-MCNC: 1.04 MG/DL (ref 0.57–1)
D-LACTATE SERPL-SCNC: 1.4 MMOL/L (ref 0.5–2)
DEPRECATED RDW RBC AUTO: 52.8 FL (ref 37–54)
EGFRCR SERPLBLD CKD-EPI 2021: 52.5 ML/MIN/1.73
ERYTHROCYTE [DISTWIDTH] IN BLOOD BY AUTOMATED COUNT: 16.7 % (ref 12.3–15.4)
GLUCOSE BLDC GLUCOMTR-MCNC: 186 MG/DL (ref 70–130)
GLUCOSE BLDC GLUCOMTR-MCNC: 285 MG/DL (ref 70–130)
GLUCOSE BLDC GLUCOMTR-MCNC: 320 MG/DL (ref 70–130)
GLUCOSE BLDC GLUCOMTR-MCNC: 328 MG/DL (ref 70–130)
GLUCOSE SERPL-MCNC: 233 MG/DL (ref 65–99)
HCT VFR BLD AUTO: 40.5 % (ref 34–46.6)
HGB BLD-MCNC: 13.1 G/DL (ref 12–15.9)
MAGNESIUM SERPL-MCNC: 2 MG/DL (ref 1.6–2.4)
MCH RBC QN AUTO: 28 PG (ref 26.6–33)
MCHC RBC AUTO-ENTMCNC: 32.3 G/DL (ref 31.5–35.7)
MCV RBC AUTO: 86.5 FL (ref 79–97)
PLATELET # BLD AUTO: 363 10*3/MM3 (ref 140–450)
PMV BLD AUTO: 9.6 FL (ref 6–12)
POTASSIUM SERPL-SCNC: 3.8 MMOL/L (ref 3.5–5.2)
RBC # BLD AUTO: 4.68 10*6/MM3 (ref 3.77–5.28)
SODIUM SERPL-SCNC: 139 MMOL/L (ref 136–145)
WBC NRBC COR # BLD AUTO: 8.26 10*3/MM3 (ref 3.4–10.8)

## 2024-12-21 PROCEDURE — 85027 COMPLETE CBC AUTOMATED: CPT | Performed by: INTERNAL MEDICINE

## 2024-12-21 PROCEDURE — G0378 HOSPITAL OBSERVATION PER HR: HCPCS

## 2024-12-21 PROCEDURE — 93005 ELECTROCARDIOGRAM TRACING: CPT | Performed by: INTERNAL MEDICINE

## 2024-12-21 PROCEDURE — 82948 REAGENT STRIP/BLOOD GLUCOSE: CPT

## 2024-12-21 PROCEDURE — 94761 N-INVAS EAR/PLS OXIMETRY MLT: CPT

## 2024-12-21 PROCEDURE — 96366 THER/PROPH/DIAG IV INF ADDON: CPT

## 2024-12-21 PROCEDURE — 93005 ELECTROCARDIOGRAM TRACING: CPT

## 2024-12-21 PROCEDURE — 83605 ASSAY OF LACTIC ACID: CPT | Performed by: EMERGENCY MEDICINE

## 2024-12-21 PROCEDURE — 93308 TTE F-UP OR LMTD: CPT

## 2024-12-21 PROCEDURE — 25010000002 FUROSEMIDE PER 20 MG: Performed by: INTERNAL MEDICINE

## 2024-12-21 PROCEDURE — 80048 BASIC METABOLIC PNL TOTAL CA: CPT | Performed by: INTERNAL MEDICINE

## 2024-12-21 PROCEDURE — 94799 UNLISTED PULMONARY SVC/PX: CPT

## 2024-12-21 PROCEDURE — 63710000001 INSULIN LISPRO (HUMAN) PER 5 UNITS: Performed by: FAMILY MEDICINE

## 2024-12-21 PROCEDURE — 25010000002 CEFTRIAXONE PER 250 MG: Performed by: FAMILY MEDICINE

## 2024-12-21 PROCEDURE — 83735 ASSAY OF MAGNESIUM: CPT | Performed by: INTERNAL MEDICINE

## 2024-12-21 PROCEDURE — 96376 TX/PRO/DX INJ SAME DRUG ADON: CPT

## 2024-12-21 PROCEDURE — 25010000002 AMIODARONE IN DEXTROSE 5% 360-4.14 MG/200ML-% SOLUTION: Performed by: INTERNAL MEDICINE

## 2024-12-21 PROCEDURE — 96368 THER/DIAG CONCURRENT INF: CPT

## 2024-12-21 RX ADMIN — INSULIN LISPRO 2 UNITS: 100 INJECTION, SOLUTION INTRAVENOUS; SUBCUTANEOUS at 17:49

## 2024-12-21 RX ADMIN — ATORVASTATIN CALCIUM 40 MG: 40 TABLET, FILM COATED ORAL at 20:36

## 2024-12-21 RX ADMIN — FUROSEMIDE 60 MG: 10 INJECTION, SOLUTION INTRAMUSCULAR; INTRAVENOUS at 08:55

## 2024-12-21 RX ADMIN — Medication 10 ML: at 08:57

## 2024-12-21 RX ADMIN — AMIODARONE HYDROCHLORIDE 200 MG: 200 TABLET ORAL at 15:39

## 2024-12-21 RX ADMIN — METOPROLOL SUCCINATE 25 MG: 25 TABLET, EXTENDED RELEASE ORAL at 08:55

## 2024-12-21 RX ADMIN — AMIODARONE HYDROCHLORIDE 0.5 MG/MIN: 1.8 INJECTION, SOLUTION INTRAVENOUS at 07:52

## 2024-12-21 RX ADMIN — INSULIN LISPRO 4 UNITS: 100 INJECTION, SOLUTION INTRAVENOUS; SUBCUTANEOUS at 08:55

## 2024-12-21 RX ADMIN — INSULIN LISPRO 5 UNITS: 100 INJECTION, SOLUTION INTRAVENOUS; SUBCUTANEOUS at 12:22

## 2024-12-21 RX ADMIN — OXYBUTYNIN CHLORIDE 5 MG: 5 TABLET, EXTENDED RELEASE ORAL at 08:55

## 2024-12-21 RX ADMIN — RIVAROXABAN 15 MG: 15 TABLET, FILM COATED ORAL at 17:49

## 2024-12-21 RX ADMIN — SODIUM CHLORIDE 1000 MG: 900 INJECTION INTRAVENOUS at 06:08

## 2024-12-21 RX ADMIN — INSULIN LISPRO 5 UNITS: 100 INJECTION, SOLUTION INTRAVENOUS; SUBCUTANEOUS at 20:37

## 2024-12-21 RX ADMIN — AMIODARONE HYDROCHLORIDE 200 MG: 200 TABLET ORAL at 23:14

## 2024-12-21 NOTE — PROGRESS NOTES
"  [facility]   Inpatient Progress Note       LOS: 0 days   Patient Care Team:  Lizzette Multani MD as PCP - General (Internal Medicine)  Harley Rose MD as Consulting Physician (Endocrinology)  Matilde Alvarez MD as Consulting Physician (Cardiology)  Leigh Tijerina APRN as Nurse Practitioner (Pulmonary Disease)    Chief Complaint: Atrial fibrillation    Subjective     Interval History:     Patient in good spirits today, talking on phone.  Discussed care with son via telephone.  On amiodarone, no dyspnea.  No orthopnea.  Ambulated per her and hallways yesterday at length, not any symptoms.  Only complaint is of some constipation.  Does not feel tachypalpitations.    Review of Systems:   Pertinent positives noted in history, exam, and assessment. Otherwise reviewed and negative.      Objective     Vitals:  Blood pressure 102/75, pulse 81, temperature 97.7 °F (36.5 °C), temperature source Oral, resp. rate 18, height 157.5 cm (62\"), weight 65.3 kg (144 lb), SpO2 94%, not currently breastfeeding.     Intake/Output Summary (Last 24 hours) at 12/21/2024 1001  Last data filed at 12/21/2024 0445  Gross per 24 hour   Intake --   Output 1900 ml   Net -1900 ml     Vitals reviewed.   Constitutional:       Appearance: Well-developed and not in distress.   Neck:      Thyroid: No thyromegaly.      Vascular: No carotid bruit or JVD.   Pulmonary:      Breath sounds: Normal breath sounds.   Cardiovascular:      Regular rhythm.      No gallop. No S3 and S4 gallop.   Pulses:     Intact distal pulses.      Carotid: 2+ bilaterally.     Radial: 2+ bilaterally.  Edema:     Peripheral edema absent.   Abdominal:      General: Bowel sounds are normal.      Palpations: Abdomen is soft. There is no abdominal mass.      Tenderness: There is no abdominal tenderness.   Musculoskeletal:         General: No deformity.      Extremities: No clubbing present.Skin:     General: Skin is warm and dry.      " Findings: No rash.   Neurological:      Mental Status: Alert and oriented to person, place, and time.            Results Review:     I reviewed the patient's new clinical results.    Results from last 7 days   Lab Units 12/21/24  0215   WBC 10*3/mm3 8.26   HEMOGLOBIN g/dL 13.1   HEMATOCRIT % 40.5   PLATELETS 10*3/mm3 363     Results from last 7 days   Lab Units 12/21/24  0215 12/20/24  1324 12/20/24  0229   SODIUM mmol/L 139   < > 141   POTASSIUM mmol/L 3.8   < > 4.7   CHLORIDE mmol/L 100   < > 101   CO2 mmol/L 25.0   < > 25.0   BUN mg/dL 37*   < > 37*   CREATININE mg/dL 1.04*   < > 1.11*   CALCIUM mg/dL 8.8   < > 9.8   BILIRUBIN mg/dL  --   --  0.2   ALK PHOS U/L  --   --  74   ALT (SGPT) U/L  --   --  15   AST (SGOT) U/L  --   --  19   GLUCOSE mg/dL 233*   < > 229*    < > = values in this interval not displayed.     Results from last 7 days   Lab Units 12/21/24  0215   SODIUM mmol/L 139   POTASSIUM mmol/L 3.8   CHLORIDE mmol/L 100   CO2 mmol/L 25.0   BUN mg/dL 37*   CREATININE mg/dL 1.04*   GLUCOSE mg/dL 233*   CALCIUM mg/dL 8.8     Results from last 7 days   Lab Units 12/20/24  0229   INR  2.24*     Lab Results   Lab Value Date/Time    TROPONINT 21 (H) 12/20/2024 0517    TROPONINT 22 (H) 12/20/2024 0305    TROPONINT 20 (H) 09/19/2024 2329    TROPONINT 25 (H) 09/19/2024 2121    TROPONINT 22 (H) 09/19/2024 0541    TROPONINT 22 (H) 09/19/2024 0334    TROPONINT 13 (H) 04/08/2023 0150    TROPONINT 14 (H) 04/07/2023 2321    TROPONINT 14 (H) 04/07/2023 2321    TROPONINT <0.010 11/10/2021 1009     Results from last 7 days   Lab Units 12/20/24  0229   TSH uIU/mL 6.910*   FREE T4 ng/dL 1.42         Results from last 7 days   Lab Units 12/20/24  0229   PROBNP pg/mL 1,338.0     Results from last 7 days   Lab Units 12/20/24  0517 12/20/24  0305   HSTROP T ng/L 21* 22*         Tele: Atrial fibrillation with ventricular rates ranging from .    Assessment:      Atrial fibrillation with RVR    Essential hypertension     Dyslipidemia    Dyspnea on exertion    Type 2 diabetes mellitus with hyperglycemia, without long-term current use of insulin    Paroxysmal atrial fibrillation    Right bundle branch block    Valvular heart disease      1.  Paroxysmal atrial fibrillation, now with RVR on IV amiodarone.  Rate is improved.  The patient is asymptomatic.  On chronic Xarelto.  2.  Valvular heart disease, moderate AAS and MS.  Will make likelihood of maintaining sinus rhythm low.  3.  Diastolic heart failure, secondary above.  Currently appears euvolemic.  4.  Hypertension currently well-controlled    Plan:  1.  Discussed with son over phone, we will continue amiodarone loading for now.  2.  If we can control her ventricular rates, and she is asymptomatic, we will simply maintain her in atrial fibrillation.  If however we cannot control her rates better, or if she redevelops symptoms, may need to do cardioversion prior to discharge.      Cristian Landon MD    Dictated utilizing Dragon dictation

## 2024-12-21 NOTE — PROGRESS NOTES
[facility] Medicine Services  PROGRESS NOTE    Patient Name: Kaylen Garrison  : 1940  MRN: [id number]    Date of Admission: 2024  Primary Care Physician: Lizzette Multani MD    Subjective   Subjective     CC:  SOB    HPI:  Resting in a chair in no acute distress and feels okay.  Does not have any specific complaint except constipation.  No fever or chills.  No chest pain or palpitation or shortness of breath.  No nausea vomiting or diarrhea.      Objective   Objective     Vital Signs:   Temp:  [97.2 °F (36.2 °C)-98 °F (36.7 °C)] 98 °F (36.7 °C)  Heart Rate:  [] 71  Resp:  [18] 18  BP: ()/(49-75) 101/57  Flow (L/min) (Oxygen Therapy):  [3-4] 3     Physical Exam:  Constitutional: No acute distress, awake, alert  HENT: NCAT, mucous membranes moist  Respiratory: Clear to auscultation bilaterally, respiratory effort normal   Cardiovascular: Irregular, no murmurs, rubs, or gallops  Gastrointestinal: Positive bowel sounds, soft, nontender, nondistended  Musculoskeletal: No bilateral ankle edema  Psychiatric: Appropriate affect, cooperative  Neurologic: Oriented x 3, speech clear  Skin: No rashes     Results Reviewed:  LAB RESULTS:      Lab 24  0215 24  1944 24  1324 24  0827 24  0520 24  0517 24  0305 24  0229   WBC 8.26  --  9.93  --   --   --   --  8.35   HEMOGLOBIN 13.1  --  13.6  --   --   --   --  14.6   HEMATOCRIT 40.5  --  41.7  --   --   --   --  44.9   PLATELETS 363  --  389  --   --   --   --  399   NEUTROS ABS  --   --   --   --   --   --   --  4.72   IMMATURE GRANS (ABS)  --   --   --   --   --   --   --  0.02   LYMPHS ABS  --   --   --   --   --   --   --  2.29   MONOS ABS  --   --   --   --   --   --   --  0.82   EOS ABS  --   --   --   --   --   --   --  0.44*   MCV 86.5  --  85.6  --   --   --   --  86.3   PROCALCITONIN  --   --   --   --   --   --   --  0.07   LACTATE 1.4 2.6* 3.7* 2.5* 2.7*  --   --   2.3*   PROTIME  --   --   --   --   --   --   --  24.8*   APTT  --   --   --   --   --   --   --  41.1*   HSTROP T  --   --   --   --   --  21* 22*  --          Lab 12/21/24  0215 12/20/24  1324 12/20/24  0229   SODIUM 139 137 141   POTASSIUM 3.8 4.4 4.7   CHLORIDE 100 98 101   CO2 25.0 24.0 25.0   ANION GAP 14.0 15.0 15.0   BUN 37* 34* 37*   CREATININE 1.04* 0.93 1.11*   EGFR 52.5* 60.0* 48.5*   GLUCOSE 233* 315* 229*   CALCIUM 8.8 9.4 9.8   MAGNESIUM 2.0 2.3 1.9   TSH  --   --  6.910*         Lab 12/20/24  0229   TOTAL PROTEIN 6.5   ALBUMIN 4.0   GLOBULIN 2.5   ALT (SGPT) 15   AST (SGOT) 19   BILIRUBIN 0.2   ALK PHOS 74         Lab 12/20/24  0517 12/20/24  0305 12/20/24  0229   PROBNP  --   --  1,338.0   HSTROP T 21* 22*  --    PROTIME  --   --  24.8*   INR  --   --  2.24*                 Brief Urine Lab Results  (Last result in the past 365 days)        Color   Clarity   Blood   Leuk Est   Nitrite   Protein   CREAT   Urine HCG        12/20/24 0458 Yellow   Clear   Negative   Small (1+)   Positive   Negative                   Microbiology Results Abnormal       Procedure Component Value - Date/Time    Urine Culture - Urine, Urine, Clean Catch [[id number]]  (Abnormal) Collected: 12/20/24 0458    Lab Status: Preliminary result Specimen: Urine, Clean Catch Updated: 12/21/24 1439     Urine Culture >100,000 CFU/mL Escherichia coli    Narrative:      Colonization of the urinary tract without infection is common. Treatment is discouraged unless the patient is symptomatic, pregnant, or undergoing an invasive urologic procedure.            CT Angiogram Chest    Result Date: 12/20/2024  CT ANGIOGRAM CHEST Date of Exam: 12/20/2024 5:06 AM EST Indication: shortness of breath. Comparison: CT scan of the chest 9/19/2024; 6/29/2023 Technique: CTA of the chest was performed after the uneventful intravenous administration of 80 mL Isovue-370. Reconstructed coronal and sagittal images were also obtained. In addition, a 3-D volume  rendered image was created for interpretation. Automated exposure control and iterative reconstruction methods were used. Findings: Hilum and Mediastinum: There are stable pretracheal, bilateral hilar, subcarinal and as ago esophageal recess nodes. These enlarged lymph nodes could be related to the presence of the pulmonary vascular congestion, lymphoproliferative disorder or metastatic disease felt less likely considering the long-term stability. Normal heart size.   No pericardial effusion.  Unremarkable thoracic aorta and pulmonary arteries. There is moderate coronary artery calcific atherosclerosis. Excellent pulmonary arterial opacification with no evidence of filling defect to suggest pulmonary embolic disease. Lung Parenchyma and Pleura: There are diffuse bilateral groundglass attenuation changes throughout both lungs with mild interlobular septal thickening consistent with pulmonary edema. There are no suspicious pulmonary nodules. There are no focal consolidations to indicate lobar pneumonia. Upper Abdomen: Surgically absent gallbladder. There is a small hiatal hernia with fluid in the distal esophagus consistent with reflux. Soft tissues: Unremarkable. Osseous structures: No aggressive focal lytic or sclerotic osseous lesions.     Impression: Impression: 1.No evidence of pulmonary embolic disease. 2.Diffuse bilateral groundglass attenuation changes with interlobular septal thickening consistent with pulmonary edema. 3.Stable mediastinal and hilar adenopathy. Electronically Signed: Ari An MD  12/20/2024 5:22 AM EST  Workstation ID: [id number]    XR Chest 1 View    Result Date: 12/20/2024  XR CHEST 1 VW Date of Exam: 12/20/2024 2:29 AM EST Indication: palpitations Comparison: Chest x-ray 9/19/2024 Findings: Heart size and pulmonary vascular markings are normal. There is mild prominence of pulm interstitium. There are no focal consolidations indicate pneumonia. The osseous structures are normal.      Impression: Impression: Mild prominence of the pulm interstitium. No active disease. Electronically Signed: Ari An MD  12/20/2024 3:10 AM EST  Workstation ID: [id number]     Results for orders placed during the hospital encounter of 09/19/24    Adult Transthoracic Echo Complete W/ Cont if Necessary Per Protocol    Interpretation Summary    Left ventricular systolic function is normal. Calculated left ventricular EF = 58.9% Left ventricular ejection fraction appears to be 56 - 60%.    The right ventricular cavity is mildly dilated.    Mild aortic valve stenosis is present. Aortic valve area is 1.3 cm2.    Peak velocity of the flow distal to the aortic valve is 239.8 cm/s. Aortic valve maximum pressure gradient is 26 mmHg. Aortic valve mean pressure gradient is 14 mmHg. Aortic valve dimensionless index is 0.33 .    Mild mitral valve stenosis is present. The mitral valve peak gradient is 15 mmHg. The mitral valve mean gradient is 5 mmHg. The mitral valve area (PHT) is 2.9 cm2.    Estimated right ventricular systolic pressure from tricuspid regurgitation is normal (<35 mmHg).      Current medications:  Scheduled Meds:amiodarone, 200 mg, Oral, Q8H   Followed by  [START ON 12/28/2024] amiodarone, 200 mg, Oral, Q12H   Followed by  [START ON 1/11/2025] amiodarone, 200 mg, Oral, Daily  atorvastatin, 40 mg, Oral, Nightly  cefTRIAXone, 1,000 mg, Intravenous, Q24H  furosemide, 60 mg, Intravenous, Q12H  insulin lispro, 2-7 Units, Subcutaneous, 4x Daily AC & at Bedtime  metoprolol succinate XL, 25 mg, Oral, Daily  oxybutynin XL, 5 mg, Oral, Daily  rivaroxaban, 15 mg, Oral, Daily With Dinner  sodium chloride, 10 mL, Intravenous, Q12H      Continuous Infusions:   PRN Meds:.  acetaminophen **OR** acetaminophen **OR** acetaminophen    senna-docusate sodium **AND** polyethylene glycol **AND** bisacodyl **AND** bisacodyl    Calcium Replacement - Follow Nurse / BPA Driven Protocol    dextrose    dextrose    glucagon (human  recombinant)    hyaluronidase 150 units in NS 10 ml syringe    Magnesium Standard Dose Replacement - Follow Nurse / BPA Driven Protocol    nitroglycerin    ondansetron ODT **OR** ondansetron    Phosphorus Replacement - Follow Nurse / BPA Driven Protocol    Potassium Replacement - Follow Nurse / BPA Driven Protocol    [COMPLETED] Insert Peripheral IV **AND** sodium chloride    sodium chloride    sodium chloride    Assessment & Plan   Assessment & Plan     Active Hospital Problems    Diagnosis  POA    **Atrial fibrillation with RVR [I48.91]  Yes    Right bundle branch block [I45.10]  Yes    Valvular heart disease [I38]  Yes    Paroxysmal atrial fibrillation [I48.0]  Yes    Type 2 diabetes mellitus with hyperglycemia, without long-term current use of insulin [E11.65]  Yes    Dyspnea on exertion [R06.09]  Yes    Essential hypertension [I10]  Yes    Dyslipidemia [E78.5]  Yes      Resolved Hospital Problems   No resolved problems to display.        Brief Hospital Course to date:  Kaylen Garrison is a 84 y.o. female  with PMHx PAF on Xarelto, aortic valve stenosis/mitral stenosis, HTN, HLD, RBBB, DM2, BERNARD, obesity who presents to ED due to dyspnea and palpitations.  She was found to be in A-fib RVR     A fib RVR, Hx PAF  -Initially placed on Cardizem drip.  Now discontinued by cardiology and placed on amiodarone  -Also on Xarelto and metoprolol  -Cardiology will try to control the rate and hopefully the symptoms.  If symptoms are not controlled they will consider cardioversion.     CHpEF  Dyspnea, Hypoxia   Aortic Stenosis/Mitral Stenosis  -Last Echo 9/20/2024, EF normal   -Pulmonary edema noted on CTA chest   -proBNP normal     Lactic Acidosis, resolved     Abnormal UA  -Continue ceftriaxone while awaiting culture sensitivities     DM2  -Hold Metformin, Januvia, Glipizide   -LDSSI       Expected Discharge Location and Transportation: Home  Expected Discharge in a day or 2  Expected Discharge Date: 12/22/2024; Expected  Discharge Time:      VTE Prophylaxis:  Pharmacologic & mechanical VTE prophylaxis orders are present.         AM-PAC 6 Clicks Score (PT): 19 (12/20/24 1328)    CODE STATUS:   Code Status and Medical Interventions: CPR (Attempt to Resuscitate); Full Support   Ordered at: 12/20/24 1107     Code Status (Patient has no pulse and is not breathing):    CPR (Attempt to Resuscitate)     Medical Interventions (Patient has pulse or is breathing):    Full Support       Yoel Paul MD  12/21/24

## 2024-12-22 LAB
ANION GAP SERPL CALCULATED.3IONS-SCNC: 10 MMOL/L (ref 5–15)
BACTERIA SPEC AEROBE CULT: ABNORMAL
BH CV ECHO MEAS - EDV(MOD-SP2): 81.2 ML
BH CV ECHO MEAS - EDV(MOD-SP4): 76.5 ML
BH CV ECHO MEAS - EF(MOD-SP2): 71.1 %
BH CV ECHO MEAS - EF(MOD-SP4): 59.7 %
BH CV ECHO MEAS - ESV(MOD-SP2): 23.5 ML
BH CV ECHO MEAS - ESV(MOD-SP4): 30.8 ML
BH CV ECHO MEAS - IVS/LVPW: 1.08 CM
BH CV ECHO MEAS - IVSD: 1.3 CM
BH CV ECHO MEAS - LVIDD: 3.9 CM
BH CV ECHO MEAS - LVIDS: 2.4 CM
BH CV ECHO MEAS - LVPWD: 1.2 CM
BH CV ECHO MEAS - SV(MOD-SP2): 57.7 ML
BH CV ECHO MEAS - SV(MOD-SP4): 45.7 ML
BH CV VAS BP LEFT ARM: NORMAL MMHG
BUN SERPL-MCNC: 39 MG/DL (ref 8–23)
BUN/CREAT SERPL: 35.8 (ref 7–25)
CALCIUM SPEC-SCNC: 9.1 MG/DL (ref 8.6–10.5)
CHLORIDE SERPL-SCNC: 99 MMOL/L (ref 98–107)
CO2 SERPL-SCNC: 28 MMOL/L (ref 22–29)
CREAT SERPL-MCNC: 1.09 MG/DL (ref 0.57–1)
DEPRECATED RDW RBC AUTO: 52.4 FL (ref 37–54)
EGFRCR SERPLBLD CKD-EPI 2021: 49.6 ML/MIN/1.73
ERYTHROCYTE [DISTWIDTH] IN BLOOD BY AUTOMATED COUNT: 16.2 % (ref 12.3–15.4)
GLUCOSE BLDC GLUCOMTR-MCNC: 185 MG/DL (ref 70–130)
GLUCOSE BLDC GLUCOMTR-MCNC: 301 MG/DL (ref 70–130)
GLUCOSE BLDC GLUCOMTR-MCNC: 309 MG/DL (ref 70–130)
GLUCOSE BLDC GLUCOMTR-MCNC: 337 MG/DL (ref 70–130)
GLUCOSE SERPL-MCNC: 173 MG/DL (ref 65–99)
HCT VFR BLD AUTO: 44.4 % (ref 34–46.6)
HGB BLD-MCNC: 14.2 G/DL (ref 12–15.9)
LV EF 2D ECHO EST: 66 %
LV EF BIPLANE MOD: 66 %
MAGNESIUM SERPL-MCNC: 2.2 MG/DL (ref 1.6–2.4)
MCH RBC QN AUTO: 27.9 PG (ref 26.6–33)
MCHC RBC AUTO-ENTMCNC: 32 G/DL (ref 31.5–35.7)
MCV RBC AUTO: 87.2 FL (ref 79–97)
PLATELET # BLD AUTO: 398 10*3/MM3 (ref 140–450)
PMV BLD AUTO: 9.9 FL (ref 6–12)
POTASSIUM SERPL-SCNC: 4 MMOL/L (ref 3.5–5.2)
RBC # BLD AUTO: 5.09 10*6/MM3 (ref 3.77–5.28)
SODIUM SERPL-SCNC: 137 MMOL/L (ref 136–145)
WBC NRBC COR # BLD AUTO: 10.4 10*3/MM3 (ref 3.4–10.8)

## 2024-12-22 PROCEDURE — G0378 HOSPITAL OBSERVATION PER HR: HCPCS

## 2024-12-22 PROCEDURE — 85027 COMPLETE CBC AUTOMATED: CPT | Performed by: NURSE PRACTITIONER

## 2024-12-22 PROCEDURE — 63710000001 INSULIN GLARGINE PER 5 UNITS: Performed by: NURSE PRACTITIONER

## 2024-12-22 PROCEDURE — 25010000002 CEFTRIAXONE PER 250 MG: Performed by: FAMILY MEDICINE

## 2024-12-22 PROCEDURE — 82948 REAGENT STRIP/BLOOD GLUCOSE: CPT

## 2024-12-22 PROCEDURE — 80048 BASIC METABOLIC PNL TOTAL CA: CPT | Performed by: INTERNAL MEDICINE

## 2024-12-22 PROCEDURE — 93005 ELECTROCARDIOGRAM TRACING: CPT | Performed by: INTERNAL MEDICINE

## 2024-12-22 PROCEDURE — 25010000002 FUROSEMIDE PER 20 MG: Performed by: INTERNAL MEDICINE

## 2024-12-22 PROCEDURE — 63710000001 INSULIN LISPRO (HUMAN) PER 5 UNITS: Performed by: NURSE PRACTITIONER

## 2024-12-22 PROCEDURE — 96376 TX/PRO/DX INJ SAME DRUG ADON: CPT

## 2024-12-22 PROCEDURE — 63710000001 INSULIN LISPRO (HUMAN) PER 5 UNITS: Performed by: FAMILY MEDICINE

## 2024-12-22 PROCEDURE — 83735 ASSAY OF MAGNESIUM: CPT | Performed by: INTERNAL MEDICINE

## 2024-12-22 RX ORDER — INSULIN LISPRO 100 [IU]/ML
2 INJECTION, SOLUTION INTRAVENOUS; SUBCUTANEOUS
Status: DISCONTINUED | OUTPATIENT
Start: 2024-12-22 | End: 2024-12-23

## 2024-12-22 RX ORDER — AMIODARONE HYDROCHLORIDE 200 MG/1
200 TABLET ORAL 2 TIMES DAILY WITH MEALS
Status: DISCONTINUED | OUTPATIENT
Start: 2024-12-22 | End: 2024-12-23 | Stop reason: HOSPADM

## 2024-12-22 RX ADMIN — INSULIN LISPRO 2 UNITS: 100 INJECTION, SOLUTION INTRAVENOUS; SUBCUTANEOUS at 12:04

## 2024-12-22 RX ADMIN — METOPROLOL SUCCINATE 25 MG: 25 TABLET, EXTENDED RELEASE ORAL at 10:41

## 2024-12-22 RX ADMIN — FUROSEMIDE 60 MG: 10 INJECTION, SOLUTION INTRAMUSCULAR; INTRAVENOUS at 21:36

## 2024-12-22 RX ADMIN — INSULIN GLARGINE 4 UNITS: 100 INJECTION, SOLUTION SUBCUTANEOUS at 21:37

## 2024-12-22 RX ADMIN — FUROSEMIDE 60 MG: 10 INJECTION, SOLUTION INTRAMUSCULAR; INTRAVENOUS at 10:41

## 2024-12-22 RX ADMIN — RIVAROXABAN 15 MG: 15 TABLET, FILM COATED ORAL at 18:04

## 2024-12-22 RX ADMIN — AMIODARONE HYDROCHLORIDE 200 MG: 200 TABLET ORAL at 18:00

## 2024-12-22 RX ADMIN — INSULIN LISPRO 5 UNITS: 100 INJECTION, SOLUTION INTRAVENOUS; SUBCUTANEOUS at 21:37

## 2024-12-22 RX ADMIN — INSULIN LISPRO 6 UNITS: 100 INJECTION, SOLUTION INTRAVENOUS; SUBCUTANEOUS at 10:39

## 2024-12-22 RX ADMIN — INSULIN LISPRO 2 UNITS: 100 INJECTION, SOLUTION INTRAVENOUS; SUBCUTANEOUS at 18:00

## 2024-12-22 RX ADMIN — Medication 10 ML: at 00:35

## 2024-12-22 RX ADMIN — OXYBUTYNIN CHLORIDE 5 MG: 5 TABLET, EXTENDED RELEASE ORAL at 10:40

## 2024-12-22 RX ADMIN — SODIUM CHLORIDE 1000 MG: 900 INJECTION INTRAVENOUS at 05:20

## 2024-12-22 RX ADMIN — Medication 10 ML: at 10:42

## 2024-12-22 RX ADMIN — INSULIN LISPRO 2 UNITS: 100 INJECTION, SOLUTION INTRAVENOUS; SUBCUTANEOUS at 10:40

## 2024-12-22 RX ADMIN — AMIODARONE HYDROCHLORIDE 200 MG: 200 TABLET ORAL at 10:41

## 2024-12-22 RX ADMIN — Medication 10 ML: at 21:37

## 2024-12-22 RX ADMIN — ATORVASTATIN CALCIUM 40 MG: 40 TABLET, FILM COATED ORAL at 21:36

## 2024-12-22 RX ADMIN — INSULIN LISPRO 2 UNITS: 100 INJECTION, SOLUTION INTRAVENOUS; SUBCUTANEOUS at 17:59

## 2024-12-22 RX ADMIN — INSULIN LISPRO 5 UNITS: 100 INJECTION, SOLUTION INTRAVENOUS; SUBCUTANEOUS at 12:04

## 2024-12-22 NOTE — PROGRESS NOTES
"  [facility]   Inpatient Progress Note       LOS: 0 days   Patient Care Team:  Lizzette Multani MD as PCP - General (Internal Medicine)  Harley Rose MD as Consulting Physician (Endocrinology)  Matilde Alvarez MD as Consulting Physician (Cardiology)  Leigh Tijerina APRN as Nurse Practitioner (Pulmonary Disease)    Chief Complaint: Atrial fibrillation    Subjective     Interval History:     Patient in bed. Still remains in AF, but resting rates stable. Notes that if she tries to get up and ambulate that her HR will go up to around 130 and PT cut her session short due to this. QTc mildly prolonged today.     Review of Systems:   Pertinent positives noted in history, exam, and assessment. Otherwise reviewed and negative.      Objective     Vitals:  Blood pressure 151/61, pulse 67, temperature 97.8 °F (36.6 °C), temperature source Oral, resp. rate 18, height 157.5 cm (62\"), weight 65.3 kg (144 lb), SpO2 96%, not currently breastfeeding.     Intake/Output Summary (Last 24 hours) at 12/22/2024 0814  Last data filed at 12/22/2024 0602  Gross per 24 hour   Intake 240 ml   Output 1950 ml   Net -1710 ml     Vitals reviewed.   Constitutional:       Appearance: Well-developed and not in distress.   Neck:      Vascular: No JVD.      Trachea: No tracheal deviation.   Pulmonary:      Effort: Pulmonary effort is normal.      Breath sounds: Bibasilar Rales present.   Cardiovascular:      Normal rate. Irregularly irregular rhythm.      Murmurs: There is no murmur.   Edema:     Peripheral edema absent.   Musculoskeletal:         General: No deformity. Skin:     General: Skin is warm and dry.   Neurological:      Mental Status: Alert and oriented to person, place, and time.            Results Review:     I reviewed the patient's new clinical results.    Results from last 7 days   Lab Units 12/21/24  0215   WBC 10*3/mm3 8.26   HEMOGLOBIN g/dL 13.1   HEMATOCRIT % 40.5   PLATELETS 10*3/mm3 " 363     Results from last 7 days   Lab Units 12/22/24  0302 12/20/24  1324 12/20/24  0229   SODIUM mmol/L 137   < > 141   POTASSIUM mmol/L 4.0   < > 4.7   CHLORIDE mmol/L 99   < > 101   CO2 mmol/L 28.0   < > 25.0   BUN mg/dL 39*   < > 37*   CREATININE mg/dL 1.09*   < > 1.11*   CALCIUM mg/dL 9.1   < > 9.8   BILIRUBIN mg/dL  --   --  0.2   ALK PHOS U/L  --   --  74   ALT (SGPT) U/L  --   --  15   AST (SGOT) U/L  --   --  19   GLUCOSE mg/dL 173*   < > 229*    < > = values in this interval not displayed.     Results from last 7 days   Lab Units 12/22/24  0302   SODIUM mmol/L 137   POTASSIUM mmol/L 4.0   CHLORIDE mmol/L 99   CO2 mmol/L 28.0   BUN mg/dL 39*   CREATININE mg/dL 1.09*   GLUCOSE mg/dL 173*   CALCIUM mg/dL 9.1     Results from last 7 days   Lab Units 12/20/24  0229   INR  2.24*     Lab Results   Lab Value Date/Time    TROPONINT 21 (H) 12/20/2024 0517    TROPONINT 22 (H) 12/20/2024 0305    TROPONINT 20 (H) 09/19/2024 2329    TROPONINT 25 (H) 09/19/2024 2121    TROPONINT 22 (H) 09/19/2024 0541    TROPONINT 22 (H) 09/19/2024 0334    TROPONINT 13 (H) 04/08/2023 0150    TROPONINT 14 (H) 04/07/2023 2321    TROPONINT 14 (H) 04/07/2023 2321    TROPONINT <0.010 11/10/2021 1009     Results from last 7 days   Lab Units 12/20/24  0229   TSH uIU/mL 6.910*   FREE T4 ng/dL 1.42         Results from last 7 days   Lab Units 12/20/24  0229   PROBNP pg/mL 1,338.0     Results from last 7 days   Lab Units 12/20/24  0517 12/20/24  0305   HSTROP T ng/L 21* 22*         Tele: Atrial fibrillation    Assessment:      Atrial fibrillation with RVR    Essential hypertension    Dyslipidemia    Dyspnea on exertion    Type 2 diabetes mellitus with hyperglycemia, without long-term current use of insulin    Paroxysmal atrial fibrillation    Right bundle branch block    Valvular heart disease      1.  Paroxysmal atrial fibrillation, now with RVR on IV amiodarone.  Rate is improved.  The patient is asymptomatic.  On chronic Xarelto.  2.  Valvular  heart disease, moderate AAS and MS.  Will make likelihood of maintaining sinus rhythm low.  3.  Diastolic heart failure, secondary above.  Currently appears euvolemic.  4.  Hypertension currently well-controlled  5. UTI, E. Coli. Per attending service.     Plan:  1.Decrease amiodarone to 200 mg BID.   2.  If we can control her ventricular rates, and she is asymptomatic, we will simply maintain her in atrial fibrillation.  If however we cannot control her rates better, or if she redevelops symptoms, may need to do cardioversion prior to discharge.  3.. Check EKG in AM.   4. Continue IV diuretics.   5. Dr. Kraus to resume cardiac care tomorrow.       VALERIANO Perez    Dictated utilizing Dragon dictation

## 2024-12-22 NOTE — PLAN OF CARE
Goal Outcome Evaluation:              Outcome Evaluation: Pt a/o, VSS, 3 LNC, Sat. 95%. No c/o pain at this time. Continue montoring.

## 2024-12-22 NOTE — PROGRESS NOTES
[facility] Medicine Services  PROGRESS NOTE    Patient Name: Kaylen Garrison  : 1940  MRN: [id number]    Date of Admission: 2024  Primary Care Physician: Lizzette Multani MD    Subjective   Subjective     CC:  SOB    HPI:  She was seen resting up in bed in no acute distress.  Awake and alert.  States she feels okay today.  Denies chest pain, palpitations, shortness of air.  Still having very mild dysuria.    Objective   Objective     Vital Signs:   Temp:  [97.5 °F (36.4 °C)-98 °F (36.7 °C)] 97.9 °F (36.6 °C)  Heart Rate:  [60-89] 78  Resp:  [18] 18  BP: (101-151)/(49-70) 108/54  Flow (L/min) (Oxygen Therapy):  [3] 3     Physical Exam:  Constitutional: No acute distress, awake, alert.  Resting up in bed.  Pleasant, interactive female.  HENT: NCAT, mucous membranes moist  Respiratory: Clear to auscultation bilaterally, respiratory effort normal   Cardiovascular: Irregular, no murmurs, rubs, or gallops  Gastrointestinal: Positive bowel sounds, soft, nontender, nondistended  Musculoskeletal: No bilateral ankle edema.  MCKEON spontaneously.  Psychiatric: Appropriate affect, cooperative  Neurologic: Oriented x 3, nonfocal.  Speech clear appropriate.  Follows commands.  Skin: No rashes     Results Reviewed:  LAB RESULTS:      Lab 24  1144 24  0215 24  1944 24  1324 24  0827 24  0520 24  0517 24  0305 24  0229   WBC 10.40 8.26  --  9.93  --   --   --   --  8.35   HEMOGLOBIN 14.2 13.1  --  13.6  --   --   --   --  14.6   HEMATOCRIT 44.4 40.5  --  41.7  --   --   --   --  44.9   PLATELETS 398 363  --  389  --   --   --   --  399   NEUTROS ABS  --   --   --   --   --   --   --   --  4.72   IMMATURE GRANS (ABS)  --   --   --   --   --   --   --   --  0.02   LYMPHS ABS  --   --   --   --   --   --   --   --  2.29   MONOS ABS  --   --   --   --   --   --   --   --  0.82   EOS ABS  --   --   --   --   --   --   --   --  0.44*   MCV 87.2  86.5  --  85.6  --   --   --   --  86.3   PROCALCITONIN  --   --   --   --   --   --   --   --  0.07   LACTATE  --  1.4 2.6* 3.7* 2.5* 2.7*  --   --  2.3*   PROTIME  --   --   --   --   --   --   --   --  24.8*   APTT  --   --   --   --   --   --   --   --  41.1*   HSTROP T  --   --   --   --   --   --  21* 22*  --          Lab 12/22/24  0302 12/21/24  0215 12/20/24  1324 12/20/24 0229   SODIUM 137 139 137 141   POTASSIUM 4.0 3.8 4.4 4.7   CHLORIDE 99 100 98 101   CO2 28.0 25.0 24.0 25.0   ANION GAP 10.0 14.0 15.0 15.0   BUN 39* 37* 34* 37*   CREATININE 1.09* 1.04* 0.93 1.11*   EGFR 49.6* 52.5* 60.0* 48.5*   GLUCOSE 173* 233* 315* 229*   CALCIUM 9.1 8.8 9.4 9.8   MAGNESIUM 2.2 2.0 2.3 1.9   TSH  --   --   --  6.910*         Lab 12/20/24 0229   TOTAL PROTEIN 6.5   ALBUMIN 4.0   GLOBULIN 2.5   ALT (SGPT) 15   AST (SGOT) 19   BILIRUBIN 0.2   ALK PHOS 74         Lab 12/20/24  0517 12/20/24  0305 12/20/24 0229   PROBNP  --   --  1,338.0   HSTROP T 21* 22*  --    PROTIME  --   --  24.8*   INR  --   --  2.24*                 Brief Urine Lab Results  (Last result in the past 365 days)        Color   Clarity   Blood   Leuk Est   Nitrite   Protein   CREAT   Urine HCG        12/20/24 0458 Yellow   Clear   Negative   Small (1+)   Positive   Negative                   Microbiology Results Abnormal       Procedure Component Value - Date/Time    Urine Culture - Urine, Urine, Clean Catch [[id number]]  (Abnormal)  (Susceptibility) Collected: 12/20/24 0458    Lab Status: Final result Specimen: Urine, Clean Catch Updated: 12/22/24 0959     Urine Culture >100,000 CFU/mL Escherichia coli    Narrative:      Colonization of the urinary tract without infection is common. Treatment is discouraged unless the patient is symptomatic, pregnant, or undergoing an invasive urologic procedure.    Susceptibility        Escherichia coli      MARLENE      Amoxicillin + Clavulanate Susceptible      Ampicillin Susceptible      Ampicillin + Sulbactam  Susceptible      Cefazolin (Urine) Susceptible      Cefepime Susceptible      Ceftazidime Susceptible      Ceftriaxone Susceptible      Gentamicin Susceptible      Levofloxacin Susceptible      Nitrofurantoin Susceptible      Piperacillin + Tazobactam Susceptible      Trimethoprim + Sulfamethoxazole Susceptible                                   No radiology results from the last 24 hrs    Results for orders placed during the hospital encounter of 12/20/24    Adult Transthoracic Echo Limited W/ Cont if Necessary Per Protocol    Interpretation Summary    Left ventricular ejection fraction appears to be 66 - 70%.    Left ventricular wall thickness is consistent with mild concentric hypertrophy.      Current medications:  Scheduled Meds:amiodarone, 200 mg, Oral, BID With Meals  atorvastatin, 40 mg, Oral, Nightly  cefTRIAXone, 1,000 mg, Intravenous, Q24H  furosemide, 60 mg, Intravenous, Q12H  insulin glargine, 4 Units, Subcutaneous, Nightly  Insulin Lispro, 2 Units, Subcutaneous, TID With Meals  insulin lispro, 2-7 Units, Subcutaneous, 4x Daily AC & at Bedtime  metoprolol succinate XL, 25 mg, Oral, Daily  oxybutynin XL, 5 mg, Oral, Daily  rivaroxaban, 15 mg, Oral, Daily With Dinner  sodium chloride, 10 mL, Intravenous, Q12H      Continuous Infusions:   PRN Meds:.  acetaminophen **OR** acetaminophen **OR** acetaminophen    senna-docusate sodium **AND** polyethylene glycol **AND** bisacodyl **AND** bisacodyl    Calcium Replacement - Follow Nurse / BPA Driven Protocol    dextrose    dextrose    glucagon (human recombinant)    hyaluronidase 150 units in NS 10 ml syringe    Magnesium Standard Dose Replacement - Follow Nurse / BPA Driven Protocol    nitroglycerin    ondansetron ODT **OR** ondansetron    Phosphorus Replacement - Follow Nurse / BPA Driven Protocol    Potassium Replacement - Follow Nurse / BPA Driven Protocol    [COMPLETED] Insert Peripheral IV **AND** sodium chloride    sodium chloride    sodium  chloride    Assessment & Plan   Assessment & Plan     Active Hospital Problems    Diagnosis  POA    **Atrial fibrillation with RVR [I48.91]  Yes    Right bundle branch block [I45.10]  Yes    Valvular heart disease [I38]  Yes    Paroxysmal atrial fibrillation [I48.0]  Yes    Type 2 diabetes mellitus with hyperglycemia, without long-term current use of insulin [E11.65]  Yes    Dyspnea on exertion [R06.09]  Yes    Essential hypertension [I10]  Yes    Dyslipidemia [E78.5]  Yes      Resolved Hospital Problems   No resolved problems to display.        Brief Hospital Course to date:  Kaylen Garrison is a 84 y.o. female  with PMHx PAF on Xarelto, aortic valve stenosis/mitral stenosis, HTN, HLD, RBBB, DM2, BERNARD, obesity who presents to ED due to dyspnea and palpitations.  She was found to be in A-fib RVR    This patient's problems and plans were partially entered by my partner and updated as appropriate by me 12/22/24.    Assessment/Plan:  Pt is new to me today      A fib RVR, Hx PAF  -Initially placed on Cardizem drip.  Now discontinued by cardiology and placed on amiodarone  -Also on Xarelto and metoprolol  -Cardiology will try to control the rate and hopefully the symptoms.  If symptoms are not controlled they will consider cardioversion.     CHpEF  Dyspnea, Hypoxia   Aortic Stenosis/Mitral Stenosis  -Last Echo 9/20/2024, EF normal   -Pulmonary edema noted on CTA chest   -proBNP normal     Lactic Acidosis, resolved     Abnormal UA  -Continue ceftriaxone while awaiting culture sensitivities     DM2 (A1c 8.0)  -Hold Metformin, Januvia, Glipizide   -LDSSI   -- Glucose running high from 173-337 last 24 hours.  -- Will add low-dose basal/bolus today.  Monitor and adjust as needed.      Expected Discharge Location and Transportation: Home  Expected Discharge   Expected Discharge Date: 12/23/2024; Expected Discharge Time:      VTE Prophylaxis:  Pharmacologic & mechanical VTE prophylaxis orders are present.         AM-PAC 6  Clicks Score (PT): 18 (12/22/24 0800)    CODE STATUS:   Code Status and Medical Interventions: CPR (Attempt to Resuscitate); Full Support   Ordered at: 12/20/24 1107     Code Status (Patient has no pulse and is not breathing):    CPR (Attempt to Resuscitate)     Medical Interventions (Patient has pulse or is breathing):    Full Support       Suki Keyes, APRN  12/22/24

## 2024-12-23 VITALS
DIASTOLIC BLOOD PRESSURE: 86 MMHG | HEIGHT: 62 IN | HEART RATE: 71 BPM | RESPIRATION RATE: 16 BRPM | SYSTOLIC BLOOD PRESSURE: 101 MMHG | OXYGEN SATURATION: 87 % | WEIGHT: 144 LBS | TEMPERATURE: 98 F | BODY MASS INDEX: 26.5 KG/M2

## 2024-12-23 LAB
ANION GAP SERPL CALCULATED.3IONS-SCNC: 14 MMOL/L (ref 5–15)
BUN SERPL-MCNC: 41 MG/DL (ref 8–23)
BUN/CREAT SERPL: 40.6 (ref 7–25)
CALCIUM SPEC-SCNC: 8.7 MG/DL (ref 8.6–10.5)
CHLORIDE SERPL-SCNC: 96 MMOL/L (ref 98–107)
CO2 SERPL-SCNC: 27 MMOL/L (ref 22–29)
CREAT SERPL-MCNC: 1.01 MG/DL (ref 0.57–1)
EGFRCR SERPLBLD CKD-EPI 2021: 54.3 ML/MIN/1.73
GLUCOSE BLDC GLUCOMTR-MCNC: 249 MG/DL (ref 70–130)
GLUCOSE BLDC GLUCOMTR-MCNC: 293 MG/DL (ref 70–130)
GLUCOSE SERPL-MCNC: 307 MG/DL (ref 65–99)
POTASSIUM SERPL-SCNC: 3.5 MMOL/L (ref 3.5–5.2)
QT INTERVAL: 436 MS
QT INTERVAL: 484 MS
QT INTERVAL: 488 MS
QT INTERVAL: 492 MS
QT INTERVAL: 514 MS
QTC INTERVAL: 438 MS
QTC INTERVAL: 482 MS
QTC INTERVAL: 483 MS
QTC INTERVAL: 487 MS
QTC INTERVAL: 503 MS
SODIUM SERPL-SCNC: 137 MMOL/L (ref 136–145)

## 2024-12-23 PROCEDURE — 97530 THERAPEUTIC ACTIVITIES: CPT

## 2024-12-23 PROCEDURE — 63710000001 INSULIN LISPRO (HUMAN) PER 5 UNITS: Performed by: NURSE PRACTITIONER

## 2024-12-23 PROCEDURE — 96376 TX/PRO/DX INJ SAME DRUG ADON: CPT

## 2024-12-23 PROCEDURE — 80048 BASIC METABOLIC PNL TOTAL CA: CPT | Performed by: INTERNAL MEDICINE

## 2024-12-23 PROCEDURE — 97110 THERAPEUTIC EXERCISES: CPT

## 2024-12-23 PROCEDURE — 25010000002 FUROSEMIDE PER 20 MG: Performed by: INTERNAL MEDICINE

## 2024-12-23 PROCEDURE — 97116 GAIT TRAINING THERAPY: CPT

## 2024-12-23 PROCEDURE — 93005 ELECTROCARDIOGRAM TRACING: CPT | Performed by: NURSE PRACTITIONER

## 2024-12-23 PROCEDURE — 82948 REAGENT STRIP/BLOOD GLUCOSE: CPT

## 2024-12-23 PROCEDURE — G0378 HOSPITAL OBSERVATION PER HR: HCPCS

## 2024-12-23 PROCEDURE — 97535 SELF CARE MNGMENT TRAINING: CPT

## 2024-12-23 PROCEDURE — 63710000001 INSULIN LISPRO (HUMAN) PER 5 UNITS: Performed by: FAMILY MEDICINE

## 2024-12-23 PROCEDURE — 25010000002 CEFTRIAXONE PER 250 MG: Performed by: FAMILY MEDICINE

## 2024-12-23 RX ORDER — ACETAMINOPHEN 325 MG/1
650 TABLET ORAL EVERY 4 HOURS PRN
Start: 2024-12-23

## 2024-12-23 RX ORDER — INSULIN LISPRO 100 [IU]/ML
3 INJECTION, SOLUTION INTRAVENOUS; SUBCUTANEOUS
Status: DISCONTINUED | OUTPATIENT
Start: 2024-12-23 | End: 2024-12-23 | Stop reason: HOSPADM

## 2024-12-23 RX ORDER — POTASSIUM CHLORIDE 1500 MG/1
40 TABLET, EXTENDED RELEASE ORAL EVERY 4 HOURS
Status: COMPLETED | OUTPATIENT
Start: 2024-12-23 | End: 2024-12-23

## 2024-12-23 RX ORDER — FUROSEMIDE 40 MG/1
40 TABLET ORAL DAILY
Qty: 30 TABLET | Refills: 0 | Status: SHIPPED | OUTPATIENT
Start: 2024-12-23

## 2024-12-23 RX ORDER — CEFUROXIME AXETIL 500 MG/1
500 TABLET ORAL 2 TIMES DAILY
Qty: 4 TABLET | Refills: 0 | Status: SHIPPED | OUTPATIENT
Start: 2024-12-23 | End: 2024-12-25

## 2024-12-23 RX ORDER — POLYETHYLENE GLYCOL 3350 17 G/17G
17 POWDER, FOR SOLUTION ORAL DAILY PRN
Start: 2024-12-23

## 2024-12-23 RX ORDER — AMIODARONE HYDROCHLORIDE 200 MG/1
200 TABLET ORAL 2 TIMES DAILY WITH MEALS
Qty: 28 TABLET | Refills: 0 | Status: SHIPPED | OUTPATIENT
Start: 2024-12-23 | End: 2024-12-30

## 2024-12-23 RX ORDER — METOPROLOL SUCCINATE 25 MG/1
25 TABLET, EXTENDED RELEASE ORAL DAILY
Qty: 30 TABLET | Refills: 0 | Status: SHIPPED | OUTPATIENT
Start: 2024-12-24 | End: 2025-01-23

## 2024-12-23 RX ADMIN — INSULIN LISPRO 3 UNITS: 100 INJECTION, SOLUTION INTRAVENOUS; SUBCUTANEOUS at 12:55

## 2024-12-23 RX ADMIN — INSULIN LISPRO 4 UNITS: 100 INJECTION, SOLUTION INTRAVENOUS; SUBCUTANEOUS at 08:28

## 2024-12-23 RX ADMIN — POTASSIUM CHLORIDE 40 MEQ: 1500 TABLET, EXTENDED RELEASE ORAL at 08:27

## 2024-12-23 RX ADMIN — FUROSEMIDE 60 MG: 10 INJECTION, SOLUTION INTRAMUSCULAR; INTRAVENOUS at 08:28

## 2024-12-23 RX ADMIN — POTASSIUM CHLORIDE 40 MEQ: 1500 TABLET, EXTENDED RELEASE ORAL at 12:54

## 2024-12-23 RX ADMIN — METOPROLOL SUCCINATE 25 MG: 25 TABLET, EXTENDED RELEASE ORAL at 08:28

## 2024-12-23 RX ADMIN — INSULIN LISPRO 3 UNITS: 100 INJECTION, SOLUTION INTRAVENOUS; SUBCUTANEOUS at 08:28

## 2024-12-23 RX ADMIN — AMIODARONE HYDROCHLORIDE 200 MG: 200 TABLET ORAL at 08:27

## 2024-12-23 RX ADMIN — OXYBUTYNIN CHLORIDE 5 MG: 5 TABLET, EXTENDED RELEASE ORAL at 08:28

## 2024-12-23 RX ADMIN — SODIUM CHLORIDE 1000 MG: 900 INJECTION INTRAVENOUS at 05:27

## 2024-12-23 RX ADMIN — INSULIN LISPRO 3 UNITS: 100 INJECTION, SOLUTION INTRAVENOUS; SUBCUTANEOUS at 12:54

## 2024-12-23 NOTE — DISCHARGE SUMMARY
[facility] Medicine Services  DISCHARGE SUMMARY    Patient Name: Kaylen Garrison  : 1940  MRN: [id number]    Date of Admission: 2024  2:14 AM  Date of Discharge:  2024  Primary Care Physician: Lizzette Multani MD    Consults       Date and Time Order Name Status Description    2024 11:07 AM Inpatient Cardiology Consult Completed             Hospital Course     Presenting Problem: Dyspnea    Active Hospital Problems    Diagnosis  POA    **Atrial fibrillation with RVR [I48.91]  Yes    Right bundle branch block [I45.10]  Yes    Valvular heart disease [I38]  Yes    Paroxysmal atrial fibrillation [I48.0]  Yes    Type 2 diabetes mellitus with hyperglycemia, without long-term current use of insulin [E11.65]  Yes    Dyspnea on exertion [R06.09]  Yes    Essential hypertension [I10]  Yes    Dyslipidemia [E78.5]  Yes      Resolved Hospital Problems   No resolved problems to display.          Hospital Course:  Kaylen Garrison is a 84 y.o. female  with PMHx PAF on Xarelto, aortic valve stenosis/mitral stenosis, HTN, HLD, RBBB, DM2, BERNARD, obesity who presents to ED due to dyspnea and palpitations.  She was found to be in A-fib RVR     A fib RVR, Hx PAF  -Initially placed on Cardizem drip.  Now discontinued by cardiology and placed on amiodarone.  Rate controlled.  -Also on Xarelto and metoprolol  -Cardiology will try to control the rate and hopefully the symptoms.  If symptoms are not controlled they will consider cardioversion.  --Patient is cleared by Dr. Kraus for discharge home today on current amiodarone twice daily x 2 weeks and then will transition to once daily.  --Follow-up [facility] heart valve center 1 week of discharge (will send patient home with the first 2 weeks supply of twice daily dosing of amiodarone.  Defer to cardiology/heart valve center to further prescribe)  --Home on Lasix 40 mg p.o. daily per Dr. Kraus recommendations.  Will send 1 month supply.   Cards to manage further.  --Follow-up with Dr. Kraus with cardiology in 4 weeks  --Patient encouraged to monitor BP, heart rate, and daily weights.  Keep log.     CHpEF  Dyspnea, Hypoxia   Aortic Stenosis/Mitral Stenosis  -Last Echo 9/20/2024, EF normal   -Pulmonary edema noted on CTA chest   -proBNP normal   --Stable.  Follow-up heart valve center in 1 week as noted.     Lactic Acidosis, resolved     UTI (POA)  --Symptomatic, culture> 100,000 CFU E. coli  -Continue ceftriaxone inpatient.  -- Will transition to 2 days of Ceftin on discharge to complete course.     DM2 (A1c 8.0)  -Hold Metformin, Januvia, Glipizide   -LDSSI   --Basal bolus while here.  Patient plans to resume home regimen on discharge and will monitor glucose closely.  Follow-up PCP further management.     Patient was seen resting up in chair no acute distress.  Awake and alert.  Hemodynamically stable and afebrile.  Eager to go home today.  Her bowel services for discharge.  Plans to resume outpatient physical therapy at [facility].  Going on medications as below per cardiology recommendations and follow-ups as noted.      Discharge Follow Up Recommendations for outpatient labs/diagnostics:  Patient is cleared for discharge home today bowel services.  Going on medications as below with follow-ups as noted.    -- Follow-up PCP 1 week of discharge  -- Follow-up [facility] heart valve center 1 week of discharge.  (I have prescribed first 2-week course of twice daily amiodarone for cardiology.  Will defer to valve center staff/cardiology to further prescribe)  -- Follow-up Dr. Kraus with [facility] cardiology in 4 weeks  -- Patient to resume outpatient physical therapy at [facility] on discharge    Day of Discharge     HPI:   Patient was seen this morning resting up in the chair no acute distress.  Awake and alert.  States she feels well.  Feels ready for discharge home.  On her chronic 2 LNC oxygen.  Ambulating.  No new issues.  Discharging home today on medications as  below per cardiology recommendations.  Follow-ups as noted.    Review of Systems  Gen- No fevers, chills  CV- No chest pain, palpitations  Resp- No cough, dyspnea  GI- No N/V/D, abd pain      Vital Signs:   Temp:  [96.4 °F (35.8 °C)-97.9 °F (36.6 °C)] 97.9 °F (36.6 °C)  Heart Rate:  [69-78] 77  Resp:  [18] 18  BP: (108-118)/(54-78) 117/76  Flow (L/min) (Oxygen Therapy):  [2-3] 2      Physical Exam:  Constitutional: No acute distress, awake, alert.  Resting up in the chair.  Pleasant, talkative female.  HENT: NCAT, mucous membranes moist  Respiratory: Clear to auscultation bilaterally, respiratory effort normal on chronic 2 LNC.  Cardiovascular: Irregular, no murmurs, rubs, or gallops  Gastrointestinal: Positive bowel sounds, soft, nontender, nondistended  Musculoskeletal: No bilateral ankle edema.  MCKEON spontaneously.  Psychiatric: Appropriate affect, cooperative  Neurologic: Oriented x 3, nonfocal.  Speech clear appropriate.  Follows commands.  Skin: No rashes     Pertinent  and/or Most Recent Results     LAB RESULTS:      Lab 12/22/24  1144 12/21/24  0215 12/20/24  1944 12/20/24  1324 12/20/24  0827 12/20/24  0520 12/20/24  0229   WBC 10.40 8.26  --  9.93  --   --  8.35   HEMOGLOBIN 14.2 13.1  --  13.6  --   --  14.6   HEMATOCRIT 44.4 40.5  --  41.7  --   --  44.9   PLATELETS 398 363  --  389  --   --  399   NEUTROS ABS  --   --   --   --   --   --  4.72   IMMATURE GRANS (ABS)  --   --   --   --   --   --  0.02   LYMPHS ABS  --   --   --   --   --   --  2.29   MONOS ABS  --   --   --   --   --   --  0.82   EOS ABS  --   --   --   --   --   --  0.44*   MCV 87.2 86.5  --  85.6  --   --  86.3   PROCALCITONIN  --   --   --   --   --   --  0.07   LACTATE  --  1.4 2.6* 3.7* 2.5* 2.7* 2.3*   PROTIME  --   --   --   --   --   --  24.8*   APTT  --   --   --   --   --   --  41.1*         Lab 12/23/24  0348 12/22/24  0302 12/21/24  0215 12/20/24  1324 12/20/24  0229   SODIUM 137 137 139 137 141   POTASSIUM 3.5 4.0 3.8 4.4 4.7    CHLORIDE 96* 99 100 98 101   CO2 27.0 28.0 25.0 24.0 25.0   ANION GAP 14.0 10.0 14.0 15.0 15.0   BUN 41* 39* 37* 34* 37*   CREATININE 1.01* 1.09* 1.04* 0.93 1.11*   EGFR 54.3* 49.6* 52.5* 60.0* 48.5*   GLUCOSE 307* 173* 233* 315* 229*   CALCIUM 8.7 9.1 8.8 9.4 9.8   MAGNESIUM  --  2.2 2.0 2.3 1.9   TSH  --   --   --   --  6.910*         Lab 12/20/24 0229   TOTAL PROTEIN 6.5   ALBUMIN 4.0   GLOBULIN 2.5   ALT (SGPT) 15   AST (SGOT) 19   BILIRUBIN 0.2   ALK PHOS 74         Lab 12/20/24  0517 12/20/24  0305 12/20/24 0229   PROBNP  --   --  1,338.0   HSTROP T 21* 22*  --    PROTIME  --   --  24.8*   INR  --   --  2.24*                 Brief Urine Lab Results  (Last result in the past 365 days)        Color   Clarity   Blood   Leuk Est   Nitrite   Protein   CREAT   Urine HCG        12/20/24 0458 Yellow   Clear   Negative   Small (1+)   Positive   Negative                 Microbiology Results (last 10 days)       Procedure Component Value - Date/Time    Urine Culture - Urine, Urine, Clean Catch [[id number]]  (Abnormal)  (Susceptibility) Collected: 12/20/24 0458    Lab Status: Final result Specimen: Urine, Clean Catch Updated: 12/22/24 0959     Urine Culture >100,000 CFU/mL Escherichia coli    Narrative:      Colonization of the urinary tract without infection is common. Treatment is discouraged unless the patient is symptomatic, pregnant, or undergoing an invasive urologic procedure.    Susceptibility        Escherichia coli      MARLENE      Amoxicillin + Clavulanate Susceptible      Ampicillin Susceptible      Ampicillin + Sulbactam Susceptible      Cefazolin (Urine) Susceptible      Cefepime Susceptible      Ceftazidime Susceptible      Ceftriaxone Susceptible      Gentamicin Susceptible      Levofloxacin Susceptible      Nitrofurantoin Susceptible      Piperacillin + Tazobactam Susceptible      Trimethoprim + Sulfamethoxazole Susceptible                                   CT Angiogram Chest    Result Date:  12/20/2024  CT ANGIOGRAM CHEST Date of Exam: 12/20/2024 5:06 AM EST Indication: shortness of breath. Comparison: CT scan of the chest 9/19/2024; 6/29/2023 Technique: CTA of the chest was performed after the uneventful intravenous administration of 80 mL Isovue-370. Reconstructed coronal and sagittal images were also obtained. In addition, a 3-D volume rendered image was created for interpretation. Automated exposure control and iterative reconstruction methods were used. Findings: Hilum and Mediastinum: There are stable pretracheal, bilateral hilar, subcarinal and as ago esophageal recess nodes. These enlarged lymph nodes could be related to the presence of the pulmonary vascular congestion, lymphoproliferative disorder or metastatic disease felt less likely considering the long-term stability. Normal heart size.   No pericardial effusion.  Unremarkable thoracic aorta and pulmonary arteries. There is moderate coronary artery calcific atherosclerosis. Excellent pulmonary arterial opacification with no evidence of filling defect to suggest pulmonary embolic disease. Lung Parenchyma and Pleura: There are diffuse bilateral groundglass attenuation changes throughout both lungs with mild interlobular septal thickening consistent with pulmonary edema. There are no suspicious pulmonary nodules. There are no focal consolidations to indicate lobar pneumonia. Upper Abdomen: Surgically absent gallbladder. There is a small hiatal hernia with fluid in the distal esophagus consistent with reflux. Soft tissues: Unremarkable. Osseous structures: No aggressive focal lytic or sclerotic osseous lesions.     Impression: 1.No evidence of pulmonary embolic disease. 2.Diffuse bilateral groundglass attenuation changes with interlobular septal thickening consistent with pulmonary edema. 3.Stable mediastinal and hilar adenopathy. Electronically Signed: Ari An MD  12/20/2024 5:22 AM EST  Workstation ID: [id number]    XR Chest 1  View    Result Date: 12/20/2024  XR CHEST 1 VW Date of Exam: 12/20/2024 2:29 AM EST Indication: palpitations Comparison: Chest x-ray 9/19/2024 Findings: Heart size and pulmonary vascular markings are normal. There is mild prominence of pulm interstitium. There are no focal consolidations indicate pneumonia. The osseous structures are normal.     Impression: Mild prominence of the pulm interstitium. No active disease. Electronically Signed: Ari An MD  12/20/2024 3:10 AM EST  Workstation ID: [id number]             Results for orders placed during the hospital encounter of 12/20/24    Adult Transthoracic Echo Limited W/ Cont if Necessary Per Protocol    Interpretation Summary    Left ventricular ejection fraction appears to be 66 - 70%.    Left ventricular wall thickness is consistent with mild concentric hypertrophy.      Plan for Follow-up of Pending Labs/Results:     Discharge Details        Discharge Medications        New Medications        Instructions Start Date   acetaminophen 325 MG tablet  Commonly known as: TYLENOL   650 mg, Oral, Every 4 Hours PRN      amiodarone 200 MG tablet  Commonly known as: PACERONE   200 mg, Oral, 2 Times Daily With Meals, Will change to once daily after initial 14 day course.      cefuroxime 500 MG tablet  Commonly known as: CEFTIN   500 mg, Oral, 2 Times Daily      polyethylene glycol 17 g packet  Commonly known as: MIRALAX   17 g, Oral, Daily PRN             Changes to Medications        Instructions Start Date   furosemide 40 MG tablet  Commonly known as: Lasix  What changed:   medication strength  See the new instructions.   40 mg, Oral, Daily      Januvia 100 MG tablet  Generic drug: SITagliptin  What changed: how much to take   Take 1 tablet by mouth once daily      metoprolol succinate XL 25 MG 24 hr tablet  Commonly known as: TOPROL-XL  What changed: medication strength   25 mg, Oral, Daily   Start Date: December 24, 2024            Continue These Medications         Instructions Start Date   Accu-Chek Guide test strip  Generic drug: glucose blood   USE 1 STRIP TO CHECK BLOOD SUGAR ONCE DAILY dx e11.65      Accu-Chek Softclix Lancets lancets       atorvastatin 40 MG tablet  Commonly known as: LIPITOR   40 mg, Nightly      fish oil 1000 MG capsule capsule   1,000 mg, Daily With Breakfast      glipizide 2.5 MG 24 hr tablet  Commonly known as: GLUCOTROL XL   5 mg, Daily      metFORMIN  MG 24 hr tablet  Commonly known as: GLUCOPHAGE-XR   1,000 mg, Oral, 2 Times Daily With Meals      oxybutynin XL 5 MG 24 hr tablet  Commonly known as: DITROPAN-XL   5 mg, Daily      rivaroxaban 15 MG tablet  Commonly known as: XARELTO   15 mg, Oral, Daily With Dinner      Vitamin D3 50 MCG (2000 UT) tablet   2,000 Units, Daily             Stop These Medications      amLODIPine 5 MG tablet  Commonly known as: NORVASC     Cinnamon 500 MG capsule              Allergies   Allergen Reactions    Adhesive Tape Rash    Latex Rash    Penicillins Rash         Discharge Disposition:  Home or Self Care    Diet:  Hospital:  Diet Order   Procedures    Diet: Cardiac, Diabetic; Healthy Heart (2-3 Na+); Consistent Carbohydrate; Fluid Consistency: Thin (IDDSI 0)       Diet Instructions       Diet: Diabetic Diets, Cardiac Diets; Healthy Heart (2-3 Na+); Regular (IDDSI 7); Thin (IDDSI 0); Consistent Carbohydrate      Discharge Diet:  Diabetic Diets  Cardiac Diets       Cardiac Diet: Healthy Heart (2-3 Na+)    Texture: Regular (IDDSI 7)    Fluid Consistency: Thin (IDDSI 0)    Diabetic Diet: Consistent Carbohydrate             Activity:  Activity Instructions       Activity as Tolerated      Measure Blood Pressure      Measure Weight              Restrictions or Other Recommendations:         CODE STATUS:    Code Status and Medical Interventions: CPR (Attempt to Resuscitate); Full Support   Ordered at: 12/20/24 1107     Code Status (Patient has no pulse and is not breathing):    CPR (Attempt to Resuscitate)      Medical Interventions (Patient has pulse or is breathing):    Full Support       Future Appointments   Date Time Provider Department Center   12/30/2024 11:00 AM Sonya Jaime APRN [facility]   1/21/2025 11:30 AM Dick Kraus MD [facility]   4/15/2025  8:00 AM Leigh Tijerina, APRN [facility]   5/21/2025 11:00 AM Harley Rose MD [facility]   6/19/2025  8:30 AM Donita Cat, APRN [facility]       Additional Instructions for the Follow-ups that You Need to Schedule       Discharge Follow-up with PCP   As directed       Currently Documented PCP:    Lizzette Multani MD    PCP Phone Number:    [phone]     Follow Up Details: PCP 1 week of discharge (please schedule appointment prior to DC)        Discharge Follow-up with Specialty: Follow-up [facility] in 1 week of discharge (please schedule appointment prior to DC)   As directed      Specialty: Follow-up [facility] in 1 week of discharge (please schedule appointment prior to DC)        Discharge Follow-up with Specified Provider: Dr. Kraus with [facility] in 4 weeks (please schedule appointment prior to DC)   As directed      To: Dr. Kraus with [facility] in 4 weeks (please schedule appointment prior to DC)        Discharge Follow-up with Specified Provider: Patient to resume outpatient physical therapy as prior to admit   As directed      To: Patient to resume outpatient physical therapy as prior to admit                  Suki Keyes, VALERIANO  12/23/24      Time Spent on Discharge:  I spent 40 minutes on this discharge activity which included: face-to-face encounter with the patient, reviewing the data in the system, coordination of the care with the nursing staff as well as consultants, documentation, and entering orders.

## 2024-12-23 NOTE — CASE MANAGEMENT/SOCIAL WORK
Case Management Discharge Note      Final Note: Plan is home with [facility]. Spouse will transport. No other discharge needs identified.         Selected Continued Care - Admitted Since 12/20/2024       Destination    No services have been selected for the patient.                Durable Medical Equipment    No services have been selected for the patient.                Dialysis/Infusion    No services have been selected for the patient.                Home Medical Care    No services have been selected for the patient.                Therapy Coordination complete.      Service Provider Services Address Phone Fax Patient Preferred    [facility] [facility] [address] [phone] --              Community Resources    No services have been selected for the patient.                Community & DME    No services have been selected for the patient.                         Final Discharge Disposition Code: 01 - home or self-care

## 2024-12-23 NOTE — THERAPY TREATMENT NOTE
Patient Name: Kaylen Garrison  : 1940    MRN: [id number]                              Today's Date: 2024       Admit Date: 2024    Visit Dx:     ICD-10-CM ICD-9-CM   1. Atrial fibrillation with rapid ventricular response  I48.91 427.31   2. Acute respiratory failure with hypoxia  J96.01 518.81   3. Acute pulmonary edema  J81.0 518.4   4. Acute cystitis without hematuria  N30.00 595.0   5. Elevated INR  R79.1 790.92   6. Essential hypertension  I10 401.9   7. Dyslipidemia  E78.5 272.4   8. Type 2 diabetes mellitus with hyperglycemia, without long-term current use of insulin  E11.65 250.00     790.29     Patient Active Problem List   Diagnosis    Syncope    Essential hypertension    Dyslipidemia    Overweight (BMI 25.0-29.9)    Meniere's disease    Dyspnea on exertion    BERNARD treated with BiPAP    Primary hyperparathyroidism    Osteopenia    Type 2 diabetes mellitus with hyperglycemia, without long-term current use of insulin    Exercise hypoxemia    Paroxysmal atrial fibrillation    Right bundle branch block    Valvular heart disease    Adrenal nodule    Multiple thyroid nodules    A-fib    Atrial fibrillation with RVR     Past Medical History:   Diagnosis Date    Aortic valve stenosis     Atrial fibrillation     Benign hypertension     History of echocardiogram 2012    Hypercalcemia     Hypercholesterolemia     Hypertension     Meniere's disease     Obesity     Overweight (BMI 25.0-29.9)     Primary hyperparathyroidism     Sleep apnea with use of continuous positive airway pressure (CPAP)     Syncope 2012    Type 2 diabetes mellitus     Vitamin D deficiency      Past Surgical History:   Procedure Laterality Date    BREAST SURGERY Left     benign tumor removal    CATARACT EXTRACTION Bilateral     CHOLECYSTECTOMY N/A 2023    Procedure: CHOLECYSTECTOMY LAPAROSCOPIC;  Surgeon: Abraham Holbrook MD;  Location: [facility];  Service: General;  Laterality: N/A;    ERCP N/A 2023     Procedure: ENDOSCOPIC RETROGRADE CHOLANGIOPANCREATOGRAPHY WITH STENT PLACEMENT;  Surgeon: Brunner, Mark I, MD;  Location: [facility] END[facility];  Service: Gastroenterology;  Laterality: N/A;  Sphincterotomy made to ampula of coomon bile duct. CBD swept with 9mm-12 mm balloon. Multiple stones and sludge removed. Stent placed in pancreatic duct    HYSTERECTOMY        General Information       Row Name 12/23/24 1134          Physical Therapy Time and Intention    Document Type therapy note (daily note)  -KE     Mode of Treatment physical therapy  -KE       Row Name 12/23/24 1134          General Information    Patient Profile Reviewed yes  -KE     Existing Precautions/Restrictions fall;other (see comments);oxygen therapy device and L/min  -KE     Barriers to Rehab none identified  -KE       Row Name 12/23/24 1134          Cognition    Orientation Status (Cognition) oriented x 4  -KE       Row Name 12/23/24 1134          Safety Issues/Impairments Affecting Functional Mobility    Safety Issues Affecting Function (Mobility) awareness of need for assistance;insight into deficits/self-awareness;safety precaution awareness;judgment  -KE     Impairments Affecting Function (Mobility) balance;endurance/activity tolerance;shortness of breath;strength  -KE               User Key  (r) = Recorded By, (t) = Taken By, (c) = Cosigned By      Initials Name Provider Type    Katie Paz, PT Physical Therapist                   Mobility       Row Name 12/23/24 1135          Bed Mobility    Comment, (Bed Mobility) UIC pre/post tx  -ASHWIN       Row Name 12/23/24 1133          Transfers    Comment, (Transfers) VCs for attention to PLB, Pt noted to desat to 85% w/ mobility on 2L, quickly recovering to >90 w/ VCs for PLB and rest  -KE       Row Name 12/23/24 1132          Sit-Stand Transfer    Sit-Stand [place] (Transfers) contact guard;verbal cues  -ASHWIN     Assistive Device (Sit-Stand Transfers) walker, front-wheeled  -ASHWIN     Comment,  (Sit-Stand Transfer) VCs for improving HP  -KE       Row Name 12/23/24 1135          Gait/Stairs (Locomotion)    [place] Level (Gait) contact guard  -KE     Assistive Device (Gait) walker, front-wheeled  -KE     Patient was able to Ambulate yes  -KE     Distance in Feet (Gait) 75  +90'+60'  -KE     Deviations/Abnormal Patterns (Gait) jacob decreased;gait speed decreased;stride length decreased;bilateral deviations  -KE     Bilateral Gait Deviations forward flexed posture;heel strike decreased  -KE     Comment, (Gait/Stairs) Pt demo step through gait pattern w/ slow gait speed and fwd flexed posture. Vcs for upright posturing. Further mobility limited by fatigue.  -KE               User Key  (r) = Recorded By, (t) = Taken By, (c) = Cosigned By      Initials Name Provider Type    Katie Paz, LELA Physical Therapist                   Obj/Interventions       Row Name 12/23/24 1138          Motor Skills    Therapeutic Exercise hip;knee;ankle  -       Row Name 12/23/24 1138          Hip (Therapeutic Exercise)    Hip (Therapeutic Exercise) strengthening exercise  -     Hip Strengthening (Therapeutic Exercise) mini squats;10 repetitions;bilateral;heel slides  standing side steps w/ UE support on railing 2x10 each way  -       Row Name 12/23/24 1138          Knee (Therapeutic Exercise)    Knee (Therapeutic Exercise) strengthening exercise  -     Knee Strengthening (Therapeutic Exercise) LAQ (long arc quad);SLR (straight leg raise)  -       Row Name 12/23/24 1138          Ankle (Therapeutic Exercise)    Ankle (Therapeutic Exercise) strengthening exercise  -     Ankle Strengthening (Therapeutic Exercise) dorsiflexion;plantarflexion;10 repetitions;bilateral  -       Row Name 12/23/24 1138          Balance    Balance Assessment sitting static balance;sitting dynamic balance;standing static balance;standing dynamic balance  -     Static Sitting Balance modified independence  -     Dynamic Sitting  Balance standby assist  -KE     Position, Sitting Balance sitting in chair  -KE     Static Standing Balance standby assist  -KE     Dynamic Standing Balance contact guard  -     Position/Device Used, Standing Balance supported;walker, front-wheeled  -KE     Balance Interventions sitting;sit to stand;supported;static;dynamic;standing  -KE     Comment, Balance no overt LOB noted  -               User Key  (r) = Recorded By, (t) = Taken By, (c) = Cosigned By      Initials Name Provider Type    Katie Paz, LELA Physical Therapist                   Goals/Plan    No documentation.                  Clinical Impression       Row Name 12/23/24 1141          Pain    Pain Location knee  -     Pain Side/Orientation right  -     Pain Management Interventions exercise or physical activity utilized;positioning techniques utilized  -KE     Response to Pain Interventions activity participation with tolerable pain  -     Additional Documentation Pain Scale: FACES Pre/Post-Treatment (Group)  -       Row Name 12/23/24 1141          Pain Scale: FACES Pre/Post-Treatment    Pain: FACES Scale, Pretreatment 2-->hurts little bit  -KE     Posttreatment Pain Rating 2-->hurts little bit  -       Row Name 12/23/24 1141          Plan of Care Review    Plan of Care Reviewed With patient  -KE     Progress improving  -     Outcome Evaluation Pt ambulating multiple bouts of gait w/ FWW, CGA. Pt noted to desat to 85% w/ mobility on 2L NC, quickly recovering to >90% w/ rest and VCs for PLB. Pt will continue to benefit from IPPT to address deficits in strength, balance and functional endurance. Pt motivated to participate in therapy session. PT rec home w/ assist and OPPT.  -       Row Name 12/23/24 1141          Vital Signs    Pretreatment Heart Rate (beats/min) 72  -KE     Intratreatment Heart Rate (beats/min) 91  -KE     Posttreatment Heart Rate (beats/min) 80  -KE     Pre SpO2 (%) 94  -KE     O2 Delivery Pre Treatment  supplemental O2  -KE     Intra SpO2 (%) 85   -KE     O2 Delivery Intra Treatment supplemental O2  -KE     Post SpO2 (%) 93  -KE     O2 Delivery Post Treatment supplemental O2  -KE     Pre Patient Position Sitting  -KE     Intra Patient Position Standing  -KE     Post Patient Position Sitting  -KE       Row Name 12/23/24 1141          Positioning and Restraints    Pre-Treatment Position sitting in chair/recliner  -KE     Post Treatment Position chair  -KE     In Chair notified nsg;reclined;waffle cushion;call light within reach;encouraged to call for assist;exit alarm on;legs elevated  -KE               User Key  (r) = Recorded By, (t) = Taken By, (c) = Cosigned By      Initials Name Provider Type    Katie Paz PT Physical Therapist                   Outcome Measures       Row Name 12/23/24 1144 12/23/24 0800       How much help from another person do you currently need...    Turning from your back to your side while in flat bed without using bedrails? 4  -KE 4  -GH    Moving from lying on back to sitting on the side of a flat bed without bedrails? 3  -KE 3  -GH    Moving to and from a bed to a chair (including a wheelchair)? 3  -KE 3  -GH    Standing up from a chair using your arms (e.g., wheelchair, bedside chair)? 3  -KE 3  -GH    Climbing 3-5 steps with a railing? 3  -KE 3  -GH    To walk in hospital room? 3  -KE 3  -GH    AM-PAC 6 Clicks Score (PT) 19  -KE 19  -GH    Highest Level of Mobility Goal 6 --> Walk 10 steps or more  -KE 6 --> Walk 10 steps or more  -GH      Row Name 12/23/24 1144          Functional Assessment    Outcome Measure Options AM-PAC 6 Clicks Basic Mobility (PT)  -KE               User Key  (r) = Recorded By, (t) = Taken By, (c) = Cosigned By      Initials Name Provider Type    Tracy De Jesus, RN Registered Nurse    Katie Paz PT Physical Therapist                                 Physical Therapy Education       Title: PT OT SLP Therapies (Resolved)       Topic: Physical  Therapy (Resolved)       Point: Mobility training (Resolved)       Learning Progress Summary            Patient Acceptance, E, VU,NR by NS at 12/20/2024 1625    Comment: line management, use of RW upon discharge home, importance of monitoring vitals while in the hospital                      Point: Home exercise program (Resolved)       Learner Progress:  Not documented in this visit.              Point: Body mechanics (Resolved)       Learning Progress Summary            Patient Acceptance, E, VU,NR by NS at 12/20/2024 1625    Comment: line management, use of RW upon discharge home, importance of monitoring vitals while in the hospital                      Point: Precautions (Resolved)       Learning Progress Summary            Patient Acceptance, E, VU,NR by NS at 12/20/2024 1625    Comment: line management, use of RW upon discharge home, importance of monitoring vitals while in the hospital                                      User Key       Initials Effective Dates Name Provider Type Discipline    NS 06/16/21 -  Mayi Agrawal, PT Physical Therapist PT                  PT Recommendation and Plan     Progress: improving  Outcome Evaluation: Pt ambulating multiple bouts of gait w/ FWW, CGA. Pt noted to desat to 85% w/ mobility on 2L NC, quickly recovering to >90% w/ rest and VCs for PLB. Pt will continue to benefit from IPPT to address deficits in strength, balance and functional endurance. Pt motivated to participate in therapy session. PT rec home w/ assist and OPPT.     Time Calculation:         PT Charges       Row Name 12/23/24 1145             Time Calculation    Start Time 1006  -KE      PT Received On 12/23/24  -KE         Timed Charges    61734 - PT Therapeutic Exercise Minutes 13  -KE      27964 - Gait Training Minutes  15  -KE      38335 - PT Therapeutic Activity Minutes 10  -KE         Total Minutes    Timed Charges Total Minutes 38  -KE       Total Minutes 38  -KE                User Key  (r) =  Recorded By, (t) = Taken By, (c) = Cosigned By      Initials Name Provider Type    Katie Paz, LELA Physical Therapist                  Therapy Charges for Today       Code Description Service Date Service Provider Modifiers Qty    77893247388  PT THER PROC EA 15 MIN 12/23/2024 Katie Smith, PT GP 1    [id number]  GAIT TRAINING EA 15 MIN 12/23/2024 Katie Smith, PT GP 1    [id number]  PT THERAPEUTIC ACT EA 15 MIN 12/23/2024 Katie Smith, PT GP 1            PT G-Codes  Outcome Measure Options: AM-PAC 6 Clicks Basic Mobility (PT)  AM-PAC 6 Clicks Score (PT): 19  AM-PAC 6 Clicks Score (OT): 19  PT Discharge Summary  Anticipated Discharge Disposition (PT): home with assist, home with outpatient therapy services    Katie Smith PT  12/23/2024

## 2024-12-23 NOTE — PLAN OF CARE
Goal Outcome Evaluation:         Patient to be discharged home today with spouse.

## 2024-12-23 NOTE — PROGRESS NOTES
LOS: 0 days   Patient Care Team:  Lizzette Multani MD as PCP - General (Internal Medicine)  Harley Rose MD as Consulting Physician (Endocrinology)  Matilde Alvarez MD as Consulting Physician (Cardiology)  Leigh Tijerina APRN as Nurse Practitioner (Pulmonary Disease)    Chief Complaint: Atrial fibrillation    Subjective     Interval History:     Events/notes since last encounter reviewed.  Patient feeling better at this time.  Heart rate more controlled.  No chest pain or dyspnea.    Review of Systems:    12 point review of systems reviewed pertinent for those mentioned in HPI    Objective     Vital Signs  Temp:  [96.4 °F (35.8 °C)-97.9 °F (36.6 °C)] 97.9 °F (36.6 °C)  Heart Rate:  [69-78] 77  Resp:  [18] 18  BP: (108-118)/(54-78) 117/76  Body mass index is 26.34 kg/m².    Intake/Output Summary (Last 24 hours) at 12/23/2024 0739  Last data filed at 12/23/2024 0530  Gross per 24 hour   Intake 1450 ml   Output 3500 ml   Net -2050 ml     No intake/output data recorded.    Physical Exam:     General Appearance:  Alert, cooperative, in no acute distress   Head:  Normocephalic, without obvious abnormality, atraumatic   Eyes:  Lids and lashes normal, conjunctivae and sclerae normal, no icterus, no pallor, corneas clear, PERRLA   Ears:  Ears appear intact with no abnormalities noted   Throat:  No oral lesions, no thrush, oral mucosa moist   Neck:  No adenopathy, supple, trachea midline, no thyromegaly, no carotid bruit, no JVD   Back:  No kyphosis present, no scoliosis present, no skin lesions, erythema or scars, no tenderness to percussion, or palpation, range of motion normal   Lungs:  Irregularly irregular no murmurs rubs gallops    Heart:  Regular rhythm and normal rate, normal S1 and S2, no murmur, no gallop, no rub, no click   Breast Exam:  Deferred   Abdomen:  Normal bowel sounds, no masses, no organomegaly, soft non-tender, non-distended, no guarding, no rebound tenderness   Genitalia:   "Deferred   Extremities:  Moves all extremities well, no edema, no cyanosis, no redness   Pulses:  Pulses palpable and equal bilaterally   Skin:  No bleeding, bruising or rash   Lymph nodes:  No palpable adenopathy   Neurologic:  Cranial nerves 2 - 12 grossly intact, sensation intact, DTR present and equal bilaterally        Results Review:     I reviewed the patient's new clinical results.  I reviewed the patient's new imaging results and agree with the interpretation.  I reviewed the patient's other test results and agree with the interpretation  I personally viewed and interpreted the patient's EKG/Telemetry data      WBC WBC   Date Value Ref Range Status   12/22/2024 10.40 3.40 - 10.80 10*3/mm3 Final   12/21/2024 8.26 3.40 - 10.80 10*3/mm3 Final   12/20/2024 9.93 3.40 - 10.80 10*3/mm3 Final      HGB Hemoglobin   Date Value Ref Range Status   12/22/2024 14.2 12.0 - 15.9 g/dL Final   12/21/2024 13.1 12.0 - 15.9 g/dL Final   12/20/2024 13.6 12.0 - 15.9 g/dL Final      HCT Hematocrit   Date Value Ref Range Status   12/22/2024 44.4 34.0 - 46.6 % Final   12/21/2024 40.5 34.0 - 46.6 % Final   12/20/2024 41.7 34.0 - 46.6 % Final      Platlets No results found for: \"LABPLAT\"     PT/INR:  No results found for: \"PROTIME\"/No results found for: \"INR\"    Sodium Sodium   Date Value Ref Range Status   12/23/2024 137 136 - 145 mmol/L Final   12/22/2024 137 136 - 145 mmol/L Final   12/21/2024 139 136 - 145 mmol/L Final   12/20/2024 137 136 - 145 mmol/L Final      Potassium Potassium   Date Value Ref Range Status   12/23/2024 3.5 3.5 - 5.2 mmol/L Final   12/22/2024 4.0 3.5 - 5.2 mmol/L Final   12/21/2024 3.8 3.5 - 5.2 mmol/L Final     Comment:     Slight hemolysis detected by analyzer. Result may be falsely elevated.   12/20/2024 4.4 3.5 - 5.2 mmol/L Final      Chloride Chloride   Date Value Ref Range Status   12/23/2024 96 (L) 98 - 107 mmol/L Final   12/22/2024 99 98 - 107 mmol/L Final   12/21/2024 100 98 - 107 mmol/L Final " "  12/20/2024 98 98 - 107 mmol/L Final      Bicarbonate No results found for: \"PLASMABICARB\"   BUN BUN   Date Value Ref Range Status   12/23/2024 41 (H) 8 - 23 mg/dL Final   12/22/2024 39 (H) 8 - 23 mg/dL Final   12/21/2024 37 (H) 8 - 23 mg/dL Final   12/20/2024 34 (H) 8 - 23 mg/dL Final      Creatinine Creatinine   Date Value Ref Range Status   12/23/2024 1.01 (H) 0.57 - 1.00 mg/dL Final   12/22/2024 1.09 (H) 0.57 - 1.00 mg/dL Final   12/21/2024 1.04 (H) 0.57 - 1.00 mg/dL Final   12/20/2024 0.93 0.57 - 1.00 mg/dL Final      Calcium Calcium   Date Value Ref Range Status   12/23/2024 8.7 8.6 - 10.5 mg/dL Final   12/22/2024 9.1 8.6 - 10.5 mg/dL Final   12/21/2024 8.8 8.6 - 10.5 mg/dL Final   12/20/2024 9.4 8.6 - 10.5 mg/dL Final      Magnesium Magnesium   Date Value Ref Range Status   12/22/2024 2.2 1.6 - 2.4 mg/dL Final   12/21/2024 2.0 1.6 - 2.4 mg/dL Final   12/20/2024 2.3 1.6 - 2.4 mg/dL Final            pH No results found for: \"PHART\"   pO2 No results found for: \"PO2ART\"   pCO2 No results found for: \"HWM6EYY\"   HCO3 No results found for: \"BQV9XAV\"     amiodarone, 200 mg, Oral, BID With Meals  atorvastatin, 40 mg, Oral, Nightly  cefTRIAXone, 1,000 mg, Intravenous, Q24H  furosemide, 60 mg, Intravenous, Q12H  insulin glargine, 4 Units, Subcutaneous, Nightly  Insulin Lispro, 2 Units, Subcutaneous, TID With Meals  insulin lispro, 2-7 Units, Subcutaneous, 4x Daily AC & at Bedtime  metoprolol succinate XL, 25 mg, Oral, Daily  oxybutynin XL, 5 mg, Oral, Daily  potassium chloride ER, 40 mEq, Oral, Q4H  rivaroxaban, 15 mg, Oral, Daily With Dinner  sodium chloride, 10 mL, Intravenous, Q12H           Medication Review: Reviewed    Assessment & Plan     Patient Active Problem List   Diagnosis Code    Syncope R55    Essential hypertension I10    Dyslipidemia E78.5    Overweight (BMI 25.0-29.9) E66.3    Meniere's disease H81.09    Dyspnea on exertion R06.09    BERNARD treated with BiPAP G47.33    Primary hyperparathyroidism E21.0 "    Osteopenia M85.80    Type 2 diabetes mellitus with hyperglycemia, without long-term current use of insulin E11.65    Exercise hypoxemia R09.02    Paroxysmal atrial fibrillation I48.0    Right bundle branch block I45.10    Valvular heart disease I38    Adrenal nodule E27.9    Multiple thyroid nodules E04.2    A-fib I48.91    Atrial fibrillation with RVR I48.91       Persistent atrial fibrillation  Acute on chronic diastolic heart failure  Hypertension  Chronic UTI    Plan: Can discharge from my perspective on amiodarone 200 mg twice daily for 2 weeks then 200 mg daily thereafter.  Can discharge home on Lasix 40 mg daily and off hydrochlorothiazide.  Will follow in heart valve in 1 week myself in 4 to 6 weeks.  If still having issues in 4 to 6 weeks can consider cardioversion at that time.  Discussed with patient and son over the phone who wish not to have cardioversion unless absolutely necessary.        Dick Kraus MD  12/23/24  07:39 EST

## 2024-12-23 NOTE — PLAN OF CARE
Goal Outcome Evaluation:  Plan of Care Reviewed With: patient        Progress: improving  Outcome Evaluation: Pt ambulating multiple bouts of gait w/ FWW, CGA. Pt noted to desat to 85% w/ mobility on 2L NC, quickly recovering to >90% w/ rest and VCs for PLB. Pt will continue to benefit from IPPT to address deficits in strength, balance and functional endurance. Pt motivated to participate in therapy session. PT rec home w/ assist and OPPT.    Anticipated Discharge Disposition (PT): home with assist, home with outpatient therapy services                         18

## 2024-12-23 NOTE — THERAPY TREATMENT NOTE
Patient Name: Kaylen Garrison  : 1940    MRN: [id number]                              Today's Date: 2024       Admit Date: 2024    Visit Dx:     ICD-10-CM ICD-9-CM   1. Atrial fibrillation with rapid ventricular response  I48.91 427.31   2. Acute respiratory failure with hypoxia  J96.01 518.81   3. Acute pulmonary edema  J81.0 518.4   4. Acute cystitis without hematuria  N30.00 595.0   5. Elevated INR  R79.1 790.92   6. Essential hypertension  I10 401.9   7. Dyslipidemia  E78.5 272.4   8. Type 2 diabetes mellitus with hyperglycemia, without long-term current use of insulin  E11.65 250.00     790.29     Patient Active Problem List   Diagnosis    Syncope    Essential hypertension    Dyslipidemia    Overweight (BMI 25.0-29.9)    Meniere's disease    Dyspnea on exertion    BERNARD treated with BiPAP    Primary hyperparathyroidism    Osteopenia    Type 2 diabetes mellitus with hyperglycemia, without long-term current use of insulin    Exercise hypoxemia    Paroxysmal atrial fibrillation    Right bundle branch block    Valvular heart disease    Adrenal nodule    Multiple thyroid nodules    A-fib    Atrial fibrillation with RVR     Past Medical History:   Diagnosis Date    Aortic valve stenosis     Atrial fibrillation     Benign hypertension     History of echocardiogram 2012    Hypercalcemia     Hypercholesterolemia     Hypertension     Meniere's disease     Obesity     Overweight (BMI 25.0-29.9)     Primary hyperparathyroidism     Sleep apnea with use of continuous positive airway pressure (CPAP)     Syncope 2012    Type 2 diabetes mellitus     Vitamin D deficiency      Past Surgical History:   Procedure Laterality Date    BREAST SURGERY Left     benign tumor removal    CATARACT EXTRACTION Bilateral     CHOLECYSTECTOMY N/A 2023    Procedure: CHOLECYSTECTOMY LAPAROSCOPIC;  Surgeon: Abraham Holbrook MD;  Location: [facility];  Service: General;  Laterality: N/A;    ERCP N/A 2023     Procedure: ENDOSCOPIC RETROGRADE CHOLANGIOPANCREATOGRAPHY WITH STENT PLACEMENT;  Surgeon: Brunner, Mark I, MD;  Location: [facility] ENDOSCOPY;  Service: Gastroenterology;  Laterality: N/A;  Sphincterotomy made to ampula of coomon bile duct. CBD swept with 9mm-12 mm balloon. Multiple stones and sludge removed. Stent placed in pancreatic duct    HYSTERECTOMY        General Information       Row Name 12/23/24 1528          OT Time and Intention    Document Type therapy note (daily note)  -     Mode of Treatment occupational therapy  -     Patient Effort excellent  -       Row Name 12/23/24 1528          General Information    Patient Profile Reviewed yes  -     Existing Precautions/Restrictions fall;oxygen therapy device and L/min  -     Barriers to Rehab none identified  -       Row Name 12/23/24 1528          Cognition    Orientation Status (Cognition) oriented x 4  -       Row Name 12/23/24 1528          Safety Issues/Impairments Affecting Functional Mobility    Safety Issues Affecting Function (Mobility) safety precaution awareness;safety precautions follow-through/compliance  -     Impairments Affecting Function (Mobility) balance;endurance/activity tolerance;shortness of breath;strength  -               User Key  (r) = Recorded By, (t) = Taken By, (c) = Cosigned By      Initials Name Provider Type     Rosalina Spivey OT Occupational Therapist                     Mobility/ADL's       Row Name 12/23/24 1528          Sit-Stand Transfer    Sit-Stand [place] (Transfers) contact guard;verbal cues  -     Assistive Device (Sit-Stand Transfers) walker, front-wheeled  -       Row Name 12/23/24 1528          Functional Mobility    Functional Mobility- Ind. Level contact guard assist;verbal cues required;1 person  -     Functional Mobility- Device walker, front-wheeled  -     Functional Mobility-Distance (Feet) --  HH distances  -     Functional Mobility- Safety Issues supplemental O2  -        Row Name 12/23/24 1528          Activities of Daily Living    BADL Assessment/Intervention grooming  -       Row Name 12/23/24 1528          Grooming Assessment/Training    [place] Level (Grooming) wash face, hands;oral care regimen;standby assist  -     Position (Grooming) sink side;unsupported standing  -               User Key  (r) = Recorded By, (t) = Taken By, (c) = Cosigned By      Initials Name Provider Type    Rosalina Kathleen OT Occupational Therapist                   Obj/Interventions       Row Name 12/23/24 1529          Balance    Static Sitting Balance independent  -     Dynamic Sitting Balance modified independence  -     Position, Sitting Balance unsupported;sitting in chair  -     Static Standing Balance standby assist  -     Dynamic Standing Balance supervision  -     Position/Device Used, Standing Balance supported  -     Balance Interventions sitting;standing;sit to stand;supported;static;dynamic;occupation based/functional task  -[facility] Name 12/23/24 1529          Knee (Therapeutic Exercise)    Knee (Therapeutic Exercise) AROM (active range of motion)  -     Knee AROM (Therapeutic Exercise) bilateral;standing;other (see comments);15 repititions  squat  -[facility] Name 12/23/24 1529          Hip (Therapeutic Exercise)    Hip (Therapeutic Exercise) AROM (active range of motion)  -     Hip AROM (Therapeutic Exercise) bilateral;hamstring stretch;15 repititions  -               User Key  (r) = Recorded By, (t) = Taken By, (c) = Cosigned By      Initials Name Provider Type    Rosalina Kathleen OT Occupational Therapist                   Goals/Plan    No documentation.                  Clinical Impression       Row Name 12/23/24 1532          Pain Assessment    Pretreatment Pain Rating 0/10 - no pain  -     Posttreatment Pain Rating 0/10 - no pain  -[facility] Name 12/23/24 1532          Plan of Care Review    Plan of Care Reviewed With patient  -     Progress  improving  -KL     Outcome Evaluation Pt continues to benefit from IPOT services to address deficits in order to progress towards PLOF. Pt SBA-CGA for all TA. pt set to d/c home w/ assist.  -       Row Name 12/23/24 1532          Therapy Plan Review/Discharge Plan (OT)    Anticipated Discharge Disposition (OT) home with assist;home with outpatient therapy services  -       Row Name 12/23/24 1532          Vital Signs    Pretreatment Heart Rate (beats/min) 81  -KL     Posttreatment Heart Rate (beats/min) 73  -KL     Pre SpO2 (%) 94  -KL     O2 Delivery Pre Treatment nasal cannula  -KL     Pre Patient Position Sitting  -KL     Intra Patient Position Standing  -KL     Post Patient Position Sitting  -       Row Name 12/23/24 1532          Positioning and Restraints    Pre-Treatment Position sitting in chair/recliner  -KL     Post Treatment Position chair  -KL     In Chair notified nsg;reclined;sitting;call light within reach;encouraged to call for assist;exit alarm on;waffle cushion;legs elevated  -KL               User Key  (r) = Recorded By, (t) = Taken By, (c) = Cosigned By      Initials Name Provider Type    Rosalina Kathleen, FRANCISCO J Occupational Therapist                   Outcome Measures       Row Name 12/23/24 1533          How much help from another is currently needed...    Putting on and taking off regular lower body clothing? 3  -KL     Bathing (including washing, rinsing, and drying) 3  -KL     Toileting (which includes using toilet bed pan or urinal) 4  -KL     Putting on and taking off regular upper body clothing 4  -KL     Taking care of personal grooming (such as brushing teeth) 4  -KL     Eating meals 4  -KL     AM-PAC 6 Clicks Score (OT) 22  -KL       Row Name 12/23/24 1144 12/23/24 0800       How much help from another person do you currently need...    Turning from your back to your side while in flat bed without using bedrails? 4  -KE 4  -GH    Moving from lying on back to sitting on the side of a  flat bed without bedrails? 3  -KE 3  -GH    Moving to and from a bed to a chair (including a wheelchair)? 3  -KE 3  -GH    Standing up from a chair using your arms (e.g., wheelchair, bedside chair)? 3  -KE 3  -GH    Climbing 3-5 steps with a railing? 3  -KE 3  -GH    To walk in hospital room? 3  -KE 3  -GH    AM-PAC 6 Clicks Score (PT) 19  -KE 19  -GH    Highest Level of Mobility Goal 6 --> Walk 10 steps or more  -KE 6 --> Walk 10 steps or more  -GH      Row Name 12/23/24 1533 12/23/24 1144       Functional Assessment    Outcome Measure Options AM-PAC 6 Clicks Daily Activity (OT)  -KL AM-PAC 6 Clicks Basic Mobility (PT)  -ASHWIN              User Key  (r) = Recorded By, (t) = Taken By, (c) = Cosigned By      Initials Name Provider Type    Tracy De Jesus, RN Registered Nurse    Katie aPz, PT Physical Therapist    Rosalina Kathleen, OT Occupational Therapist                    Occupational Therapy Education       Title: PT OT SLP Therapies (Done)       Topic: Occupational Therapy (Done)       Point: ADL training (Done)       Description:   Instruct learner(s) on proper safety adaptation and remediation techniques during self care or transfers.   Instruct in proper use of assistive devices.                  Learning Progress Summary            Patient Acceptance, E, VU by YESSICA at 12/23/2024 1533    Acceptance, E, VU,NR by LISA at 12/20/2024 1428    Comment: reason for consult, evaluation results, vital results with activity, walker/transfer/ADL safety   Family Acceptance, E, VU,NR by LISA at 12/20/2024 1428    Comment: reason for consult, evaluation results, vital results with activity, walker/transfer/ADL safety                      Point: Home exercise program (Done)       Description:   Instruct learner(s) on appropriate technique for monitoring, assisting and/or progressing therapeutic exercises/activities.                  Learning Progress Summary            Patient Acceptance, E, VU by YESSICA at 12/23/2024 1533                       Point: Precautions (Done)       Description:   Instruct learner(s) on prescribed precautions during self-care and functional transfers.                  Learning Progress Summary            Patient Acceptance, E, VU by  at 12/23/2024 1533    Acceptance, E, VU,NR by LISA at 12/20/2024 1428    Comment: reason for consult, evaluation results, vital results with activity, walker/transfer/ADL safety   Family Acceptance, E, VU,NR by LISA at 12/20/2024 1428    Comment: reason for consult, evaluation results, vital results with activity, walker/transfer/ADL safety                      Point: Body mechanics (Done)       Description:   Instruct learner(s) on proper positioning and spine alignment during self-care, functional mobility activities and/or exercises.                  Learning Progress Summary            Patient Acceptance, E, VU by  at 12/23/2024 1533                                      User Key       Initials Effective Dates Name Provider Type Discipline     07/11/23 -  Vernell Miller OT Occupational Therapist OT     02/05/24 -  Rosalina Spivey OT Occupational Therapist OT                  OT Recommendation and Plan     Plan of Care Review  Plan of Care Reviewed With: patient  Progress: improving  Outcome Evaluation: Pt continues to benefit from IPOT services to address deficits in order to progress towards PLOF. Pt SBA-CGA for all TA. pt set to d/c home w/ assist.     Time Calculation:         Time Calculation- OT       Row Name 12/23/24 1534 12/23/24 1145          Time Calculation- OT    OT Start Time 0915 -KL --     OT Received On 12/23/24  -KL --        Timed Charges    96984 - OT Therapeutic Exercise Minutes 10  -KL --     00608 - Gait Training Minutes  -- 15  -KE     30888 - OT Self Care/Mgmt Minutes 15  -KL --        Total Minutes    Timed Charges Total Minutes 25  -KL 15  -KE      Total Minutes 25  -KL 15  -KE               User Key  (r) = Recorded By, (t) = Taken By, (c) = Cosigned  By      Initials Name Provider Type    Katie Paz, PT Physical Therapist    Rosalina Kathleen OT Occupational Therapist                  Therapy Charges for Today       Code Description Service Date Service Provider Modifiers Qty    27003838820 HC OT THER PROC EA 15 MIN 12/23/2024 Rosalina Spivey OT GO 1    [id number]  OT SELF CARE/MGMT/TRAIN EA 15 MIN 12/23/2024 Rosalina Spivey OT GO 1                 Rosalina Spivey OT  12/23/2024

## 2024-12-23 NOTE — PLAN OF CARE
Goal Outcome Evaluation:  Plan of Care Reviewed With: patient        Progress: improving  Outcome Evaluation: Pt continues to benefit from IPOT services to address deficits in order to progress towards PLOF. Pt SBA-CGA for all TA. pt set to d/c home w/ assist.    Anticipated Discharge Disposition (OT): home with assist, home with outpatient therapy services

## 2024-12-24 LAB
QT INTERVAL: 476 MS
QTC INTERVAL: 524 MS

## 2024-12-30 ENCOUNTER — TELEPHONE (OUTPATIENT)
Dept: CARDIOLOGY | Facility: CLINIC | Age: 84
End: 2024-12-30
Payer: MEDICARE

## 2024-12-30 RX ORDER — AMIODARONE HYDROCHLORIDE 200 MG/1
200 TABLET ORAL DAILY
Qty: 30 TABLET | Refills: 6 | Status: SHIPPED | OUTPATIENT
Start: 2024-12-30

## 2024-12-30 NOTE — TELEPHONE ENCOUNTER
Received call from pt that she needs a refill on Amiodarone, as she was only sent a 14 day supply when she discharged from the hospital. Per the d/c summary, pt was to take Amiodarone BID for 14 days, then decrease to once a day dosing. I told pt that is why they only sent in a 14 day supply. I told pt I would send in a new script for Amiodarone for once a day. She verbalized understanding.

## 2024-12-30 NOTE — TELEPHONE ENCOUNTER
Spoke to [facility] and pt and informed both that pt can begin once daily dosing on Friday. [facility] and pt verbalized understanding and had no further questions.

## 2024-12-30 NOTE — TELEPHONE ENCOUNTER
"Received voicemail from [facility] that pt may have \"misplaced\" some the 14 day supply of Amiodarone and will only get about 10 days of Amiodarone BID. They want to know if this is ok or does more need to be sent in for the BID dosing to get to 14 days. (I spoke to pt earlier today and sent in a refill for the once a day dosing that was discussed in d/c summary. Pt did not mention that she was almost out of the BID dosing.)    I spoke to pt. She confirmed she only has enough of the BID dosing to get through Thursday. That would only be 10 days of BID dosing.    Is is ok for pt to start the once a day dosing on Friday, or does she need to do a full 14 days of BID dosing before decreasing to once a day?    "

## 2025-01-07 ENCOUNTER — OFFICE VISIT (OUTPATIENT)
Dept: CARDIOLOGY | Facility: HOSPITAL | Age: 85
End: 2025-01-07
Payer: MEDICARE

## 2025-01-07 ENCOUNTER — HOSPITAL ENCOUNTER (OUTPATIENT)
Dept: CARDIOLOGY | Facility: HOSPITAL | Age: 85
Discharge: HOME | End: 2025-01-07
Payer: MEDICARE

## 2025-01-07 VITALS
WEIGHT: 145.2 LBS | HEART RATE: 54 BPM | BODY MASS INDEX: 26.72 KG/M2 | HEIGHT: 62 IN | SYSTOLIC BLOOD PRESSURE: 134 MMHG | OXYGEN SATURATION: 94 % | DIASTOLIC BLOOD PRESSURE: 76 MMHG

## 2025-01-07 DIAGNOSIS — I48.0 PAROXYSMAL ATRIAL FIBRILLATION: Primary | ICD-10-CM

## 2025-01-07 DIAGNOSIS — I50.32 CHRONIC HEART FAILURE WITH PRESERVED EJECTION FRACTION (HFPEF): ICD-10-CM

## 2025-01-07 DIAGNOSIS — I48.19 ATRIAL FIBRILLATION, PERSISTENT: ICD-10-CM

## 2025-01-07 DIAGNOSIS — I38 VALVULAR HEART DISEASE: ICD-10-CM

## 2025-01-07 DIAGNOSIS — I10 PRIMARY HYPERTENSION: ICD-10-CM

## 2025-01-07 LAB
QT INTERVAL: 468 MS
QTC INTERVAL: 435 MS

## 2025-01-07 PROCEDURE — 93005 ELECTROCARDIOGRAM TRACING: CPT | Performed by: NURSE PRACTITIONER

## 2025-01-07 RX ORDER — FUROSEMIDE 40 MG/1
40 TABLET ORAL DAILY
Qty: 30 TABLET | Refills: 1 | Status: SHIPPED | OUTPATIENT
Start: 2025-01-07

## 2025-01-07 NOTE — PROGRESS NOTES
[facility]  Heart Failure Clinic    Cardiologist/EP: Dr. Kraus  PCP: Lizzette Multani MD  Referring: hospital    Chief Complaint  Congestive Heart Failure and Atrial Fibrillation    PROBLEM LIST:  Atrial fibrillation  New onset atrial flutter with RVR 11/10/2021 in setting of nocturnal hypoxia  [id number] = 5 on Xarelto   SATISH, 11/12/2021: EF 50%. Mild LAE. No evidence of intracardiac thrombus or mass, clear JENNA, Trace MR and TR.   Successful ECV, 11/12/2021  Started on Amiodarone 11/2021  VHD  Echo (04/08/2023): EF 56 to 60%.  LV wall thickness consistent with mild concentric hypertrophy.  LV diastolic function consistent with (grade 2 with high lap) pseudonormalization.  LA volume mildly increased.  Moderate calcification of aortic valve.  Moderate AS. Moderate calcification of mitral valve.  Mild to moderate MS.  Mild TR.  RVSP is moderately elevated at 53.9 mmHg.  Echo, 09/20/2024: EF 58.9%. Mild AV stenosis. JOHNNY is 1.3 cm2. Peak velocity of the flow distal to the aortic valve is 239.8 cm/s. Aortic valve maximum pressure gradient is 26 mmHg. Aortic valve mean pressure gradient is 14 mmHg. Aortic valve dimensionless index is 0.33 Mild MV stenosis. The mitral valve peak gradient is 15 mmHg. The mitral valve mean gradient is 5 mmHg. The mitral valve area (PHT) is 2.9 cm2. Normal RVSP.   HFpEF  Hypertension  Hyperlipidemia  Type 2 diabetes  BERNARD    Subjective    History of Present Illness    Kaylen Garrison is a 84 y.o. female who presents today as a hospital referral for atrial fibrillation and heart failure.      Patient was admitted 12/20-12/23 with A-fib with RVR.  She was started back on amiodarone.  Previously on amiodarone in 2023 but was stopped due to hypoxia.  She had evidence of pulmonary edema on CTA chest and was discharged on Lasix 40 mg daily.  Limited echo showed normal LVEF.  She was treated for UTI as well.    She has noticed over the past couple of days that her oxygen has  "dropped intermittently to 88 but resolves quickly once she takes a deep breath. She denies shortness of breath or palpitations. Denies swelling. Weight has been stable at 140lbs. Discussed care over the phone with her son who is an ENT.            Objective     Vital Signs:   Vitals:    01/07/25 0947   BP: 134/76   BP Location: Left arm   Patient Position: Sitting   Cuff Size: Adult   Pulse: 54   SpO2: 94%   Weight: 65.9 kg (145 lb 3.2 oz)   Height: 157.5 cm (62\")     Body mass index is 26.56 kg/m².  Physical Exam  Vitals reviewed.   Constitutional:       Appearance: Normal appearance.   HENT:      Head: Normocephalic.   Neck:      Vascular: No carotid bruit.   Cardiovascular:      Rate and Rhythm: Regular rhythm. Bradycardia present.      Pulses: Normal pulses.      Heart sounds: Normal heart sounds, S1 normal and S2 normal. No murmur heard.  Pulmonary:      Effort: Pulmonary effort is normal. No respiratory distress.      Breath sounds: Normal breath sounds.   Chest:      Chest wall: No tenderness.   Abdominal:      General: Abdomen is flat.      Palpations: Abdomen is soft.   Musculoskeletal:      Cervical back: Neck supple.      Right lower leg: No edema.      Left lower leg: No edema.   Skin:     General: Skin is warm and dry.   Neurological:      General: No focal deficit present.      Mental Status: She is alert and oriented to person, place, and time. Mental status is at baseline.   Psychiatric:         Mood and Affect: Mood normal.         Behavior: Behavior normal.         Thought Content: Thought content normal.              Data Reviewed:  Lab Results   Component Value Date    GLUCOSE 307 (H) 12/23/2024    CALCIUM 8.7 12/23/2024     12/23/2024    K 3.5 12/23/2024    CO2 27.0 12/23/2024    CL 96 (L) 12/23/2024    BUN 41 (H) 12/23/2024    CREATININE 1.01 (H) 12/23/2024    EGFR 54.3 (L) 12/23/2024    BCR 40.6 (H) 12/23/2024    ANIONGAP 14.0 12/23/2024     Lab Results   Component Value Date    WBC " 10.40 12/22/2024    HGB 14.2 12/22/2024    HCT 44.4 12/22/2024    MCV 87.2 12/22/2024     12/22/2024     Lab Results   Component Value Date    PROBNP 1,338.0 12/20/2024       EKG today shows sinus bradycardia with a QTc of 435              Assessment and Plan   Chronic HFpEF  - LVEF 66-70%, NYHA class II ACC/AHA stage: c  - Euvolemic on exam  - Diuretic regimen: Lasix 40mg daily (refills provided)  - Will obtain last labs  - No SGLT2i due to UTIs  - Heart failure education provided today including signs and symptoms, causes of heart failure, medications, daily weights, low sodium diet (less than 1500 mg per day) and daily physical activity as tolerated. Reviewed HF Zones with patient. Reinforced reasons to call and diuretic plan.       2. Paroxysmal atrial fibrillation  - In NSR on amiodarone and metoprolol   - CHADsVasc score 6, continue Xarelto  - Long discussion with patient and son, no documented pulmonary toxicity and only having brief decreases in her O2 sats.  We discussed deep breathing exercises and advised her to call if O2 sats are persistently low.  Currently amiodarone benefit outweighs risk and her son agrees with this  - ECG 12 Lead; Future    3. Valvular heart disease  -Mild mitral and aortic valve stenosis  - Asymptomatic    4. Primary hypertension  - Well controlled  - Continue metoprolol.  She says she currently is taking 50 mg of metoprolol per her PCP, which she seems to be tolerating. I told her to verify this at home.  Her son does report that she has had some exercise intolerance in the past and advised her to call me if she does experience this          I spent 45 minutes caring for Kaylen on this date of service. This time includes time spent by me in the following activities:preparing for the visit, reviewing tests, obtaining and/or reviewing a separately obtained history, performing a medically appropriate examination and/or evaluation , counseling and educating the  patient/family/caregiver, documenting information in the medical record, and independently interpreting results and communicating that information with the patient/family/caregiver    Follow Up {Instructions Charge Capture  Follow-up Communications :23}   Return if symptoms worsen or fail to improve.    Patient was given instructions and counseling regarding her condition or for health maintenance advice. Please see specific information pulled into the AVS if appropriate.  Advised to call the [facility] with any questions, concerns, or worsening symptoms.

## 2025-01-12 LAB
QT INTERVAL: 342 MS
QTC INTERVAL: 458 MS

## 2025-01-21 ENCOUNTER — OFFICE VISIT (OUTPATIENT)
Dept: CARDIOLOGY | Facility: CLINIC | Age: 85
End: 2025-01-21
Payer: MEDICARE

## 2025-01-21 VITALS
OXYGEN SATURATION: 94 % | BODY MASS INDEX: 25.8 KG/M2 | DIASTOLIC BLOOD PRESSURE: 60 MMHG | WEIGHT: 140.2 LBS | SYSTOLIC BLOOD PRESSURE: 110 MMHG | HEIGHT: 62 IN | HEART RATE: 79 BPM

## 2025-01-21 DIAGNOSIS — I45.10 RIGHT BUNDLE BRANCH BLOCK: ICD-10-CM

## 2025-01-21 DIAGNOSIS — I48.91 ATRIAL FIBRILLATION, UNSPECIFIED TYPE: ICD-10-CM

## 2025-01-21 DIAGNOSIS — I48.91 ATRIAL FIBRILLATION WITH RVR: ICD-10-CM

## 2025-01-21 DIAGNOSIS — I10 ESSENTIAL HYPERTENSION: Primary | ICD-10-CM

## 2025-01-21 DIAGNOSIS — R53.81 DEBILITY: ICD-10-CM

## 2025-01-21 DIAGNOSIS — I38 VALVULAR HEART DISEASE: ICD-10-CM

## 2025-01-21 DIAGNOSIS — I48.0 PAROXYSMAL ATRIAL FIBRILLATION: ICD-10-CM

## 2025-01-21 NOTE — PROGRESS NOTES
[facility] Cardiology  Office Note  Kaylen Garrison  1940  [address] [place] [address]     VISIT DATE:  01/21/25    PCP: Lizzette Multani MD  [address] [facility] [address]  [place] [address]    CC: Dyspnea  Chief Complaint   Patient presents with    Paroxysmal atrial fibrillation     4-6 WK Follow up       PROBLEM LIST:    1.  Persistent atrial fibrillation  2.  Echocardiogram December 2024 with normal left ventricular function  3.  Echocardiogram September 2024 with mild aortic stenosis mild mitral stenosis    Allergies  Allergies   Allergen Reactions    Adhesive Tape Rash    Latex Rash    Penicillins Rash       Current Medications    Current Outpatient Medications:     Accu-Chek Softclix Lancets lancets, , Disp: , Rfl:     acetaminophen (TYLENOL) 325 MG tablet, Take 2 tablets by mouth Every 4 (Four) Hours As Needed for Mild Pain., Disp: , Rfl:     amiodarone (PACERONE) 200 MG tablet, Take 1 tablet by mouth Daily., Disp: 30 tablet, Rfl: 6    atorvastatin (LIPITOR) 40 MG tablet, Take 1 tablet by mouth Every Night., Disp: , Rfl:     Cholecalciferol (Vitamin D3) 50 MCG (2000 UT) tablet, Take 1 tablet by mouth Daily. OTC, Disp: , Rfl:     furosemide (Lasix) 40 MG tablet, Take 1 tablet by mouth Daily., Disp: 30 tablet, Rfl: 1    glipizide (GLUCOTROL XL) 2.5 MG 24 hr tablet, Take 2 tablets by mouth Daily., Disp: , Rfl:     glucose blood (Accu-Chek Guide) test strip, USE 1 STRIP TO CHECK BLOOD SUGAR ONCE DAILY dx e11.65, Disp: 100 each, Rfl: 3    Januvia 100 MG tablet, Take 1 tablet by mouth once daily (Patient taking differently: Take 1 tablet by mouth Daily.), Disp: 90 tablet, Rfl: 0    metFORMIN ER (GLUCOPHAGE-XR) 500 MG 24 hr tablet, TAKE 2 TABLETS BY MOUTH TWICE DAILY WITH MEALS, Disp: 360 tablet, Rfl: 3    metoprolol succinate XL (TOPROL-XL) 25 MG 24 hr tablet, Take 1 tablet by mouth Daily for 30 days. (Patient taking differently: Take 2 tablets by mouth Daily.), Disp: 30 tablet,  "Rfl: 0    Omega-3 Fatty Acids (fish oil) 1000 MG capsule capsule, Take 1 capsule by mouth Daily With Breakfast. OTC, Disp: , Rfl:     oxybutynin XL (DITROPAN-XL) 5 MG 24 hr tablet, Take 1 tablet by mouth Daily., Disp: , Rfl:     rivaroxaban (XARELTO) 15 MG tablet, Take 1 tablet by mouth Daily With Dinner. Indications: Atrial Fibrillation, Disp: 30 tablet, Rfl: 3     History of Present Illness   Kaylen Garrison is a 84 y.o. year old female who presents for consult from No ref. provider found for evaluation of atrial fibrillation.  Patient feels well at this time.  No chest pain or shortness of breath.  Is active overall.  Patient secondary to travel to [place] along with [place].  Uses her walker.  States just feels better being on amiodarone.  Has less tachycardia at home.  Blood pressure 18 to be controlled.  Patient has been stable on her feet without falls.  Otherwise her energy level has improved also.  Patient had no lower extremity swelling.  Generally has been more active since going home.    Pt denies any chest pain, dyspnea at rest, dyspnea on exertion, orthopnea, PND, palpitations, lower extremity edema, or claudication. Pt denies history of CHF, DVT, PE, MI, CVA, TIA, or rheumatic fever.     SOCIAL HX  Social History     Socioeconomic History    Marital status:    Tobacco Use    Smoking status: Never     Passive exposure: Never    Smokeless tobacco: Never   Vaping Use    Vaping status: Never Used   Substance and Sexual Activity    Alcohol use: No    Drug use: No    Sexual activity: Defer       FAMILY HX  Family History   Problem Relation Age of Onset    Heart attack Mother     No Known Problems Father     No Known Problems Sister     Lactose intolerance Brother     No Known Problems Sister        Vitals:    01/21/25 1142   BP: 110/60   BP Location: Right arm   Patient Position: Sitting   Cuff Size: Adult   Pulse: 79   SpO2: 94%   Weight: 63.6 kg (140 lb 3.2 oz)   Height: 157.5 cm (62\") "     Body mass index is 25.64 kg/m².     PHYSICAL EXAMINATION:  Vitals and nursing note reviewed.   Constitutional:       Appearance: Healthy appearance. Not in distress.   Eyes:      Conjunctiva/sclera: Conjunctivae normal.      Pupils: Pupils are equal, round, and reactive to light.   HENT:      Nose: Nose normal.    Mouth/Throat:      Pharynx: Oropharynx is clear.   Neck:      Vascular: JVD normal.   Pulmonary:      Effort: Pulmonary effort is normal.      Breath sounds: Normal breath sounds. No wheezing. No rhonchi. No rales.   Cardiovascular:      PMI at left midclavicular line. Normal rate. Irregular rhythm. Normal S1. Normal S2.       Murmurs: There is no murmur.      No gallop.  No click. No rub.   Pulses:     Intact distal pulses.   Abdominal:      General: Bowel sounds are normal.      Palpations: Abdomen is soft.      Tenderness: There is no abdominal tenderness.   Musculoskeletal: Normal range of motion.         General: No tenderness.      Cervical back: Normal range of motion. Skin:     General: Skin is warm and dry.   Neurological:      General: No focal deficit present.      Mental Status: Alert and oriented to person, place and time.      Cranial Nerves: Cranial nerves 2-12 are intact.         Diagnostic Data:  Lab Results   Component Value Date    TRIG 66 11/11/2021    HDL 42 11/11/2021     Lab Results   Component Value Date    GLUCOSE 307 (H) 12/23/2024    BUN 41 (H) 12/23/2024    CREATININE 1.01 (H) 12/23/2024     12/23/2024    K 3.5 12/23/2024    CL 96 (L) 12/23/2024    CO2 27.0 12/23/2024     Lab Results   Component Value Date    HGBA1C 8.0 (A) 09/12/2024     Lab Results   Component Value Date    WBC 10.40 12/22/2024    HGB 14.2 12/22/2024    HCT 44.4 12/22/2024     12/22/2024       Procedures    Advance Care Planning   ACP discussion was declined by the patient. Patient does not have an advance directive, declines further assistance.         ASSESSMENT:  Diagnoses and all orders  for this visit:    1. Essential hypertension (Primary)    2. Paroxysmal atrial fibrillation    3. Right bundle branch block    4. Valvular heart disease    5. Atrial fibrillation, unspecified type    6. Atrial fibrillation with RVR    7. Debility  -     Ambulatory Referral to Physical Therapy for Evaluation & Treatment        PLAN:    Continue current care at this time.  Patient is asymptomatic from her atrial fibrillation and prefers no procedures and attempts to return to sinus rhythm.  Do not disagree with.  Patient also would like to stay on amiodarone as feels better being on such symptomatically has improved thus we will continue such.  Ultimately will need repeat echocardiogram however will repeat in 1 to 2 years  Continue anticoagulation given Charlie Vasc scire    Return in about 4 months (around 5/21/2025).     Dick Kraus MD

## 2025-04-15 ENCOUNTER — OFFICE VISIT (OUTPATIENT)
Age: 85
End: 2025-04-15
Payer: MEDICARE

## 2025-04-15 VITALS
OXYGEN SATURATION: 93 % | HEART RATE: 64 BPM | BODY MASS INDEX: 25.95 KG/M2 | TEMPERATURE: 98.1 F | WEIGHT: 141 LBS | SYSTOLIC BLOOD PRESSURE: 132 MMHG | HEIGHT: 62 IN | DIASTOLIC BLOOD PRESSURE: 72 MMHG

## 2025-04-15 DIAGNOSIS — R06.09 DYSPNEA ON EXERTION: ICD-10-CM

## 2025-04-15 DIAGNOSIS — I48.0 PAROXYSMAL ATRIAL FIBRILLATION: ICD-10-CM

## 2025-04-15 DIAGNOSIS — R09.02 EXERCISE HYPOXEMIA: Primary | ICD-10-CM

## 2025-04-15 RX ORDER — AMLODIPINE BESYLATE 2.5 MG/1
1 TABLET ORAL EVERY 12 HOURS SCHEDULED
COMMUNITY
Start: 2025-04-04

## 2025-04-15 RX ORDER — VALSARTAN 320 MG/1
320 TABLET ORAL
COMMUNITY
Start: 2025-02-13

## 2025-04-15 NOTE — PROGRESS NOTES
[facility] Follow up    CHIEF COMPLAINT    Mild dyspnea with exertion    HISTORY OF PRESENT ILLNESS    Kaylen Garrison is a 84 y.o.female here today for follow-up.  She was last seen in the office by me in October.  She denies any wrist or illnesses since her last appointment.    She has had much improvement in her breathing since getting her A-fib better regulated.    She continues to work with PT and has some instability with her legs.  She did fall yesterday and fell on her knee but does not have much pain today.  She has a bump on her left shin.    She has oxygen to use during the day but has not used in quite a while.  She states her oxygen levels above 94% majority of the time.    She continues wear her BiPAP nightly.  She uses oxygen bled through the machine.  She sees the sleep center for her BiPAP Orders.  She is in need of a new BiPAP machine.  Her machine is over 5 years old.    She denies any chest pain or palpitations.  She denies any lower extremity edema or calf tenderness.    She denies any reflux symptoms.  She denies any sputum production.  She denies any hemoptysis.    She is a lifetime non-smoker.  Patient Active Problem List   Diagnosis    Syncope    Essential hypertension    Dyslipidemia    Overweight (BMI 25.0-29.9)    Meniere's disease    Dyspnea on exertion    BERNARD treated with BiPAP    Primary hyperparathyroidism    Osteopenia    Type 2 diabetes mellitus with hyperglycemia, without long-term current use of insulin    Exercise hypoxemia    Paroxysmal atrial fibrillation    Right bundle branch block    Valvular heart disease    Adrenal nodule    Multiple thyroid nodules    A-fib    Atrial fibrillation with RVR       Allergies   Allergen Reactions    Adhesive Tape Rash    Latex Rash    Penicillins Rash       Current Outpatient Medications:     Accu-Chek Softclix Lancets lancets, , Disp: , Rfl:     acetaminophen (TYLENOL) 325 MG tablet, Take 2 tablets by mouth Every 4 (Four) Hours As  Needed for Mild Pain., Disp: , Rfl:     amiodarone (PACERONE) 200 MG tablet, Take 1 tablet by mouth Daily., Disp: 30 tablet, Rfl: 6    amLODIPine (NORVASC) 2.5 MG tablet, Take 1 tablet by mouth Every 12 (Twelve) Hours., Disp: , Rfl:     atorvastatin (LIPITOR) 40 MG tablet, Take 1 tablet by mouth Every Night., Disp: , Rfl:     Cholecalciferol (Vitamin D3) 50 MCG (2000 UT) tablet, Take 1 tablet by mouth Daily. OTC, Disp: , Rfl:     furosemide (Lasix) 40 MG tablet, Take 1 tablet by mouth Daily., Disp: 30 tablet, Rfl: 1    glipizide (GLUCOTROL XL) 2.5 MG 24 hr tablet, Take 2 tablets by mouth Daily., Disp: , Rfl:     glucose blood (Accu-Chek Guide) test strip, USE 1 STRIP TO CHECK BLOOD SUGAR ONCE DAILY dx e11.65, Disp: 100 each, Rfl: 3    Januvia 100 MG tablet, Take 1 tablet by mouth once daily (Patient taking differently: Take 1 tablet by mouth Daily.), Disp: 90 tablet, Rfl: 0    metFORMIN ER (GLUCOPHAGE-XR) 500 MG 24 hr tablet, TAKE 2 TABLETS BY MOUTH TWICE DAILY WITH MEALS, Disp: 360 tablet, Rfl: 3    metoprolol succinate XL (TOPROL-XL) 25 MG 24 hr tablet, Take 1 tablet by mouth Daily for 30 days. (Patient taking differently: Take 2 tablets by mouth Daily.), Disp: 30 tablet, Rfl: 0    Omega-3 Fatty Acids (fish oil) 1000 MG capsule capsule, Take 1 capsule by mouth Daily With Breakfast. OTC, Disp: , Rfl:     oxybutynin XL (DITROPAN-XL) 5 MG 24 hr tablet, Take 1 tablet by mouth Daily., Disp: , Rfl:     rivaroxaban (XARELTO) 15 MG tablet, Take 1 tablet by mouth Daily With Dinner. Indications: Atrial Fibrillation, Disp: 30 tablet, Rfl: 3    valsartan (DIOVAN) 320 MG tablet, Take 1 tablet by mouth every night at bedtime., Disp: , Rfl:   MEDICATION LIST AND ALLERGIES REVIEWED.    Social History     Tobacco Use    Smoking status: Never     Passive exposure: Never    Smokeless tobacco: Never   Vaping Use    Vaping status: Never Used   Substance Use Topics    Alcohol use: No    Drug use: No       FAMILY AND SOCIAL HISTORY  "REVIEWED.    Review of Systems   Constitutional:  Negative for activity change, appetite change, fatigue, fever and unexpected weight change.   HENT:  Negative for congestion, postnasal drip, rhinorrhea, sinus pressure, sore throat and voice change.    Eyes:  Negative for visual disturbance.   Respiratory:  Positive for shortness of breath. Negative for cough, chest tightness and wheezing.    Cardiovascular:  Negative for chest pain, palpitations and leg swelling.   Gastrointestinal:  Negative for abdominal distention, abdominal pain, nausea and vomiting.   Endocrine: Negative for cold intolerance and heat intolerance.   Genitourinary:  Negative for difficulty urinating and urgency.   Musculoskeletal:  Negative for arthralgias, back pain and neck pain.   Skin:  Negative for color change and pallor.   Allergic/Immunologic: Negative for environmental allergies and food allergies.   Neurological:  Positive for weakness. Negative for dizziness, syncope and light-headedness.   Hematological:  Negative for adenopathy. Does not bruise/bleed easily.   Psychiatric/Behavioral:  Negative for agitation and behavioral problems.    .    /72   Pulse 64   Temp 98.1 °F (36.7 °C)   Ht 157.5 cm (62\")   Wt 64 kg (141 lb)   SpO2 93% Comment: Room air at rest  BMI 25.79 kg/m²     Immunization History   Administered Date(s) Administered    COVID-19 (MODERNA) 12YRS+ (SPIKEVAX) 10/17/2023    COVID-19 (PFIZER) Purple Cap Monovalent 09/27/2021    Fluzone (or Fluarix & Flulaval for VFC) >6mos 09/29/2018    Fluzone High-Dose 65+YRS 09/19/2017, 09/30/2019, 09/20/2023    Fluzone High-Dose 65+yrs 09/10/2020, 09/24/2021    Hepatitis A 10/15/2018, 09/23/2019    Hepatitis B Adult/Adolescent IM 10/11/2019, 11/05/2019, 06/24/2020    Influenza, Unspecified 09/10/2020, 09/29/2020       Physical Exam  Vitals and nursing note reviewed.   Constitutional:       Appearance: She is well-developed. She is not diaphoretic.   HENT:      Head: " Normocephalic and atraumatic.   Eyes:      Pupils: Pupils are equal, round, and reactive to light.   Neck:      Thyroid: No thyromegaly.   Cardiovascular:      Rate and Rhythm: Normal rate and regular rhythm.      Heart sounds: Normal heart sounds. No murmur heard.     No friction rub. No gallop.   Pulmonary:      Effort: Pulmonary effort is normal. No respiratory distress.      Breath sounds: Normal breath sounds. No wheezing or rales.   Chest:      Chest wall: No tenderness.   Abdominal:      General: Bowel sounds are normal.      Palpations: Abdomen is soft.      Tenderness: There is no abdominal tenderness.   Musculoskeletal:         General: No swelling. Normal range of motion.      Cervical back: Normal range of motion and neck supple.   Lymphadenopathy:      Cervical: No cervical adenopathy.   Skin:     General: Skin is warm and dry.      Capillary Refill: Capillary refill takes less than 2 seconds.   Neurological:      Mental Status: She is alert and oriented to person, place, and time.   Psychiatric:         Mood and Affect: Mood normal.         Behavior: Behavior normal.           RESULTS    PROBLEM LIST    Problem List Items Addressed This Visit          Cardiac and Vasculature    Dyspnea on exertion    Paroxysmal atrial fibrillation    Relevant Medications    amLODIPine (NORVASC) 2.5 MG tablet       Pulmonary and Pneumonias    Exercise hypoxemia - Primary         DISCUSSION    Ms. Garrison was here for follow-up.  Seems to be doing okay from a pulmonary standpoint.  She will continue to use the oxygen during the day to maintain saturation above 89% at all times.  She has not used her oxygen during the day quite a while.      She will continue close follow-up with cardiology for her history of atrial fibrillation.    I did encourage her to continue being active and try to get at least 15 minutes of exercise daily.  She will continue to work with PT for her leg weakness.    She is due for a new BiPAP when  she follows up in June with the sleep center.  Her machine is over 5 years old.    We will have her follow-up in 6 months.    I personally spent a total of 33 minutes on patient visit today including chart review, face to face with the patient obtaining the history and physical exam, review of pertinent images and tests, counseling and discussion and/or coordination of care as described above, and documentation.  Total time excludes time spent on other separate services such as performing procedures or test interpretation, if applicable.        Leigh Tijerina, APRN  04/15/[id number] EDT  Electronically signed     Please note that portions of this note were completed with a voice recognition program.        CC: Lizzette Multani MD

## 2025-04-18 ENCOUNTER — TELEPHONE (OUTPATIENT)
Dept: CARDIOLOGY | Facility: CLINIC | Age: 85
End: 2025-04-18
Payer: MEDICARE

## 2025-04-18 NOTE — TELEPHONE ENCOUNTER
Received voicemail from pt. In message she states she is concerned about an appt she has on 5/27, as it is with a doctor she doesn't know.    Pt had seen Dr. Kraus in the past.    I spoke to pt and informed her that Dr. Kraus is no longer with the clinic and she has been moved to Dr. Rose. She verbalized understanding and had no further questions.

## 2025-04-21 ENCOUNTER — TELEPHONE (OUTPATIENT)
Dept: CARDIOLOGY | Facility: CLINIC | Age: 85
End: 2025-04-21
Payer: MEDICARE

## 2025-04-21 NOTE — TELEPHONE ENCOUNTER
Called patient and LVM letting her know about her upcoming appt w/ Dr. Rose on 5/27/25 at [address] and that future appts will be at the [facility].

## 2025-04-29 ENCOUNTER — TELEPHONE (OUTPATIENT)
Dept: CARDIOLOGY | Facility: CLINIC | Age: 85
End: 2025-04-29
Payer: MEDICARE

## 2025-04-29 DIAGNOSIS — I48.91 ATRIAL FIBRILLATION, UNSPECIFIED TYPE: ICD-10-CM

## 2025-04-29 DIAGNOSIS — R00.1 BRADYCARDIA: Primary | ICD-10-CM

## 2025-04-29 NOTE — TELEPHONE ENCOUNTER
Received call from pt. She states that she has been going to PT and her therapist is worried about her heart rate. Pt stated that for the last couple of weeks during PT, her heart rate is not going up. She states that it will be in the 50s before she starts, and will stay in the 50s even with exercise. She states she sees Rayo Layton with [facility].    Pt is worried her medications will need to be changed. Pt states she is feeling fine. She gave some readings from her home blood pressure monitor.    4/28-10:12pm-118/54, pulse 56  4/25-5am-121/71, pulse 52; 9:05pm-128/69, pulse 56  4/23-8pm-131/62, pulse 55    I told pt I would call Rayo Layton and see if he has the blood pressure readings for the last couple of weeks to give to Dr. Rose. I told pt I would get back with her once Dr. Rose has reviewed the numbers.    I spoke to Rayo Layton with [facility]. He states that it was actually only today that pt's heart rate stayed low. He states that normally at rest before exercise, he heart rate is in the 70s. With exercise, it would go up to 80-85. This morning at PT, her heart rate at rest was 58-60, and with exercise, it only went up 1-2 bpm, then went back down to 56.    She is scheduled to see Rayo banks for PT on Friday. She is scheduled to see Dr. Rose on 5/27/25.    Do you want to make any changes now, or wait to see how she does on Friday at PT?

## 2025-04-30 NOTE — TELEPHONE ENCOUNTER
Spoke to pt and informed her what Dr. Rose said. She verbalized understanding and is agreeable to wearing a holter for 3 days.    She asked me to contact her son Maxi, and inform him what we are doing. I attempted to reach him. No answer. Left voicemail for him to return call.

## 2025-05-01 NOTE — TELEPHONE ENCOUNTER
Received voicemail from pt son Maxi, who is her POA, after I had left.     I returned his call this morning, as he is an ENT, and I didn't want to interfere with his clinic, and informed him what Dr. Rose said about pt. He verbalized understanding.    He did want to let Dr. Rose know about pt and her Amiodarone. He states that some of the cardiologists she has seen in the past have tried taking her off Amiodarone. He states that every time she has been taken off it, she ends up going in to afib, and has to spend a week in the hospital. He wanted to let Dr. Rose know, in hopes that he will keep pt on the medication, as she is doing, and feeling well.

## 2025-05-05 ENCOUNTER — TELEPHONE (OUTPATIENT)
Dept: CARDIOLOGY | Facility: CLINIC | Age: 85
End: 2025-05-05
Payer: MEDICARE

## 2025-05-05 NOTE — TELEPHONE ENCOUNTER
Received a patient message about concerns with her heart rate. HR dropped from 60s-50s. Monitor placed on patient this morning, confirmed patient appt on 5/27.     Denies chest pain, shortness of breath, numbness and tingling.     Recent vitals:     HR 54     Patient aware to go to the ER with new cardiac symptoms or if HR stays below 50. Patient verbalizes understanding.

## 2025-05-21 ENCOUNTER — OFFICE VISIT (OUTPATIENT)
Dept: ENDOCRINOLOGY | Facility: CLINIC | Age: 85
End: 2025-05-21
Payer: MEDICARE

## 2025-05-21 VITALS
DIASTOLIC BLOOD PRESSURE: 66 MMHG | SYSTOLIC BLOOD PRESSURE: 118 MMHG | HEIGHT: 62 IN | HEART RATE: 55 BPM | WEIGHT: 141.2 LBS | BODY MASS INDEX: 25.98 KG/M2 | OXYGEN SATURATION: 97 %

## 2025-05-21 DIAGNOSIS — E11.65 TYPE 2 DIABETES MELLITUS WITH HYPERGLYCEMIA, WITHOUT LONG-TERM CURRENT USE OF INSULIN: Primary | ICD-10-CM

## 2025-05-21 DIAGNOSIS — E78.5 DYSLIPIDEMIA: ICD-10-CM

## 2025-05-21 DIAGNOSIS — E21.0 PRIMARY HYPERPARATHYROIDISM: ICD-10-CM

## 2025-05-21 DIAGNOSIS — E27.9 ADRENAL NODULE: ICD-10-CM

## 2025-05-21 DIAGNOSIS — I10 ESSENTIAL HYPERTENSION: ICD-10-CM

## 2025-05-21 DIAGNOSIS — E04.2 MULTIPLE THYROID NODULES: ICD-10-CM

## 2025-05-21 LAB
EXPIRATION DATE: ABNORMAL
EXPIRATION DATE: ABNORMAL
GLUCOSE BLDC GLUCOMTR-MCNC: 148 MG/DL (ref 70–130)
HBA1C MFR BLD: 7.1 % (ref 4.5–5.7)
Lab: ABNORMAL
Lab: ABNORMAL

## 2025-05-21 NOTE — ASSESSMENT & PLAN NOTE
H/o 2.6cm thyroid nodule.  Not a surgical candidate due to age and comorbidities.  Continue with periodic neck exams.  Has h/o mildly elevated TSH that has normalized.  Plan to check TSH next visit.

## 2025-05-21 NOTE — LETTER
May 21, 2025     Lizzette Multani MD  [address] [facility]  [address]  [place] [id number]    Patient: Kaylen Garrison   YOB: 1940   Date of Visit: 2025     Dear Lizzette Multani MD:       Thank you for referring Kaylen Garrison to me for evaluation. Below are the relevant portions of my assessment and plan of care.    If you have questions, please do not hesitate to call me. I look forward to following Kaylen along with you.         Sincerely,        Harley Rose MD        CC: No Recipients    Harley Rose MD  [phone]  Sign when Signing Visit       Office Note      Date: 2025  Patient Name: Kaylen Garrison  MRN: [id number]  : 1940    Chief Complaint   Patient presents with   • Diabetes     Type II Diabetes   • Hypertension   • Hyperthyroidism   • Thyroid Problem   • Adrenal Problem       History of Present Illness:   Kaylen Garrison is a 84 y.o. female who presents for Diabetes type 2. Diagnosed in: . Treated in past with oral agents. Current treatments: metformin, januvia and glipizide. Number of insulin shots per day: none. Checks blood sugar 1 time a day. Has low blood sugar: no. Aspirin use: No - on xarelto . Statin use: Yes. ACE-I/ARB use: Yes. Changes in health since last visit: hospital stay for jacklynfib. Last eye exam 2024.     Subjective      Diabetic Complications:  Eyes: No  Kidneys: No  Feet: No  Heart: No    Diet and Exercise:  Meals per day: 3  Minutes of exercise per week: 0 mins.    Review of Systems:   Review of Systems   Constitutional: Negative.    Cardiovascular: Negative.    Gastrointestinal: Negative.    Endocrine: Negative.        The following portions of the patient's history were reviewed and updated as appropriate: allergies, current medications, past family history, past medical history, past social history, past surgical history, and problem list.    Objective     Visit Vitals  /66 (BP Location: Left arm, Patient  "Position: Sitting)   Pulse 55   Ht 157.5 cm (62.01\")   Wt 64 kg (141 lb 3.2 oz)   SpO2 97%   BMI 25.82 kg/m²       Physical Exam:  Physical Exam  Constitutional:       Appearance: Normal appearance.   Neck:      Comments: Thyroid very low in neck and difficult to palpate  Lymphadenopathy:      Cervical: No cervical adenopathy.   Neurological:      Mental Status: She is alert.         Labs:    HbA1c  Lab Results   Component Value Date    HGBA1C 7.1 (A) 05/21/2025       CMP  Lab Results   Component Value Date    GLUCOSE 307 (H) 12/23/2024    BUN 41 (H) 12/23/2024    CREATININE 1.01 (H) 12/23/2024    EGFRIFNONA 55 (L) 02/10/2022    BCR 40.6 (H) 12/23/2024    K 3.5 12/23/2024    CO2 27.0 12/23/2024    CALCIUM 8.7 12/23/2024    AST 19 12/20/2024    ALT 15 12/20/2024        Lipid Panel  Lab Results   Component Value Date    HDL Cholesterol 42 11/11/2021    LDL Cholesterol  40 11/11/2021    LDL/HDL Ratio 1.00 11/11/2021    Triglycerides 66 11/11/2021        TSH  Lab Results   Component Value Date    TSH 6.910 (H) 12/20/2024    FREET4 1.42 12/20/2024        Hemoglobin A1C  No components found for: \"HGBA1C\"     Microalbumin/Creatinine  Lab Results   Component Value Date    MALBCRERATIO 15.6 04/16/2024    MICROALBUR 1.3 04/16/2024           Assessment / Plan      Assessment & Plan:  Diagnoses and all orders for this visit:    1. Type 2 diabetes mellitus with hyperglycemia, without long-term current use of insulin (Primary)  Assessment & Plan:  Diabetes is improving with treatment.   Continue current treatment regimen.  Diabetes will be reassessed in 6 months.    Orders:  -     POC Glucose, Blood  -     POC Glycosylated Hemoglobin (Hb A1C)    2. Essential hypertension  Assessment & Plan:  Hypertension is stable and controlled.  Continue current treatment regimen.  Blood pressure will be reassessed in 6 months.      3. Dyslipidemia  Assessment & Plan:  Continue statin.  Plan to check lipids next visit.      4. Primary " hyperparathyroidism  Assessment & Plan:  Last calcium was okay.      5. Multiple thyroid nodules  Assessment & Plan:  H/o 2.6cm thyroid nodule.  Not a surgical candidate due to age and comorbidities.  Continue with periodic neck exams.  Has h/o mildly elevated TSH that has normalized.  Plan to check TSH next visit.      6. Adrenal nodule  Assessment & Plan:  Nonfunctioning adenoma.        Current Outpatient Medications   Medication Instructions   • Accu-Chek Softclix Lancets lancets    • acetaminophen (TYLENOL) 650 mg, Oral, Every 4 Hours PRN   • amiodarone (PACERONE) 200 mg, Oral, Daily   • amLODIPine (NORVASC) 2.5 MG tablet 1 tablet, Every 12 Hours Scheduled   • atorvastatin (LIPITOR) 40 mg, Nightly   • fish oil 1,000 mg, Daily With Breakfast   • furosemide (LASIX) 40 mg, Oral, Daily   • glipizide (GLUCOTROL XL) 5 mg, Daily   • glucose blood (Accu-Chek Guide) test strip USE 1 STRIP TO CHECK BLOOD SUGAR ONCE DAILY dx e11.65   • Januvia 100 MG tablet Take 1 tablet by mouth once daily   • metFORMIN ER (GLUCOPHAGE-XR) 1,000 mg, Oral, 2 Times Daily With Meals   • metoprolol succinate XL (TOPROL-XL) 25 mg, Oral, Daily   • oxybutynin XL (DITROPAN-XL) 5 mg, Daily   • rivaroxaban (XARELTO) 15 mg, Oral, Daily With Dinner   • valsartan (DIOVAN) 320 mg, Every Night at Bedtime   • Vitamin D3 2,000 Units, Daily      Return in about 6 months (around 11/21/2025) for Recheck with A1c, CMP, lipid, TSH, microalbumin, foot exam.    Electronically signed by: Harley Rose MD  05/21/2025

## 2025-05-21 NOTE — PROGRESS NOTES
"     Office Note      Date: 2025  Patient Name: Kaylen Garrison  MRN: [id number]  : 1940    Chief Complaint   Patient presents with    Diabetes     Type II Diabetes    Hypertension    Hyperthyroidism    Thyroid Problem    Adrenal Problem       History of Present Illness:   Kaylen Garrison is a 84 y.o. female who presents for Diabetes type 2. Diagnosed in: . Treated in past with oral agents. Current treatments: metformin, januvia and glipizide. Number of insulin shots per day: none. Checks blood sugar 1 time a day. Has low blood sugar: no. Aspirin use: No - on xarelto . Statin use: Yes. ACE-I/ARB use: Yes. Changes in health since last visit: hospital stay for a.fib. Last eye exam 2024.     Subjective      Diabetic Complications:  Eyes: No  Kidneys: No  Feet: No  Heart: No    Diet and Exercise:  Meals per day: 3  Minutes of exercise per week: 0 mins.    Review of Systems:   Review of Systems   Constitutional: Negative.    Cardiovascular: Negative.    Gastrointestinal: Negative.    Endocrine: Negative.        The following portions of the patient's history were reviewed and updated as appropriate: allergies, current medications, past family history, past medical history, past social history, past surgical history, and problem list.    Objective     Visit Vitals  /66 (BP Location: Left arm, Patient Position: Sitting)   Pulse 55   Ht 157.5 cm (62.01\")   Wt 64 kg (141 lb 3.2 oz)   SpO2 97%   BMI 25.82 kg/m²       Physical Exam:  Physical Exam  Constitutional:       Appearance: Normal appearance.   Neck:      Comments: Thyroid very low in neck and difficult to palpate  Lymphadenopathy:      Cervical: No cervical adenopathy.   Neurological:      Mental Status: She is alert.         Labs:    HbA1c  Lab Results   Component Value Date    HGBA1C 7.1 (A) 2025       CMP  Lab Results   Component Value Date    GLUCOSE 307 (H) 2024    BUN 41 (H) 2024    CREATININE 1.01 (H) 2024 " "   EGFRIFNONA 55 (L) 02/10/2022    BCR 40.6 (H) 12/23/2024    K 3.5 12/23/2024    CO2 27.0 12/23/2024    CALCIUM 8.7 12/23/2024    AST 19 12/20/2024    ALT 15 12/20/2024        Lipid Panel  Lab Results   Component Value Date    HDL Cholesterol 42 11/11/2021    LDL Cholesterol  40 11/11/2021    LDL/HDL Ratio 1.00 11/11/2021    Triglycerides 66 11/11/2021        TSH  Lab Results   Component Value Date    TSH 6.910 (H) 12/20/2024    FREET4 1.42 12/20/2024        Hemoglobin A1C  No components found for: \"HGBA1C\"     Microalbumin/Creatinine  Lab Results   Component Value Date    MALBCRERATIO 15.6 04/16/2024    MICROALBUR 1.3 04/16/2024           Assessment / Plan      Assessment & Plan:  Diagnoses and all orders for this visit:    1. Type 2 diabetes mellitus with hyperglycemia, without long-term current use of insulin (Primary)  Assessment & Plan:  Diabetes is improving with treatment.   Continue current treatment regimen.  Diabetes will be reassessed in 6 months.    Orders:  -     POC Glucose, Blood  -     POC Glycosylated Hemoglobin (Hb A1C)    2. Essential hypertension  Assessment & Plan:  Hypertension is stable and controlled.  Continue current treatment regimen.  Blood pressure will be reassessed in 6 months.      3. Dyslipidemia  Assessment & Plan:  Continue statin.  Plan to check lipids next visit.      4. Primary hyperparathyroidism  Assessment & Plan:  Last calcium was okay.      5. Multiple thyroid nodules  Assessment & Plan:  H/o 2.6cm thyroid nodule.  Not a surgical candidate due to age and comorbidities.  Continue with periodic neck exams.  Has h/o mildly elevated TSH that has normalized.  Plan to check TSH next visit.      6. Adrenal nodule  Assessment & Plan:  Nonfunctioning adenoma.        Current Outpatient Medications   Medication Instructions    Accu-Chek Softclix Lancets lancets     acetaminophen (TYLENOL) 650 mg, Oral, Every 4 Hours PRN    amiodarone (PACERONE) 200 mg, Oral, Daily    amLODIPine " (NORVASC) 2.5 MG tablet 1 tablet, Every 12 Hours Scheduled    atorvastatin (LIPITOR) 40 mg, Nightly    fish oil 1,000 mg, Daily With Breakfast    furosemide (LASIX) 40 mg, Oral, Daily    glipizide (GLUCOTROL XL) 5 mg, Daily    glucose blood (Accu-Chek Guide) test strip USE 1 STRIP TO CHECK BLOOD SUGAR ONCE DAILY dx e11.65    Januvia 100 MG tablet Take 1 tablet by mouth once daily    metFORMIN ER (GLUCOPHAGE-XR) 1,000 mg, Oral, 2 Times Daily With Meals    metoprolol succinate XL (TOPROL-XL) 25 mg, Oral, Daily    oxybutynin XL (DITROPAN-XL) 5 mg, Daily    rivaroxaban (XARELTO) 15 mg, Oral, Daily With Dinner    valsartan (DIOVAN) 320 mg, Every Night at Bedtime    Vitamin D3 2,000 Units, Daily      Return in about 6 months (around 11/21/2025) for Recheck with A1c, CMP, lipid, TSH, microalbumin, foot exam.    Electronically signed by: Harley Rose MD  05/21/2025

## 2025-05-27 ENCOUNTER — OFFICE VISIT (OUTPATIENT)
Dept: CARDIOLOGY | Facility: CLINIC | Age: 85
End: 2025-05-27
Payer: MEDICARE

## 2025-05-27 VITALS
HEIGHT: 63 IN | HEART RATE: 56 BPM | OXYGEN SATURATION: 95 % | BODY MASS INDEX: 24.91 KG/M2 | SYSTOLIC BLOOD PRESSURE: 122 MMHG | DIASTOLIC BLOOD PRESSURE: 40 MMHG | WEIGHT: 140.6 LBS

## 2025-05-27 DIAGNOSIS — I48.91 ATRIAL FIBRILLATION, UNSPECIFIED TYPE: Primary | ICD-10-CM

## 2025-05-27 DIAGNOSIS — R00.1 BRADYCARDIA, SINUS: ICD-10-CM

## 2025-05-27 DIAGNOSIS — I50.32 CHRONIC HEART FAILURE WITH PRESERVED EJECTION FRACTION (HFPEF): ICD-10-CM

## 2025-05-27 DIAGNOSIS — I35.0 NONRHEUMATIC AORTIC VALVE STENOSIS: ICD-10-CM

## 2025-05-27 RX ORDER — DOXYCYCLINE 100 MG/1
1 CAPSULE ORAL EVERY 12 HOURS SCHEDULED
COMMUNITY
Start: 2025-05-17

## 2025-05-27 RX ORDER — BENZONATATE 200 MG/1
200 CAPSULE ORAL 3 TIMES DAILY PRN
COMMUNITY
Start: 2025-05-17

## 2025-05-27 NOTE — ASSESSMENT & PLAN NOTE
Sinus bradycardia on ECG today.  [id number] 5  Asymptomatic from this standpoint.  GFR fluctuates between 50-60  Tolerating Xarelto without any bleeding or falls.  Will increase Xarelto to 20 mg daily considering her renal function.  Patient will let us know if she develops any significant bleeding  Orders:    rivaroxaban (XARELTO) 20 MG tablet; Take 1 tablet by mouth Daily With Dinner. Indications: Atrial Fibrillation

## 2025-05-27 NOTE — ASSESSMENT & PLAN NOTE
Personal review of her echo from September 2024 shows that aortic stenosis is in the mild to moderate range.  Murmur on exam is consistent with this.  Not symptomatic from this standpoint  Plan on surveillance echo in 2026.

## 2025-05-27 NOTE — ASSESSMENT & PLAN NOTE
EF normal.  Has some LVH.  Clinically, she is euvolemic and compensated.  Reports having intermittent oxygen readings of 82 on her pulse ox.  Unsure how accurate these readings are.  Sats today are mid 90s on room air.  No respiratory distress.  Continue Lasix 40 mg daily.  Continue daily weights  May consider adding SGLT2 inhibitor versus spironolactone in the future depending on renal function

## 2025-05-27 NOTE — PROGRESS NOTES
[facility] Cardiology Office Follow Up Note    Kaylen Garrison  [id number]  2025    Primary Care Provider: Lizzette Multani MD    Chief Complaint   Patient presents with    Paroxysmal atrial fibrillation    Essential hypertension       Subjective     History of Present Illness:   Kaylen Garrison is a 84 y.o. female with paroxysmal atrial fibrillation, aortic stenosis, HFpEF, hypertension and diabetes who presents to the Cardiology Clinic for follow up of her atrial fibrillation.  She is accompanied by her  today.  Patient says she has no acute complaints.  Denies feeling any palpitations or herself going into A-fib.  Denies any exertional chest pain or dyspnea.  Her only issue is a cough she has developed over the past few weeks.  It is a dry cough, nonproductive.  No fevers or chills.  Denies any dyspnea, orthopnea, PND, or leg swelling.  Takes all of her medications as prescribed.  Has had no bleeding on Xarelto.  No falls.  Does have some very minor bruising of her arms when she gets cuts and scrapes but working outside.    Past Cardiac History and Testin. Paroxysmal atrial fibrillation  -- New onset atrial flutter with RVR 11/10/2021 in setting of nocturnal hypoxia  -- [id number] = 5 on Xarelto   -- SATISH, 2021: EF 50%. Mild LAE. No evidence of intracardiac thrombus or mass, clear JENNA, Trace MR and TR.   -- Successful ECV, 2021 (started Amio)    2. Aortic stenosis/Mitral stenosis/Valvular heart disease  -- Echo (2023): EF 56 to 60%.  LV wall thickness consistent with mild concentric hypertrophy. Grade II DD.  LA volume mildly increased.  Moderate calcification of aortic valve.  Moderate AS. Moderate calcification of mitral valve.  Mild to moderate MS.  Mild TR.  RVSP is moderately elevated at 53.9 mmHg.  -- Echo, 2024: EF 58.9%. Mild AV stenosis. JOHNNY is 1.3 cm2. Peak velocity 239.8 cm/s. Maximum pressure gradient is 26 mmHg. Mean pressure gradient is  14 mmHg. Aortic valve dimensionless index is 0.33 Mild MV stenosis. The mitral valve peak gradient is 15 mmHg. The mitral valve mean gradient is 5 mmHg. The mitral valve area (PHT) is 2.9 cm2. Normal RVSP.     3. Hypertension  4. Dyslipidemia  5. RBBB  6. Type 2 diabetes mellitus  7. Obstructive sleep apnea, on BiPAP  8. Syncope, 04/2012  -- Echo, April 2012: EF 55-60% with no valvular abnormalities.  -- Limited studies, 4/2012: 0-49% bilateral carotid artery stenosis.  -- No acute findings per CT scan.  -- MRI brain, 4/2012: Cortical atrophy, chronic ischemic changes noted.  -- Abnormal MPS, 5/2012: EF 79% with apical hypoperfusion suspicious for ischemia, Dr. Arnaud Augustine.  -- MPS 9/2016: EF greater than 70%.  No evidence of inducible ischemia.  Borderline inferior ST segment changes.    9. Obesity  10. Ménière's disease    11.  HFpEF  --Echo (limited) 12/2024: EF 65-70%, mild LVH    Review of Systems:  Review of Systems   Constitutional: Negative.    Respiratory:  Positive for cough. Negative for chest tightness, shortness of breath and stridor.    Cardiovascular: Negative.    Gastrointestinal: Negative.    Musculoskeletal: Negative.    Neurological: Negative.        I have reviewed and/or updated the patient's past medical, past surgical, family, social history, problem list and allergies as appropriate.       Current Outpatient Medications:     Accu-Chek Softclix Lancets lancets, , Disp: , Rfl:     acetaminophen (TYLENOL) 325 MG tablet, Take 2 tablets by mouth Every 4 (Four) Hours As Needed for Mild Pain., Disp: , Rfl:     amiodarone (PACERONE) 200 MG tablet, Take 1 tablet by mouth Daily., Disp: 30 tablet, Rfl: 6    amLODIPine (NORVASC) 2.5 MG tablet, Take 1 tablet by mouth Every 12 (Twelve) Hours., Disp: , Rfl:     atorvastatin (LIPITOR) 40 MG tablet, Take 1 tablet by mouth Every Night., Disp: , Rfl:     benzonatate (TESSALON) 200 MG capsule, Take 1 capsule by mouth 3 (Three) Times a Day As Needed for Cough.,  "Disp: , Rfl:     Cholecalciferol (Vitamin D3) 50 MCG (2000 UT) tablet, Take 1 tablet by mouth Daily. OTC, Disp: , Rfl:     doxycycline (VIBRAMYCIN) 100 MG capsule, Take 1 capsule by mouth Every 12 (Twelve) Hours., Disp: , Rfl:     furosemide (Lasix) 40 MG tablet, Take 1 tablet by mouth Daily., Disp: 30 tablet, Rfl: 1    glipizide (GLUCOTROL XL) 2.5 MG 24 hr tablet, Take 2 tablets by mouth Daily., Disp: , Rfl:     glucose blood (Accu-Chek Guide) test strip, USE 1 STRIP TO CHECK BLOOD SUGAR ONCE DAILY dx e11.65, Disp: 100 each, Rfl: 3    Januvia 100 MG tablet, Take 1 tablet by mouth once daily (Patient taking differently: Take 1 tablet by mouth Daily.), Disp: 90 tablet, Rfl: 0    metFORMIN ER (GLUCOPHAGE-XR) 500 MG 24 hr tablet, TAKE 2 TABLETS BY MOUTH TWICE DAILY WITH MEALS, Disp: 360 tablet, Rfl: 3    Omega-3 Fatty Acids (fish oil) 1000 MG capsule capsule, Take 1 capsule by mouth Daily With Breakfast. OTC, Disp: , Rfl:     oxybutynin XL (DITROPAN-XL) 5 MG 24 hr tablet, Take 1 tablet by mouth Daily., Disp: , Rfl:     rivaroxaban (XARELTO) 20 MG tablet, Take 1 tablet by mouth Daily With Dinner. Indications: Atrial Fibrillation, Disp: 30 tablet, Rfl: 3    valsartan (DIOVAN) 320 MG tablet, Take 1 tablet by mouth every night at bedtime., Disp: , Rfl:     metoprolol succinate XL (TOPROL-XL) 25 MG 24 hr tablet, Take 1 tablet by mouth Daily for 30 days. (Patient taking differently: Take 2 tablets by mouth Daily.), Disp: 30 tablet, Rfl: 0       Objective     Vitals:  Vitals:    05/27/25 0903   BP: 122/40   BP Location: Left arm   Patient Position: Sitting   Cuff Size: Adult   Pulse: 56   SpO2: 95%   Weight: 63.8 kg (140 lb 9.6 oz)   Height: 160 cm (63\")       Physical Exam:  Vitals reviewed.   Constitutional:       Appearance: Healthy appearance. Not in distress.   Neck:      Vascular: No JVD.   Pulmonary:      Effort: Pulmonary effort is normal.      Breath sounds: Normal breath sounds.   Cardiovascular:      Normal rate. " Regular rhythm. Normal S1. Normal S2.       Murmurs: There is no murmur.      No gallop.  No click. No rub.   Pulses:     Intact distal pulses.   Edema:     Peripheral edema absent.   Abdominal:      General: There is no distension.      Palpations: Abdomen is soft.      Tenderness: There is no abdominal tenderness.   Skin:     General: Skin is warm and dry.         Results Review:   I reviewed the patient's new clinical results.  I reviewed the patient's new imaging results and agree with the interpretation.  I reviewed the patient's other test results and agree with the interpretation  I personally viewed and interpreted the patient's EKG/Telemetry data      ECG 12 Lead    Date/Time: 5/27/2025 11:33 AM  Performed by: Stu Rose MD    Authorized by: Stu Rose MD  Comparison: compared with previous ECG from 1/7/2025  Similar to previous ECG  Comparison to previous ECG: Right bundle branch block now present.  Rhythm: sinus rhythm  Rate: bradycardic  BPM: 56  Conduction: right bundle branch block and 1st degree AV block  Q waves: V1 and V2 (Septal infarct, age undetermined)    QRS axis: normal    Clinical impression: abnormal EKG          Most Recent Labs:  POC Glycosylated Hemoglobin (Hb A1C) (05/21/2025 11:19)  Basic Metabolic Panel (12/23/2024 03:48)          Assessment & Plan  Atrial fibrillation, unspecified type  Sinus bradycardia on ECG today.  [id number] 5  Asymptomatic from this standpoint.  GFR fluctuates between 50-60  Tolerating Xarelto without any bleeding or falls.  Will increase Xarelto to 20 mg daily considering her renal function.  Patient will let us know if she develops any significant bleeding  Orders:    rivaroxaban (XARELTO) 20 MG tablet; Take 1 tablet by mouth Daily With Dinner. Indications: Atrial Fibrillation    Bradycardia, sinus  Reportedly having some significant bradycardia episodes with physical therapy.  She is asymptomatic.  Reviewed recent Holter which shows average heart  rate is below 60 but no significant/concerning bradycardia or blocks noted  Will continue current dose of amiodarone and metoprolol.  If low heart rates remain an issue going forward, we may decrease her amnio dose       Chronic heart failure with preserved ejection fraction (HFpEF)  EF normal.  Has some LVH.  Clinically, she is euvolemic and compensated.  Reports having intermittent oxygen readings of 82 on her pulse ox.  Unsure how accurate these readings are.  Sats today are mid 90s on room air.  No respiratory distress.  Continue Lasix 40 mg daily.  Continue daily weights  May consider adding SGLT2 inhibitor versus spironolactone in the future depending on renal function       Nonrheumatic aortic valve stenosis  Personal review of her echo from September 2024 shows that aortic stenosis is in the mild to moderate range.  Murmur on exam is consistent with this.  Not symptomatic from this standpoint  Plan on surveillance echo in 2026.              Plan   Preventative Cardiology:   Tobacco Cessation: N/A  Obstructive Sleep Apnea Screening: Deffered    Advanced Care Planning  ACP discussion was held with the patient during this visit. Patient does not have an advance directive, information provided.           Follow Up:   Return in about 3 months (around 8/27/2025).    I spent 43 minutes evaluating, treating, counseling, coordinating patient care, reviewing old/outside records, independently reviewing imaging/telemetry, and documenting. This excludes time spent on separately billable services and procedures.     Thank you for allowing me to participate in the care of your patient. Please do not hesitate to contact me with additional questions or concerns.     Electronically signed by Stu Rose MD, 05/27/25, 11:37 AM EDT.

## 2025-06-02 ENCOUNTER — RESULTS FOLLOW-UP (OUTPATIENT)
Dept: CARDIOLOGY | Facility: CLINIC | Age: 85
End: 2025-06-02
Payer: MEDICARE

## 2025-06-03 NOTE — TELEPHONE ENCOUNTER
Patient verbalizes understanding. States she is doing well on Xarelto, no signs of bleeding and no falls.     Requests that I call son to update him on status as well. LVM with son, awaiting call back.

## 2025-06-19 ENCOUNTER — OFFICE VISIT (OUTPATIENT)
Dept: SLEEP MEDICINE | Facility: CLINIC | Age: 85
End: 2025-06-19
Payer: MEDICARE

## 2025-06-19 VITALS
HEART RATE: 62 BPM | WEIGHT: 141 LBS | TEMPERATURE: 97.8 F | DIASTOLIC BLOOD PRESSURE: 62 MMHG | BODY MASS INDEX: 24.98 KG/M2 | SYSTOLIC BLOOD PRESSURE: 134 MMHG | HEIGHT: 63 IN | OXYGEN SATURATION: 95 %

## 2025-06-19 DIAGNOSIS — G47.34 NOCTURNAL HYPOXEMIA: ICD-10-CM

## 2025-06-19 DIAGNOSIS — G47.33 OSA (OBSTRUCTIVE SLEEP APNEA): Primary | ICD-10-CM

## 2025-06-19 PROCEDURE — 99213 OFFICE O/P EST LOW 20 MIN: CPT | Performed by: NURSE PRACTITIONER

## 2025-06-19 NOTE — PROGRESS NOTES
Chief Complaint:   Chief Complaint   Patient presents with    Follow-up    Sleep Apnea       HPI:    Kaylen Garrison is a 84 y.o. female here for follow-up of sleep apnea and nocturnal hypoxemia.  Patient was last seen 6/10/2024.  Patient continues to do well with BiPAP therapy.  Patient is getting 8-1/2 hours of sleep nightly.  She goes to sleep easily and does get up x 1.  Patient has oxygen that does bleed into her BiPAP at night she does not know the settings she is on right now but states it is too high.  She will turn it to 1 L tonight and I will get an overnight oximetry we will then adjust as appropriate.  Her current [place] score is 1/24.  Patient states her machine is 5+ years old and does need an order for a new one.  Will get this ordered for her today.        Current medications are:   Current Outpatient Medications:     Accu-Chek Softclix Lancets lancets, , Disp: , Rfl:     acetaminophen (TYLENOL) 325 MG tablet, Take 2 tablets by mouth Every 4 (Four) Hours As Needed for Mild Pain., Disp: , Rfl:     amiodarone (PACERONE) 200 MG tablet, Take 1 tablet by mouth Daily., Disp: 30 tablet, Rfl: 6    amLODIPine (NORVASC) 2.5 MG tablet, Take 1 tablet by mouth Every 12 (Twelve) Hours., Disp: , Rfl:     atorvastatin (LIPITOR) 40 MG tablet, Take 1 tablet by mouth Every Night., Disp: , Rfl:     benzonatate (TESSALON) 200 MG capsule, Take 1 capsule by mouth 3 (Three) Times a Day As Needed for Cough., Disp: , Rfl:     Cholecalciferol (Vitamin D3) 50 MCG (2000 UT) tablet, Take 1 tablet by mouth Daily. OTC, Disp: , Rfl:     doxycycline (VIBRAMYCIN) 100 MG capsule, Take 1 capsule by mouth Every 12 (Twelve) Hours., Disp: , Rfl:     furosemide (Lasix) 40 MG tablet, Take 1 tablet by mouth Daily., Disp: 30 tablet, Rfl: 1    glipizide (GLUCOTROL XL) 2.5 MG 24 hr tablet, Take 2 tablets by mouth Daily., Disp: , Rfl:     glucose blood (Accu-Chek Guide) test strip, USE 1 STRIP TO CHECK BLOOD SUGAR ONCE DAILY dx e11.65, Disp:  100 each, Rfl: 3    Januvia 100 MG tablet, Take 1 tablet by mouth once daily (Patient taking differently: Take 1 tablet by mouth Daily.), Disp: 90 tablet, Rfl: 0    metFORMIN ER (GLUCOPHAGE-XR) 500 MG 24 hr tablet, TAKE 2 TABLETS BY MOUTH TWICE DAILY WITH MEALS, Disp: 360 tablet, Rfl: 3    Omega-3 Fatty Acids (fish oil) 1000 MG capsule capsule, Take 1 capsule by mouth Daily With Breakfast. OTC, Disp: , Rfl:     oxybutynin XL (DITROPAN-XL) 5 MG 24 hr tablet, Take 1 tablet by mouth Daily., Disp: , Rfl:     rivaroxaban (XARELTO) 20 MG tablet, Take 1 tablet by mouth Daily With Dinner. Indications: Atrial Fibrillation, Disp: 30 tablet, Rfl: 3    valsartan (DIOVAN) 320 MG tablet, Take 1 tablet by mouth every night at bedtime., Disp: , Rfl:     metoprolol succinate XL (TOPROL-XL) 25 MG 24 hr tablet, Take 1 tablet by mouth Daily for 30 days. (Patient taking differently: Take 2 tablets by mouth Daily.), Disp: 30 tablet, Rfl: 0.      The patient's relevant past medical, surgical, family and social history were reviewed and updated in Epic as appropriate.       Review of Systems   Eyes:  Positive for visual disturbance.   Respiratory:  Positive for apnea and shortness of breath.    Genitourinary:  Positive for frequency and urgency.   Musculoskeletal:  Positive for arthralgias, back pain and gait problem.   Neurological:  Positive for dizziness.   All other systems reviewed and are negative.        Objective:    Physical Exam  Constitutional:       Appearance: Normal appearance.   HENT:      Head: Normocephalic and atraumatic.      Mouth/Throat:      Comments: Class 2 airway  Cardiovascular:      Rate and Rhythm: Regular rhythm. Bradycardia present.   Pulmonary:      Effort: Pulmonary effort is normal.      Breath sounds: Normal breath sounds.   Skin:     General: Skin is warm and dry.   Neurological:      Mental Status: She is alert and oriented to person, place, and time.   Psychiatric:         Mood and Affect: Mood normal.   "       Behavior: Behavior normal.         Thought Content: Thought content normal.         Judgment: Judgment normal.     /62   Pulse 62   Temp 97.8 °F (36.6 °C)   Ht 160 cm (62.99\")   Wt 64 kg (141 lb)   SpO2 95%   BMI 24.98 kg/m²     CPAP Report    90/90 days of use  Greater than 4-hour use 100%  AHI of 2.1  Maximum IPAP 25  Minimum EPAP 8  Pressure support 6  The patient continues to use and benefit from CPAP therapy.    ASSESSMENT/PLAN    Diagnoses and all orders for this visit:    1. BERNARD (obstructive sleep apnea) (Primary)  -     Overnight Sleep Oximetry Study; Future  -     PAP Therapy    2. Nocturnal hypoxemia  -     Overnight Sleep Oximetry Study; Future  -     PAP Therapy          Order for new machine and pulse ox overnight while O2 at 1 L bleed through BiPAP I will see her back in 31 to 90 days.    Signed by  Donita Cat, APRN    June 19, 2025      CC: Lizzette Multani MD         No ref. provider found      "

## 2025-07-08 DIAGNOSIS — G47.34 NOCTURNAL HYPOXEMIA: ICD-10-CM

## 2025-07-08 DIAGNOSIS — G47.33 OSA (OBSTRUCTIVE SLEEP APNEA): ICD-10-CM

## 2025-07-11 ENCOUNTER — TELEPHONE (OUTPATIENT)
Dept: CARDIOLOGY | Facility: CLINIC | Age: 85
End: 2025-07-11
Payer: MEDICARE

## 2025-07-11 NOTE — TELEPHONE ENCOUNTER
Called and spoke w/ patient. Let her know Dr. Rose will be out of the office on 8/29/25 and we needed to reschedule her appt. Appt has been moved to 9/5/25. New appt reminder has been mailed.

## 2025-07-17 ENCOUNTER — TELEPHONE (OUTPATIENT)
Dept: SLEEP MEDICINE | Facility: CLINIC | Age: 85
End: 2025-07-17

## 2025-07-21 ENCOUNTER — OFFICE VISIT (OUTPATIENT)
Age: 85
End: 2025-07-21
Payer: MEDICARE

## 2025-07-21 VITALS
HEIGHT: 63 IN | DIASTOLIC BLOOD PRESSURE: 72 MMHG | BODY MASS INDEX: 25.87 KG/M2 | WEIGHT: 146 LBS | SYSTOLIC BLOOD PRESSURE: 110 MMHG | OXYGEN SATURATION: 95 % | TEMPERATURE: 97.7 F | HEART RATE: 50 BPM

## 2025-07-21 DIAGNOSIS — I48.0 PAROXYSMAL ATRIAL FIBRILLATION: Primary | ICD-10-CM

## 2025-07-21 DIAGNOSIS — R06.09 DYSPNEA ON EXERTION: ICD-10-CM

## 2025-07-21 DIAGNOSIS — G47.33 OSA TREATED WITH BIPAP: ICD-10-CM

## 2025-07-21 PROCEDURE — 1159F MED LIST DOCD IN RCRD: CPT | Performed by: NURSE PRACTITIONER

## 2025-07-21 PROCEDURE — 99214 OFFICE O/P EST MOD 30 MIN: CPT | Performed by: NURSE PRACTITIONER

## 2025-07-21 PROCEDURE — G2211 COMPLEX E/M VISIT ADD ON: HCPCS | Performed by: NURSE PRACTITIONER

## 2025-07-21 PROCEDURE — 1160F RVW MEDS BY RX/DR IN RCRD: CPT | Performed by: NURSE PRACTITIONER

## 2025-07-21 NOTE — PROGRESS NOTES
[facility] Follow up    CHIEF COMPLAINT    Mild dyspnea with exertion    HISTORY OF PRESENT ILLNESS    Kaylen Garrison is a 85 y.o.female here today for follow-up.  She had been seen by Donita Cat in June.  She did receive a new BiPAP machine and an overnight oximetry study had been performed.  She was told that it was low and she did not know what to do and wanted to be seen in the office.    She is currently using the BiPAP with 2-1/2 L bled through the machine.  She denies any sleeping difficulties.  She does feel like the BiPAP is working well for her.  She does wear it every night.    She is monitoring her oxygen during the day and has been above 92% majority of the time.  She has portable oxygen but has not been using it.    She does have some mild shortness of breath with heavy exertion but does recover very quickly at rest.    She denies any chest pain or palpitations.  She denies any lower extremity edema or calf tenderness.    She denies any sputum production or hemoptysis.  She denies any fever, chills or night sweats.    She denies any reflux symptoms.    She is a lifetime non-smoker.    Patient Active Problem List   Diagnosis    Syncope    Essential hypertension    Dyslipidemia    Overweight (BMI 25.0-29.9)    Meniere's disease    Dyspnea on exertion    BERNARD treated with BiPAP    Primary hyperparathyroidism    Osteopenia    Type 2 diabetes mellitus with hyperglycemia, without long-term current use of insulin    Exercise hypoxemia    Paroxysmal atrial fibrillation    Right bundle branch block    Valvular heart disease    Adrenal nodule    Multiple thyroid nodules    Nonrheumatic aortic valve stenosis    Chronic heart failure with preserved ejection fraction (HFpEF)       Allergies   Allergen Reactions    Adhesive Tape Rash    Latex Rash    Penicillins Rash       Current Outpatient Medications:     Accu-Chek Softclix Lancets lancets, , Disp: , Rfl:     acetaminophen (TYLENOL) 325 MG tablet,  Pt advised to call back GI. Its up to them if they will do the procedure or not.  Diarrhea is not a new problem for her. She gets this off an on.  All questions answered.     Take 2 tablets by mouth Every 4 (Four) Hours As Needed for Mild Pain., Disp: , Rfl:     amiodarone (PACERONE) 200 MG tablet, Take 1 tablet by mouth Daily., Disp: 30 tablet, Rfl: 6    amLODIPine (NORVASC) 2.5 MG tablet, Take 1 tablet by mouth Every 12 (Twelve) Hours., Disp: , Rfl:     atorvastatin (LIPITOR) 40 MG tablet, Take 1 tablet by mouth Every Night., Disp: , Rfl:     benzonatate (TESSALON) 200 MG capsule, Take 1 capsule by mouth 3 (Three) Times a Day As Needed for Cough., Disp: , Rfl:     Cholecalciferol (Vitamin D3) 50 MCG (2000 UT) tablet, Take 1 tablet by mouth Daily. OTC, Disp: , Rfl:     doxycycline (VIBRAMYCIN) 100 MG capsule, Take 1 capsule by mouth Every 12 (Twelve) Hours., Disp: , Rfl:     furosemide (Lasix) 40 MG tablet, Take 1 tablet by mouth Daily., Disp: 30 tablet, Rfl: 1    glipizide (GLUCOTROL XL) 2.5 MG 24 hr tablet, Take 2 tablets by mouth Daily., Disp: , Rfl:     glucose blood (Accu-Chek Guide) test strip, USE 1 STRIP TO CHECK BLOOD SUGAR ONCE DAILY dx e11.65, Disp: 100 each, Rfl: 3    Januvia 100 MG tablet, Take 1 tablet by mouth once daily (Patient taking differently: Take 1 tablet by mouth Daily.), Disp: 90 tablet, Rfl: 0    metFORMIN ER (GLUCOPHAGE-XR) 500 MG 24 hr tablet, TAKE 2 TABLETS BY MOUTH TWICE DAILY WITH MEALS, Disp: 360 tablet, Rfl: 3    Omega-3 Fatty Acids (fish oil) 1000 MG capsule capsule, Take 1 capsule by mouth Daily With Breakfast. OTC, Disp: , Rfl:     oxybutynin XL (DITROPAN-XL) 5 MG 24 hr tablet, Take 1 tablet by mouth Daily., Disp: , Rfl:     rivaroxaban (XARELTO) 20 MG tablet, Take 1 tablet by mouth Daily With Dinner. Indications: Atrial Fibrillation, Disp: 30 tablet, Rfl: 3    valsartan (DIOVAN) 320 MG tablet, Take 1 tablet by mouth every night at bedtime., Disp: , Rfl:     metoprolol succinate XL (TOPROL-XL) 25 MG 24 hr tablet, Take 1 tablet by mouth Daily for 30 days. (Patient taking differently: Take 2 tablets by mouth Daily.), Disp: 30 tablet, Rfl: 0  MEDICATION  "LIST AND ALLERGIES REVIEWED.    Social History     Tobacco Use    Smoking status: Never     Passive exposure: Never    Smokeless tobacco: Never   Vaping Use    Vaping status: Never Used   Substance Use Topics    Alcohol use: No    Drug use: No       FAMILY AND SOCIAL HISTORY REVIEWED.    Review of Systems   Constitutional:  Negative for activity change, appetite change, fatigue, fever and unexpected weight change.   HENT:  Negative for congestion, postnasal drip, rhinorrhea, sinus pressure, sore throat and voice change.    Eyes:  Negative for visual disturbance.   Respiratory:  Positive for shortness of breath. Negative for cough, chest tightness and wheezing.    Cardiovascular:  Negative for chest pain, palpitations and leg swelling.   Gastrointestinal:  Negative for abdominal distention, abdominal pain, nausea and vomiting.   Endocrine: Negative for cold intolerance and heat intolerance.   Genitourinary:  Negative for difficulty urinating and urgency.   Musculoskeletal:  Negative for arthralgias, back pain and neck pain.   Skin:  Negative for color change and pallor.   Allergic/Immunologic: Negative for environmental allergies and food allergies.   Neurological:  Negative for dizziness, syncope, weakness and light-headedness.   Hematological:  Negative for adenopathy. Does not bruise/bleed easily.   Psychiatric/Behavioral:  Negative for agitation and behavioral problems.    .    /72   Pulse 50   Temp 97.7 °F (36.5 °C)   Ht 160 cm (62.99\")   Wt 66.2 kg (146 lb)   SpO2 95% Comment: Room air at rest  BMI 25.87 kg/m²     Immunization History   Administered Date(s) Administered    COVID-19 (MODERNA) 12YRS+ (SPIKEVAX) 10/17/2023    COVID-19 (PFIZER) Purple Cap Monovalent 09/27/2021    Fluzone (or Fluarix & Flulaval for VFC) >6mos 09/29/2018    Fluzone High-Dose 65+YRS 09/19/2017, 09/30/2019, 09/20/2023    Fluzone High-Dose 65+yrs 09/10/2020, 09/24/2021    Hepatitis A 10/15/2018, 09/23/2019    Hepatitis B " Adult/Adolescent IM 10/11/2019, 11/05/2019, 06/24/2020    Influenza, Unspecified 09/10/2020, 09/29/2020       Physical Exam  Vitals and nursing note reviewed.   Constitutional:       Appearance: She is well-developed. She is not diaphoretic.   HENT:      Head: Normocephalic and atraumatic.   Eyes:      Pupils: Pupils are equal, round, and reactive to light.   Neck:      Thyroid: No thyromegaly.   Cardiovascular:      Rate and Rhythm: Normal rate and regular rhythm.      Heart sounds: Normal heart sounds. No murmur heard.     No friction rub. No gallop.   Pulmonary:      Effort: Pulmonary effort is normal. No respiratory distress.      Breath sounds: Normal breath sounds. No wheezing or rales.   Chest:      Chest wall: No tenderness.   Abdominal:      General: Bowel sounds are normal.      Palpations: Abdomen is soft.      Tenderness: There is no abdominal tenderness.   Musculoskeletal:         General: No swelling. Normal range of motion.      Cervical back: Normal range of motion and neck supple.   Lymphadenopathy:      Cervical: No cervical adenopathy.   Skin:     General: Skin is warm and dry.      Capillary Refill: Capillary refill takes less than 2 seconds.   Neurological:      Mental Status: She is alert and oriented to person, place, and time.   Psychiatric:         Mood and Affect: Mood normal.         Behavior: Behavior normal.           RESULTS    PROBLEM LIST    Problem List Items Addressed This Visit          Cardiac and Vasculature    Dyspnea on exertion    Paroxysmal atrial fibrillation - Primary       Sleep    BERNARD treated with BiPAP    Overview   Auto BIPAP              DISCUSSION    Ms. Garrison was here because she was concerned about her overnight oximetry report.  We did review the report and she did show a low oxygen below 88% on 1 L bled through her CPAP machine.  I did advise her that she needs to use 2 L bled through her BiPAP at nighttime.    She will then follow-up with Donita in September  for further management of her oxygen bled through the BiPAP.  I did encourage her to wear the BiPAP a minimum of 4 to 6 hours nightly.    She will continue follow-up with cardiology for her A-fib.    I did encourage her to stay physically active and to use oxygen during the day to maintain a saturation above 88% at all times.    She will keep her follow-up in October with me.    I personally spent a total of 32 minutes on patient visit today including chart review, face to face with the patient obtaining the history and physical exam, review of pertinent images and tests, counseling and discussion and/or coordination of care as described above, and documentation.  Total time excludes time spent on other separate services such as performing procedures or test interpretation, if applicable.        Leigh Tijerina, APRN  07/21/202515:33 EDT  Electronically signed     Please note that portions of this note were completed with a voice recognition program.        CC: Lizzette Multani MD

## 2025-08-25 RX ORDER — LANCETS
EACH MISCELLANEOUS
Qty: 100 EACH | Refills: 1 | Status: SHIPPED | OUTPATIENT
Start: 2025-08-25

## 2025-08-25 RX ORDER — AMIODARONE HYDROCHLORIDE 200 MG/1
200 TABLET ORAL DAILY
Qty: 90 TABLET | Refills: 3 | Status: SHIPPED | OUTPATIENT
Start: 2025-08-25

## (undated) DEVICE — SPHINCTEROTOME: Brand: DREAMTOME™ RX 44

## (undated) DEVICE — [HIGH FLOW INSUFFLATOR,  DO NOT USE IF PACKAGE IS DAMAGED,  KEEP DRY,  KEEP AWAY FROM SUNLIGHT,  PROTECT FROM HEAT AND RADIOACTIVE SOURCES.]: Brand: PNEUMOSURE

## (undated) DEVICE — RETRIEVAL BALLOON CATHETER: Brand: EXTRACTOR™ PRO RX

## (undated) DEVICE — SYR LUERLOK 50ML

## (undated) DEVICE — CANNULATING SPHINCTEROTOME: Brand: JAGTOME™ REVOLUTION RX

## (undated) DEVICE — LAPAROSCOPIC SMOKE FILTRATION SYSTEM: Brand: PALL LAPAROSHIELD® PLUS LAPAROSCOPIC SMOKE FILTRATION SYSTEM

## (undated) DEVICE — TUBING, SUCTION, 1/4" X 10', STRAIGHT: Brand: MEDLINE

## (undated) DEVICE — APPL CHLORAPREP W/TINT 26ML BLU

## (undated) DEVICE — ANTIBACTERIAL UNDYED BRAIDED (POLYGLACTIN 910), SYNTHETIC ABSORBABLE SURGICAL SUTURE: Brand: COATED VICRYL

## (undated) DEVICE — DEV LK WIREGUIDE FUSN OLYMP SCP

## (undated) DEVICE — SOL IRR H2O BTL 1000ML STRL

## (undated) DEVICE — SYR LL TP 10ML STRL

## (undated) DEVICE — ENDOPATH XCEL BLADELESS TROCARS WITH STABILITY SLEEVES: Brand: ENDOPATH XCEL

## (undated) DEVICE — SAFELINER SUCTION CANISTER 1000CC: Brand: DEROYAL

## (undated) DEVICE — THE BITE BLOCK MAXI, LATEX FREE STRAP IS USED TO PROTECT THE ENDOSCOPE INSERTION TUBE FROM BEING BITTEN BY THE PATIENT.

## (undated) DEVICE — PK LAP LASR CHOLE 10

## (undated) DEVICE — BOWL PLSTC MD 16OZ BLU STRL

## (undated) DEVICE — SPNG ENDO BEDSIDE TUB ENZYM

## (undated) DEVICE — KT ORCA ORCAPOD DISP STRL

## (undated) DEVICE — SOLIDIFIER LIQ PREMISORB 1500CC

## (undated) DEVICE — ENDOCUT SCISSOR TIP, DISPOSABLE: Brand: RENEW

## (undated) DEVICE — FIRST STEP BEDSIDE ADD WATER KIT - RESEALABLE STAND-UP POUCH, ENDOSCOPIC CLEANING PAD - 1 POUCH: Brand: FIRST STEP BEDSIDE ADD WATER KIT - RESEALABLE STAND-UP POUCH, ENDOSCOPIC CLEANIN

## (undated) DEVICE — GLV SURG SENSICARE PI ORTHO SZ7.5 LF STRL

## (undated) DEVICE — ENDOPATH XCEL BLUNT TIP TROCARS WITH SMOOTH SLEEVES: Brand: ENDOPATH XCEL

## (undated) DEVICE — CONTN GRAD MEAS TRIANG 32OZ BLK

## (undated) DEVICE — HYBRID CO2 TUBING/CAP SET FOR OLYMPUS® SCOPES & CO2 SOURCE: Brand: ERBE

## (undated) DEVICE — INTRO ACCSR BLNT TP

## (undated) DEVICE — ENDOPATH XCEL UNIVERSAL TROCAR STABLILITY SLEEVES: Brand: ENDOPATH XCEL

## (undated) DEVICE — SUT MNCRYL PLS ANTIB UD 4/0 PS2 18IN

## (undated) DEVICE — LUBE JELLY FOIL PACK 1.4 OZ: Brand: MEDLINE INDUSTRIES, INC.